# Patient Record
Sex: MALE | Race: WHITE | Employment: OTHER | URBAN - METROPOLITAN AREA
[De-identification: names, ages, dates, MRNs, and addresses within clinical notes are randomized per-mention and may not be internally consistent; named-entity substitution may affect disease eponyms.]

---

## 2017-01-25 RX ORDER — ATENOLOL 100 MG/1
TABLET ORAL
Qty: 90 TABLET | Refills: 1 | Status: SHIPPED | OUTPATIENT
Start: 2017-01-25 | End: 2017-07-24 | Stop reason: SDUPTHER

## 2017-02-03 RX ORDER — DIPHENOXYLATE HYDROCHLORIDE AND ATROPINE SULFATE 2.5; .025 MG/1; MG/1
TABLET ORAL
Qty: 120 TABLET | Refills: 1 | Status: SHIPPED | OUTPATIENT
Start: 2017-02-03 | End: 2017-05-23 | Stop reason: SDUPTHER

## 2017-02-20 ENCOUNTER — OFFICE VISIT (OUTPATIENT)
Dept: INTERNAL MEDICINE CLINIC | Age: 72
End: 2017-02-20

## 2017-02-20 VITALS
WEIGHT: 161.2 LBS | SYSTOLIC BLOOD PRESSURE: 136 MMHG | BODY MASS INDEX: 25.91 KG/M2 | OXYGEN SATURATION: 98 % | HEIGHT: 66 IN | HEART RATE: 78 BPM | RESPIRATION RATE: 18 BRPM | DIASTOLIC BLOOD PRESSURE: 64 MMHG

## 2017-02-20 DIAGNOSIS — E78.00 PURE HYPERCHOLESTEROLEMIA: ICD-10-CM

## 2017-02-20 DIAGNOSIS — Z13.1 SCREENING FOR DIABETES MELLITUS: ICD-10-CM

## 2017-02-20 DIAGNOSIS — I10 ESSENTIAL HYPERTENSION: ICD-10-CM

## 2017-02-20 DIAGNOSIS — Z00.00 ENCOUNTER FOR MEDICARE ANNUAL WELLNESS EXAM: Primary | ICD-10-CM

## 2017-02-20 DIAGNOSIS — Z00.00 ROUTINE GENERAL MEDICAL EXAMINATION AT A HEALTH CARE FACILITY: ICD-10-CM

## 2017-02-20 DIAGNOSIS — Z12.5 SCREENING PSA (PROSTATE SPECIFIC ANTIGEN): ICD-10-CM

## 2017-02-20 PROCEDURE — G0438 PPPS, INITIAL VISIT: HCPCS | Performed by: FAMILY MEDICINE

## 2017-02-20 RX ORDER — QUETIAPINE FUMARATE 25 MG/1
25 TABLET, FILM COATED ORAL NIGHTLY
Qty: 90 TABLET | Refills: 1 | Status: SHIPPED | OUTPATIENT
Start: 2017-02-20 | End: 2017-04-24 | Stop reason: SDUPTHER

## 2017-02-20 RX ORDER — ALPRAZOLAM 0.25 MG/1
0.25 TABLET ORAL NIGHTLY PRN
Qty: 60 TABLET | Refills: 0 | Status: SHIPPED | OUTPATIENT
Start: 2017-02-20 | End: 2017-04-24 | Stop reason: ALTCHOICE

## 2017-02-20 ASSESSMENT — LIFESTYLE VARIABLES: HOW OFTEN DO YOU HAVE A DRINK CONTAINING ALCOHOL: 0

## 2017-02-20 ASSESSMENT — ANXIETY QUESTIONNAIRES: GAD7 TOTAL SCORE: 0

## 2017-02-20 ASSESSMENT — PATIENT HEALTH QUESTIONNAIRE - PHQ9: SUM OF ALL RESPONSES TO PHQ QUESTIONS 1-9: 0

## 2017-03-17 DIAGNOSIS — Z12.5 SCREENING PSA (PROSTATE SPECIFIC ANTIGEN): ICD-10-CM

## 2017-03-17 DIAGNOSIS — I10 ESSENTIAL HYPERTENSION: ICD-10-CM

## 2017-03-17 DIAGNOSIS — Z13.1 SCREENING FOR DIABETES MELLITUS: ICD-10-CM

## 2017-03-17 DIAGNOSIS — E78.00 PURE HYPERCHOLESTEROLEMIA: ICD-10-CM

## 2017-03-17 LAB
A/G RATIO: 2.3 (ref 1.1–2.2)
ALBUMIN SERPL-MCNC: 4.6 G/DL (ref 3.4–5)
ALP BLD-CCNC: 77 U/L (ref 40–129)
ALT SERPL-CCNC: 26 U/L (ref 10–40)
ANION GAP SERPL CALCULATED.3IONS-SCNC: 16 MMOL/L (ref 3–16)
AST SERPL-CCNC: 23 U/L (ref 15–37)
BASOPHILS ABSOLUTE: 0 K/UL (ref 0–0.2)
BASOPHILS RELATIVE PERCENT: 0.4 %
BILIRUB SERPL-MCNC: 0.5 MG/DL (ref 0–1)
BUN BLDV-MCNC: 22 MG/DL (ref 7–20)
CALCIUM SERPL-MCNC: 9.9 MG/DL (ref 8.3–10.6)
CHLORIDE BLD-SCNC: 98 MMOL/L (ref 99–110)
CHOLESTEROL, TOTAL: 177 MG/DL (ref 0–199)
CO2: 28 MMOL/L (ref 21–32)
CREAT SERPL-MCNC: 0.9 MG/DL (ref 0.8–1.3)
EOSINOPHILS ABSOLUTE: 0.3 K/UL (ref 0–0.6)
EOSINOPHILS RELATIVE PERCENT: 3 %
GFR AFRICAN AMERICAN: >60
GFR NON-AFRICAN AMERICAN: >60
GLOBULIN: 2 G/DL
GLUCOSE BLD-MCNC: 104 MG/DL (ref 70–99)
HCT VFR BLD CALC: 46.3 % (ref 40.5–52.5)
HDLC SERPL-MCNC: 56 MG/DL (ref 40–60)
HEMOGLOBIN: 15.4 G/DL (ref 13.5–17.5)
LDL CHOLESTEROL CALCULATED: 88 MG/DL
LYMPHOCYTES ABSOLUTE: 1.8 K/UL (ref 1–5.1)
LYMPHOCYTES RELATIVE PERCENT: 20.2 %
MCH RBC QN AUTO: 28.7 PG (ref 26–34)
MCHC RBC AUTO-ENTMCNC: 33.3 G/DL (ref 31–36)
MCV RBC AUTO: 86.1 FL (ref 80–100)
MONOCYTES ABSOLUTE: 0.8 K/UL (ref 0–1.3)
MONOCYTES RELATIVE PERCENT: 9.7 %
NEUTROPHILS ABSOLUTE: 5.8 K/UL (ref 1.7–7.7)
NEUTROPHILS RELATIVE PERCENT: 66.7 %
PDW BLD-RTO: 14 % (ref 12.4–15.4)
PLATELET # BLD: 236 K/UL (ref 135–450)
PMV BLD AUTO: 8.4 FL (ref 5–10.5)
POTASSIUM SERPL-SCNC: 3.9 MMOL/L (ref 3.5–5.1)
PROSTATE SPECIFIC ANTIGEN: 1.47 NG/ML (ref 0–4)
RBC # BLD: 5.37 M/UL (ref 4.2–5.9)
SODIUM BLD-SCNC: 142 MMOL/L (ref 136–145)
T4 FREE: 0.9 NG/DL (ref 0.9–1.8)
TOTAL PROTEIN: 6.6 G/DL (ref 6.4–8.2)
TRIGL SERPL-MCNC: 163 MG/DL (ref 0–150)
TSH SERPL DL<=0.05 MIU/L-ACNC: 3.21 UIU/ML (ref 0.27–4.2)
VLDLC SERPL CALC-MCNC: 33 MG/DL
WBC # BLD: 8.7 K/UL (ref 4–11)

## 2017-03-27 ENCOUNTER — OFFICE VISIT (OUTPATIENT)
Dept: INTERNAL MEDICINE CLINIC | Age: 72
End: 2017-03-27

## 2017-03-27 VITALS
RESPIRATION RATE: 16 BRPM | SYSTOLIC BLOOD PRESSURE: 130 MMHG | HEART RATE: 80 BPM | DIASTOLIC BLOOD PRESSURE: 66 MMHG | OXYGEN SATURATION: 95 %

## 2017-03-27 DIAGNOSIS — F51.01 PRIMARY INSOMNIA: ICD-10-CM

## 2017-03-27 DIAGNOSIS — K58.0 IRRITABLE BOWEL SYNDROME WITH DIARRHEA: ICD-10-CM

## 2017-03-27 DIAGNOSIS — F34.1 DYSTHYMIA: ICD-10-CM

## 2017-03-27 PROCEDURE — G8420 CALC BMI NORM PARAMETERS: HCPCS | Performed by: FAMILY MEDICINE

## 2017-03-27 PROCEDURE — 99214 OFFICE O/P EST MOD 30 MIN: CPT | Performed by: FAMILY MEDICINE

## 2017-03-27 PROCEDURE — 4040F PNEUMOC VAC/ADMIN/RCVD: CPT | Performed by: FAMILY MEDICINE

## 2017-03-27 PROCEDURE — G8484 FLU IMMUNIZE NO ADMIN: HCPCS | Performed by: FAMILY MEDICINE

## 2017-03-27 PROCEDURE — 3017F COLORECTAL CA SCREEN DOC REV: CPT | Performed by: FAMILY MEDICINE

## 2017-03-27 PROCEDURE — 1123F ACP DISCUSS/DSCN MKR DOCD: CPT | Performed by: FAMILY MEDICINE

## 2017-03-27 PROCEDURE — 1036F TOBACCO NON-USER: CPT | Performed by: FAMILY MEDICINE

## 2017-03-27 PROCEDURE — G8427 DOCREV CUR MEDS BY ELIG CLIN: HCPCS | Performed by: FAMILY MEDICINE

## 2017-03-27 ASSESSMENT — ENCOUNTER SYMPTOMS
DIARRHEA: 0
BLOOD IN STOOL: 0
VOICE CHANGE: 0
ABDOMINAL PAIN: 0
TROUBLE SWALLOWING: 0
CONSTIPATION: 0
SHORTNESS OF BREATH: 0

## 2017-04-05 ENCOUNTER — OFFICE VISIT (OUTPATIENT)
Dept: SLEEP MEDICINE | Age: 72
End: 2017-04-05

## 2017-04-05 VITALS
SYSTOLIC BLOOD PRESSURE: 136 MMHG | HEIGHT: 66 IN | RESPIRATION RATE: 16 BRPM | DIASTOLIC BLOOD PRESSURE: 62 MMHG | WEIGHT: 165 LBS | HEART RATE: 90 BPM | OXYGEN SATURATION: 95 % | BODY MASS INDEX: 26.52 KG/M2

## 2017-04-05 DIAGNOSIS — F51.04 INSOMNIA, PSYCHOPHYSIOLOGICAL: Primary | ICD-10-CM

## 2017-04-05 PROCEDURE — 4040F PNEUMOC VAC/ADMIN/RCVD: CPT | Performed by: PSYCHIATRY & NEUROLOGY

## 2017-04-05 PROCEDURE — 99204 OFFICE O/P NEW MOD 45 MIN: CPT | Performed by: PSYCHIATRY & NEUROLOGY

## 2017-04-05 PROCEDURE — G8427 DOCREV CUR MEDS BY ELIG CLIN: HCPCS | Performed by: PSYCHIATRY & NEUROLOGY

## 2017-04-05 PROCEDURE — 3017F COLORECTAL CA SCREEN DOC REV: CPT | Performed by: PSYCHIATRY & NEUROLOGY

## 2017-04-05 PROCEDURE — 1036F TOBACCO NON-USER: CPT | Performed by: PSYCHIATRY & NEUROLOGY

## 2017-04-05 PROCEDURE — 1123F ACP DISCUSS/DSCN MKR DOCD: CPT | Performed by: PSYCHIATRY & NEUROLOGY

## 2017-04-05 PROCEDURE — G8420 CALC BMI NORM PARAMETERS: HCPCS | Performed by: PSYCHIATRY & NEUROLOGY

## 2017-04-05 ASSESSMENT — ENCOUNTER SYMPTOMS: COUGH: 1

## 2017-04-05 ASSESSMENT — SLEEP AND FATIGUE QUESTIONNAIRES
HOW LIKELY ARE YOU TO NOD OFF OR FALL ASLEEP WHILE SITTING AND TALKING TO SOMEONE: 0
HOW LIKELY ARE YOU TO NOD OFF OR FALL ASLEEP IN A CAR, WHILE STOPPED FOR A FEW MINUTES IN TRAFFIC: 0
HOW LIKELY ARE YOU TO NOD OFF OR FALL ASLEEP WHILE SITTING INACTIVE IN A PUBLIC PLACE: 0
HOW LIKELY ARE YOU TO NOD OFF OR FALL ASLEEP WHILE SITTING AND READING: 0
ESS TOTAL SCORE: 2
HOW LIKELY ARE YOU TO NOD OFF OR FALL ASLEEP WHILE LYING DOWN TO REST IN THE AFTERNOON WHEN CIRCUMSTANCES PERMIT: 1
HOW LIKELY ARE YOU TO NOD OFF OR FALL ASLEEP WHILE SITTING QUIETLY AFTER LUNCH WITHOUT ALCOHOL: 0
HOW LIKELY ARE YOU TO NOD OFF OR FALL ASLEEP WHILE WATCHING TV: 0
HOW LIKELY ARE YOU TO NOD OFF OR FALL ASLEEP WHEN YOU ARE A PASSENGER IN A CAR FOR AN HOUR WITHOUT A BREAK: 1
NECK CIRCUMFERENCE (INCHES): 16

## 2017-04-13 RX ORDER — PRAVASTATIN SODIUM 40 MG
TABLET ORAL
Qty: 90 TABLET | Refills: 1 | Status: SHIPPED | OUTPATIENT
Start: 2017-04-13 | End: 2017-11-01 | Stop reason: SDUPTHER

## 2017-04-24 ENCOUNTER — OFFICE VISIT (OUTPATIENT)
Dept: INTERNAL MEDICINE CLINIC | Age: 72
End: 2017-04-24

## 2017-04-24 VITALS
DIASTOLIC BLOOD PRESSURE: 72 MMHG | RESPIRATION RATE: 18 BRPM | HEART RATE: 80 BPM | SYSTOLIC BLOOD PRESSURE: 120 MMHG | BODY MASS INDEX: 27.04 KG/M2 | WEIGHT: 165 LBS | OXYGEN SATURATION: 96 %

## 2017-04-24 DIAGNOSIS — R06.02 SHORTNESS OF BREATH: ICD-10-CM

## 2017-04-24 DIAGNOSIS — J44.9 COPD, MILD (HCC): ICD-10-CM

## 2017-04-24 DIAGNOSIS — F51.01 PRIMARY INSOMNIA: ICD-10-CM

## 2017-04-24 DIAGNOSIS — F41.8 DEPRESSION WITH ANXIETY: ICD-10-CM

## 2017-04-24 DIAGNOSIS — I10 ESSENTIAL HYPERTENSION: ICD-10-CM

## 2017-04-24 DIAGNOSIS — K58.0 IRRITABLE BOWEL SYNDROME WITH DIARRHEA: ICD-10-CM

## 2017-04-24 PROCEDURE — 99214 OFFICE O/P EST MOD 30 MIN: CPT | Performed by: FAMILY MEDICINE

## 2017-04-24 PROCEDURE — 4040F PNEUMOC VAC/ADMIN/RCVD: CPT | Performed by: FAMILY MEDICINE

## 2017-04-24 PROCEDURE — G8420 CALC BMI NORM PARAMETERS: HCPCS | Performed by: FAMILY MEDICINE

## 2017-04-24 PROCEDURE — 1036F TOBACCO NON-USER: CPT | Performed by: FAMILY MEDICINE

## 2017-04-24 PROCEDURE — G8926 SPIRO NO PERF OR DOC: HCPCS | Performed by: FAMILY MEDICINE

## 2017-04-24 PROCEDURE — 3023F SPIROM DOC REV: CPT | Performed by: FAMILY MEDICINE

## 2017-04-24 PROCEDURE — 1123F ACP DISCUSS/DSCN MKR DOCD: CPT | Performed by: FAMILY MEDICINE

## 2017-04-24 PROCEDURE — 3017F COLORECTAL CA SCREEN DOC REV: CPT | Performed by: FAMILY MEDICINE

## 2017-04-24 PROCEDURE — G8427 DOCREV CUR MEDS BY ELIG CLIN: HCPCS | Performed by: FAMILY MEDICINE

## 2017-04-24 RX ORDER — QUETIAPINE FUMARATE 50 MG/1
50 TABLET, FILM COATED ORAL NIGHTLY
Qty: 30 TABLET | Refills: 3 | Status: SHIPPED | OUTPATIENT
Start: 2017-04-24 | End: 2017-07-17

## 2017-04-24 ASSESSMENT — ENCOUNTER SYMPTOMS
BLOOD IN STOOL: 0
TROUBLE SWALLOWING: 0
CONSTIPATION: 0
ABDOMINAL PAIN: 0
SHORTNESS OF BREATH: 0
DIARRHEA: 0
VOICE CHANGE: 0

## 2017-04-28 RX ORDER — ALBUTEROL SULFATE 90 UG/1
2 AEROSOL, METERED RESPIRATORY (INHALATION) EVERY 6 HOURS PRN
Qty: 1 INHALER | Refills: 2 | OUTPATIENT
Start: 2017-04-28 | End: 2019-03-23 | Stop reason: SDUPTHER

## 2017-05-01 RX ORDER — BENAZEPRIL HYDROCHLORIDE 20 MG/1
TABLET ORAL
Qty: 90 TABLET | Refills: 1 | Status: SHIPPED | OUTPATIENT
Start: 2017-05-01 | End: 2017-10-22 | Stop reason: SDUPTHER

## 2017-05-03 ENCOUNTER — OFFICE VISIT (OUTPATIENT)
Dept: SLEEP MEDICINE | Age: 72
End: 2017-05-03

## 2017-05-03 VITALS
HEART RATE: 78 BPM | RESPIRATION RATE: 12 BRPM | OXYGEN SATURATION: 98 % | SYSTOLIC BLOOD PRESSURE: 128 MMHG | DIASTOLIC BLOOD PRESSURE: 62 MMHG | BODY MASS INDEX: 27.16 KG/M2 | WEIGHT: 169 LBS | HEIGHT: 66 IN

## 2017-05-03 DIAGNOSIS — F51.04 PSYCHOPHYSIOLOGICAL INSOMNIA: Primary | ICD-10-CM

## 2017-05-03 DIAGNOSIS — G47.00 INSOMNIA, PERSISTENT: ICD-10-CM

## 2017-05-03 PROCEDURE — 1036F TOBACCO NON-USER: CPT | Performed by: PSYCHIATRY & NEUROLOGY

## 2017-05-03 PROCEDURE — 4040F PNEUMOC VAC/ADMIN/RCVD: CPT | Performed by: PSYCHIATRY & NEUROLOGY

## 2017-05-03 PROCEDURE — G8420 CALC BMI NORM PARAMETERS: HCPCS | Performed by: PSYCHIATRY & NEUROLOGY

## 2017-05-03 PROCEDURE — 3017F COLORECTAL CA SCREEN DOC REV: CPT | Performed by: PSYCHIATRY & NEUROLOGY

## 2017-05-03 PROCEDURE — G8427 DOCREV CUR MEDS BY ELIG CLIN: HCPCS | Performed by: PSYCHIATRY & NEUROLOGY

## 2017-05-03 PROCEDURE — 1123F ACP DISCUSS/DSCN MKR DOCD: CPT | Performed by: PSYCHIATRY & NEUROLOGY

## 2017-05-03 PROCEDURE — 99213 OFFICE O/P EST LOW 20 MIN: CPT | Performed by: PSYCHIATRY & NEUROLOGY

## 2017-05-03 ASSESSMENT — ENCOUNTER SYMPTOMS
EYES NEGATIVE: 1
COUGH: 1
GASTROINTESTINAL NEGATIVE: 1
COLOR CHANGE: 0

## 2017-05-15 RX ORDER — AMLODIPINE BESYLATE 10 MG/1
TABLET ORAL
Qty: 90 TABLET | Refills: 1 | Status: SHIPPED | OUTPATIENT
Start: 2017-05-15 | End: 2017-11-09 | Stop reason: SDUPTHER

## 2017-05-19 RX ORDER — QUETIAPINE FUMARATE 25 MG/1
25 TABLET, FILM COATED ORAL ONCE
Qty: 30 TABLET | Refills: 1 | Status: SHIPPED | OUTPATIENT
Start: 2017-05-19 | End: 2017-10-16 | Stop reason: ALTCHOICE

## 2017-05-23 RX ORDER — DIPHENOXYLATE HYDROCHLORIDE AND ATROPINE SULFATE 2.5; .025 MG/1; MG/1
TABLET ORAL
Qty: 120 TABLET | Refills: 0 | Status: SHIPPED | OUTPATIENT
Start: 2017-05-23 | End: 2017-06-24 | Stop reason: SDUPTHER

## 2017-05-25 ENCOUNTER — HOSPITAL ENCOUNTER (OUTPATIENT)
Dept: OTHER | Age: 72
Discharge: HOME | End: 2017-05-27
Admitting: PSYCHIATRY & NEUROLOGY

## 2017-05-25 DIAGNOSIS — F51.04 PSYCHOPHYSIOLOGICAL INSOMNIA: ICD-10-CM

## 2017-05-25 DIAGNOSIS — G47.00 INSOMNIA, PERSISTENT: ICD-10-CM

## 2017-06-01 ENCOUNTER — TELEPHONE (OUTPATIENT)
Dept: SLEEP MEDICINE | Age: 72
End: 2017-06-01

## 2017-06-06 ENCOUNTER — OFFICE VISIT (OUTPATIENT)
Dept: SLEEP MEDICINE | Age: 72
End: 2017-06-06

## 2017-06-06 VITALS
DIASTOLIC BLOOD PRESSURE: 68 MMHG | HEART RATE: 80 BPM | SYSTOLIC BLOOD PRESSURE: 130 MMHG | WEIGHT: 166 LBS | OXYGEN SATURATION: 98 % | BODY MASS INDEX: 26.68 KG/M2 | RESPIRATION RATE: 16 BRPM | HEIGHT: 66 IN

## 2017-06-06 DIAGNOSIS — F51.04 CHRONIC INSOMNIA: Primary | ICD-10-CM

## 2017-06-06 PROCEDURE — 1123F ACP DISCUSS/DSCN MKR DOCD: CPT | Performed by: PSYCHIATRY & NEUROLOGY

## 2017-06-06 PROCEDURE — 4040F PNEUMOC VAC/ADMIN/RCVD: CPT | Performed by: PSYCHIATRY & NEUROLOGY

## 2017-06-06 PROCEDURE — 99213 OFFICE O/P EST LOW 20 MIN: CPT | Performed by: PSYCHIATRY & NEUROLOGY

## 2017-06-06 PROCEDURE — G8427 DOCREV CUR MEDS BY ELIG CLIN: HCPCS | Performed by: PSYCHIATRY & NEUROLOGY

## 2017-06-06 PROCEDURE — 3017F COLORECTAL CA SCREEN DOC REV: CPT | Performed by: PSYCHIATRY & NEUROLOGY

## 2017-06-06 PROCEDURE — G8420 CALC BMI NORM PARAMETERS: HCPCS | Performed by: PSYCHIATRY & NEUROLOGY

## 2017-06-06 PROCEDURE — 1036F TOBACCO NON-USER: CPT | Performed by: PSYCHIATRY & NEUROLOGY

## 2017-06-06 RX ORDER — GABAPENTIN 300 MG/1
CAPSULE ORAL
Qty: 60 CAPSULE | Refills: 3 | Status: SHIPPED | OUTPATIENT
Start: 2017-06-06 | End: 2017-06-19

## 2017-06-06 ASSESSMENT — ENCOUNTER SYMPTOMS
ALLERGIC/IMMUNOLOGIC NEGATIVE: 1
GASTROINTESTINAL NEGATIVE: 1
RESPIRATORY NEGATIVE: 1
EYES NEGATIVE: 1

## 2017-06-16 ENCOUNTER — TELEPHONE (OUTPATIENT)
Dept: PULMONOLOGY | Age: 72
End: 2017-06-16

## 2017-06-19 RX ORDER — GABAPENTIN 600 MG/1
TABLET ORAL
Qty: 90 TABLET | Refills: 3 | Status: SHIPPED | OUTPATIENT
Start: 2017-06-19 | End: 2017-07-17 | Stop reason: SDUPTHER

## 2017-06-21 RX ORDER — VALSARTAN 320 MG/1
TABLET ORAL
Qty: 90 TABLET | Refills: 1 | Status: SHIPPED | OUTPATIENT
Start: 2017-06-21 | End: 2017-12-19 | Stop reason: SDUPTHER

## 2017-06-21 RX ORDER — HYDROCHLOROTHIAZIDE 25 MG/1
TABLET ORAL
Qty: 90 TABLET | Refills: 1 | Status: SHIPPED | OUTPATIENT
Start: 2017-06-21 | End: 2018-01-09 | Stop reason: SDUPTHER

## 2017-06-26 RX ORDER — DIPHENOXYLATE HYDROCHLORIDE AND ATROPINE SULFATE 2.5; .025 MG/1; MG/1
TABLET ORAL
Qty: 120 TABLET | Refills: 0 | Status: SHIPPED | OUTPATIENT
Start: 2017-06-26 | End: 2017-08-06 | Stop reason: SDUPTHER

## 2017-07-17 ENCOUNTER — OFFICE VISIT (OUTPATIENT)
Dept: INTERNAL MEDICINE CLINIC | Age: 72
End: 2017-07-17

## 2017-07-17 VITALS
SYSTOLIC BLOOD PRESSURE: 124 MMHG | RESPIRATION RATE: 18 BRPM | OXYGEN SATURATION: 97 % | HEART RATE: 74 BPM | DIASTOLIC BLOOD PRESSURE: 76 MMHG | WEIGHT: 163.2 LBS | BODY MASS INDEX: 26.74 KG/M2

## 2017-07-17 DIAGNOSIS — F34.1 DYSTHYMIA: ICD-10-CM

## 2017-07-17 DIAGNOSIS — I10 ESSENTIAL HYPERTENSION: ICD-10-CM

## 2017-07-17 DIAGNOSIS — J44.9 COPD, MILD (HCC): ICD-10-CM

## 2017-07-17 DIAGNOSIS — F51.01 PRIMARY INSOMNIA: Primary | ICD-10-CM

## 2017-07-17 DIAGNOSIS — K58.0 IRRITABLE BOWEL SYNDROME WITH DIARRHEA: ICD-10-CM

## 2017-07-17 PROCEDURE — 1036F TOBACCO NON-USER: CPT | Performed by: FAMILY MEDICINE

## 2017-07-17 PROCEDURE — G8926 SPIRO NO PERF OR DOC: HCPCS | Performed by: FAMILY MEDICINE

## 2017-07-17 PROCEDURE — 99214 OFFICE O/P EST MOD 30 MIN: CPT | Performed by: FAMILY MEDICINE

## 2017-07-17 PROCEDURE — 4040F PNEUMOC VAC/ADMIN/RCVD: CPT | Performed by: FAMILY MEDICINE

## 2017-07-17 PROCEDURE — 3017F COLORECTAL CA SCREEN DOC REV: CPT | Performed by: FAMILY MEDICINE

## 2017-07-17 PROCEDURE — G8427 DOCREV CUR MEDS BY ELIG CLIN: HCPCS | Performed by: FAMILY MEDICINE

## 2017-07-17 PROCEDURE — 3023F SPIROM DOC REV: CPT | Performed by: FAMILY MEDICINE

## 2017-07-17 PROCEDURE — G8419 CALC BMI OUT NRM PARAM NOF/U: HCPCS | Performed by: FAMILY MEDICINE

## 2017-07-17 PROCEDURE — 1123F ACP DISCUSS/DSCN MKR DOCD: CPT | Performed by: FAMILY MEDICINE

## 2017-07-17 RX ORDER — GABAPENTIN 600 MG/1
TABLET ORAL
Qty: 90 TABLET | Refills: 3 | COMMUNITY
Start: 2017-07-17 | End: 2017-11-01

## 2017-07-17 ASSESSMENT — ENCOUNTER SYMPTOMS
ABDOMINAL PAIN: 0
DIARRHEA: 0
TROUBLE SWALLOWING: 0
BLOOD IN STOOL: 0
SHORTNESS OF BREATH: 0
CONSTIPATION: 0
VOICE CHANGE: 0

## 2017-07-24 RX ORDER — ATENOLOL 100 MG/1
TABLET ORAL
Qty: 90 TABLET | Refills: 1 | Status: SHIPPED | OUTPATIENT
Start: 2017-07-24 | End: 2018-01-24 | Stop reason: SDUPTHER

## 2017-08-07 RX ORDER — DIPHENOXYLATE HYDROCHLORIDE AND ATROPINE SULFATE 2.5; .025 MG/1; MG/1
TABLET ORAL
Qty: 120 TABLET | Refills: 0 | Status: SHIPPED | OUTPATIENT
Start: 2017-08-07 | End: 2017-10-10 | Stop reason: SDUPTHER

## 2017-08-11 RX ORDER — VENLAFAXINE HYDROCHLORIDE 150 MG/1
CAPSULE, EXTENDED RELEASE ORAL
Qty: 90 CAPSULE | Refills: 1 | Status: SHIPPED | OUTPATIENT
Start: 2017-08-11 | End: 2018-02-08 | Stop reason: SDUPTHER

## 2017-09-12 ENCOUNTER — OFFICE VISIT (OUTPATIENT)
Dept: SLEEP MEDICINE | Age: 72
End: 2017-09-12

## 2017-09-12 VITALS
BODY MASS INDEX: 26.36 KG/M2 | OXYGEN SATURATION: 97 % | WEIGHT: 164 LBS | RESPIRATION RATE: 20 BRPM | HEIGHT: 66 IN | DIASTOLIC BLOOD PRESSURE: 70 MMHG | SYSTOLIC BLOOD PRESSURE: 140 MMHG | HEART RATE: 71 BPM

## 2017-09-12 DIAGNOSIS — F51.04 CHRONIC INSOMNIA: Primary | ICD-10-CM

## 2017-09-12 PROCEDURE — 1036F TOBACCO NON-USER: CPT | Performed by: PSYCHIATRY & NEUROLOGY

## 2017-09-12 PROCEDURE — 3017F COLORECTAL CA SCREEN DOC REV: CPT | Performed by: PSYCHIATRY & NEUROLOGY

## 2017-09-12 PROCEDURE — G8427 DOCREV CUR MEDS BY ELIG CLIN: HCPCS | Performed by: PSYCHIATRY & NEUROLOGY

## 2017-09-12 PROCEDURE — 1123F ACP DISCUSS/DSCN MKR DOCD: CPT | Performed by: PSYCHIATRY & NEUROLOGY

## 2017-09-12 PROCEDURE — G8417 CALC BMI ABV UP PARAM F/U: HCPCS | Performed by: PSYCHIATRY & NEUROLOGY

## 2017-09-12 PROCEDURE — 99213 OFFICE O/P EST LOW 20 MIN: CPT | Performed by: PSYCHIATRY & NEUROLOGY

## 2017-09-12 PROCEDURE — 4040F PNEUMOC VAC/ADMIN/RCVD: CPT | Performed by: PSYCHIATRY & NEUROLOGY

## 2017-09-12 ASSESSMENT — ENCOUNTER SYMPTOMS
GASTROINTESTINAL NEGATIVE: 1
ALLERGIC/IMMUNOLOGIC NEGATIVE: 1
EYES NEGATIVE: 1
SHORTNESS OF BREATH: 0
APNEA: 0

## 2017-09-28 ENCOUNTER — TELEPHONE (OUTPATIENT)
Dept: PULMONOLOGY | Age: 72
End: 2017-09-28

## 2017-09-28 NOTE — TELEPHONE ENCOUNTER
Damaris Amos called stating that Dr Marzena Martínez prescribed Prosom 2mg tablets and he can take 1- 1 1/2 tabs nightly. He has been taking 1 1/2 tabs every night but this is not allowing him to stay asleep. He is taking and additional 1/2 tablet. He wants to know if Dr Marzena Martínez can increase his Rx to 2 mg.  Nightly

## 2017-10-09 ENCOUNTER — TELEPHONE (OUTPATIENT)
Dept: INTERNAL MEDICINE CLINIC | Age: 72
End: 2017-10-09

## 2017-10-09 RX ORDER — AZITHROMYCIN 250 MG/1
TABLET, FILM COATED ORAL
Qty: 1 PACKET | Refills: 0 | Status: SHIPPED | OUTPATIENT
Start: 2017-10-09 | End: 2017-10-16 | Stop reason: ALTCHOICE

## 2017-10-09 NOTE — TELEPHONE ENCOUNTER
Patient requesting z-pack to be sent to [facility] for his URI. Pt c/o of sx's for about a week now. Nasal congestion,sneezing etc. Please advise. Rivera Ward can be reached at [phone] .

## 2017-10-11 RX ORDER — DIPHENOXYLATE HYDROCHLORIDE AND ATROPINE SULFATE 2.5; .025 MG/1; MG/1
TABLET ORAL
Qty: 120 TABLET | Refills: 1 | Status: SHIPPED | OUTPATIENT
Start: 2017-10-11 | End: 2018-04-21 | Stop reason: SDUPTHER

## 2017-10-16 ENCOUNTER — OFFICE VISIT (OUTPATIENT)
Dept: INTERNAL MEDICINE CLINIC | Age: 72
End: 2017-10-16

## 2017-10-16 VITALS
HEART RATE: 76 BPM | DIASTOLIC BLOOD PRESSURE: 71 MMHG | OXYGEN SATURATION: 97 % | RESPIRATION RATE: 18 BRPM | SYSTOLIC BLOOD PRESSURE: 112 MMHG

## 2017-10-16 DIAGNOSIS — F51.01 PRIMARY INSOMNIA: ICD-10-CM

## 2017-10-16 DIAGNOSIS — R26.81 UNSTEADY: ICD-10-CM

## 2017-10-16 DIAGNOSIS — F41.8 DEPRESSION WITH ANXIETY: ICD-10-CM

## 2017-10-16 DIAGNOSIS — J20.9 ACUTE BRONCHITIS DUE TO INFECTION: ICD-10-CM

## 2017-10-16 DIAGNOSIS — J44.1 CHRONIC OBSTRUCTIVE PULMONARY DISEASE WITH ACUTE EXACERBATION (HCC): ICD-10-CM

## 2017-10-16 PROCEDURE — 1036F TOBACCO NON-USER: CPT | Performed by: FAMILY MEDICINE

## 2017-10-16 PROCEDURE — 4040F PNEUMOC VAC/ADMIN/RCVD: CPT | Performed by: FAMILY MEDICINE

## 2017-10-16 PROCEDURE — 99214 OFFICE O/P EST MOD 30 MIN: CPT | Performed by: FAMILY MEDICINE

## 2017-10-16 PROCEDURE — 1123F ACP DISCUSS/DSCN MKR DOCD: CPT | Performed by: FAMILY MEDICINE

## 2017-10-16 PROCEDURE — 3017F COLORECTAL CA SCREEN DOC REV: CPT | Performed by: FAMILY MEDICINE

## 2017-10-16 PROCEDURE — G8427 DOCREV CUR MEDS BY ELIG CLIN: HCPCS | Performed by: FAMILY MEDICINE

## 2017-10-16 PROCEDURE — G8484 FLU IMMUNIZE NO ADMIN: HCPCS | Performed by: FAMILY MEDICINE

## 2017-10-16 PROCEDURE — 3023F SPIROM DOC REV: CPT | Performed by: FAMILY MEDICINE

## 2017-10-16 PROCEDURE — G8417 CALC BMI ABV UP PARAM F/U: HCPCS | Performed by: FAMILY MEDICINE

## 2017-10-16 PROCEDURE — G8926 SPIRO NO PERF OR DOC: HCPCS | Performed by: FAMILY MEDICINE

## 2017-10-16 RX ORDER — PREDNISONE 20 MG/1
20 TABLET ORAL DAILY
Qty: 10 TABLET | Refills: 0 | Status: SHIPPED | OUTPATIENT
Start: 2017-10-16 | End: 2017-10-26

## 2017-10-16 ASSESSMENT — ENCOUNTER SYMPTOMS
DIARRHEA: 0
CONSTIPATION: 0
SHORTNESS OF BREATH: 0
VOICE CHANGE: 0
ABDOMINAL PAIN: 0
BLOOD IN STOOL: 0
TROUBLE SWALLOWING: 0

## 2017-10-23 RX ORDER — BENAZEPRIL HYDROCHLORIDE 20 MG/1
TABLET ORAL
Qty: 90 TABLET | Refills: 1 | Status: SHIPPED | OUTPATIENT
Start: 2017-10-23 | End: 2017-11-01

## 2017-10-25 ENCOUNTER — HOSPITAL ENCOUNTER (OUTPATIENT)
Dept: OTHER | Age: 72
Discharge: HOME | End: 2017-10-25
Attending: FAMILY MEDICINE | Admitting: FAMILY MEDICINE

## 2017-10-25 DIAGNOSIS — J44.1 CHRONIC OBSTRUCTIVE PULMONARY DISEASE WITH ACUTE EXACERBATION (HCC): ICD-10-CM

## 2017-10-25 DIAGNOSIS — J20.9 ACUTE BRONCHITIS DUE TO INFECTION: ICD-10-CM

## 2017-11-01 ENCOUNTER — OFFICE VISIT (OUTPATIENT)
Dept: INTERNAL MEDICINE CLINIC | Age: 72
End: 2017-11-01

## 2017-11-01 VITALS — SYSTOLIC BLOOD PRESSURE: 102 MMHG | DIASTOLIC BLOOD PRESSURE: 67 MMHG | HEART RATE: 90 BPM | OXYGEN SATURATION: 97 %

## 2017-11-01 DIAGNOSIS — F51.01 PRIMARY INSOMNIA: ICD-10-CM

## 2017-11-01 DIAGNOSIS — F34.1 DYSTHYMIA: ICD-10-CM

## 2017-11-01 DIAGNOSIS — K58.0 IRRITABLE BOWEL SYNDROME WITH DIARRHEA: ICD-10-CM

## 2017-11-01 DIAGNOSIS — J44.9 COPD, MILD (HCC): ICD-10-CM

## 2017-11-01 DIAGNOSIS — I10 ESSENTIAL HYPERTENSION: ICD-10-CM

## 2017-11-01 PROCEDURE — G8926 SPIRO NO PERF OR DOC: HCPCS | Performed by: FAMILY MEDICINE

## 2017-11-01 PROCEDURE — 4040F PNEUMOC VAC/ADMIN/RCVD: CPT | Performed by: FAMILY MEDICINE

## 2017-11-01 PROCEDURE — 99214 OFFICE O/P EST MOD 30 MIN: CPT | Performed by: FAMILY MEDICINE

## 2017-11-01 PROCEDURE — 3023F SPIROM DOC REV: CPT | Performed by: FAMILY MEDICINE

## 2017-11-01 PROCEDURE — G8427 DOCREV CUR MEDS BY ELIG CLIN: HCPCS | Performed by: FAMILY MEDICINE

## 2017-11-01 PROCEDURE — 1123F ACP DISCUSS/DSCN MKR DOCD: CPT | Performed by: FAMILY MEDICINE

## 2017-11-01 PROCEDURE — 3017F COLORECTAL CA SCREEN DOC REV: CPT | Performed by: FAMILY MEDICINE

## 2017-11-01 PROCEDURE — G8484 FLU IMMUNIZE NO ADMIN: HCPCS | Performed by: FAMILY MEDICINE

## 2017-11-01 PROCEDURE — 1036F TOBACCO NON-USER: CPT | Performed by: FAMILY MEDICINE

## 2017-11-01 PROCEDURE — G8417 CALC BMI ABV UP PARAM F/U: HCPCS | Performed by: FAMILY MEDICINE

## 2017-11-01 RX ORDER — PRAVASTATIN SODIUM 40 MG
TABLET ORAL
Qty: 90 TABLET | Refills: 1 | Status: SHIPPED | OUTPATIENT
Start: 2017-11-01 | End: 2018-05-05 | Stop reason: SDUPTHER

## 2017-11-01 ASSESSMENT — ENCOUNTER SYMPTOMS
SHORTNESS OF BREATH: 0
TROUBLE SWALLOWING: 0
DIARRHEA: 0
VOICE CHANGE: 0
BLOOD IN STOOL: 0
ABDOMINAL PAIN: 0
CONSTIPATION: 0

## 2017-11-01 NOTE — PROGRESS NOTES
affect and unsteadiness    2. COPD, mild (HCC) -Continue Ventolin inhaler as needed    3. Essential hypertension - controlled continue atenolol 100 mg daily, hydrochlorothiazide 25 mg daily and add Diovan 325 mg daily plus the Norvasc 10 mg daily. Hold benazepril x 2 weeks due to lingering cough -which might be ACE cough   4. Dysthymia -Stable on Effexor 150 mg daily    5. Irritable bowel syndrome with diarrhea - continue high-fiber diet and up when necessary Lomotil                 Plan:      As above.  RTC in 2-3 mos and PRN

## 2017-11-10 RX ORDER — AMLODIPINE BESYLATE 10 MG/1
TABLET ORAL
Qty: 90 TABLET | Refills: 1 | Status: SHIPPED | OUTPATIENT
Start: 2017-11-10 | End: 2018-02-12 | Stop reason: SDUPTHER

## 2017-12-11 RX ORDER — FLUTICASONE FUROATE AND VILANTEROL 200; 25 UG/1; UG/1
1 POWDER RESPIRATORY (INHALATION) DAILY
Qty: 1 EACH | Refills: 2 | Status: SHIPPED | OUTPATIENT
Start: 2017-12-11 | End: 2018-01-16 | Stop reason: SDUPTHER

## 2017-12-19 RX ORDER — VALSARTAN 320 MG/1
TABLET ORAL
Qty: 90 TABLET | Refills: 1 | Status: SHIPPED | OUTPATIENT
Start: 2017-12-19 | End: 2018-06-20 | Stop reason: SDUPTHER

## 2018-01-09 RX ORDER — HYDROCHLOROTHIAZIDE 25 MG/1
TABLET ORAL
Qty: 90 TABLET | Refills: 1 | Status: SHIPPED | OUTPATIENT
Start: 2018-01-09 | End: 2018-07-10 | Stop reason: SDUPTHER

## 2018-01-16 ENCOUNTER — OFFICE VISIT (OUTPATIENT)
Dept: INTERNAL MEDICINE CLINIC | Age: 73
End: 2018-01-16

## 2018-01-16 VITALS
RESPIRATION RATE: 18 BRPM | WEIGHT: 172.6 LBS | SYSTOLIC BLOOD PRESSURE: 138 MMHG | DIASTOLIC BLOOD PRESSURE: 62 MMHG | HEART RATE: 78 BPM | OXYGEN SATURATION: 97 % | BODY MASS INDEX: 28.29 KG/M2

## 2018-01-16 DIAGNOSIS — J44.9 COPD, MILD (HCC): Primary | ICD-10-CM

## 2018-01-16 DIAGNOSIS — F51.01 PRIMARY INSOMNIA: ICD-10-CM

## 2018-01-16 DIAGNOSIS — F34.1 DYSTHYMIA: ICD-10-CM

## 2018-01-16 DIAGNOSIS — K58.0 IRRITABLE BOWEL SYNDROME WITH DIARRHEA: ICD-10-CM

## 2018-01-16 DIAGNOSIS — I10 ESSENTIAL HYPERTENSION: ICD-10-CM

## 2018-01-16 PROCEDURE — 1036F TOBACCO NON-USER: CPT | Performed by: FAMILY MEDICINE

## 2018-01-16 PROCEDURE — G8427 DOCREV CUR MEDS BY ELIG CLIN: HCPCS | Performed by: FAMILY MEDICINE

## 2018-01-16 PROCEDURE — 3023F SPIROM DOC REV: CPT | Performed by: FAMILY MEDICINE

## 2018-01-16 PROCEDURE — 99214 OFFICE O/P EST MOD 30 MIN: CPT | Performed by: FAMILY MEDICINE

## 2018-01-16 PROCEDURE — G8417 CALC BMI ABV UP PARAM F/U: HCPCS | Performed by: FAMILY MEDICINE

## 2018-01-16 PROCEDURE — 4040F PNEUMOC VAC/ADMIN/RCVD: CPT | Performed by: FAMILY MEDICINE

## 2018-01-16 PROCEDURE — G8484 FLU IMMUNIZE NO ADMIN: HCPCS | Performed by: FAMILY MEDICINE

## 2018-01-16 PROCEDURE — 3017F COLORECTAL CA SCREEN DOC REV: CPT | Performed by: FAMILY MEDICINE

## 2018-01-16 PROCEDURE — 1123F ACP DISCUSS/DSCN MKR DOCD: CPT | Performed by: FAMILY MEDICINE

## 2018-01-16 PROCEDURE — G8926 SPIRO NO PERF OR DOC: HCPCS | Performed by: FAMILY MEDICINE

## 2018-01-16 RX ORDER — QUETIAPINE FUMARATE 25 MG/1
25 TABLET, FILM COATED ORAL ONCE
Qty: 30 TABLET | Refills: 1 | Status: SHIPPED | OUTPATIENT
Start: 2018-01-16 | End: 2018-02-09 | Stop reason: ALTCHOICE

## 2018-01-16 RX ORDER — FLUTICASONE FUROATE AND VILANTEROL 200; 25 UG/1; UG/1
1 POWDER RESPIRATORY (INHALATION) DAILY
Qty: 1 EACH | Refills: 2 | Status: SHIPPED | OUTPATIENT
Start: 2018-01-16 | End: 2018-05-26 | Stop reason: SDUPTHER

## 2018-01-16 ASSESSMENT — ENCOUNTER SYMPTOMS
TROUBLE SWALLOWING: 0
VOICE CHANGE: 0
DIARRHEA: 0
BLOOD IN STOOL: 0
CONSTIPATION: 0
SHORTNESS OF BREATH: 0
ABDOMINAL PAIN: 0

## 2018-01-16 NOTE — PROGRESS NOTES
Subjective:      Patient ID: Cassie Hollis is a 74 y.o. male. Patient presented to the office for follow-up of COPD, hypertension, dysthymia, insomnia and IBS. He is doing okay although there is some little adjustment with regards to a sleeping medicine. He still take Seroquel 12.5 to help maintain his sleep. He was prescribed gabapentin by the sleep medicine specialist  HPI Review of Systems   Constitutional: Negative for activity change. HENT: Negative for trouble swallowing and voice change. Eyes: Negative for visual disturbance. Respiratory: Negative for shortness of breath. Cardiovascular: Negative for chest pain and leg swelling. Gastrointestinal: Negative for abdominal pain, blood in stool, constipation and diarrhea. Genitourinary: Negative for difficulty urinating, dysuria, frequency, hematuria and scrotal swelling. Musculoskeletal: Negative for arthralgias and myalgias. Skin: Negative for rash. Neurological: Negative for dizziness. Psychiatric/Behavioral: Negative for behavioral problems. Objective:   Physical Exam   Constitutional: He is oriented to person, place, and time. He appears well-developed and well-nourished. No distress. HENT:   Head: Normocephalic. Eyes: Conjunctivae are normal.   Neck: Neck supple. No thyromegaly present. Cardiovascular: Normal rate, regular rhythm and normal heart sounds. No murmur heard. Pulmonary/Chest: Breath sounds normal. No respiratory distress. He has no wheezes. He has no rales. Abdominal: Soft. He exhibits no distension. Musculoskeletal: Normal range of motion. He exhibits no edema. Neurological: He is alert and oriented to person, place, and time. Skin: Skin is warm. No rash noted. Psychiatric: He has a normal mood and affect. His behavior is normal.       Assessment:      1. COPD, mild ([address] - stable on Breo and Ventolin HFA   2.  Essential hypertension -controlled Atenolol 100 mg daily, amlodipine 10 mg daily, and Diovan 325 mg daily, plus HCTZ 25 mg daily    3. Dysthymia - he takes Effexor 150 mg daily advised to increase the dose of Seroquel to 25 mg daily which might help sleep and he may discontinue  gabapentin which is also use to maintain his sleep    4. Primary insomnia - She also take ProSom 2 mg 1-2 tablets at night. 5. Irritable bowel syndrome with diarrhea - Continue high fiber diet plus Lomotil as needed             Plan:      As above.  RTC in 3 mos

## 2018-01-24 RX ORDER — ATENOLOL 100 MG/1
TABLET ORAL
Qty: 90 TABLET | Refills: 1 | Status: SHIPPED | OUTPATIENT
Start: 2018-01-24 | End: 2018-07-25 | Stop reason: SDUPTHER

## 2018-01-30 RX ORDER — QUETIAPINE FUMARATE 25 MG/1
TABLET, FILM COATED ORAL
Qty: 30 TABLET | Refills: 3 | Status: SHIPPED | OUTPATIENT
Start: 2018-01-30 | End: 2018-05-30 | Stop reason: SDUPTHER

## 2018-02-09 RX ORDER — VENLAFAXINE HYDROCHLORIDE 150 MG/1
CAPSULE, EXTENDED RELEASE ORAL
Qty: 90 CAPSULE | Refills: 1 | Status: SHIPPED | OUTPATIENT
Start: 2018-02-09 | End: 2018-08-13 | Stop reason: SDUPTHER

## 2018-02-12 RX ORDER — AMLODIPINE BESYLATE 10 MG/1
TABLET ORAL
Qty: 90 TABLET | Refills: 1 | Status: SHIPPED | OUTPATIENT
Start: 2018-02-12 | End: 2018-05-29 | Stop reason: SDUPTHER

## 2018-03-29 ENCOUNTER — OFFICE VISIT (OUTPATIENT)
Dept: SLEEP MEDICINE | Age: 73
End: 2018-03-29

## 2018-03-29 VITALS
HEIGHT: 70 IN | OXYGEN SATURATION: 97 % | TEMPERATURE: 98.6 F | SYSTOLIC BLOOD PRESSURE: 124 MMHG | BODY MASS INDEX: 24.91 KG/M2 | WEIGHT: 174 LBS | RESPIRATION RATE: 16 BRPM | DIASTOLIC BLOOD PRESSURE: 68 MMHG | HEART RATE: 76 BPM

## 2018-03-29 DIAGNOSIS — F51.04 CHRONIC INSOMNIA: Primary | ICD-10-CM

## 2018-03-29 PROCEDURE — G8427 DOCREV CUR MEDS BY ELIG CLIN: HCPCS | Performed by: PSYCHIATRY & NEUROLOGY

## 2018-03-29 PROCEDURE — 1123F ACP DISCUSS/DSCN MKR DOCD: CPT | Performed by: PSYCHIATRY & NEUROLOGY

## 2018-03-29 PROCEDURE — 1036F TOBACCO NON-USER: CPT | Performed by: PSYCHIATRY & NEUROLOGY

## 2018-03-29 PROCEDURE — 3017F COLORECTAL CA SCREEN DOC REV: CPT | Performed by: PSYCHIATRY & NEUROLOGY

## 2018-03-29 PROCEDURE — 4040F PNEUMOC VAC/ADMIN/RCVD: CPT | Performed by: PSYCHIATRY & NEUROLOGY

## 2018-03-29 PROCEDURE — G8482 FLU IMMUNIZE ORDER/ADMIN: HCPCS | Performed by: PSYCHIATRY & NEUROLOGY

## 2018-03-29 PROCEDURE — G8420 CALC BMI NORM PARAMETERS: HCPCS | Performed by: PSYCHIATRY & NEUROLOGY

## 2018-03-29 PROCEDURE — 99213 OFFICE O/P EST LOW 20 MIN: CPT | Performed by: PSYCHIATRY & NEUROLOGY

## 2018-03-29 NOTE — PATIENT INSTRUCTIONS
Patient Education        Insomnia: Care Instructions  Your Care Instructions    Insomnia is the inability to sleep well. It is a common problem for most people at some time. Insomnia may make it hard for you to get to sleep, stay asleep, or sleep as long as you need to. This can make you tired and grouchy during the day. It can also make you forgetful, less effective at work, and unhappy. Insomnia can be caused by conditions such as depression or anxiety. Pain can also affect your ability to sleep. When these problems are solved, the insomnia usually clears up. But sometimes bad sleep habits can cause insomnia. If insomnia is affecting your work or your enjoyment of life, you can take steps to improve your sleep. Follow-up care is a key part of your treatment and safety. Be sure to make and go to all appointments, and call your doctor if you are having problems. It's also a good idea to know your test results and keep a list of the medicines you take. How can you care for yourself at home? What to avoid  · Do not have drinks with caffeine, such as coffee or black tea, for 8 hours before bed. · Do not smoke or use other types of tobacco near bedtime. Nicotine is a stimulant and can keep you awake. · Avoid drinking alcohol late in the evening, because it can cause you to wake in the middle of the night. · Do not eat a big meal close to bedtime. If you are hungry, eat a light snack. · Do not drink a lot of water close to bedtime, because the need to urinate may wake you up during the night. · Do not read or watch TV in bed. Use the bed only for sleeping and sexual activity. What to try  · Go to bed at the same time every night, and wake up at the same time every morning. Do not take naps during the day. · Keep your bedroom quiet, dark, and cool. · Sleep on a comfortable pillow and mattress. · If watching the clock makes you anxious, turn it facing away from you so you cannot see the time.   · If you

## 2018-04-05 ENCOUNTER — OFFICE VISIT (OUTPATIENT)
Dept: INTERNAL MEDICINE CLINIC | Age: 73
End: 2018-04-05

## 2018-04-05 VITALS
TEMPERATURE: 98.8 F | BODY MASS INDEX: 25.02 KG/M2 | SYSTOLIC BLOOD PRESSURE: 130 MMHG | WEIGHT: 174.4 LBS | DIASTOLIC BLOOD PRESSURE: 70 MMHG | HEART RATE: 89 BPM | OXYGEN SATURATION: 98 %

## 2018-04-05 DIAGNOSIS — F51.01 PRIMARY INSOMNIA: ICD-10-CM

## 2018-04-05 DIAGNOSIS — Z23 NEED FOR TDAP VACCINATION: ICD-10-CM

## 2018-04-05 DIAGNOSIS — J20.9 ACUTE BRONCHITIS DUE TO INFECTION: ICD-10-CM

## 2018-04-05 DIAGNOSIS — Z13.6 SCREENING FOR AAA (ABDOMINAL AORTIC ANEURYSM): ICD-10-CM

## 2018-04-05 DIAGNOSIS — F34.1 DYSTHYMIA: ICD-10-CM

## 2018-04-05 DIAGNOSIS — J44.9 COPD, MILD (HCC): ICD-10-CM

## 2018-04-05 PROCEDURE — G8926 SPIRO NO PERF OR DOC: HCPCS | Performed by: FAMILY MEDICINE

## 2018-04-05 PROCEDURE — 4040F PNEUMOC VAC/ADMIN/RCVD: CPT | Performed by: FAMILY MEDICINE

## 2018-04-05 PROCEDURE — G8417 CALC BMI ABV UP PARAM F/U: HCPCS | Performed by: FAMILY MEDICINE

## 2018-04-05 PROCEDURE — 99214 OFFICE O/P EST MOD 30 MIN: CPT | Performed by: FAMILY MEDICINE

## 2018-04-05 PROCEDURE — 1123F ACP DISCUSS/DSCN MKR DOCD: CPT | Performed by: FAMILY MEDICINE

## 2018-04-05 PROCEDURE — G8427 DOCREV CUR MEDS BY ELIG CLIN: HCPCS | Performed by: FAMILY MEDICINE

## 2018-04-05 PROCEDURE — 1036F TOBACCO NON-USER: CPT | Performed by: FAMILY MEDICINE

## 2018-04-05 PROCEDURE — 3017F COLORECTAL CA SCREEN DOC REV: CPT | Performed by: FAMILY MEDICINE

## 2018-04-05 PROCEDURE — 3023F SPIROM DOC REV: CPT | Performed by: FAMILY MEDICINE

## 2018-04-05 RX ORDER — AZITHROMYCIN 250 MG/1
TABLET, FILM COATED ORAL
Qty: 1 PACKET | Refills: 0 | Status: SHIPPED | OUTPATIENT
Start: 2018-04-05 | End: 2018-04-15

## 2018-04-05 RX ORDER — GUAIFENESIN AND CODEINE PHOSPHATE 100; 10 MG/5ML; MG/5ML
5 SOLUTION ORAL 4 TIMES DAILY PRN
Qty: 120 ML | Refills: 1 | Status: SHIPPED | OUTPATIENT
Start: 2018-04-05 | End: 2018-04-12

## 2018-04-05 ASSESSMENT — ENCOUNTER SYMPTOMS
DIARRHEA: 0
TROUBLE SWALLOWING: 0
SHORTNESS OF BREATH: 0
ABDOMINAL PAIN: 0
CONSTIPATION: 0
BLOOD IN STOOL: 0
VOICE CHANGE: 0

## 2018-04-16 ENCOUNTER — HOSPITAL ENCOUNTER (OUTPATIENT)
Dept: ULTRASOUND IMAGING | Age: 73
Discharge: HOME | End: 2018-04-16
Attending: FAMILY MEDICINE | Admitting: FAMILY MEDICINE

## 2018-04-16 DIAGNOSIS — Z13.6 ENCOUNTER FOR SCREENING FOR CARDIOVASCULAR DISORDERS: ICD-10-CM

## 2018-04-16 DIAGNOSIS — Z13.6 SCREENING FOR AAA (ABDOMINAL AORTIC ANEURYSM): ICD-10-CM

## 2018-04-19 DIAGNOSIS — F51.04 CHRONIC INSOMNIA: ICD-10-CM

## 2018-04-19 DIAGNOSIS — Z12.5 SCREENING PSA (PROSTATE SPECIFIC ANTIGEN): ICD-10-CM

## 2018-04-19 DIAGNOSIS — I10 ESSENTIAL HYPERTENSION: Primary | ICD-10-CM

## 2018-04-19 DIAGNOSIS — E78.00 PURE HYPERCHOLESTEROLEMIA: ICD-10-CM

## 2018-04-21 DIAGNOSIS — K52.9 CHRONIC DIARRHEA: Primary | ICD-10-CM

## 2018-04-23 RX ORDER — DIPHENOXYLATE HYDROCHLORIDE AND ATROPINE SULFATE 2.5; .025 MG/1; MG/1
TABLET ORAL
Qty: 120 TABLET | Refills: 0 | Status: SHIPPED | OUTPATIENT
Start: 2018-04-23 | End: 2018-08-14 | Stop reason: SDUPTHER

## 2018-04-23 RX ORDER — BENAZEPRIL HYDROCHLORIDE 20 MG/1
TABLET ORAL
Qty: 90 TABLET | Refills: 1 | Status: SHIPPED | OUTPATIENT
Start: 2018-04-23 | End: 2018-10-28 | Stop reason: SDUPTHER

## 2018-04-25 DIAGNOSIS — I10 ESSENTIAL HYPERTENSION: ICD-10-CM

## 2018-04-25 DIAGNOSIS — E78.00 PURE HYPERCHOLESTEROLEMIA: ICD-10-CM

## 2018-04-25 DIAGNOSIS — Z12.5 SCREENING PSA (PROSTATE SPECIFIC ANTIGEN): ICD-10-CM

## 2018-04-25 LAB
A/G RATIO: 1.8 (ref 1.1–2.2)
ALBUMIN SERPL-MCNC: 4.2 G/DL (ref 3.4–5)
ALP BLD-CCNC: 88 U/L (ref 40–129)
ALT SERPL-CCNC: 22 U/L (ref 10–40)
ANION GAP SERPL CALCULATED.3IONS-SCNC: 13 MMOL/L (ref 3–16)
AST SERPL-CCNC: 22 U/L (ref 15–37)
BILIRUB SERPL-MCNC: 0.4 MG/DL (ref 0–1)
BUN BLDV-MCNC: 23 MG/DL (ref 7–20)
CALCIUM SERPL-MCNC: 9.3 MG/DL (ref 8.3–10.6)
CHLORIDE BLD-SCNC: 100 MMOL/L (ref 99–110)
CHOLESTEROL, TOTAL: 170 MG/DL (ref 0–199)
CO2: 29 MMOL/L (ref 21–32)
CREAT SERPL-MCNC: 0.9 MG/DL (ref 0.8–1.3)
GFR AFRICAN AMERICAN: >60
GFR NON-AFRICAN AMERICAN: >60
GLOBULIN: 2.3 G/DL
GLUCOSE BLD-MCNC: 109 MG/DL (ref 70–99)
HCT VFR BLD CALC: 45.2 % (ref 40.5–52.5)
HDLC SERPL-MCNC: 44 MG/DL (ref 40–60)
HEMOGLOBIN: 15.5 G/DL (ref 13.5–17.5)
LDL CHOLESTEROL CALCULATED: 106 MG/DL
MCH RBC QN AUTO: 29.2 PG (ref 26–34)
MCHC RBC AUTO-ENTMCNC: 34.3 G/DL (ref 31–36)
MCV RBC AUTO: 85.2 FL (ref 80–100)
PDW BLD-RTO: 14 % (ref 12.4–15.4)
PLATELET # BLD: 299 K/UL (ref 135–450)
PMV BLD AUTO: 7.8 FL (ref 5–10.5)
POTASSIUM SERPL-SCNC: 4.2 MMOL/L (ref 3.5–5.1)
RBC # BLD: 5.3 M/UL (ref 4.2–5.9)
SODIUM BLD-SCNC: 142 MMOL/L (ref 136–145)
T4 FREE: 1 NG/DL (ref 0.9–1.8)
TOTAL PROTEIN: 6.5 G/DL (ref 6.4–8.2)
TRIGL SERPL-MCNC: 100 MG/DL (ref 0–150)
TSH SERPL DL<=0.05 MIU/L-ACNC: 3.21 UIU/ML (ref 0.27–4.2)
VLDLC SERPL CALC-MCNC: 20 MG/DL
WBC # BLD: 9.5 K/UL (ref 4–11)

## 2018-04-30 ENCOUNTER — TELEPHONE (OUTPATIENT)
Dept: INTERNAL MEDICINE CLINIC | Age: 73
End: 2018-04-30

## 2018-04-30 ENCOUNTER — TELEPHONE (OUTPATIENT)
Dept: SLEEP MEDICINE | Age: 73
End: 2018-04-30

## 2018-04-30 DIAGNOSIS — F51.04 CHRONIC INSOMNIA: Primary | ICD-10-CM

## 2018-04-30 RX ORDER — TEMAZEPAM 15 MG/1
15 CAPSULE ORAL NIGHTLY PRN
Qty: 30 CAPSULE | Refills: 5 | Status: SHIPPED | OUTPATIENT
Start: 2018-04-30 | End: 2018-05-30

## 2018-05-05 RX ORDER — PRAVASTATIN SODIUM 40 MG
TABLET ORAL
Qty: 90 TABLET | Refills: 1 | Status: SHIPPED | OUTPATIENT
Start: 2018-05-05 | End: 2018-11-02 | Stop reason: SDUPTHER

## 2018-05-05 RX ORDER — AMLODIPINE BESYLATE 10 MG/1
TABLET ORAL
Qty: 90 TABLET | Refills: 1 | Status: SHIPPED | OUTPATIENT
Start: 2018-05-05 | End: 2018-11-02 | Stop reason: SDUPTHER

## 2018-05-24 DIAGNOSIS — F51.04 CHRONIC INSOMNIA: ICD-10-CM

## 2018-05-30 ENCOUNTER — OFFICE VISIT (OUTPATIENT)
Dept: INTERNAL MEDICINE CLINIC | Age: 73
End: 2018-05-30

## 2018-05-30 VITALS
RESPIRATION RATE: 18 BRPM | HEART RATE: 80 BPM | BODY MASS INDEX: 24.25 KG/M2 | WEIGHT: 169 LBS | DIASTOLIC BLOOD PRESSURE: 66 MMHG | SYSTOLIC BLOOD PRESSURE: 134 MMHG | OXYGEN SATURATION: 96 %

## 2018-05-30 DIAGNOSIS — F51.01 PRIMARY INSOMNIA: Primary | ICD-10-CM

## 2018-05-30 DIAGNOSIS — I10 ESSENTIAL HYPERTENSION: ICD-10-CM

## 2018-05-30 DIAGNOSIS — F41.8 DEPRESSION WITH ANXIETY: ICD-10-CM

## 2018-05-30 DIAGNOSIS — K58.0 IRRITABLE BOWEL SYNDROME WITH DIARRHEA: ICD-10-CM

## 2018-05-30 PROCEDURE — 1123F ACP DISCUSS/DSCN MKR DOCD: CPT | Performed by: FAMILY MEDICINE

## 2018-05-30 PROCEDURE — G8420 CALC BMI NORM PARAMETERS: HCPCS | Performed by: FAMILY MEDICINE

## 2018-05-30 PROCEDURE — 1036F TOBACCO NON-USER: CPT | Performed by: FAMILY MEDICINE

## 2018-05-30 PROCEDURE — G8427 DOCREV CUR MEDS BY ELIG CLIN: HCPCS | Performed by: FAMILY MEDICINE

## 2018-05-30 PROCEDURE — 4040F PNEUMOC VAC/ADMIN/RCVD: CPT | Performed by: FAMILY MEDICINE

## 2018-05-30 PROCEDURE — 99214 OFFICE O/P EST MOD 30 MIN: CPT | Performed by: FAMILY MEDICINE

## 2018-05-30 PROCEDURE — 3017F COLORECTAL CA SCREEN DOC REV: CPT | Performed by: FAMILY MEDICINE

## 2018-05-30 RX ORDER — QUETIAPINE FUMARATE 50 MG/1
50 TABLET, FILM COATED ORAL NIGHTLY
Qty: 30 TABLET | Refills: 5 | Status: SHIPPED | OUTPATIENT
Start: 2018-05-30 | End: 2018-10-15 | Stop reason: SDUPTHER

## 2018-05-30 ASSESSMENT — ENCOUNTER SYMPTOMS
TROUBLE SWALLOWING: 0
SHORTNESS OF BREATH: 0
DIARRHEA: 0
CONSTIPATION: 0
VOICE CHANGE: 0
BLOOD IN STOOL: 0
ABDOMINAL PAIN: 0

## 2018-06-20 RX ORDER — VALSARTAN 320 MG/1
TABLET ORAL
Qty: 90 TABLET | Refills: 1 | Status: SHIPPED | OUTPATIENT
Start: 2018-06-20 | End: 2018-07-31 | Stop reason: ALTCHOICE

## 2018-07-02 RX ORDER — GABAPENTIN 600 MG/1
TABLET ORAL
Qty: 60 TABLET | Refills: 1 | Status: SHIPPED | OUTPATIENT
Start: 2018-07-02 | End: 2018-10-02

## 2018-07-10 RX ORDER — HYDROCHLOROTHIAZIDE 25 MG/1
25 TABLET ORAL DAILY
Qty: 90 TABLET | Refills: 1 | Status: SHIPPED | OUTPATIENT
Start: 2018-07-10 | End: 2019-01-15 | Stop reason: SDUPTHER

## 2018-07-25 RX ORDER — ATENOLOL 100 MG/1
100 TABLET ORAL DAILY
Qty: 90 TABLET | Refills: 1 | Status: SHIPPED | OUTPATIENT
Start: 2018-07-25 | End: 2019-01-15 | Stop reason: SDUPTHER

## 2018-07-31 ENCOUNTER — TELEPHONE (OUTPATIENT)
Dept: INTERNAL MEDICINE CLINIC | Age: 73
End: 2018-07-31

## 2018-07-31 RX ORDER — LOSARTAN POTASSIUM 100 MG/1
100 TABLET ORAL DAILY
Qty: 90 TABLET | Refills: 1 | Status: SHIPPED | OUTPATIENT
Start: 2018-07-31 | End: 2019-01-31 | Stop reason: SDUPTHER

## 2018-08-08 ENCOUNTER — OFFICE VISIT (OUTPATIENT)
Dept: INTERNAL MEDICINE CLINIC | Age: 73
End: 2018-08-08

## 2018-08-08 VITALS
OXYGEN SATURATION: 97 % | HEART RATE: 71 BPM | BODY MASS INDEX: 24.02 KG/M2 | SYSTOLIC BLOOD PRESSURE: 136 MMHG | WEIGHT: 167.4 LBS | RESPIRATION RATE: 18 BRPM | DIASTOLIC BLOOD PRESSURE: 66 MMHG

## 2018-08-08 DIAGNOSIS — Z00.00 MEDICARE ANNUAL WELLNESS VISIT, SUBSEQUENT: Primary | ICD-10-CM

## 2018-08-08 DIAGNOSIS — F51.01 PRIMARY INSOMNIA: ICD-10-CM

## 2018-08-08 DIAGNOSIS — Z00.00 ROUTINE GENERAL MEDICAL EXAMINATION AT A HEALTH CARE FACILITY: ICD-10-CM

## 2018-08-08 PROCEDURE — G0439 PPPS, SUBSEQ VISIT: HCPCS | Performed by: FAMILY MEDICINE

## 2018-08-08 PROCEDURE — 4040F PNEUMOC VAC/ADMIN/RCVD: CPT | Performed by: FAMILY MEDICINE

## 2018-08-08 ASSESSMENT — LIFESTYLE VARIABLES: HOW OFTEN DO YOU HAVE A DRINK CONTAINING ALCOHOL: 0

## 2018-08-08 ASSESSMENT — PATIENT HEALTH QUESTIONNAIRE - PHQ9
SUM OF ALL RESPONSES TO PHQ QUESTIONS 1-9: 1
SUM OF ALL RESPONSES TO PHQ QUESTIONS 1-9: 1

## 2018-08-08 ASSESSMENT — ANXIETY QUESTIONNAIRES: GAD7 TOTAL SCORE: 0

## 2018-08-08 NOTE — PROGRESS NOTES
Ferman Essex, MD   aspirin 81 MG tablet Take 81 mg by mouth daily. Historical Provider, MD     Past Medical History:   Diagnosis Date    Anxiety     Depression     Hyperlipidemia     Hypertension     IBS (irritable bowel syndrome) 10/16/2013    Insomnia      Past Surgical History:   Procedure Laterality Date    APPENDECTOMY      TONSILLECTOMY AND ADENOIDECTOMY       Family History   Problem Relation Age of Onset    Stroke Maternal Grandfather     Cancer Other         leukemia    Hypertension Sister     Hypertension Brother     Hypertension Brother        CareTeam (Including outside providers/suppliers regularly involved in providing care):   Patient Care Team:  Ferman Essex, MD as PCP - Elizabeth Salmon MD as PCP - S Attributed Provider    Wt Readings from Last 3 Encounters:   08/08/18 167 lb 6.4 oz (75.9 kg)   05/30/18 169 lb (76.7 kg)   04/05/18 174 lb 6.4 oz (79.1 kg)     Vitals:    08/08/18 1329   BP: 136/66   Pulse: 71   Resp: 18   SpO2: 97%   Weight: 167 lb 6.4 oz (75.9 kg)     Body mass index is 24.02 kg/m².   General Appearance: alert and oriented to person, place and time, well developed and well- nourished, in no acute distress  Skin: warm and dry, no rash or erythema  Head: normocephalic and atraumatic  Eyes: pupils equal, round, and reactive to light, extraocular eye movements intact, conjunctivae normal  ENT: tympanic membrane, external ear and ear canal normal bilaterally, nose without deformity, nasal mucosa and turbinates normal without polyps  Neck: supple and non-tender without mass, no thyromegaly or thyroid nodules, no cervical lymphadenopathy  Pulmonary/Chest: clear to auscultation bilaterally- no wheezes, rales or rhonchi, normal air movement, no respiratory distress  Cardiovascular: normal rate, regular rhythm, normal S1 and S2, no murmurs, rubs, clicks, or gallops, distal pulses intact, no carotid bruits  Abdomen: soft, non-tender, non-distended, normal bowel sounds, no masses or organomegaly  Extremities: no cyanosis, clubbing or edema  Musculoskeletal: normal range of motion, no joint swelling, deformity or tenderness  Neurologic: reflexes normal and symmetric, no cranial nerve deficit, gait, coordination and speech normal    Patient's complete Health Risk Assessment and screening values have been reviewed and are found in Flowsheets. The following problems were reviewed today and where indicated follow up appointments were made and/or referrals ordered. Positive Risk Factor Screenings with Interventions:     General Health:  General  In general, how would you say your health is?: Very Good  In the past 7 days, have you experienced any of the following?: (!) New or Increased Fatigue  Do you get the social and emotional support that you need?: Yes  Do you have a Living Will?: Yes  General Health Risk Interventions:  · None indicated    Personalized Preventive Plan   Current Health Maintenance Status  Immunization History   Administered Date(s) Administered    Influenza Virus Vaccine 10/16/2013, 10/20/2014, 09/23/2015    Influenza, High Dose 10/01/2016, 09/26/2017    Pneumococcal 13-valent Conjugate ([id number]) 10/01/2016    Pneumococcal Polysaccharide ([id number]) 07/12/2013    Td 10/16/2013    Tdap (Boostrix, Adacel) 06/13/2018    Zoster Live (Zostavax) 09/04/2013    Zoster Subunit (Shingrix) 04/05/2018        Health Maintenance   Topic Date Due    Flu vaccine (1) 09/01/2018    Shingles Vaccine (2 of 2 - 2 Dose Series) 10/05/2018    Potassium monitoring  04/25/2019    Creatinine monitoring  04/25/2019    Colon cancer screen colonoscopy  06/05/2019    Lipid screen  04/25/2023    DTaP/Tdap/Td vaccine (2 - Td) 06/13/2028    Pneumococcal low/med risk  Completed    AAA screen  Completed     Recommendations for Preventive Services Due: see orders and patient instructions/AVS.  .   Recommended screening schedule for the next 5-10 years is provided to the patient in written form: see Patient Instructions/AVS.

## 2018-08-08 NOTE — PATIENT INSTRUCTIONS
Personalized Preventive Plan for Marisol Lynn - 8/8/2018  Medicare offers a range of preventive health benefits. Some of the tests and screenings are paid in full while other may be subject to a deductible, co-insurance, and/or copay. Some of these benefits include a comprehensive review of your medical history including lifestyle, illnesses that may run in your family, and various assessments and screenings as appropriate. After reviewing your medical record and screening and assessments performed today your provider may have ordered immunizations, labs, imaging, and/or referrals for you. A list of these orders (if applicable) as well as your Preventive Care list are included within your After Visit Summary for your review. Other Preventive Recommendations:    · A preventive eye exam performed by an eye specialist is recommended every 1-2 years to screen for glaucoma; cataracts, macular degeneration, and other eye disorders. · A preventive dental visit is recommended every 6 months. · Try to get at least 150 minutes of exercise per week or 10,000 steps per day on a pedometer . · Order or download the FREE \"Exercise & Physical Activity: Your Everyday Guide\" from The [employer]. Call 1-348.290.4915 or search [employer] online. · You need 9089-2124 mg of calcium and 5744-6350 IU of vitamin D per day. It is possible to meet your calcium requirement with diet alone, but a vitamin D supplement is usually necessary to meet this goal.  · When exposed to the sun, use a sunscreen that protects against both UVA and UVB radiation with an SPF of 30 or greater. Reapply every 2 to 3 hours or after sweating, drying off with a towel, or swimming. · Always wear a seat belt when traveling in a car. Always wear a helmet when riding a bicycle or motorcycle.

## 2018-08-13 RX ORDER — VENLAFAXINE HYDROCHLORIDE 150 MG/1
150 CAPSULE, EXTENDED RELEASE ORAL DAILY
Qty: 90 CAPSULE | Refills: 1 | Status: SHIPPED | OUTPATIENT
Start: 2018-08-13 | End: 2019-02-11 | Stop reason: SDUPTHER

## 2018-08-14 DIAGNOSIS — K52.9 CHRONIC DIARRHEA: ICD-10-CM

## 2018-08-14 RX ORDER — DIPHENOXYLATE HYDROCHLORIDE AND ATROPINE SULFATE 2.5; .025 MG/1; MG/1
TABLET ORAL
Qty: 120 TABLET | Refills: 1 | Status: SHIPPED | OUTPATIENT
Start: 2018-08-14 | End: 2018-12-13 | Stop reason: SDUPTHER

## 2018-08-15 DIAGNOSIS — K52.9 CHRONIC DIARRHEA: ICD-10-CM

## 2018-08-15 RX ORDER — DIPHENOXYLATE HYDROCHLORIDE AND ATROPINE SULFATE 2.5; .025 MG/1; MG/1
TABLET ORAL
Qty: 120 TABLET | OUTPATIENT
Start: 2018-08-15 | End: 2018-09-14

## 2018-09-12 DIAGNOSIS — F51.01 PRIMARY INSOMNIA: ICD-10-CM

## 2018-10-02 ENCOUNTER — OFFICE VISIT (OUTPATIENT)
Dept: INTERNAL MEDICINE CLINIC | Age: 73
End: 2018-10-02

## 2018-10-02 VITALS
RESPIRATION RATE: 20 BRPM | OXYGEN SATURATION: 93 % | HEART RATE: 65 BPM | TEMPERATURE: 99.2 F | SYSTOLIC BLOOD PRESSURE: 124 MMHG | DIASTOLIC BLOOD PRESSURE: 56 MMHG

## 2018-10-02 DIAGNOSIS — Z91.81 AT HIGH RISK FOR FALLS: ICD-10-CM

## 2018-10-02 DIAGNOSIS — J20.9 ACUTE BRONCHITIS DUE TO INFECTION: ICD-10-CM

## 2018-10-02 DIAGNOSIS — J44.9 COPD, MILD (HCC): ICD-10-CM

## 2018-10-02 DIAGNOSIS — F51.01 PRIMARY INSOMNIA: ICD-10-CM

## 2018-10-02 DIAGNOSIS — I10 ESSENTIAL HYPERTENSION: ICD-10-CM

## 2018-10-02 DIAGNOSIS — K58.0 IRRITABLE BOWEL SYNDROME WITH DIARRHEA: ICD-10-CM

## 2018-10-02 PROCEDURE — 99214 OFFICE O/P EST MOD 30 MIN: CPT | Performed by: FAMILY MEDICINE

## 2018-10-02 RX ORDER — AZITHROMYCIN 250 MG/1
TABLET, FILM COATED ORAL
Qty: 1 PACKET | Refills: 0 | Status: SHIPPED | OUTPATIENT
Start: 2018-10-02 | End: 2019-02-07 | Stop reason: SDUPTHER

## 2018-10-02 RX ORDER — FLUTICASONE FUROATE AND VILANTEROL 100; 25 UG/1; UG/1
POWDER RESPIRATORY (INHALATION) DAILY
COMMUNITY
End: 2018-12-03 | Stop reason: CLARIF

## 2018-10-02 RX ORDER — BENZONATATE 100 MG/1
100 CAPSULE ORAL 3 TIMES DAILY PRN
Qty: 30 CAPSULE | Refills: 1 | Status: SHIPPED | OUTPATIENT
Start: 2018-10-02 | End: 2018-10-09

## 2018-10-02 ASSESSMENT — ENCOUNTER SYMPTOMS
WHEEZING: 0
BLOOD IN STOOL: 0
COUGH: 1
DIARRHEA: 0
SHORTNESS OF BREATH: 0
VOICE CHANGE: 0
TROUBLE SWALLOWING: 0
CONSTIPATION: 0
ABDOMINAL PAIN: 0

## 2018-10-02 NOTE — PROGRESS NOTES
10/2/2018     [place] Lovelys (:  1945) is a 74 y.o. male, here for evaluation of the following medical concerns:    HPI   Patient presented to the office for follow-up. He complained of cough and cold for the past 10 days. He has nasal congestion , postnasal drip, sore throat and dry cough. He also complained of hoarseness of the voice. He tried over-the-counter cough medicine \"with dextrometorphan \" but he is not getting any better. His temperature at the office is 99.2°F though he denies fever and chills at home. He also denies shortness of breath or wheezing  COPD: Mild. Current treatment includes short-acting beta agonist inhaler, combined beta agonist/steroid inhaler. Residual symptoms: none. He denies any other symptoms. He requires his rescue inhaler 0 time(s) per day. Hypertension:  Home blood pressure monitoring: No.  He is adherent to a low sodium diet. Patient denies chest pain and shortness of breath. Antihypertensive medication side effects: no medication side effects noted. Use of agents associated with hypertension: none. Sodium (mmol/L)   Date Value   2018 142    BUN (mg/dL)   Date Value   2018 23 (H)    Glucose (mg/dL)   Date Value   2018 109 (H)      Potassium (mmol/L)   Date Value   2018 4.2    CREATININE (mg/dL)   Date Value   2018 0.9       He has primary insomnia controlled with current medication. He also have IBS , takes the Lomotil as needed    Review of Systems   Constitutional: Negative for activity change. HENT: Negative for trouble swallowing and voice change. Eyes: Negative for visual disturbance. Respiratory: Positive for cough. Negative for shortness of breath and wheezing. Cardiovascular: Negative for chest pain and leg swelling. Gastrointestinal: Negative for abdominal pain, blood in stool, constipation and diarrhea.    Genitourinary: Negative for difficulty urinating, dysuria, frequency, hematuria and scrotal swelling. Musculoskeletal: Negative for arthralgias and myalgias. Skin: Negative for rash. Neurological: Negative for dizziness. Psychiatric/Behavioral: Negative for behavioral problems. Prior to Visit Medications    Medication Sig Taking? Authorizing Provider   fluticasone-vilanterol (BREO ELLIPTA) 100-25 MCG/INH AEPB inhaler Inhale into the lungs daily Yes Historical Provider, MD   azithromycin (ZITHROMAX Z-PAMELLA) 250 MG tablet Take 2 tablets (500 mg) on Day 1, and then take 1 tablet (250 mg) on days 2 through 5. Yes Carol Machado MD   benzonatate (TESSALON PERLES) 100 MG capsule Take 1 capsule by mouth 3 times daily as needed for Cough Yes Carol Machado MD   estazolam (PROSOM) 2 MG tablet Take 1.5 tablets by mouth nightly as needed (sleep) for up to 30 days. Elijah Reynolds MD   albuterol sulfate HFA (VENTOLIN HFA) 108 (90 BASE) MCG/ACT inhaler Inhale 2 puffs into the lungs every 6 hours as needed for Wheezing Yes Carol Machado MD   diphenoxylate-atropine (LOMOTIL) 2.5-0.025 MG per tablet TAKE 1 TABLET BY MOUTH FOUR TIMES A DAY AS NEEDED  Carol Machado MD   venlafaxine (EFFEXOR XR) 150 MG extended release capsule Take 1 capsule by mouth daily  Carol Machado MD   losartan (COZAAR) 100 MG tablet Take 1 tablet by mouth daily  Carol Machado MD   atenolol (TENORMIN) 100 MG tablet Take 1 tablet by mouth daily  Carol Machado MD   hydrochlorothiazide (HYDRODIURIL) 25 MG tablet Take 1 tablet by mouth daily  Carol Machado MD   gabapentin (NEURONTIN) 600 MG tablet 1-2 tabs about 2 hours before the bedtime. Patient taking differently: 75 mg. 1-2 tabs about 2 hours before the bedtime.   Pao Miranda MD   QUEtiapine (SEROQUEL) 50 MG tablet Take 1 tablet by mouth nightly  Carol Machado MD   pravastatin (PRAVACHOL) 40 MG tablet TAKE 1 TABLET BY MOUTH DAILY  Carol Machado MD   amLODIPine (NORVASC) 10 MG tablet TAKE 1 TABLET BY MOUTH DAILY  Fortino Fofana

## 2018-10-14 DIAGNOSIS — F51.01 PRIMARY INSOMNIA: ICD-10-CM

## 2018-10-15 ENCOUNTER — OFFICE VISIT (OUTPATIENT)
Dept: SLEEP MEDICINE | Age: 73
End: 2018-10-15
Payer: MEDICARE

## 2018-10-15 VITALS
TEMPERATURE: 98.4 F | HEIGHT: 71 IN | OXYGEN SATURATION: 97 % | DIASTOLIC BLOOD PRESSURE: 62 MMHG | WEIGHT: 165.2 LBS | RESPIRATION RATE: 20 BRPM | SYSTOLIC BLOOD PRESSURE: 124 MMHG | BODY MASS INDEX: 23.13 KG/M2 | HEART RATE: 86 BPM

## 2018-10-15 DIAGNOSIS — F51.01 PRIMARY INSOMNIA: Primary | ICD-10-CM

## 2018-10-15 DIAGNOSIS — I10 ESSENTIAL HYPERTENSION: ICD-10-CM

## 2018-10-15 PROCEDURE — G8427 DOCREV CUR MEDS BY ELIG CLIN: HCPCS | Performed by: PSYCHIATRY & NEUROLOGY

## 2018-10-15 PROCEDURE — 3017F COLORECTAL CA SCREEN DOC REV: CPT | Performed by: PSYCHIATRY & NEUROLOGY

## 2018-10-15 PROCEDURE — 1101F PT FALLS ASSESS-DOCD LE1/YR: CPT | Performed by: PSYCHIATRY & NEUROLOGY

## 2018-10-15 PROCEDURE — 1036F TOBACCO NON-USER: CPT | Performed by: PSYCHIATRY & NEUROLOGY

## 2018-10-15 PROCEDURE — G8420 CALC BMI NORM PARAMETERS: HCPCS | Performed by: PSYCHIATRY & NEUROLOGY

## 2018-10-15 PROCEDURE — 1123F ACP DISCUSS/DSCN MKR DOCD: CPT | Performed by: PSYCHIATRY & NEUROLOGY

## 2018-10-15 PROCEDURE — 99213 OFFICE O/P EST LOW 20 MIN: CPT | Performed by: PSYCHIATRY & NEUROLOGY

## 2018-10-15 PROCEDURE — G8484 FLU IMMUNIZE NO ADMIN: HCPCS | Performed by: PSYCHIATRY & NEUROLOGY

## 2018-10-15 PROCEDURE — 4040F PNEUMOC VAC/ADMIN/RCVD: CPT | Performed by: PSYCHIATRY & NEUROLOGY

## 2018-10-15 RX ORDER — QUETIAPINE FUMARATE 50 MG/1
25 TABLET, FILM COATED ORAL NIGHTLY
Qty: 30 TABLET | Refills: 5 | Status: ON HOLD | OUTPATIENT
Start: 2018-10-15 | End: 2019-02-09

## 2018-10-15 RX ORDER — TEMAZEPAM 30 MG/1
30 CAPSULE ORAL NIGHTLY PRN
Qty: 30 CAPSULE | Refills: 5 | Status: SHIPPED | OUTPATIENT
Start: 2018-10-15 | End: 2018-12-10 | Stop reason: SDUPTHER

## 2018-10-15 ASSESSMENT — ENCOUNTER SYMPTOMS
EYES NEGATIVE: 1
CHOKING: 0

## 2018-10-15 NOTE — PATIENT INSTRUCTIONS

## 2018-10-29 RX ORDER — BENAZEPRIL HYDROCHLORIDE 20 MG/1
20 TABLET ORAL DAILY
Qty: 90 TABLET | Refills: 1 | Status: ON HOLD | OUTPATIENT
Start: 2018-10-29 | End: 2019-02-10 | Stop reason: HOSPADM

## 2018-11-02 RX ORDER — PRAVASTATIN SODIUM 40 MG
40 TABLET ORAL DAILY
Qty: 90 TABLET | Refills: 1 | Status: SHIPPED | OUTPATIENT
Start: 2018-11-02 | End: 2019-05-03 | Stop reason: SDUPTHER

## 2018-11-02 RX ORDER — AMLODIPINE BESYLATE 10 MG/1
10 TABLET ORAL DAILY
Qty: 90 TABLET | Refills: 1 | Status: ON HOLD | OUTPATIENT
Start: 2018-11-02 | End: 2019-02-10

## 2018-11-21 DIAGNOSIS — F41.8 DEPRESSION WITH ANXIETY: Primary | ICD-10-CM

## 2018-11-30 ENCOUNTER — OFFICE VISIT (OUTPATIENT)
Dept: INTERNAL MEDICINE CLINIC | Age: 73
End: 2018-11-30
Payer: MEDICARE

## 2018-11-30 ENCOUNTER — HOSPITAL ENCOUNTER (OUTPATIENT)
Dept: GENERAL RADIOLOGY | Age: 73
Discharge: HOME | End: 2018-11-30
Payer: MEDICARE

## 2018-11-30 ENCOUNTER — HOSPITAL ENCOUNTER (OUTPATIENT)
Age: 73
Discharge: HOME | End: 2018-11-30
Payer: MEDICARE

## 2018-11-30 VITALS — RESPIRATION RATE: 18 BRPM | DIASTOLIC BLOOD PRESSURE: 54 MMHG | HEART RATE: 72 BPM | SYSTOLIC BLOOD PRESSURE: 126 MMHG

## 2018-11-30 DIAGNOSIS — S69.91XA INJURY OF RIGHT MIDDLE FINGER, INITIAL ENCOUNTER: ICD-10-CM

## 2018-11-30 DIAGNOSIS — I10 ESSENTIAL HYPERTENSION: ICD-10-CM

## 2018-11-30 DIAGNOSIS — S69.91XA INJURY OF RIGHT MIDDLE FINGER, INITIAL ENCOUNTER: Primary | ICD-10-CM

## 2018-11-30 PROCEDURE — 4040F PNEUMOC VAC/ADMIN/RCVD: CPT | Performed by: FAMILY MEDICINE

## 2018-11-30 PROCEDURE — 99213 OFFICE O/P EST LOW 20 MIN: CPT | Performed by: FAMILY MEDICINE

## 2018-11-30 PROCEDURE — 1123F ACP DISCUSS/DSCN MKR DOCD: CPT | Performed by: FAMILY MEDICINE

## 2018-11-30 PROCEDURE — G8482 FLU IMMUNIZE ORDER/ADMIN: HCPCS | Performed by: FAMILY MEDICINE

## 2018-11-30 PROCEDURE — G8420 CALC BMI NORM PARAMETERS: HCPCS | Performed by: FAMILY MEDICINE

## 2018-11-30 PROCEDURE — 3017F COLORECTAL CA SCREEN DOC REV: CPT | Performed by: FAMILY MEDICINE

## 2018-11-30 PROCEDURE — G8427 DOCREV CUR MEDS BY ELIG CLIN: HCPCS | Performed by: FAMILY MEDICINE

## 2018-11-30 PROCEDURE — 1101F PT FALLS ASSESS-DOCD LE1/YR: CPT | Performed by: FAMILY MEDICINE

## 2018-11-30 PROCEDURE — 1036F TOBACCO NON-USER: CPT | Performed by: FAMILY MEDICINE

## 2018-11-30 PROCEDURE — 73140 X-RAY EXAM OF FINGER(S): CPT

## 2018-11-30 ASSESSMENT — ENCOUNTER SYMPTOMS
ABDOMINAL PAIN: 0
VOICE CHANGE: 0
DIARRHEA: 0
TROUBLE SWALLOWING: 0
CONSTIPATION: 0
SHORTNESS OF BREATH: 0
BLOOD IN STOOL: 0

## 2018-12-01 ENCOUNTER — TELEPHONE (OUTPATIENT)
Dept: INTERNAL MEDICINE CLINIC | Age: 73
End: 2018-12-01

## 2018-12-10 ENCOUNTER — TELEPHONE (OUTPATIENT)
Dept: SLEEP MEDICINE | Age: 73
End: 2018-12-10

## 2018-12-10 DIAGNOSIS — F51.04 CHRONIC INSOMNIA: Primary | ICD-10-CM

## 2018-12-10 DIAGNOSIS — F51.01 PRIMARY INSOMNIA: ICD-10-CM

## 2018-12-10 RX ORDER — TEMAZEPAM 30 MG/1
30 CAPSULE ORAL NIGHTLY PRN
Qty: 30 CAPSULE | Refills: 5 | Status: SHIPPED | OUTPATIENT
Start: 2018-12-10 | End: 2018-12-10

## 2018-12-10 RX ORDER — TEMAZEPAM 15 MG/1
CAPSULE ORAL
Qty: 60 CAPSULE | Refills: 5 | Status: SHIPPED | OUTPATIENT
Start: 2018-12-10 | End: 2019-04-15 | Stop reason: SDUPTHER

## 2018-12-13 DIAGNOSIS — K52.9 CHRONIC DIARRHEA: ICD-10-CM

## 2018-12-14 RX ORDER — DIPHENOXYLATE HYDROCHLORIDE AND ATROPINE SULFATE 2.5; .025 MG/1; MG/1
1 TABLET ORAL 4 TIMES DAILY PRN
Qty: 120 TABLET | Refills: 0 | Status: SHIPPED | OUTPATIENT
Start: 2018-12-14 | End: 2019-05-26 | Stop reason: SDUPTHER

## 2018-12-26 DIAGNOSIS — F41.8 DEPRESSION WITH ANXIETY: ICD-10-CM

## 2019-01-15 RX ORDER — HYDROCHLOROTHIAZIDE 25 MG/1
25 TABLET ORAL DAILY
Qty: 90 TABLET | Refills: 1 | Status: ON HOLD | OUTPATIENT
Start: 2019-01-15 | End: 2019-02-10 | Stop reason: HOSPADM

## 2019-01-15 RX ORDER — ATENOLOL 100 MG/1
100 TABLET ORAL DAILY
Qty: 90 TABLET | Refills: 1 | Status: SHIPPED | OUTPATIENT
Start: 2019-01-15 | End: 2019-07-21 | Stop reason: SDUPTHER

## 2019-01-24 DIAGNOSIS — F41.8 DEPRESSION WITH ANXIETY: ICD-10-CM

## 2019-02-01 RX ORDER — LOSARTAN POTASSIUM 100 MG/1
100 TABLET ORAL DAILY
Qty: 90 TABLET | Refills: 1 | Status: ON HOLD | OUTPATIENT
Start: 2019-02-01 | End: 2019-02-10

## 2019-02-01 RX ORDER — QUETIAPINE FUMARATE 50 MG/1
50 TABLET, FILM COATED ORAL NIGHTLY
Qty: 90 TABLET | Refills: 1 | Status: SHIPPED | OUTPATIENT
Start: 2019-02-01 | End: 2019-10-27 | Stop reason: SDUPTHER

## 2019-02-07 RX ORDER — AZITHROMYCIN 250 MG/1
TABLET, FILM COATED ORAL
Qty: 1 PACKET | Refills: 1 | Status: ON HOLD | OUTPATIENT
Start: 2019-02-07 | End: 2019-02-10 | Stop reason: HOSPADM

## 2019-02-08 ENCOUNTER — HOSPITAL ENCOUNTER (INPATIENT)
Age: 74
LOS: 2 days | Discharge: HOME | DRG: 684 | End: 2019-02-10
Attending: EMERGENCY MEDICINE | Admitting: INTERNAL MEDICINE
Payer: MEDICARE

## 2019-02-08 ENCOUNTER — APPOINTMENT (OUTPATIENT)
Dept: GENERAL RADIOLOGY | Age: 74
DRG: 684 | End: 2019-02-08
Payer: MEDICARE

## 2019-02-08 ENCOUNTER — APPOINTMENT (OUTPATIENT)
Dept: CT IMAGING | Age: 74
DRG: 684 | End: 2019-02-08
Payer: MEDICARE

## 2019-02-08 DIAGNOSIS — N17.9 AKI (ACUTE KIDNEY INJURY) (HCC): ICD-10-CM

## 2019-02-08 DIAGNOSIS — N17.9 ACUTE RENAL INJURY (HCC): Primary | ICD-10-CM

## 2019-02-08 DIAGNOSIS — R55 SYNCOPE, UNSPECIFIED SYNCOPE TYPE: ICD-10-CM

## 2019-02-08 LAB
A/G RATIO: 1.2 (ref 1.1–2.2)
ALBUMIN SERPL-MCNC: 3.4 G/DL (ref 3.4–5)
ALP BLD-CCNC: 64 U/L (ref 40–129)
ALT SERPL-CCNC: 29 U/L (ref 10–40)
ANION GAP SERPL CALCULATED.3IONS-SCNC: 20 MMOL/L (ref 3–16)
AST SERPL-CCNC: 61 U/L (ref 15–37)
BACTERIA: ABNORMAL /HPF
BASOPHILS ABSOLUTE: 0 K/UL (ref 0–0.2)
BASOPHILS RELATIVE PERCENT: 0.5 %
BILIRUB SERPL-MCNC: <0.2 MG/DL (ref 0–1)
BILIRUBIN URINE: ABNORMAL
BLOOD, URINE: ABNORMAL
BUN BLDV-MCNC: 65 MG/DL (ref 7–20)
CALCIUM SERPL-MCNC: 8.2 MG/DL (ref 8.3–10.6)
CASTS 2: ABNORMAL /LPF
CASTS: ABNORMAL /LPF
CHLORIDE BLD-SCNC: 97 MMOL/L (ref 99–110)
CLARITY: CLEAR
CO2: 22 MMOL/L (ref 21–32)
COLOR: YELLOW
CREAT SERPL-MCNC: 3.1 MG/DL (ref 0.8–1.3)
CRYSTALS, UA: ABNORMAL /HPF
EOSINOPHILS ABSOLUTE: 0 K/UL (ref 0–0.6)
EOSINOPHILS RELATIVE PERCENT: 0.2 %
EPITHELIAL CELLS, UA: ABNORMAL /HPF
GFR AFRICAN AMERICAN: 24
GFR NON-AFRICAN AMERICAN: 20
GLOBULIN: 2.8 G/DL
GLUCOSE BLD-MCNC: 131 MG/DL (ref 70–99)
GLUCOSE URINE: NEGATIVE MG/DL
HCT VFR BLD CALC: 42.5 % (ref 40.5–52.5)
HEMOGLOBIN: 13.8 G/DL (ref 13.5–17.5)
KETONES, URINE: ABNORMAL MG/DL
LEUKOCYTE ESTERASE, URINE: NEGATIVE
LYMPHOCYTES ABSOLUTE: 0.6 K/UL (ref 1–5.1)
LYMPHOCYTES RELATIVE PERCENT: 8.3 %
MCH RBC QN AUTO: 27.8 PG (ref 26–34)
MCHC RBC AUTO-ENTMCNC: 32.4 G/DL (ref 31–36)
MCV RBC AUTO: 85.7 FL (ref 80–100)
MICROSCOPIC EXAMINATION: YES
MONOCYTES ABSOLUTE: 0.3 K/UL (ref 0–1.3)
MONOCYTES RELATIVE PERCENT: 4.5 %
NEUTROPHILS ABSOLUTE: 6 K/UL (ref 1.7–7.7)
NEUTROPHILS RELATIVE PERCENT: 86.5 %
NITRITE, URINE: NEGATIVE
PDW BLD-RTO: 14.1 % (ref 12.4–15.4)
PH UA: 5
PLATELET # BLD: 179 K/UL (ref 135–450)
PMV BLD AUTO: 8.9 FL (ref 5–10.5)
POTASSIUM REFLEX MAGNESIUM: 3.7 MMOL/L (ref 3.5–5.1)
PROTEIN UA: 100 MG/DL
RAPID INFLUENZA  B AGN: NEGATIVE
RAPID INFLUENZA A AGN: NEGATIVE
RBC # BLD: 4.96 M/UL (ref 4.2–5.9)
RBC UA: ABNORMAL /HPF (ref 0–2)
SODIUM BLD-SCNC: 139 MMOL/L (ref 136–145)
SPECIFIC GRAVITY UA: 1.02
TOTAL PROTEIN: 6.2 G/DL (ref 6.4–8.2)
URINE REFLEX TO CULTURE: ABNORMAL
URINE TYPE: ABNORMAL
UROBILINOGEN, URINE: 0.2 E.U./DL
WBC # BLD: 6.9 K/UL (ref 4–11)
WBC UA: ABNORMAL /HPF (ref 0–5)

## 2019-02-08 PROCEDURE — 36415 COLL VENOUS BLD VENIPUNCTURE: CPT

## 2019-02-08 PROCEDURE — 2580000003 HC RX 258: Performed by: EMERGENCY MEDICINE

## 2019-02-08 PROCEDURE — 1200000000 HC SEMI PRIVATE

## 2019-02-08 PROCEDURE — 96375 TX/PRO/DX INJ NEW DRUG ADDON: CPT

## 2019-02-08 PROCEDURE — 99285 EMERGENCY DEPT VISIT HI MDM: CPT

## 2019-02-08 PROCEDURE — 2500000003 HC RX 250 WO HCPCS: Performed by: EMERGENCY MEDICINE

## 2019-02-08 PROCEDURE — 93005 ELECTROCARDIOGRAM TRACING: CPT | Performed by: EMERGENCY MEDICINE

## 2019-02-08 PROCEDURE — 85025 COMPLETE CBC W/AUTO DIFF WBC: CPT

## 2019-02-08 PROCEDURE — 71046 X-RAY EXAM CHEST 2 VIEWS: CPT

## 2019-02-08 PROCEDURE — 80053 COMPREHEN METABOLIC PANEL: CPT

## 2019-02-08 PROCEDURE — 96361 HYDRATE IV INFUSION ADD-ON: CPT

## 2019-02-08 PROCEDURE — 81001 URINALYSIS AUTO W/SCOPE: CPT

## 2019-02-08 PROCEDURE — 70450 CT HEAD/BRAIN W/O DYE: CPT

## 2019-02-08 PROCEDURE — 96374 THER/PROPH/DIAG INJ IV PUSH: CPT

## 2019-02-08 PROCEDURE — 6360000002 HC RX W HCPCS: Performed by: EMERGENCY MEDICINE

## 2019-02-08 PROCEDURE — 87804 INFLUENZA ASSAY W/OPTIC: CPT

## 2019-02-08 RX ORDER — SODIUM CHLORIDE 9 MG/ML
INJECTION, SOLUTION INTRAVENOUS CONTINUOUS
Status: DISCONTINUED | OUTPATIENT
Start: 2019-02-08 | End: 2019-02-09

## 2019-02-08 RX ORDER — ONDANSETRON 2 MG/ML
4 INJECTION INTRAMUSCULAR; INTRAVENOUS ONCE
Status: COMPLETED | OUTPATIENT
Start: 2019-02-08 | End: 2019-02-08

## 2019-02-08 RX ADMIN — SODIUM CHLORIDE: 9 INJECTION, SOLUTION INTRAVENOUS at 20:42

## 2019-02-08 RX ADMIN — SODIUM CHLORIDE: 9 INJECTION, SOLUTION INTRAVENOUS at 22:28

## 2019-02-08 RX ADMIN — FAMOTIDINE 20 MG: 10 INJECTION, SOLUTION INTRAVENOUS at 20:45

## 2019-02-08 RX ADMIN — ONDANSETRON 4 MG: 2 INJECTION INTRAMUSCULAR; INTRAVENOUS at 20:42

## 2019-02-08 ASSESSMENT — PAIN SCALES - GENERAL
PAINLEVEL_OUTOF10: 0

## 2019-02-08 ASSESSMENT — ENCOUNTER SYMPTOMS
SHORTNESS OF BREATH: 0
EYE DISCHARGE: 0

## 2019-02-09 ENCOUNTER — APPOINTMENT (OUTPATIENT)
Dept: ULTRASOUND IMAGING | Age: 74
DRG: 684 | End: 2019-02-09
Payer: MEDICARE

## 2019-02-09 LAB
ANION GAP SERPL CALCULATED.3IONS-SCNC: 13 MMOL/L (ref 3–16)
BUN BLDV-MCNC: 64 MG/DL (ref 7–20)
CALCIUM SERPL-MCNC: 7.3 MG/DL (ref 8.3–10.6)
CHLORIDE BLD-SCNC: 100 MMOL/L (ref 99–110)
CO2: 24 MMOL/L (ref 21–32)
CREAT SERPL-MCNC: 2.8 MG/DL (ref 0.8–1.3)
GFR AFRICAN AMERICAN: 27
GFR NON-AFRICAN AMERICAN: 22
GLUCOSE BLD-MCNC: 86 MG/DL (ref 70–99)
GLUCOSE BLD-MCNC: 96 MG/DL (ref 70–99)
GLUCOSE BLD-MCNC: 96 MG/DL (ref 70–99)
MAGNESIUM: 3.3 MG/DL (ref 1.8–2.4)
PERFORMED ON: NORMAL
PERFORMED ON: NORMAL
POTASSIUM SERPL-SCNC: 3.2 MMOL/L (ref 3.5–5.1)
REPORT: NORMAL
RESPIRATORY PANEL PCR: NORMAL
SODIUM BLD-SCNC: 137 MMOL/L (ref 136–145)
URIC ACID, SERUM: 7.3 MG/DL (ref 3.5–7.2)

## 2019-02-09 PROCEDURE — 6370000000 HC RX 637 (ALT 250 FOR IP): Performed by: NURSE PRACTITIONER

## 2019-02-09 PROCEDURE — 2580000003 HC RX 258: Performed by: INTERNAL MEDICINE

## 2019-02-09 PROCEDURE — 76770 US EXAM ABDO BACK WALL COMP: CPT

## 2019-02-09 PROCEDURE — 6360000002 HC RX W HCPCS: Performed by: INTERNAL MEDICINE

## 2019-02-09 PROCEDURE — 83735 ASSAY OF MAGNESIUM: CPT

## 2019-02-09 PROCEDURE — 1200000000 HC SEMI PRIVATE

## 2019-02-09 PROCEDURE — 80048 BASIC METABOLIC PNL TOTAL CA: CPT

## 2019-02-09 PROCEDURE — 87581 M.PNEUMON DNA AMP PROBE: CPT

## 2019-02-09 PROCEDURE — 87798 DETECT AGENT NOS DNA AMP: CPT

## 2019-02-09 PROCEDURE — 2580000003 HC RX 258: Performed by: NURSE PRACTITIONER

## 2019-02-09 PROCEDURE — 36415 COLL VENOUS BLD VENIPUNCTURE: CPT

## 2019-02-09 PROCEDURE — 84550 ASSAY OF BLOOD/URIC ACID: CPT

## 2019-02-09 PROCEDURE — 6370000000 HC RX 637 (ALT 250 FOR IP): Performed by: INTERNAL MEDICINE

## 2019-02-09 PROCEDURE — 87633 RESP VIRUS 12-25 TARGETS: CPT

## 2019-02-09 PROCEDURE — 87486 CHLMYD PNEUM DNA AMP PROBE: CPT

## 2019-02-09 PROCEDURE — 94760 N-INVAS EAR/PLS OXIMETRY 1: CPT

## 2019-02-09 RX ORDER — FAMOTIDINE 20 MG/1
20 TABLET, FILM COATED ORAL DAILY
Status: DISCONTINUED | OUTPATIENT
Start: 2019-02-09 | End: 2019-02-10 | Stop reason: HOSPADM

## 2019-02-09 RX ORDER — ALBUTEROL SULFATE 90 UG/1
2 AEROSOL, METERED RESPIRATORY (INHALATION) EVERY 6 HOURS PRN
Status: DISCONTINUED | OUTPATIENT
Start: 2019-02-09 | End: 2019-02-10 | Stop reason: HOSPADM

## 2019-02-09 RX ORDER — SODIUM CHLORIDE 0.9 % (FLUSH) 0.9 %
10 SYRINGE (ML) INJECTION EVERY 12 HOURS SCHEDULED
Status: DISCONTINUED | OUTPATIENT
Start: 2019-02-09 | End: 2019-02-10 | Stop reason: HOSPADM

## 2019-02-09 RX ORDER — 0.9 % SODIUM CHLORIDE 0.9 %
500 INTRAVENOUS SOLUTION INTRAVENOUS ONCE
Status: COMPLETED | OUTPATIENT
Start: 2019-02-09 | End: 2019-02-09

## 2019-02-09 RX ORDER — QUETIAPINE FUMARATE 25 MG/1
25 TABLET, FILM COATED ORAL NIGHTLY
Status: DISCONTINUED | OUTPATIENT
Start: 2019-02-09 | End: 2019-02-10 | Stop reason: HOSPADM

## 2019-02-09 RX ORDER — ONDANSETRON 2 MG/ML
4 INJECTION INTRAMUSCULAR; INTRAVENOUS EVERY 6 HOURS PRN
Status: DISCONTINUED | OUTPATIENT
Start: 2019-02-09 | End: 2019-02-10 | Stop reason: HOSPADM

## 2019-02-09 RX ORDER — ACETAMINOPHEN 325 MG/1
650 TABLET ORAL EVERY 4 HOURS PRN
Status: DISCONTINUED | OUTPATIENT
Start: 2019-02-09 | End: 2019-02-10 | Stop reason: HOSPADM

## 2019-02-09 RX ORDER — SODIUM CHLORIDE 9 MG/ML
INJECTION, SOLUTION INTRAVENOUS CONTINUOUS
Status: DISCONTINUED | OUTPATIENT
Start: 2019-02-09 | End: 2019-02-10

## 2019-02-09 RX ORDER — POTASSIUM CHLORIDE 20 MEQ/1
40 TABLET, EXTENDED RELEASE ORAL ONCE
Status: COMPLETED | OUTPATIENT
Start: 2019-02-09 | End: 2019-02-09

## 2019-02-09 RX ORDER — SODIUM CHLORIDE 0.9 % (FLUSH) 0.9 %
10 SYRINGE (ML) INJECTION PRN
Status: DISCONTINUED | OUTPATIENT
Start: 2019-02-09 | End: 2019-02-10 | Stop reason: HOSPADM

## 2019-02-09 RX ORDER — ASPIRIN 81 MG/1
81 TABLET, CHEWABLE ORAL DAILY
Status: DISCONTINUED | OUTPATIENT
Start: 2019-02-09 | End: 2019-02-10 | Stop reason: HOSPADM

## 2019-02-09 RX ORDER — GUAIFENESIN 600 MG/1
600 TABLET, EXTENDED RELEASE ORAL 2 TIMES DAILY
Status: DISCONTINUED | OUTPATIENT
Start: 2019-02-09 | End: 2019-02-10 | Stop reason: HOSPADM

## 2019-02-09 RX ORDER — LORAZEPAM 0.5 MG/1
0.5 TABLET ORAL NIGHTLY PRN
Status: DISCONTINUED | OUTPATIENT
Start: 2019-02-09 | End: 2019-02-09

## 2019-02-09 RX ADMIN — SODIUM CHLORIDE 500 ML: 9 INJECTION, SOLUTION INTRAVENOUS at 05:03

## 2019-02-09 RX ADMIN — GUAIFENESIN 600 MG: 600 TABLET, EXTENDED RELEASE ORAL at 14:00

## 2019-02-09 RX ADMIN — ENOXAPARIN SODIUM 30 MG: 30 INJECTION SUBCUTANEOUS at 09:29

## 2019-02-09 RX ADMIN — FAMOTIDINE 20 MG: 20 TABLET, FILM COATED ORAL at 17:00

## 2019-02-09 RX ADMIN — POTASSIUM CHLORIDE 40 MEQ: 20 TABLET, EXTENDED RELEASE ORAL at 09:29

## 2019-02-09 RX ADMIN — ASPIRIN 81 MG 81 MG: 81 TABLET ORAL at 09:29

## 2019-02-09 RX ADMIN — GUAIFENESIN 600 MG: 600 TABLET, EXTENDED RELEASE ORAL at 20:49

## 2019-02-09 RX ADMIN — SODIUM CHLORIDE: 9 INJECTION, SOLUTION INTRAVENOUS at 05:03

## 2019-02-09 RX ADMIN — QUETIAPINE FUMARATE 25 MG: 25 TABLET ORAL at 20:49

## 2019-02-09 RX ADMIN — SODIUM CHLORIDE: 9 INJECTION, SOLUTION INTRAVENOUS at 20:47

## 2019-02-09 ASSESSMENT — ENCOUNTER SYMPTOMS
VOICE CHANGE: 0
EYE PAIN: 0
EYE REDNESS: 0
BACK PAIN: 0
PHOTOPHOBIA: 0
DIARRHEA: 1
SORE THROAT: 0
CHEST TIGHTNESS: 0
TROUBLE SWALLOWING: 0
NAUSEA: 1
ABDOMINAL PAIN: 0
VOMITING: 1

## 2019-02-09 ASSESSMENT — PAIN SCALES - GENERAL: PAINLEVEL_OUTOF10: 0

## 2019-02-10 VITALS
OXYGEN SATURATION: 95 % | BODY MASS INDEX: 23.89 KG/M2 | DIASTOLIC BLOOD PRESSURE: 68 MMHG | HEIGHT: 70 IN | TEMPERATURE: 98 F | HEART RATE: 85 BPM | RESPIRATION RATE: 14 BRPM | WEIGHT: 166.89 LBS | SYSTOLIC BLOOD PRESSURE: 125 MMHG

## 2019-02-10 LAB
ANION GAP SERPL CALCULATED.3IONS-SCNC: 11 MMOL/L (ref 3–16)
BUN BLDV-MCNC: 35 MG/DL (ref 7–20)
CALCIUM SERPL-MCNC: 7.7 MG/DL (ref 8.3–10.6)
CHLORIDE BLD-SCNC: 108 MMOL/L (ref 99–110)
CO2: 22 MMOL/L (ref 21–32)
CREAT SERPL-MCNC: 1.2 MG/DL (ref 0.8–1.3)
EKG ATRIAL RATE: 87 BPM
EKG DIAGNOSIS: NORMAL
EKG P AXIS: 50 DEGREES
EKG P-R INTERVAL: 220 MS
EKG Q-T INTERVAL: 378 MS
EKG QRS DURATION: 98 MS
EKG QTC CALCULATION (BAZETT): 454 MS
EKG R AXIS: 2 DEGREES
EKG T AXIS: 27 DEGREES
EKG VENTRICULAR RATE: 87 BPM
GFR AFRICAN AMERICAN: >60
GFR NON-AFRICAN AMERICAN: 59
GLUCOSE BLD-MCNC: 107 MG/DL (ref 70–99)
HCT VFR BLD CALC: 33.3 % (ref 40.5–52.5)
HEMOGLOBIN: 11.5 G/DL (ref 13.5–17.5)
MAGNESIUM: 2.7 MG/DL (ref 1.8–2.4)
MCH RBC QN AUTO: 29.3 PG (ref 26–34)
MCHC RBC AUTO-ENTMCNC: 34.5 G/DL (ref 31–36)
MCV RBC AUTO: 84.8 FL (ref 80–100)
PDW BLD-RTO: 14.3 % (ref 12.4–15.4)
PLATELET # BLD: 170 K/UL (ref 135–450)
PMV BLD AUTO: 8 FL (ref 5–10.5)
POTASSIUM REFLEX MAGNESIUM: 3.1 MMOL/L (ref 3.5–5.1)
RBC # BLD: 3.93 M/UL (ref 4.2–5.9)
SODIUM BLD-SCNC: 141 MMOL/L (ref 136–145)
TROPONIN: <0.01 NG/ML
WBC # BLD: 6.6 K/UL (ref 4–11)

## 2019-02-10 PROCEDURE — 85027 COMPLETE CBC AUTOMATED: CPT

## 2019-02-10 PROCEDURE — 6360000002 HC RX W HCPCS: Performed by: INTERNAL MEDICINE

## 2019-02-10 PROCEDURE — 94760 N-INVAS EAR/PLS OXIMETRY 1: CPT

## 2019-02-10 PROCEDURE — 84484 ASSAY OF TROPONIN QUANT: CPT

## 2019-02-10 PROCEDURE — 6370000000 HC RX 637 (ALT 250 FOR IP): Performed by: NURSE PRACTITIONER

## 2019-02-10 PROCEDURE — 36415 COLL VENOUS BLD VENIPUNCTURE: CPT

## 2019-02-10 PROCEDURE — 80048 BASIC METABOLIC PNL TOTAL CA: CPT

## 2019-02-10 PROCEDURE — 2580000003 HC RX 258: Performed by: NURSE PRACTITIONER

## 2019-02-10 PROCEDURE — 83735 ASSAY OF MAGNESIUM: CPT

## 2019-02-10 PROCEDURE — 93010 ELECTROCARDIOGRAM REPORT: CPT | Performed by: INTERNAL MEDICINE

## 2019-02-10 PROCEDURE — 6370000000 HC RX 637 (ALT 250 FOR IP): Performed by: INTERNAL MEDICINE

## 2019-02-10 RX ORDER — POTASSIUM CHLORIDE 20 MEQ/1
40 TABLET, EXTENDED RELEASE ORAL
Status: DISCONTINUED | OUTPATIENT
Start: 2019-02-10 | End: 2019-02-10 | Stop reason: HOSPADM

## 2019-02-10 RX ORDER — LOSARTAN POTASSIUM 100 MG/1
50 TABLET ORAL DAILY
Qty: 90 TABLET | Refills: 1 | Status: SHIPPED | OUTPATIENT
Start: 2019-02-10 | End: 2019-03-27 | Stop reason: SDUPTHER

## 2019-02-10 RX ORDER — POTASSIUM CHLORIDE 20 MEQ/1
40 TABLET, EXTENDED RELEASE ORAL
Status: ACTIVE | OUTPATIENT
Start: 2019-02-10 | End: 2019-02-10

## 2019-02-10 RX ORDER — AMLODIPINE BESYLATE 10 MG/1
5 TABLET ORAL DAILY
Qty: 90 TABLET | Refills: 1 | Status: SHIPPED | OUTPATIENT
Start: 2019-02-10 | End: 2019-06-27 | Stop reason: SDUPTHER

## 2019-02-10 RX ADMIN — POTASSIUM CHLORIDE 40 MEQ: 20 TABLET, EXTENDED RELEASE ORAL at 13:30

## 2019-02-10 RX ADMIN — GUAIFENESIN 600 MG: 600 TABLET, EXTENDED RELEASE ORAL at 09:09

## 2019-02-10 RX ADMIN — ENOXAPARIN SODIUM 30 MG: 30 INJECTION SUBCUTANEOUS at 09:09

## 2019-02-10 RX ADMIN — ASPIRIN 81 MG 81 MG: 81 TABLET ORAL at 09:09

## 2019-02-10 RX ADMIN — FAMOTIDINE 20 MG: 20 TABLET, FILM COATED ORAL at 09:09

## 2019-02-10 RX ADMIN — SODIUM CHLORIDE: 9 INJECTION, SOLUTION INTRAVENOUS at 04:06

## 2019-02-11 ENCOUNTER — CARE COORDINATION (OUTPATIENT)
Dept: CASE MANAGEMENT | Age: 74
End: 2019-02-11

## 2019-02-11 ENCOUNTER — TELEPHONE (OUTPATIENT)
Dept: INTERNAL MEDICINE CLINIC | Age: 74
End: 2019-02-11

## 2019-02-11 DIAGNOSIS — N17.9 AKI (ACUTE KIDNEY INJURY) (HCC): ICD-10-CM

## 2019-02-11 RX ORDER — VENLAFAXINE HYDROCHLORIDE 150 MG/1
150 CAPSULE, EXTENDED RELEASE ORAL DAILY
Qty: 90 CAPSULE | Refills: 1 | Status: SHIPPED | OUTPATIENT
Start: 2019-02-11 | End: 2019-07-21 | Stop reason: SDUPTHER

## 2019-02-12 LAB
ANION GAP SERPL CALCULATED.3IONS-SCNC: 16 MMOL/L (ref 3–16)
BUN BLDV-MCNC: 21 MG/DL (ref 7–20)
CALCIUM SERPL-MCNC: 8.9 MG/DL (ref 8.3–10.6)
CHLORIDE BLD-SCNC: 106 MMOL/L (ref 99–110)
CO2: 24 MMOL/L (ref 21–32)
CREAT SERPL-MCNC: 0.7 MG/DL (ref 0.8–1.3)
GFR AFRICAN AMERICAN: >60
GFR NON-AFRICAN AMERICAN: >60
GLUCOSE BLD-MCNC: 119 MG/DL (ref 70–99)
POTASSIUM SERPL-SCNC: 3.8 MMOL/L (ref 3.5–5.1)
SODIUM BLD-SCNC: 146 MMOL/L (ref 136–145)

## 2019-02-14 ENCOUNTER — OFFICE VISIT (OUTPATIENT)
Dept: INTERNAL MEDICINE CLINIC | Age: 74
End: 2019-02-14
Payer: MEDICARE

## 2019-02-14 ENCOUNTER — CARE COORDINATION (OUTPATIENT)
Dept: CASE MANAGEMENT | Age: 74
End: 2019-02-14

## 2019-02-14 VITALS
BODY MASS INDEX: 23.53 KG/M2 | SYSTOLIC BLOOD PRESSURE: 144 MMHG | WEIGHT: 164 LBS | HEART RATE: 87 BPM | DIASTOLIC BLOOD PRESSURE: 87 MMHG | RESPIRATION RATE: 18 BRPM

## 2019-02-14 DIAGNOSIS — K58.0 IRRITABLE BOWEL SYNDROME WITH DIARRHEA: ICD-10-CM

## 2019-02-14 DIAGNOSIS — F41.8 DEPRESSION WITH ANXIETY: ICD-10-CM

## 2019-02-14 DIAGNOSIS — I95.1 SYNCOPE DUE TO ORTHOSTATIC HYPOTENSION: ICD-10-CM

## 2019-02-14 DIAGNOSIS — N17.9 AKI (ACUTE KIDNEY INJURY) (HCC): ICD-10-CM

## 2019-02-14 DIAGNOSIS — I10 ESSENTIAL HYPERTENSION: ICD-10-CM

## 2019-02-14 DIAGNOSIS — K59.1 FUNCTIONAL DIARRHEA: ICD-10-CM

## 2019-02-14 PROCEDURE — 99496 TRANSJ CARE MGMT HIGH F2F 7D: CPT | Performed by: FAMILY MEDICINE

## 2019-02-14 PROCEDURE — 1111F DSCHRG MED/CURRENT MED MERGE: CPT | Performed by: FAMILY MEDICINE

## 2019-02-14 ASSESSMENT — ENCOUNTER SYMPTOMS
VOICE CHANGE: 0
SHORTNESS OF BREATH: 0
CONSTIPATION: 0
TROUBLE SWALLOWING: 0
ABDOMINAL PAIN: 0
DIARRHEA: 0
BLOOD IN STOOL: 0

## 2019-02-19 ENCOUNTER — CARE COORDINATION (OUTPATIENT)
Dept: CASE MANAGEMENT | Age: 74
End: 2019-02-19

## 2019-02-22 DIAGNOSIS — F41.8 DEPRESSION WITH ANXIETY: ICD-10-CM

## 2019-02-25 ENCOUNTER — HOSPITAL ENCOUNTER (OUTPATIENT)
Dept: NON INVASIVE DIAGNOSTICS | Age: 74
Discharge: HOME | End: 2019-02-25
Payer: MEDICARE

## 2019-02-25 DIAGNOSIS — R55 SYNCOPE, UNSPECIFIED SYNCOPE TYPE: ICD-10-CM

## 2019-02-25 LAB
LV EF: 58 %
LVEF MODALITY: NORMAL

## 2019-02-25 PROCEDURE — 93306 TTE W/DOPPLER COMPLETE: CPT

## 2019-03-08 ENCOUNTER — TELEPHONE (OUTPATIENT)
Dept: INTERNAL MEDICINE CLINIC | Age: 74
End: 2019-03-08

## 2019-03-08 ENCOUNTER — OFFICE VISIT (OUTPATIENT)
Dept: INTERNAL MEDICINE CLINIC | Age: 74
End: 2019-03-08
Payer: MEDICARE

## 2019-03-08 VITALS
DIASTOLIC BLOOD PRESSURE: 80 MMHG | OXYGEN SATURATION: 95 % | RESPIRATION RATE: 18 BRPM | HEART RATE: 81 BPM | SYSTOLIC BLOOD PRESSURE: 138 MMHG

## 2019-03-08 DIAGNOSIS — I10 ESSENTIAL HYPERTENSION: ICD-10-CM

## 2019-03-08 DIAGNOSIS — F34.1 DYSTHYMIA: ICD-10-CM

## 2019-03-08 DIAGNOSIS — Z12.5 SCREENING PSA (PROSTATE SPECIFIC ANTIGEN): ICD-10-CM

## 2019-03-08 DIAGNOSIS — E55.9 VITAMIN D DEFICIENCY: ICD-10-CM

## 2019-03-08 DIAGNOSIS — J44.9 COPD, MILD (HCC): Primary | ICD-10-CM

## 2019-03-08 DIAGNOSIS — J44.9 COPD, MILD (HCC): ICD-10-CM

## 2019-03-08 DIAGNOSIS — K58.0 IRRITABLE BOWEL SYNDROME WITH DIARRHEA: ICD-10-CM

## 2019-03-08 DIAGNOSIS — N17.9 AKI (ACUTE KIDNEY INJURY) (HCC): Primary | ICD-10-CM

## 2019-03-08 DIAGNOSIS — F51.01 PRIMARY INSOMNIA: ICD-10-CM

## 2019-03-08 DIAGNOSIS — E78.00 PURE HYPERCHOLESTEROLEMIA: ICD-10-CM

## 2019-03-08 DIAGNOSIS — N17.9 AKI (ACUTE KIDNEY INJURY) (HCC): ICD-10-CM

## 2019-03-08 PROCEDURE — 1101F PT FALLS ASSESS-DOCD LE1/YR: CPT | Performed by: FAMILY MEDICINE

## 2019-03-08 PROCEDURE — 1123F ACP DISCUSS/DSCN MKR DOCD: CPT | Performed by: FAMILY MEDICINE

## 2019-03-08 PROCEDURE — G8926 SPIRO NO PERF OR DOC: HCPCS | Performed by: FAMILY MEDICINE

## 2019-03-08 PROCEDURE — 1036F TOBACCO NON-USER: CPT | Performed by: FAMILY MEDICINE

## 2019-03-08 PROCEDURE — G8420 CALC BMI NORM PARAMETERS: HCPCS | Performed by: FAMILY MEDICINE

## 2019-03-08 PROCEDURE — G8482 FLU IMMUNIZE ORDER/ADMIN: HCPCS | Performed by: FAMILY MEDICINE

## 2019-03-08 PROCEDURE — 3017F COLORECTAL CA SCREEN DOC REV: CPT | Performed by: FAMILY MEDICINE

## 2019-03-08 PROCEDURE — 4040F PNEUMOC VAC/ADMIN/RCVD: CPT | Performed by: FAMILY MEDICINE

## 2019-03-08 PROCEDURE — 99214 OFFICE O/P EST MOD 30 MIN: CPT | Performed by: FAMILY MEDICINE

## 2019-03-08 PROCEDURE — 1111F DSCHRG MED/CURRENT MED MERGE: CPT | Performed by: FAMILY MEDICINE

## 2019-03-08 PROCEDURE — G8427 DOCREV CUR MEDS BY ELIG CLIN: HCPCS | Performed by: FAMILY MEDICINE

## 2019-03-08 PROCEDURE — 3023F SPIROM DOC REV: CPT | Performed by: FAMILY MEDICINE

## 2019-03-08 ASSESSMENT — ENCOUNTER SYMPTOMS
TROUBLE SWALLOWING: 0
SHORTNESS OF BREATH: 0
BLOOD IN STOOL: 0
VOICE CHANGE: 0
DIARRHEA: 0
CONSTIPATION: 0
ABDOMINAL PAIN: 0

## 2019-03-11 ENCOUNTER — CARE COORDINATION (OUTPATIENT)
Dept: CASE MANAGEMENT | Age: 74
End: 2019-03-11

## 2019-03-11 DIAGNOSIS — J44.9 COPD, MILD (HCC): ICD-10-CM

## 2019-03-11 DIAGNOSIS — E78.00 PURE HYPERCHOLESTEROLEMIA: ICD-10-CM

## 2019-03-11 DIAGNOSIS — Z12.5 SCREENING PSA (PROSTATE SPECIFIC ANTIGEN): ICD-10-CM

## 2019-03-11 DIAGNOSIS — E55.9 VITAMIN D DEFICIENCY: ICD-10-CM

## 2019-03-11 DIAGNOSIS — I10 ESSENTIAL HYPERTENSION: ICD-10-CM

## 2019-03-11 DIAGNOSIS — N17.9 AKI (ACUTE KIDNEY INJURY) (HCC): ICD-10-CM

## 2019-03-11 LAB
A/G RATIO: 1.7 (ref 1.1–2.2)
ALBUMIN SERPL-MCNC: 4 G/DL (ref 3.4–5)
ALP BLD-CCNC: 100 U/L (ref 40–129)
ALT SERPL-CCNC: 21 U/L (ref 10–40)
ANION GAP SERPL CALCULATED.3IONS-SCNC: 11 MMOL/L (ref 3–16)
AST SERPL-CCNC: 25 U/L (ref 15–37)
BASOPHILS ABSOLUTE: 0.1 K/UL (ref 0–0.2)
BASOPHILS RELATIVE PERCENT: 0.8 %
BILIRUB SERPL-MCNC: 0.4 MG/DL (ref 0–1)
BUN BLDV-MCNC: 18 MG/DL (ref 7–20)
CALCIUM SERPL-MCNC: 9.1 MG/DL (ref 8.3–10.6)
CHLORIDE BLD-SCNC: 103 MMOL/L (ref 99–110)
CHOLESTEROL, FASTING: 139 MG/DL (ref 0–199)
CO2: 27 MMOL/L (ref 21–32)
CREAT SERPL-MCNC: 0.8 MG/DL (ref 0.8–1.3)
EOSINOPHILS ABSOLUTE: 0.4 K/UL (ref 0–0.6)
EOSINOPHILS RELATIVE PERCENT: 5.6 %
GFR AFRICAN AMERICAN: >60
GFR NON-AFRICAN AMERICAN: >60
GLOBULIN: 2.4 G/DL
GLUCOSE FASTING: 98 MG/DL (ref 70–99)
HCT VFR BLD CALC: 41.6 % (ref 40.5–52.5)
HDLC SERPL-MCNC: 45 MG/DL (ref 40–60)
HEMOGLOBIN: 14 G/DL (ref 13.5–17.5)
LDL CHOLESTEROL CALCULATED: 77 MG/DL
LYMPHOCYTES ABSOLUTE: 1.1 K/UL (ref 1–5.1)
LYMPHOCYTES RELATIVE PERCENT: 15.1 %
MCH RBC QN AUTO: 29 PG (ref 26–34)
MCHC RBC AUTO-ENTMCNC: 33.6 G/DL (ref 31–36)
MCV RBC AUTO: 86.2 FL (ref 80–100)
MONOCYTES ABSOLUTE: 0.7 K/UL (ref 0–1.3)
MONOCYTES RELATIVE PERCENT: 9.3 %
NEUTROPHILS ABSOLUTE: 4.9 K/UL (ref 1.7–7.7)
NEUTROPHILS RELATIVE PERCENT: 69.2 %
PDW BLD-RTO: 15 % (ref 12.4–15.4)
PLATELET # BLD: 228 K/UL (ref 135–450)
PMV BLD AUTO: 8.3 FL (ref 5–10.5)
POTASSIUM SERPL-SCNC: 4 MMOL/L (ref 3.5–5.1)
PROSTATE SPECIFIC ANTIGEN: 1.57 NG/ML (ref 0–4)
RBC # BLD: 4.82 M/UL (ref 4.2–5.9)
SODIUM BLD-SCNC: 141 MMOL/L (ref 136–145)
T4 FREE: 0.8 NG/DL (ref 0.9–1.8)
TOTAL PROTEIN: 6.4 G/DL (ref 6.4–8.2)
TRIGLYCERIDE, FASTING: 83 MG/DL (ref 0–150)
TSH SERPL DL<=0.05 MIU/L-ACNC: 2.65 UIU/ML (ref 0.27–4.2)
VITAMIN D 25-HYDROXY: 39.7 NG/ML
VLDLC SERPL CALC-MCNC: 17 MG/DL
WBC # BLD: 7.1 K/UL (ref 4–11)

## 2019-03-12 RX ORDER — FLUTICASONE FUROATE AND VILANTEROL 100; 25 UG/1; UG/1
POWDER RESPIRATORY (INHALATION) DAILY
Status: ON HOLD | COMMUNITY
End: 2020-12-17

## 2019-03-18 ENCOUNTER — ANESTHESIA EVENT (OUTPATIENT)
Dept: ENDOSCOPY | Age: 74
End: 2019-03-18
Payer: MEDICARE

## 2019-03-19 ENCOUNTER — HOSPITAL ENCOUNTER (OUTPATIENT)
Age: 74
Setting detail: OUTPATIENT SURGERY
Discharge: HOME | End: 2019-03-19
Attending: INTERNAL MEDICINE | Admitting: INTERNAL MEDICINE
Payer: MEDICARE

## 2019-03-19 ENCOUNTER — ANESTHESIA (OUTPATIENT)
Dept: ENDOSCOPY | Age: 74
End: 2019-03-19
Payer: MEDICARE

## 2019-03-19 VITALS
TEMPERATURE: 97.4 F | RESPIRATION RATE: 16 BRPM | HEART RATE: 85 BPM | BODY MASS INDEX: 22.31 KG/M2 | HEIGHT: 71 IN | DIASTOLIC BLOOD PRESSURE: 86 MMHG | WEIGHT: 159.38 LBS | SYSTOLIC BLOOD PRESSURE: 150 MMHG | OXYGEN SATURATION: 97 %

## 2019-03-19 VITALS — OXYGEN SATURATION: 100 % | DIASTOLIC BLOOD PRESSURE: 93 MMHG | SYSTOLIC BLOOD PRESSURE: 122 MMHG

## 2019-03-19 DIAGNOSIS — K52.9 CHRONIC DIARRHEA OF UNKNOWN ORIGIN: ICD-10-CM

## 2019-03-19 PROCEDURE — 3700000000 HC ANESTHESIA ATTENDED CARE: Performed by: INTERNAL MEDICINE

## 2019-03-19 PROCEDURE — 3700000001 HC ADD 15 MINUTES (ANESTHESIA): Performed by: INTERNAL MEDICINE

## 2019-03-19 PROCEDURE — 2580000003 HC RX 258: Performed by: ANESTHESIOLOGY

## 2019-03-19 PROCEDURE — 2709999900 HC NON-CHARGEABLE SUPPLY: Performed by: INTERNAL MEDICINE

## 2019-03-19 PROCEDURE — 6360000002 HC RX W HCPCS: Performed by: NURSE ANESTHETIST, CERTIFIED REGISTERED

## 2019-03-19 PROCEDURE — 7100000010 HC PHASE II RECOVERY - FIRST 15 MIN: Performed by: INTERNAL MEDICINE

## 2019-03-19 PROCEDURE — 88305 TISSUE EXAM BY PATHOLOGIST: CPT

## 2019-03-19 PROCEDURE — 3609010600 HC COLONOSCOPY POLYPECTOMY SNARE/COLD BIOPSY: Performed by: INTERNAL MEDICINE

## 2019-03-19 PROCEDURE — 7100000011 HC PHASE II RECOVERY - ADDTL 15 MIN: Performed by: INTERNAL MEDICINE

## 2019-03-19 PROCEDURE — 3609010300 HC COLONOSCOPY W/BIOPSY SINGLE/MULTIPLE: Performed by: INTERNAL MEDICINE

## 2019-03-19 PROCEDURE — C1773 RET DEV, INSERTABLE: HCPCS | Performed by: INTERNAL MEDICINE

## 2019-03-19 PROCEDURE — 2500000003 HC RX 250 WO HCPCS: Performed by: NURSE ANESTHETIST, CERTIFIED REGISTERED

## 2019-03-19 RX ORDER — ONDANSETRON 2 MG/ML
4 INJECTION INTRAMUSCULAR; INTRAVENOUS
Status: DISCONTINUED | OUTPATIENT
Start: 2019-03-19 | End: 2019-03-19 | Stop reason: HOSPADM

## 2019-03-19 RX ORDER — SODIUM CHLORIDE 9 MG/ML
INJECTION, SOLUTION INTRAVENOUS CONTINUOUS
Status: DISCONTINUED | OUTPATIENT
Start: 2019-03-19 | End: 2019-03-19 | Stop reason: HOSPADM

## 2019-03-19 RX ORDER — PROPOFOL 10 MG/ML
INJECTION, EMULSION INTRAVENOUS PRN
Status: DISCONTINUED | OUTPATIENT
Start: 2019-03-19 | End: 2019-03-19 | Stop reason: SDUPTHER

## 2019-03-19 RX ORDER — LIDOCAINE HYDROCHLORIDE 20 MG/ML
INJECTION, SOLUTION EPIDURAL; INFILTRATION; INTRACAUDAL; PERINEURAL PRN
Status: DISCONTINUED | OUTPATIENT
Start: 2019-03-19 | End: 2019-03-19 | Stop reason: SDUPTHER

## 2019-03-19 RX ORDER — SODIUM CHLORIDE 0.9 % (FLUSH) 0.9 %
10 SYRINGE (ML) INJECTION PRN
Status: DISCONTINUED | OUTPATIENT
Start: 2019-03-19 | End: 2019-03-19 | Stop reason: HOSPADM

## 2019-03-19 RX ORDER — SODIUM CHLORIDE, SODIUM LACTATE, POTASSIUM CHLORIDE, CALCIUM CHLORIDE 600; 310; 30; 20 MG/100ML; MG/100ML; MG/100ML; MG/100ML
INJECTION, SOLUTION INTRAVENOUS CONTINUOUS
Status: DISCONTINUED | OUTPATIENT
Start: 2019-03-19 | End: 2019-03-19 | Stop reason: ALTCHOICE

## 2019-03-19 RX ORDER — SODIUM CHLORIDE 0.9 % (FLUSH) 0.9 %
10 SYRINGE (ML) INJECTION EVERY 12 HOURS SCHEDULED
Status: DISCONTINUED | OUTPATIENT
Start: 2019-03-19 | End: 2019-03-19 | Stop reason: HOSPADM

## 2019-03-19 RX ORDER — LIDOCAINE HYDROCHLORIDE 10 MG/ML
1 INJECTION, SOLUTION EPIDURAL; INFILTRATION; INTRACAUDAL; PERINEURAL
Status: DISCONTINUED | OUTPATIENT
Start: 2019-03-19 | End: 2019-03-19 | Stop reason: HOSPADM

## 2019-03-19 RX ADMIN — LIDOCAINE HYDROCHLORIDE 40 MG: 20 INJECTION, SOLUTION EPIDURAL; INFILTRATION; INTRACAUDAL; PERINEURAL at 10:55

## 2019-03-19 RX ADMIN — PROPOFOL 80 MG: 10 INJECTION, EMULSION INTRAVENOUS at 10:49

## 2019-03-19 RX ADMIN — PROPOFOL 40 MG: 10 INJECTION, EMULSION INTRAVENOUS at 11:00

## 2019-03-19 RX ADMIN — PROPOFOL 80 MG: 10 INJECTION, EMULSION INTRAVENOUS at 10:55

## 2019-03-19 RX ADMIN — LIDOCAINE HYDROCHLORIDE 20 MG: 20 INJECTION, SOLUTION EPIDURAL; INFILTRATION; INTRACAUDAL; PERINEURAL at 11:00

## 2019-03-19 RX ADMIN — LIDOCAINE HYDROCHLORIDE 40 MG: 20 INJECTION, SOLUTION EPIDURAL; INFILTRATION; INTRACAUDAL; PERINEURAL at 10:49

## 2019-03-19 RX ADMIN — SODIUM CHLORIDE: 0.9 INJECTION, SOLUTION INTRAVENOUS at 10:33

## 2019-03-19 ASSESSMENT — PAIN SCALES - GENERAL
PAINLEVEL_OUTOF10: 0
PAINLEVEL_OUTOF10: 0

## 2019-03-19 ASSESSMENT — ENCOUNTER SYMPTOMS: SHORTNESS OF BREATH: 0

## 2019-03-19 ASSESSMENT — PAIN SCALES - WONG BAKER: WONGBAKER_NUMERICALRESPONSE: 0

## 2019-03-19 ASSESSMENT — PAIN - FUNCTIONAL ASSESSMENT: PAIN_FUNCTIONAL_ASSESSMENT: 0-10

## 2019-03-19 ASSESSMENT — LIFESTYLE VARIABLES: SMOKING_STATUS: 0

## 2019-03-25 RX ORDER — ALBUTEROL SULFATE 90 UG/1
2 AEROSOL, METERED RESPIRATORY (INHALATION) EVERY 6 HOURS PRN
Qty: 18 G | Refills: 2 | Status: SHIPPED | OUTPATIENT
Start: 2019-03-25 | End: 2020-09-01

## 2019-03-27 ENCOUNTER — OFFICE VISIT (OUTPATIENT)
Dept: INTERNAL MEDICINE CLINIC | Age: 74
End: 2019-03-27
Payer: MEDICARE

## 2019-03-27 VITALS
OXYGEN SATURATION: 98 % | DIASTOLIC BLOOD PRESSURE: 80 MMHG | SYSTOLIC BLOOD PRESSURE: 162 MMHG | HEART RATE: 74 BPM | WEIGHT: 163.8 LBS | BODY MASS INDEX: 23.17 KG/M2 | RESPIRATION RATE: 18 BRPM

## 2019-03-27 DIAGNOSIS — J44.9 COPD, MILD (HCC): ICD-10-CM

## 2019-03-27 DIAGNOSIS — F51.01 PRIMARY INSOMNIA: ICD-10-CM

## 2019-03-27 DIAGNOSIS — R35.0 URINARY FREQUENCY: Primary | ICD-10-CM

## 2019-03-27 DIAGNOSIS — K58.0 IRRITABLE BOWEL SYNDROME WITH DIARRHEA: ICD-10-CM

## 2019-03-27 DIAGNOSIS — D12.8 ADENOMATOUS POLYP OF RECTUM: ICD-10-CM

## 2019-03-27 DIAGNOSIS — I10 ESSENTIAL HYPERTENSION: ICD-10-CM

## 2019-03-27 DIAGNOSIS — F34.1 DYSTHYMIA: ICD-10-CM

## 2019-03-27 LAB
BILIRUBIN, POC: NEGATIVE
BLOOD URINE, POC: NEGATIVE
CLARITY, POC: NORMAL
COLOR, POC: NORMAL
GLUCOSE URINE, POC: NEGATIVE
KETONES, POC: NEGATIVE
LEUKOCYTE EST, POC: NEGATIVE
NITRITE, POC: NEGATIVE
PH, POC: 7.5
PROTEIN, POC: NORMAL
SPECIFIC GRAVITY, POC: 1
UROBILINOGEN, POC: 0.2

## 2019-03-27 PROCEDURE — 1036F TOBACCO NON-USER: CPT | Performed by: FAMILY MEDICINE

## 2019-03-27 PROCEDURE — G8427 DOCREV CUR MEDS BY ELIG CLIN: HCPCS | Performed by: FAMILY MEDICINE

## 2019-03-27 PROCEDURE — 3017F COLORECTAL CA SCREEN DOC REV: CPT | Performed by: FAMILY MEDICINE

## 2019-03-27 PROCEDURE — 4040F PNEUMOC VAC/ADMIN/RCVD: CPT | Performed by: FAMILY MEDICINE

## 2019-03-27 PROCEDURE — 1123F ACP DISCUSS/DSCN MKR DOCD: CPT | Performed by: FAMILY MEDICINE

## 2019-03-27 PROCEDURE — 99214 OFFICE O/P EST MOD 30 MIN: CPT | Performed by: FAMILY MEDICINE

## 2019-03-27 PROCEDURE — 3023F SPIROM DOC REV: CPT | Performed by: FAMILY MEDICINE

## 2019-03-27 PROCEDURE — 81002 URINALYSIS NONAUTO W/O SCOPE: CPT | Performed by: FAMILY MEDICINE

## 2019-03-27 PROCEDURE — G8926 SPIRO NO PERF OR DOC: HCPCS | Performed by: FAMILY MEDICINE

## 2019-03-27 PROCEDURE — G8420 CALC BMI NORM PARAMETERS: HCPCS | Performed by: FAMILY MEDICINE

## 2019-03-27 PROCEDURE — G8482 FLU IMMUNIZE ORDER/ADMIN: HCPCS | Performed by: FAMILY MEDICINE

## 2019-03-27 RX ORDER — LOSARTAN POTASSIUM 100 MG/1
100 TABLET ORAL DAILY
Qty: 90 TABLET | Refills: 1 | Status: SHIPPED | OUTPATIENT
Start: 2019-03-27 | End: 2020-02-18

## 2019-03-27 ASSESSMENT — ENCOUNTER SYMPTOMS
CONSTIPATION: 0
TROUBLE SWALLOWING: 0
BLOOD IN STOOL: 0
SHORTNESS OF BREATH: 0
ABDOMINAL PAIN: 0
DIARRHEA: 1
VOICE CHANGE: 0

## 2019-03-29 DIAGNOSIS — F41.8 DEPRESSION WITH ANXIETY: ICD-10-CM

## 2019-04-15 ENCOUNTER — OFFICE VISIT (OUTPATIENT)
Dept: SLEEP MEDICINE | Age: 74
End: 2019-04-15
Payer: MEDICARE

## 2019-04-15 ENCOUNTER — OFFICE VISIT (OUTPATIENT)
Dept: INTERNAL MEDICINE CLINIC | Age: 74
End: 2019-04-15
Payer: MEDICARE

## 2019-04-15 VITALS
BODY MASS INDEX: 22.96 KG/M2 | HEART RATE: 71 BPM | HEIGHT: 71 IN | TEMPERATURE: 97.9 F | OXYGEN SATURATION: 96 % | DIASTOLIC BLOOD PRESSURE: 88 MMHG | SYSTOLIC BLOOD PRESSURE: 132 MMHG | RESPIRATION RATE: 14 BRPM | WEIGHT: 164 LBS

## 2019-04-15 VITALS
OXYGEN SATURATION: 98 % | DIASTOLIC BLOOD PRESSURE: 74 MMHG | RESPIRATION RATE: 18 BRPM | HEART RATE: 74 BPM | SYSTOLIC BLOOD PRESSURE: 142 MMHG

## 2019-04-15 DIAGNOSIS — K58.0 IRRITABLE BOWEL SYNDROME WITH DIARRHEA: ICD-10-CM

## 2019-04-15 DIAGNOSIS — F51.01 PRIMARY INSOMNIA: ICD-10-CM

## 2019-04-15 DIAGNOSIS — I10 ESSENTIAL HYPERTENSION: Primary | ICD-10-CM

## 2019-04-15 DIAGNOSIS — E78.00 PURE HYPERCHOLESTEROLEMIA: ICD-10-CM

## 2019-04-15 DIAGNOSIS — F51.04 CHRONIC INSOMNIA: Primary | ICD-10-CM

## 2019-04-15 PROBLEM — N17.9 AKI (ACUTE KIDNEY INJURY) (HCC): Status: RESOLVED | Noted: 2019-02-08 | Resolved: 2019-04-15

## 2019-04-15 PROCEDURE — 1123F ACP DISCUSS/DSCN MKR DOCD: CPT | Performed by: FAMILY MEDICINE

## 2019-04-15 PROCEDURE — 3017F COLORECTAL CA SCREEN DOC REV: CPT | Performed by: PSYCHIATRY & NEUROLOGY

## 2019-04-15 PROCEDURE — G8420 CALC BMI NORM PARAMETERS: HCPCS | Performed by: PSYCHIATRY & NEUROLOGY

## 2019-04-15 PROCEDURE — G8427 DOCREV CUR MEDS BY ELIG CLIN: HCPCS | Performed by: FAMILY MEDICINE

## 2019-04-15 PROCEDURE — G8420 CALC BMI NORM PARAMETERS: HCPCS | Performed by: FAMILY MEDICINE

## 2019-04-15 PROCEDURE — 1123F ACP DISCUSS/DSCN MKR DOCD: CPT | Performed by: PSYCHIATRY & NEUROLOGY

## 2019-04-15 PROCEDURE — 4040F PNEUMOC VAC/ADMIN/RCVD: CPT | Performed by: PSYCHIATRY & NEUROLOGY

## 2019-04-15 PROCEDURE — 3017F COLORECTAL CA SCREEN DOC REV: CPT | Performed by: FAMILY MEDICINE

## 2019-04-15 PROCEDURE — 1036F TOBACCO NON-USER: CPT | Performed by: FAMILY MEDICINE

## 2019-04-15 PROCEDURE — 1036F TOBACCO NON-USER: CPT | Performed by: PSYCHIATRY & NEUROLOGY

## 2019-04-15 PROCEDURE — G8427 DOCREV CUR MEDS BY ELIG CLIN: HCPCS | Performed by: PSYCHIATRY & NEUROLOGY

## 2019-04-15 PROCEDURE — 99214 OFFICE O/P EST MOD 30 MIN: CPT | Performed by: FAMILY MEDICINE

## 2019-04-15 PROCEDURE — 99213 OFFICE O/P EST LOW 20 MIN: CPT | Performed by: PSYCHIATRY & NEUROLOGY

## 2019-04-15 PROCEDURE — 4040F PNEUMOC VAC/ADMIN/RCVD: CPT | Performed by: FAMILY MEDICINE

## 2019-04-15 RX ORDER — TEMAZEPAM 15 MG/1
CAPSULE ORAL
Qty: 60 CAPSULE | Refills: 5 | Status: SHIPPED | OUTPATIENT
Start: 2019-04-15 | End: 2019-06-27

## 2019-04-15 RX ORDER — HYDROCHLOROTHIAZIDE 25 MG/1
25 TABLET ORAL EVERY MORNING
Qty: 30 TABLET | Refills: 3 | Status: SHIPPED | OUTPATIENT
Start: 2019-04-15 | End: 2019-11-04

## 2019-04-15 ASSESSMENT — ENCOUNTER SYMPTOMS
DIARRHEA: 0
COUGH: 0
SHORTNESS OF BREATH: 0
BLOOD IN STOOL: 0
CONSTIPATION: 0
ABDOMINAL PAIN: 0
TROUBLE SWALLOWING: 0
VOICE CHANGE: 0
APNEA: 0

## 2019-04-15 NOTE — PATIENT INSTRUCTIONS

## 2019-04-15 NOTE — PROGRESS NOTES
MD AMAURI Reeves Board Certified in Sleep Medicine  Certified in [address] [place] [address] Certified in Neurology [phone] [place] [address] ELIE Whatley [phone]   [address] [place] [address], [address]                      [address]  [address] [place] [id number] [phone]    Subjective:     Patient ID: Patria Hendricks is a 74 y.o. male. Chief Complaint   Patient presents with    Follow-up     medication       HPI:        Patria Hendricks is a 74 y.o. male was seen today as a follow for chronic insomnia. Still on Temazepam 30 mg, no side effects from the drug, he can fall in sleep within 15-30 minutes and stays in sleep. No snoring/EDS/snoriting. No RLS symptoms.      Previous Report(s)Reviewed: historical medical records     Social History     Socioeconomic History    Marital status:      Spouse name: Not on file    Number of children: Not on file    Years of education: Not on file    Highest education level: Not on file   Occupational History    Not on file   Social Needs    Financial resource strain: Not on file    Food insecurity:     Worry: Not on file     Inability: Not on file    Transportation needs:     Medical: Not on file     Non-medical: Not on file   Tobacco Use    Smoking status: Former Smoker     Packs/day: 1.00     Years: 20.00     Pack years: 20.00     Types: Cigarettes     Last attempt to quit: 1988     Years since quittin.6    Smokeless tobacco: Never Used   Substance and Sexual Activity    Alcohol use: No    Drug use: No    Sexual activity: Not on file   Lifestyle    Physical activity:     Days per week: Not on file     Minutes per session: Not on file    Stress: Not on file   Relationships    Social connections:     Talks on phone: Not on file     Gets together: Not on file     Attends Spiritism service: Not on file     Active member of club or organization: Not on file     Attends meetings of clubs or organizations: Not on file     Relationship status: Not on file    Intimate partner violence:     Fear of current or ex partner: Not on file     Emotionally abused: Not on file     Physically abused: Not on file     Forced sexual activity: Not on file   Other Topics Concern    Not on file   Social History Narrative    Caffeine:        SODA - 0          TEA - 0        COFFEE - 3 cups a day       Prior to Admission medications    Medication Sig Start Date End Date Taking? Authorizing Provider   temazepam (RESTORIL) 15 MG capsule 2 caps a night as needed 4/15/19 4/15/23 Yes Bettye Henriquez MD   losartan (COZAAR) 100 MG tablet Take 1 tablet by mouth daily 3/27/19  Yes Kofi Pool MD   albuterol sulfate HFA (VENTOLIN HFA) 108 (90 Base) MCG/ACT inhaler Inhale 2 puffs into the lungs every 6 hours as needed for Wheezing or Shortness of Breath 3/25/19  Yes Kofi Pool MD   fluticasone-vilanterol (BREO ELLIPTA) 100-25 MCG/INH AEPB inhaler Inhale into the lungs daily   Yes Historical Provider, MD   venlafaxine (EFFEXOR XR) 150 MG extended release capsule Take 1 capsule by mouth daily 2/11/19  Yes Kofi Pool MD   amLODIPine (NORVASC) 10 MG tablet Take 0.5 tablets by mouth daily 2/10/19  Yes Rissa Rodrigues, KLAUS - CNP   QUEtiapine (SEROQUEL) 50 MG tablet Take 1 tablet by mouth nightly 2/1/19  Yes Kofi Pool MD   atenolol (TENORMIN) 100 MG tablet Take 1 tablet by mouth daily 1/15/19  Yes Kofi Pool MD   pravastatin (PRAVACHOL) 40 MG tablet Take 1 tablet by mouth daily 11/2/18  Yes Kofi Pool MD   aspirin 81 MG tablet Take 81 mg by mouth daily. Yes Historical Provider, MD   diphenoxylate-atropine (LOMOTIL) 2.5-0.025 MG per tablet Take 1 tablet by mouth 4 times daily as needed for Diarrhea for up to 30 days. . 12/14/18 3/12/19  Kofi Pool MD Themandibular molar Class :   [x]1 []2 []3      Mallampati I Airway Classification:   []1 []2 [x]3 []4      Physical Exam   Constitutional: No distress. HENT:   Nose: Nose normal.   Eyes: EOM are normal.   Neck: Neck supple. Cardiovascular: Normal rate, regular rhythm and normal heart sounds. Pulmonary/Chest: Effort normal and breath sounds normal. No respiratory distress. Musculoskeletal: Normal range of motion. He exhibits no edema. Neurological: He is alert. Skin: Skin is warm. Psychiatric: He has a normal mood and affect. Nursing note and vitals reviewed. Assessment:        Diagnosis Orders   1. Chronic insomnia  temazepam (RESTORIL) 15 MG capsule     Plan:   WIll continue the Temazepam.    No orders of the defined types were placed in this encounter. Return in about 6 months (around 10/15/2019) for insomnia.     Remington Haney MD  Medical Director - [facility]

## 2019-04-15 NOTE — PROGRESS NOTES
4/15/2019     Hailey Cuevas (:  1945) is a 74 y.o. male, here for evaluation of the following medical concerns:    HPI   Patient presented to the office for follow-up. He stated that since the hydrochlorothiazide was discontinued few months ago ( after episodes of hypotension, dehydration and acute kidney injury) his blood pressures started to creep up. 140-160's. And once in a while she also tension-type headache. But not sure if headache is causing high blood pressure or high blood pressure causing headache  Treatment Adherence:   Medication compliance:  compliant most of the time  Diet compliance:  compliant most of the time  Weight trend: stable  Current exercise: no regular exercise  Barriers: none    Hypertension:  Home blood pressure monitoring: No. Patient denies chest pain and shortness of breath. Antihypertensive medication side effects: no medication side effects noted. Use of agents associated with hypertension: none. Sodium (mmol/L)   Date Value   2019 141    BUN (mg/dL)   Date Value   2019 18    Glucose (mg/dL)   Date Value   2019 119 (H)      Potassium (mmol/L)   Date Value   2019 4.0     Potassium reflex Magnesium (mmol/L)   Date Value   02/10/2019 3.1 (L)    CREATININE (mg/dL)   Date Value   2019 0.8         Hyperlipidemia:  No new myalgias or GI upset on pravastatin (Pravachol). Lab Results   Component Value Date    CHOL 170 2018    TRIG 100 2018    HDL 45 2019    LDLCALC 77 2019     Lab Results   Component Value Date    ALT 21 2019    AST 25 2019      He has IBS with diarrhea and was advised by gastroenterologist to start high-fiber diet including Metamucil. Patient is stated that the diarrhea is almost gone and rarely use Lomotil    Review of Systems   Constitutional: Negative for activity change. HENT: Negative for trouble swallowing and voice change.     Eyes: Negative for visual disturbance. Respiratory: Negative for shortness of breath. Cardiovascular: Negative for chest pain and leg swelling. Gastrointestinal: Negative for abdominal pain, blood in stool, constipation and diarrhea. Genitourinary: Negative for difficulty urinating, dysuria, frequency, hematuria and scrotal swelling. Musculoskeletal: Negative for arthralgias and myalgias. Skin: Negative for rash. Neurological: Negative for dizziness. Psychiatric/Behavioral: Negative for behavioral problems. Prior to Visit Medications    Medication Sig Taking? Authorizing Provider   hydrochlorothiazide (HYDRODIURIL) 25 MG tablet Take 1 tablet by mouth every morning Yes Sharron Garcia MD   losartan (COZAAR) 100 MG tablet Take 1 tablet by mouth daily Yes Sharron Garcia MD   amLODIPine (NORVASC) 10 MG tablet Take 0.5 tablets by mouth daily Yes KLAUS Blue CNP   atenolol (TENORMIN) 100 MG tablet Take 1 tablet by mouth daily Yes Sharron Garcia MD   pravastatin (PRAVACHOL) 40 MG tablet Take 1 tablet by mouth daily Yes Sharron Garcia MD   temazepam (RESTORIL) 15 MG capsule 2 caps a night as needed  Neli Ramirez MD   albuterol sulfate HFA (VENTOLIN HFA) 108 (90 Base) MCG/ACT inhaler Inhale 2 puffs into the lungs every 6 hours as needed for Wheezing or Shortness of Breath  Sharron Garcia MD   fluticasone-vilanterol (BREO ELLIPTA) 100-25 MCG/INH AEPB inhaler Inhale into the lungs daily  Historical Provider, MD   venlafaxine (EFFEXOR XR) 150 MG extended release capsule Take 1 capsule by mouth daily  Sharron Garcia MD   QUEtiapine (SEROQUEL) 50 MG tablet Take 1 tablet by mouth nightly  Sharron Garcia MD   diphenoxylate-atropine (LOMOTIL) 2.5-0.025 MG per tablet Take 1 tablet by mouth 4 times daily as needed for Diarrhea for up to 30 days. Apoorva Friday, MD   aspirin 81 MG tablet Take 81 mg by mouth daily.   Historical Provider, MD        Social History     Tobacco Use    Smoking status: Former Smoker     Packs/day: 1.00     Years: 20.00     Pack years: 20.00     Types: Cigarettes     Last attempt to quit: 1988     Years since quittin.6    Smokeless tobacco: Never Used   Substance Use Topics    Alcohol use: No        Vitals:    04/15/19 1418   BP: (!) 142/74   Pulse: 74   Resp: 18   SpO2: 98%     Estimated body mass index is 23.2 kg/m² as calculated from the following:    Height as of an earlier encounter on 4/15/19: 5' 10.5\" (1.791 m). Weight as of an earlier encounter on 4/15/19: 164 lb (74.4 kg). Physical Exam   Constitutional: He is oriented to person, place, and time. He appears well-developed and well-nourished. No distress. HENT:   Head: Normocephalic. Eyes: Conjunctivae are normal.   Neck: Neck supple. No thyromegaly present. Cardiovascular: Normal rate, regular rhythm and normal heart sounds. No murmur heard. Pulmonary/Chest: Breath sounds normal. No respiratory distress. He has no wheezes. He has no rales. Abdominal: Soft. He exhibits no distension. Musculoskeletal: Normal range of motion. He exhibits no edema. Neurological: He is alert and oriented to person, place, and time. Skin: Skin is warm. No rash noted. Psychiatric: He has a normal mood and affect. His behavior is normal.       ASSESSMENT/PLAN:  1. Essential hypertension  Restart hydrochlorothiazide 25 mg daily # 30 x 3. Continue atenolol 100 mg daily, amlodipine 10 mg daily and losartan 100 mg daily. Will recheck BMP in one  month. Continue blood pressure reading at home and call the office if there is some concern. 2. Irritable bowel syndrome with diarrhea  Controlled. Continue high-fiber diet. Lomotil when necessary only    3. Primary insomnia  Patient takes the Restoril 15 mg at night    4. Pure hypercholesterolemia  Controlled. LDL was 77 last 3/11/2019.  Continue Pravachol 40 mg daily    RTC in 3 mos    An electronic signature was used to authenticate this note.    --PUSHPA HARRY Bebe Quintanilla MD on 4/15/2019 at 3:21 PM

## 2019-05-01 DIAGNOSIS — F41.8 DEPRESSION WITH ANXIETY: ICD-10-CM

## 2019-05-03 RX ORDER — PRAVASTATIN SODIUM 40 MG
40 TABLET ORAL DAILY
Qty: 90 TABLET | Refills: 1 | Status: SHIPPED | OUTPATIENT
Start: 2019-05-03 | End: 2019-10-27 | Stop reason: SDUPTHER

## 2019-05-17 ENCOUNTER — TELEPHONE (OUTPATIENT)
Dept: INTERNAL MEDICINE CLINIC | Age: 74
End: 2019-05-17

## 2019-05-17 DIAGNOSIS — I10 ESSENTIAL HYPERTENSION: Primary | ICD-10-CM

## 2019-05-22 NOTE — TELEPHONE ENCOUNTER
Per Dr Dakotah Esteves needs repeat BMP since taking HCTZ. Order entered and the patient has been notified of this information and all questions answered.

## 2019-05-26 DIAGNOSIS — K52.9 CHRONIC DIARRHEA: ICD-10-CM

## 2019-05-28 RX ORDER — DIPHENOXYLATE HYDROCHLORIDE AND ATROPINE SULFATE 2.5; .025 MG/1; MG/1
1 TABLET ORAL 4 TIMES DAILY PRN
Qty: 120 TABLET | Refills: 1 | Status: SHIPPED | OUTPATIENT
Start: 2019-05-28 | End: 2020-04-20

## 2019-05-31 DIAGNOSIS — F41.8 DEPRESSION WITH ANXIETY: ICD-10-CM

## 2019-06-03 DIAGNOSIS — I10 ESSENTIAL HYPERTENSION: ICD-10-CM

## 2019-06-03 LAB
ANION GAP SERPL CALCULATED.3IONS-SCNC: 16 MMOL/L (ref 3–16)
BUN BLDV-MCNC: 19 MG/DL (ref 7–20)
CALCIUM SERPL-MCNC: 9.2 MG/DL (ref 8.3–10.6)
CHLORIDE BLD-SCNC: 99 MMOL/L (ref 99–110)
CO2: 24 MMOL/L (ref 21–32)
CREAT SERPL-MCNC: 1 MG/DL (ref 0.8–1.3)
GFR AFRICAN AMERICAN: >60
GFR NON-AFRICAN AMERICAN: >60
GLUCOSE BLD-MCNC: 107 MG/DL (ref 70–99)
POTASSIUM SERPL-SCNC: 3.9 MMOL/L (ref 3.5–5.1)
SODIUM BLD-SCNC: 139 MMOL/L (ref 136–145)

## 2019-06-27 ENCOUNTER — OFFICE VISIT (OUTPATIENT)
Dept: INTERNAL MEDICINE CLINIC | Age: 74
End: 2019-06-27
Payer: MEDICARE

## 2019-06-27 VITALS
OXYGEN SATURATION: 96 % | HEART RATE: 75 BPM | SYSTOLIC BLOOD PRESSURE: 132 MMHG | RESPIRATION RATE: 18 BRPM | DIASTOLIC BLOOD PRESSURE: 62 MMHG

## 2019-06-27 DIAGNOSIS — K58.0 IRRITABLE BOWEL SYNDROME WITH DIARRHEA: ICD-10-CM

## 2019-06-27 DIAGNOSIS — I10 ESSENTIAL HYPERTENSION: ICD-10-CM

## 2019-06-27 DIAGNOSIS — F34.1 DYSTHYMIA: ICD-10-CM

## 2019-06-27 DIAGNOSIS — F51.01 PRIMARY INSOMNIA: ICD-10-CM

## 2019-06-27 PROCEDURE — 1036F TOBACCO NON-USER: CPT | Performed by: FAMILY MEDICINE

## 2019-06-27 PROCEDURE — 4040F PNEUMOC VAC/ADMIN/RCVD: CPT | Performed by: FAMILY MEDICINE

## 2019-06-27 PROCEDURE — G8420 CALC BMI NORM PARAMETERS: HCPCS | Performed by: FAMILY MEDICINE

## 2019-06-27 PROCEDURE — G8427 DOCREV CUR MEDS BY ELIG CLIN: HCPCS | Performed by: FAMILY MEDICINE

## 2019-06-27 PROCEDURE — 99214 OFFICE O/P EST MOD 30 MIN: CPT | Performed by: FAMILY MEDICINE

## 2019-06-27 PROCEDURE — 1123F ACP DISCUSS/DSCN MKR DOCD: CPT | Performed by: FAMILY MEDICINE

## 2019-06-27 RX ORDER — AMLODIPINE BESYLATE 5 MG/1
5 TABLET ORAL DAILY
Qty: 90 TABLET | Refills: 1
Start: 2019-06-27 | End: 2020-02-13

## 2019-06-27 ASSESSMENT — ENCOUNTER SYMPTOMS
SHORTNESS OF BREATH: 0
CONSTIPATION: 0
BLOOD IN STOOL: 0
DIARRHEA: 0
VOICE CHANGE: 0
TROUBLE SWALLOWING: 0
ABDOMINAL PAIN: 0

## 2019-06-27 NOTE — PROGRESS NOTES
2019     Tali Mccloud (:  1945) is a 66 y.o. male, here for evaluation of the following medical concerns:    HPI   Patient is here for follow-up. He would like to discuss also the results of the renal profile. About  3 months ago he was admitted at the hospital with dehydration and acute renal profile with hypokalemia. Hydrochlorothiazide was on hold as it may contribute to the dehydration as well as hypokalemia but then it was restarted last visit because of blood pressure slowly creeping up. BMP was done a week ago and it came back with normal potassium as well as renal profile. Patient would also like to discuss medication for insomnia. He reported he still trouble sleeping despite ProSom 3.5 mg at night he is requesting to up the dose to 4 mg daily. He has anxiety depression and takes Effexor and Seroquel. He stated that sometimes he cut the dose of Seroquel from 50 mg to 25 mg because of occasional hangover effect the next day. He has IBS with diarrhea and takes Lomotil as needed  Hypertension:  Home blood pressure monitoring: No.  He is adherent to a low sodium diet. Patient denies chest pain and shortness of breath. Antihypertensive medication side effects: no medication side effects noted. Use of agents associated with hypertension: none. Sodium (mmol/L)   Date Value   2019 139    BUN (mg/dL)   Date Value   2019 19    Glucose (mg/dL)   Date Value   2019 107 (H)      Potassium (mmol/L)   Date Value   2019 3.9     Potassium reflex Magnesium (mmol/L)   Date Value   02/10/2019 3.1 (L)    CREATININE (mg/dL)   Date Value   2019 1.0           Review of Systems   Constitutional: Negative for activity change. HENT: Negative for trouble swallowing and voice change. Eyes: Negative for visual disturbance. Respiratory: Negative for shortness of breath. Cardiovascular: Negative for chest pain and leg swelling. Gastrointestinal: Negative for abdominal pain, blood in stool, constipation and diarrhea. Genitourinary: Negative for difficulty urinating, dysuria, frequency, hematuria and scrotal swelling. Musculoskeletal: Negative for arthralgias and myalgias. Skin: Negative for rash. Neurological: Negative for dizziness. Psychiatric/Behavioral: Positive for sleep disturbance. Negative for behavioral problems. The patient is nervous/anxious. Prior to Visit Medications    Medication Sig Taking? Authorizing Provider   amLODIPine (NORVASC) 5 MG tablet Take 1 tablet by mouth daily Yes Xavier Reilly MD   estazolam (PROSOM) 2 MG tablet Take 1-2 tablets at night PRN for sleep Yes Xavier Reilly MD   diphenoxylate-atropine (LOMOTIL) 2.5-0.025 MG per tablet Take 1 tablet by mouth 4 times daily as needed for Diarrhea for up to 60 days. Yes Xavier Reilly MD   pravastatin (PRAVACHOL) 40 MG tablet Take 1 tablet by mouth daily Yes Xavier Reilly MD   hydrochlorothiazide (HYDRODIURIL) 25 MG tablet Take 1 tablet by mouth every morning Yes Xavier Reilly MD   losartan (COZAAR) 100 MG tablet Take 1 tablet by mouth daily Yes Xavier Reilly MD   albuterol sulfate HFA (VENTOLIN HFA) 108 (90 Base) MCG/ACT inhaler Inhale 2 puffs into the lungs every 6 hours as needed for Wheezing or Shortness of Breath Yes Xavier Reilly MD   fluticasone-vilanterol (BREO ELLIPTA) 100-25 MCG/INH AEPB inhaler Inhale into the lungs daily Yes Historical Provider, MD   venlafaxine (EFFEXOR XR) 150 MG extended release capsule Take 1 capsule by mouth daily Yes Xavier Reilly MD   QUEtiapine (SEROQUEL) 50 MG tablet Take 1 tablet by mouth nightly Yes Xavier Reilly MD   atenolol (TENORMIN) 100 MG tablet Take 1 tablet by mouth daily Yes Xavier Reilly MD   aspirin 81 MG tablet Take 81 mg by mouth daily.  Yes Historical Provider, MD        Social History     Tobacco Use    Smoking status: Former Smoker     Packs/day: 1.00     Years: 20.00 Pack years: 20.00     Types: Cigarettes     Last attempt to quit: 1988     Years since quittin.8    Smokeless tobacco: Never Used   Substance Use Topics    Alcohol use: No        Vitals:    19 1215   BP: 132/62   Pulse: 75   Resp: 18   SpO2: 96%     Estimated body mass index is 23.2 kg/m² as calculated from the following:    Height as of 4/15/19: 5' 10.5\" (1.791 m). Weight as of 4/15/19: 164 lb (74.4 kg). Physical Exam   Constitutional: He appears well-developed and well-nourished. HENT:   Head: Normocephalic. Eyes: Pupils are equal, round, and reactive to light. Conjunctivae are normal.   Neck: Normal range of motion. Neck supple. No thyromegaly present. Cardiovascular: Normal rate, regular rhythm and normal heart sounds. No murmur heard. Pulmonary/Chest: Effort normal and breath sounds normal.   Abdominal: Soft. Bowel sounds are normal. He exhibits no mass. Genitourinary: Prostate normal and penis normal.   Musculoskeletal: Normal range of motion. He exhibits no edema. Neurological: He is alert. Skin: Skin is warm. No rash noted. Psychiatric: He has a normal mood and affect. His behavior is normal.       ASSESSMENT/PLAN:  1. Primary insomnia  He requested to increase ProSom from 3.5mg to 4 mg daily. His request was granted as long as he quit temazepam    2. Essential hypertension  Controlled. Continue atenolol 100 mg daily, losartan 100 mg daily, hydrochlorothiazide 25 mg daily and amlodipine 5 mg daily    3. Irritable bowel syndrome with diarrhea  Advised high-fiber diet. May take Metamucil. He also take Lomotil 3-4 times a day as needed. 4. Dysthymia  Continue Effexor 150 mg daily.   Plus Seroquel 50 mg at night patient advised to take Seroquel around 8 PM or 10 to 12 hours before waking time      RTC in 3 mos    An electronic signature was used to authenticate this note.    --Walter Borrego MD on 2019 at 7:07 PM

## 2019-07-21 DIAGNOSIS — F41.9 ANXIETY: ICD-10-CM

## 2019-07-22 RX ORDER — ATENOLOL 100 MG/1
100 TABLET ORAL DAILY
Qty: 90 TABLET | Refills: 1 | Status: SHIPPED | OUTPATIENT
Start: 2019-07-22 | End: 2020-01-20

## 2019-07-22 RX ORDER — VENLAFAXINE HYDROCHLORIDE 150 MG/1
150 CAPSULE, EXTENDED RELEASE ORAL DAILY
Qty: 90 CAPSULE | Refills: 1 | Status: SHIPPED | OUTPATIENT
Start: 2019-07-22 | End: 2019-12-09 | Stop reason: SDUPTHER

## 2019-07-22 NOTE — TELEPHONE ENCOUNTER
Last filled: 6/27/19. [facility] [address]  [facility].

## 2019-08-21 DIAGNOSIS — F41.9 ANXIETY: ICD-10-CM

## 2019-09-18 ENCOUNTER — OFFICE VISIT (OUTPATIENT)
Dept: INTERNAL MEDICINE CLINIC | Age: 74
End: 2019-09-18
Payer: MEDICARE

## 2019-09-18 VITALS
SYSTOLIC BLOOD PRESSURE: 134 MMHG | OXYGEN SATURATION: 97 % | RESPIRATION RATE: 18 BRPM | HEART RATE: 70 BPM | DIASTOLIC BLOOD PRESSURE: 68 MMHG | BODY MASS INDEX: 23.96 KG/M2 | WEIGHT: 169.4 LBS

## 2019-09-18 DIAGNOSIS — Z23 NEED FOR INFLUENZA VACCINATION: ICD-10-CM

## 2019-09-18 DIAGNOSIS — Z00.00 ROUTINE GENERAL MEDICAL EXAMINATION AT A HEALTH CARE FACILITY: ICD-10-CM

## 2019-09-18 DIAGNOSIS — F41.9 ANXIETY: ICD-10-CM

## 2019-09-18 DIAGNOSIS — Z00.00 MEDICARE ANNUAL WELLNESS VISIT, SUBSEQUENT: Primary | ICD-10-CM

## 2019-09-18 PROCEDURE — 4040F PNEUMOC VAC/ADMIN/RCVD: CPT | Performed by: FAMILY MEDICINE

## 2019-09-18 PROCEDURE — 1123F ACP DISCUSS/DSCN MKR DOCD: CPT | Performed by: FAMILY MEDICINE

## 2019-09-18 PROCEDURE — 90686 IIV4 VACC NO PRSV 0.5 ML IM: CPT | Performed by: FAMILY MEDICINE

## 2019-09-18 PROCEDURE — G0439 PPPS, SUBSEQ VISIT: HCPCS | Performed by: FAMILY MEDICINE

## 2019-09-18 PROCEDURE — G0008 ADMIN INFLUENZA VIRUS VAC: HCPCS | Performed by: FAMILY MEDICINE

## 2019-09-18 ASSESSMENT — LIFESTYLE VARIABLES: HOW OFTEN DO YOU HAVE A DRINK CONTAINING ALCOHOL: 0

## 2019-09-18 ASSESSMENT — PATIENT HEALTH QUESTIONNAIRE - PHQ9
SUM OF ALL RESPONSES TO PHQ QUESTIONS 1-9: 0
SUM OF ALL RESPONSES TO PHQ QUESTIONS 1-9: 0

## 2019-09-18 ASSESSMENT — VISUAL ACUITY
OS_CC: 20/25
OD_CC: 20/25

## 2019-09-18 NOTE — PROGRESS NOTES
Medicare Annual Wellness Visit  Name: Viridiana Cruz Date: 2019   MRN: [id number] Sex: Male   Age: 66 y.o. Ethnicity: Non-/Non    : 1945 Race: Ramesh Kyle is here for Medicare AWV    Screenings for behavioral, psychosocial and functional/safety risks, and cognitive dysfunction are all negative except as indicated below. These results, as well as other patient data from the [address] form, are documented in Flowsheets linked to this Encounter. No Known Allergies  Prior to Visit Medications    Medication Sig Taking? Authorizing Provider   estazolam (PROSOM) 2 MG tablet TAKE 1-2 TABLETS BY MOUTH EVERY NIGHT AT BEDTIME AS NEEDED FOR SLEEP Yes Katelynn Boyd MD   atenolol (TENORMIN) 100 MG tablet Take 1 tablet by mouth daily Yes KLAUS Sousa CNP   venlafaxine (EFFEXOR XR) 150 MG extended release capsule Take 1 capsule by mouth daily Yes KLAUS Sousa CNP   amLODIPine (NORVASC) 5 MG tablet Take 1 tablet by mouth daily Yes Katelynn Boyd MD   pravastatin (PRAVACHOL) 40 MG tablet Take 1 tablet by mouth daily Yes Katelynn Boyd MD   hydrochlorothiazide (HYDRODIURIL) 25 MG tablet Take 1 tablet by mouth every morning Yes Katelynn Boyd MD   losartan (COZAAR) 100 MG tablet Take 1 tablet by mouth daily Yes Katelynn Boyd MD   albuterol sulfate HFA (VENTOLIN HFA) 108 (90 Base) MCG/ACT inhaler Inhale 2 puffs into the lungs every 6 hours as needed for Wheezing or Shortness of Breath Yes Katelynn Boyd MD   fluticasone-vilanterol (BREO ELLIPTA) 100-25 MCG/INH AEPB inhaler Inhale into the lungs daily Yes Historical Provider, MD   QUEtiapine (SEROQUEL) 50 MG tablet Take 1 tablet by mouth nightly Yes Katelynn Boyd MD   diphenoxylate-atropine (LOMOTIL) 2.5-0.025 MG per tablet Take 1 tablet by mouth 4 times daily as needed for Diarrhea for up to 60 days. Katelynn Boyd MD   aspirin 81 MG tablet Take 81 mg by mouth daily.

## 2019-10-07 ENCOUNTER — OFFICE VISIT (OUTPATIENT)
Dept: SLEEP MEDICINE | Age: 74
End: 2019-10-07
Payer: MEDICARE

## 2019-10-07 VITALS
HEART RATE: 76 BPM | RESPIRATION RATE: 16 BRPM | DIASTOLIC BLOOD PRESSURE: 84 MMHG | OXYGEN SATURATION: 96 % | WEIGHT: 172 LBS | SYSTOLIC BLOOD PRESSURE: 138 MMHG | HEIGHT: 71 IN | BODY MASS INDEX: 24.08 KG/M2

## 2019-10-07 DIAGNOSIS — F51.04 CHRONIC INSOMNIA: Primary | ICD-10-CM

## 2019-10-07 PROCEDURE — 4040F PNEUMOC VAC/ADMIN/RCVD: CPT | Performed by: PSYCHIATRY & NEUROLOGY

## 2019-10-07 PROCEDURE — G8427 DOCREV CUR MEDS BY ELIG CLIN: HCPCS | Performed by: PSYCHIATRY & NEUROLOGY

## 2019-10-07 PROCEDURE — 1036F TOBACCO NON-USER: CPT | Performed by: PSYCHIATRY & NEUROLOGY

## 2019-10-07 PROCEDURE — 1123F ACP DISCUSS/DSCN MKR DOCD: CPT | Performed by: PSYCHIATRY & NEUROLOGY

## 2019-10-07 PROCEDURE — 99212 OFFICE O/P EST SF 10 MIN: CPT | Performed by: PSYCHIATRY & NEUROLOGY

## 2019-10-07 PROCEDURE — G8482 FLU IMMUNIZE ORDER/ADMIN: HCPCS | Performed by: PSYCHIATRY & NEUROLOGY

## 2019-10-07 PROCEDURE — G8420 CALC BMI NORM PARAMETERS: HCPCS | Performed by: PSYCHIATRY & NEUROLOGY

## 2019-10-07 RX ORDER — TEMAZEPAM 15 MG/1
CAPSULE ORAL
Qty: 60 CAPSULE | Refills: 5 | Status: SHIPPED | OUTPATIENT
Start: 2019-10-07 | End: 2019-12-09

## 2019-10-07 ASSESSMENT — ENCOUNTER SYMPTOMS: APNEA: 0

## 2019-10-08 RX ORDER — HYDROCHLOROTHIAZIDE 25 MG/1
25 TABLET ORAL DAILY
Qty: 90 TABLET | Refills: 1 | Status: SHIPPED | OUTPATIENT
Start: 2019-10-08 | End: 2020-04-10

## 2019-10-15 DIAGNOSIS — F41.9 ANXIETY: ICD-10-CM

## 2019-10-29 RX ORDER — PRAVASTATIN SODIUM 40 MG
40 TABLET ORAL DAILY
Qty: 90 TABLET | Refills: 1 | Status: SHIPPED | OUTPATIENT
Start: 2019-10-29 | End: 2020-04-20

## 2019-10-29 RX ORDER — QUETIAPINE FUMARATE 50 MG/1
50 TABLET, FILM COATED ORAL NIGHTLY
Qty: 90 TABLET | Refills: 1 | Status: SHIPPED | OUTPATIENT
Start: 2019-10-29 | End: 2020-05-11 | Stop reason: SDUPTHER

## 2019-10-30 RX ORDER — AZITHROMYCIN 250 MG/1
TABLET, FILM COATED ORAL
Qty: 1 PACKET | Refills: 1 | Status: SHIPPED | OUTPATIENT
Start: 2019-10-30 | End: 2019-12-09 | Stop reason: ALTCHOICE

## 2019-11-13 DIAGNOSIS — F41.9 ANXIETY: ICD-10-CM

## 2019-12-09 ENCOUNTER — OFFICE VISIT (OUTPATIENT)
Dept: INTERNAL MEDICINE CLINIC | Age: 74
End: 2019-12-09
Payer: MEDICARE

## 2019-12-09 VITALS
HEART RATE: 70 BPM | SYSTOLIC BLOOD PRESSURE: 138 MMHG | DIASTOLIC BLOOD PRESSURE: 76 MMHG | RESPIRATION RATE: 18 BRPM | OXYGEN SATURATION: 96 % | BODY MASS INDEX: 24.47 KG/M2 | WEIGHT: 173 LBS

## 2019-12-09 DIAGNOSIS — I10 ESSENTIAL HYPERTENSION: Primary | ICD-10-CM

## 2019-12-09 DIAGNOSIS — F41.8 DEPRESSION WITH ANXIETY: ICD-10-CM

## 2019-12-09 DIAGNOSIS — M25.562 CHRONIC PAIN OF LEFT KNEE: ICD-10-CM

## 2019-12-09 DIAGNOSIS — F51.01 PRIMARY INSOMNIA: ICD-10-CM

## 2019-12-09 DIAGNOSIS — J44.9 COPD, MILD (HCC): ICD-10-CM

## 2019-12-09 DIAGNOSIS — G89.29 CHRONIC PAIN OF LEFT KNEE: ICD-10-CM

## 2019-12-09 DIAGNOSIS — K58.0 IRRITABLE BOWEL SYNDROME WITH DIARRHEA: ICD-10-CM

## 2019-12-09 PROCEDURE — 99214 OFFICE O/P EST MOD 30 MIN: CPT | Performed by: FAMILY MEDICINE

## 2019-12-09 PROCEDURE — G8420 CALC BMI NORM PARAMETERS: HCPCS | Performed by: FAMILY MEDICINE

## 2019-12-09 PROCEDURE — 1123F ACP DISCUSS/DSCN MKR DOCD: CPT | Performed by: FAMILY MEDICINE

## 2019-12-09 PROCEDURE — G8926 SPIRO NO PERF OR DOC: HCPCS | Performed by: FAMILY MEDICINE

## 2019-12-09 PROCEDURE — 4040F PNEUMOC VAC/ADMIN/RCVD: CPT | Performed by: FAMILY MEDICINE

## 2019-12-09 PROCEDURE — G8427 DOCREV CUR MEDS BY ELIG CLIN: HCPCS | Performed by: FAMILY MEDICINE

## 2019-12-09 PROCEDURE — G8482 FLU IMMUNIZE ORDER/ADMIN: HCPCS | Performed by: FAMILY MEDICINE

## 2019-12-09 PROCEDURE — 1036F TOBACCO NON-USER: CPT | Performed by: FAMILY MEDICINE

## 2019-12-09 PROCEDURE — 3023F SPIROM DOC REV: CPT | Performed by: FAMILY MEDICINE

## 2019-12-09 RX ORDER — PREDNISONE 20 MG/1
20 TABLET ORAL DAILY
Qty: 10 TABLET | Refills: 0 | Status: SHIPPED | OUTPATIENT
Start: 2019-12-09 | End: 2019-12-19

## 2019-12-09 RX ORDER — VENLAFAXINE HYDROCHLORIDE 150 MG/1
150 CAPSULE, EXTENDED RELEASE ORAL DAILY
Qty: 90 CAPSULE | Refills: 1 | Status: SHIPPED | OUTPATIENT
Start: 2019-12-09 | End: 2020-03-05

## 2019-12-09 RX ORDER — IBUPROFEN 600 MG/1
600 TABLET ORAL EVERY 6 HOURS PRN
Qty: 90 TABLET | Refills: 1 | Status: SHIPPED | OUTPATIENT
Start: 2019-12-09 | End: 2020-07-06

## 2019-12-09 SDOH — ECONOMIC STABILITY: INCOME INSECURITY: HOW HARD IS IT FOR YOU TO PAY FOR THE VERY BASICS LIKE FOOD, HOUSING, MEDICAL CARE, AND HEATING?: NOT HARD AT ALL

## 2019-12-09 SDOH — ECONOMIC STABILITY: FOOD INSECURITY: WITHIN THE PAST 12 MONTHS, THE FOOD YOU BOUGHT JUST DIDN'T LAST AND YOU DIDN'T HAVE MONEY TO GET MORE.: NEVER TRUE

## 2019-12-09 SDOH — ECONOMIC STABILITY: TRANSPORTATION INSECURITY
IN THE PAST 12 MONTHS, HAS LACK OF TRANSPORTATION KEPT YOU FROM MEETINGS, WORK, OR FROM GETTING THINGS NEEDED FOR DAILY LIVING?: NO

## 2019-12-09 SDOH — ECONOMIC STABILITY: TRANSPORTATION INSECURITY
IN THE PAST 12 MONTHS, HAS THE LACK OF TRANSPORTATION KEPT YOU FROM MEDICAL APPOINTMENTS OR FROM GETTING MEDICATIONS?: NO

## 2019-12-09 SDOH — ECONOMIC STABILITY: FOOD INSECURITY: WITHIN THE PAST 12 MONTHS, YOU WORRIED THAT YOUR FOOD WOULD RUN OUT BEFORE YOU GOT MONEY TO BUY MORE.: NEVER TRUE

## 2019-12-10 DIAGNOSIS — F41.9 ANXIETY: ICD-10-CM

## 2019-12-11 DIAGNOSIS — F41.9 ANXIETY: ICD-10-CM

## 2020-01-20 RX ORDER — ATENOLOL 100 MG/1
100 TABLET ORAL DAILY
Qty: 90 TABLET | Refills: 1 | Status: SHIPPED | OUTPATIENT
Start: 2020-01-20 | End: 2020-07-21

## 2020-02-05 RX ORDER — AMLODIPINE BESYLATE 10 MG/1
5 TABLET ORAL DAILY
Qty: 45 TABLET | Refills: 1 | Status: SHIPPED | OUTPATIENT
Start: 2020-02-05 | End: 2020-07-06

## 2020-02-14 ENCOUNTER — OFFICE VISIT (OUTPATIENT)
Dept: INTERNAL MEDICINE CLINIC | Age: 75
End: 2020-02-14
Payer: MEDICARE

## 2020-02-14 VITALS
SYSTOLIC BLOOD PRESSURE: 166 MMHG | WEIGHT: 172 LBS | DIASTOLIC BLOOD PRESSURE: 80 MMHG | HEART RATE: 74 BPM | RESPIRATION RATE: 18 BRPM | OXYGEN SATURATION: 97 % | BODY MASS INDEX: 24.33 KG/M2

## 2020-02-14 PROCEDURE — 3023F SPIROM DOC REV: CPT | Performed by: FAMILY MEDICINE

## 2020-02-14 PROCEDURE — 1123F ACP DISCUSS/DSCN MKR DOCD: CPT | Performed by: FAMILY MEDICINE

## 2020-02-14 PROCEDURE — 1036F TOBACCO NON-USER: CPT | Performed by: FAMILY MEDICINE

## 2020-02-14 PROCEDURE — G8420 CALC BMI NORM PARAMETERS: HCPCS | Performed by: FAMILY MEDICINE

## 2020-02-14 PROCEDURE — G8926 SPIRO NO PERF OR DOC: HCPCS | Performed by: FAMILY MEDICINE

## 2020-02-14 PROCEDURE — 99214 OFFICE O/P EST MOD 30 MIN: CPT | Performed by: FAMILY MEDICINE

## 2020-02-14 PROCEDURE — G8427 DOCREV CUR MEDS BY ELIG CLIN: HCPCS | Performed by: FAMILY MEDICINE

## 2020-02-14 PROCEDURE — G8482 FLU IMMUNIZE ORDER/ADMIN: HCPCS | Performed by: FAMILY MEDICINE

## 2020-02-14 PROCEDURE — 4040F PNEUMOC VAC/ADMIN/RCVD: CPT | Performed by: FAMILY MEDICINE

## 2020-02-14 RX ORDER — FUROSEMIDE 40 MG/1
40 TABLET ORAL DAILY
Qty: 30 TABLET | Refills: 5 | Status: ON HOLD | OUTPATIENT
Start: 2020-02-14 | End: 2020-12-17

## 2020-02-14 ASSESSMENT — PATIENT HEALTH QUESTIONNAIRE - PHQ9
SUM OF ALL RESPONSES TO PHQ QUESTIONS 1-9: 0
2. FEELING DOWN, DEPRESSED OR HOPELESS: 0
SUM OF ALL RESPONSES TO PHQ9 QUESTIONS 1 & 2: 0
1. LITTLE INTEREST OR PLEASURE IN DOING THINGS: 0
SUM OF ALL RESPONSES TO PHQ QUESTIONS 1-9: 0

## 2020-02-14 NOTE — PROGRESS NOTES
by Mirian Teresa MD at [address] [place] [address]          Social History     Socioeconomic History    Marital status:      Spouse name: Not on file    Number of children: Not on file    Years of education: Not on file    Highest education level: Not on file   Occupational History    Not on file   Social Needs    Financial resource strain: Not hard at all    Food insecurity:     Worry: Never true     Inability: Never true   [employer] needs:     Medical: No     Non-medical: No   Tobacco Use    Smoking status: Former Smoker     Packs/day: 1.00     Years: 20.00     Pack years: 20.00     Types: Cigarettes     Last attempt to quit: 1988     Years since quittin.5    Smokeless tobacco: Never Used   Substance and Sexual Activity    Alcohol use: No    Drug use: No    Sexual activity: Not on file   Lifestyle    Physical activity:     Days per week: Not on file     Minutes per session: Not on file    Stress: Not on file   Relationships    Social connections:     Talks on phone: Not on file     Gets together: Not on file     Attends [employer] service: Not on file     Active member of club or organization: Not on file     Attends meetings of clubs or organizations: Not on file     Relationship status: Not on file    Intimate partner violence:     Fear of current or ex partner: Not on file     Emotionally abused: Not on file     Physically abused: Not on file     Forced sexual activity: Not on file   Other Topics Concern    Not on file   Social History Narrative    Caffeine:        SODA - 0          TEA - 0        COFFEE - 3 cups a day        Family History   Problem Relation Age of Onset    Stroke Maternal Grandfather     Cancer Other         leukemia    Hypertension Sister     Hypertension Brother     Hypertension Brother           Physical Exam:  Physical Exam  Vitals signs and nursing note reviewed.    Constitutional:       General: He is not in acute distress. Appearance: Normal appearance. HENT:      Head: Normocephalic and atraumatic. Right Ear: External ear normal.      Left Ear: External ear normal.      Mouth/Throat:      Mouth: Mucous membranes are moist.      Pharynx: Oropharynx is clear. No oropharyngeal exudate. Eyes:      General: No scleral icterus. Conjunctiva/sclera: Conjunctivae normal.   Neck:      Musculoskeletal: Normal range of motion and neck supple. Vascular: No carotid bruit. Cardiovascular:      Rate and Rhythm: Normal rate and regular rhythm. Heart sounds: Normal heart sounds. No murmur. Pulmonary:      Effort: Pulmonary effort is normal. No respiratory distress. Breath sounds: Normal breath sounds. No wheezing. Abdominal:      General: There is no distension. Palpations: Abdomen is soft. There is no mass. Tenderness: There is no abdominal tenderness. There is no guarding or rebound. Musculoskeletal:         General: Swelling (swelling of left ankle. ) present. Skin:     General: Skin is warm and dry. Findings: No rash. Neurological:      Mental Status: He is alert and oriented to person, place, and time.    Psychiatric:         Mood and Affect: Mood normal.         Behavior: Behavior normal.            Laboratory Studies:  No results found for: LABA1C     Lab Results   Component Value Date    WBC 7.1 03/11/2019    HGB 14.0 03/11/2019    HCT 41.6 03/11/2019    MCV 86.2 03/11/2019     03/11/2019        Lab Results   Component Value Date     06/03/2019    K 3.9 06/03/2019    CL 99 06/03/2019    CO2 24 06/03/2019    BUN 19 06/03/2019    CREATININE 1.0 06/03/2019    GLUCOSE 107 (H) 06/03/2019    CALCIUM 9.2 06/03/2019    PROT 6.4 03/11/2019    LABALBU 4.0 03/11/2019    BILITOT 0.4 03/11/2019    ALKPHOS 100 03/11/2019    AST 25 03/11/2019    ALT 21 03/11/2019    LABGLOM >60 06/03/2019    GFRAA >60 06/03/2019    AGRATIO 1.7 03/11/2019    GLOB 2.4 03/11/2019       Lab Results Component Value Date    CHOL 170 04/25/2018    TRIG 100 04/25/2018    HDL 45 03/11/2019    LDLCALC 77 03/11/2019    LABVLDL 17 03/11/2019       Lab Results   Component Value Date    TSH 2.65 03/11/2019    T4FREE 0.8 (L) 03/11/2019        Lab Results   Component Value Date    VITD25 39.7 03/11/2019    VITD25 43.4 02/10/2015          Health Maintenance Review: There are no preventive care reminders to display for this patient. Assessment/Plan:  1. Chronic pain of left knee / 2. Edema of left ankle / 3. At high risk for falls  Patient sustained previous sports injury playing baseball in college. Residual pain from this injury has been bothering patient recently. Patient is also experiencing left ankle swelling which I suspect is due to his left knee brace. Advised patient hold off on wearing knees brace for 1 week. Will start patient on Lasix 40mg daily for 1 week. Will order an X-ray of left knee. Will refer patient to orthopedics Dr. Shirin Jarrett. Of note: patient had an echo doppler in 2/2019 which was unremarkable along with echocardiogram which was also unremarkarble. - Priyanka Giraldo MD, Orthopedic Surgery (Hip, Knee, Shoulder), [facility]  - XR KNEE LEFT (3 VIEWS); Future  - furosemide (LASIX) 40 MG tablet; Take 1 tablet by mouth daily  Dispense: 30 tablet; Refill: 5    4. Essential hypertension  Controlled. Continue Atenolol 100mg daily, Hydrodiuril 25mg daily, Losartain 100mg daily and Amlodipine 5mg daily. 5. COPD, mild (HCC)  Stable. Continue Breo and Albuterol inhalers as needed. 6. Irritable bowel syndrome with diarrhea  Doing fairly well. Taking less of the Lomotil since he began taking Metamucil    7. Primary insomnia  Stable. Continue Prosom 2mg daily along with Seroquel.   - estazolam (PROSOM) 2 MG tablet; Take 1-2 tabs by mouth nightly PRN for sleep  Dispense: 60 tablet; Refill: 0    8. Depression with anxiety  Controlled.  Continue Effexor 150mg daily and Seroquel 50mg MD.   Electronically Signed: Leopold Glen, Scribe, 2/14/2020, 3:31 PM.      Neha Campos MD, personally performed the services described in this documentation. All medical record entries made by the scribe were at my direction and in my presence. I have reviewed the chart and discharge instructions (if applicable) and agree that the record reflects my personal performance and is accurate and complete.  Leslie Mai MD, 2/14/2020, 3:43 PM.

## 2020-02-18 RX ORDER — LOSARTAN POTASSIUM 100 MG/1
100 TABLET ORAL DAILY
Qty: 90 TABLET | Refills: 1 | Status: SHIPPED | OUTPATIENT
Start: 2020-02-18 | End: 2020-08-20

## 2020-03-05 RX ORDER — VENLAFAXINE HYDROCHLORIDE 150 MG/1
150 CAPSULE, EXTENDED RELEASE ORAL DAILY
Qty: 90 CAPSULE | Refills: 1 | Status: SHIPPED | OUTPATIENT
Start: 2020-03-05 | End: 2020-09-04

## 2020-03-13 DIAGNOSIS — Z13.29 THYROID DISORDER SCREEN: ICD-10-CM

## 2020-03-13 DIAGNOSIS — E78.49 OTHER HYPERLIPIDEMIA: ICD-10-CM

## 2020-03-13 DIAGNOSIS — I10 ESSENTIAL HYPERTENSION: ICD-10-CM

## 2020-03-13 DIAGNOSIS — E55.9 VITAMIN D DEFICIENCY: ICD-10-CM

## 2020-03-13 DIAGNOSIS — Z12.5 SCREENING PSA (PROSTATE SPECIFIC ANTIGEN): ICD-10-CM

## 2020-03-13 LAB
A/G RATIO: 1.5 (ref 1.1–2.2)
ALBUMIN SERPL-MCNC: 4.1 G/DL (ref 3.4–5)
ALP BLD-CCNC: 88 U/L (ref 40–129)
ALT SERPL-CCNC: 27 U/L (ref 10–40)
ANION GAP SERPL CALCULATED.3IONS-SCNC: 14 MMOL/L (ref 3–16)
AST SERPL-CCNC: 27 U/L (ref 15–37)
BILIRUB SERPL-MCNC: 0.4 MG/DL (ref 0–1)
BUN BLDV-MCNC: 22 MG/DL (ref 7–20)
CALCIUM SERPL-MCNC: 9.4 MG/DL (ref 8.3–10.6)
CHLORIDE BLD-SCNC: 101 MMOL/L (ref 99–110)
CHOLESTEROL, TOTAL: 138 MG/DL (ref 0–199)
CO2: 27 MMOL/L (ref 21–32)
CREAT SERPL-MCNC: 0.9 MG/DL (ref 0.8–1.3)
GFR AFRICAN AMERICAN: >60
GFR NON-AFRICAN AMERICAN: >60
GLOBULIN: 2.7 G/DL
GLUCOSE BLD-MCNC: 104 MG/DL (ref 70–99)
HCT VFR BLD CALC: 44.6 % (ref 40.5–52.5)
HDLC SERPL-MCNC: 42 MG/DL (ref 40–60)
HEMOGLOBIN: 14.9 G/DL (ref 13.5–17.5)
LDL CHOLESTEROL CALCULATED: 74 MG/DL
MCH RBC QN AUTO: 29 PG (ref 26–34)
MCHC RBC AUTO-ENTMCNC: 33.4 G/DL (ref 31–36)
MCV RBC AUTO: 86.9 FL (ref 80–100)
PDW BLD-RTO: 14.2 % (ref 12.4–15.4)
PLATELET # BLD: 218 K/UL (ref 135–450)
PMV BLD AUTO: 8 FL (ref 5–10.5)
POTASSIUM SERPL-SCNC: 3.8 MMOL/L (ref 3.5–5.1)
PROSTATE SPECIFIC ANTIGEN: 2.25 NG/ML (ref 0–4)
RBC # BLD: 5.14 M/UL (ref 4.2–5.9)
SODIUM BLD-SCNC: 142 MMOL/L (ref 136–145)
TOTAL PROTEIN: 6.8 G/DL (ref 6.4–8.2)
TRIGL SERPL-MCNC: 108 MG/DL (ref 0–150)
TSH REFLEX: 3.35 UIU/ML (ref 0.27–4.2)
VITAMIN D 25-HYDROXY: 43.2 NG/ML
VLDLC SERPL CALC-MCNC: 22 MG/DL
WBC # BLD: 12 K/UL (ref 4–11)

## 2020-04-06 ENCOUNTER — VIRTUAL VISIT (OUTPATIENT)
Dept: SLEEP MEDICINE | Age: 75
End: 2020-04-06

## 2020-04-06 RX ORDER — TEMAZEPAM 30 MG/1
CAPSULE ORAL
Qty: 30 CAPSULE | Refills: 5 | Status: SHIPPED | OUTPATIENT
Start: 2020-04-06 | End: 2020-05-11

## 2020-04-06 ASSESSMENT — ENCOUNTER SYMPTOMS
EYES NEGATIVE: 1
ALLERGIC/IMMUNOLOGIC NEGATIVE: 1
GASTROINTESTINAL NEGATIVE: 1
RESPIRATORY NEGATIVE: 1
APNEA: 0

## 2020-04-06 NOTE — PROGRESS NOTES
file     Attends Zoroastrian service: Not on file     Active member of club or organization: Not on file     Attends meetings of clubs or organizations: Not on file     Relationship status: Not on file    Intimate partner violence     Fear of current or ex partner: Not on file     Emotionally abused: Not on file     Physically abused: Not on file     Forced sexual activity: Not on file   Other Topics Concern    Not on file   Social History Narrative    Caffeine:        SODA - 0          TEA - 0        COFFEE - 3 cups a day       Prior to Admission medications    Medication Sig Start Date End Date Taking? Authorizing Provider   temazepam (RESTORIL) 30 MG capsule One cap QHS PRN 4/6/20 4/6/24 Yes Emanuel Carmona MD   Fluticasone furoate-vilanterol (BREO ELLIPTA) 200-25 MCG/INH AEPB inhaler Inhale 1 puff into the lungs daily 3/11/20 4/10/20  Juliette Epley, MD   venlafaxine (EFFEXOR XR) 150 MG extended release capsule Take 1 capsule by mouth daily 3/5/20   Juliette Epley, MD   losartan (COZAAR) 100 MG tablet Take 1 tablet by mouth daily 2/18/20   Juliette Epley, MD   furosemide (LASIX) 40 MG tablet Take 1 tablet by mouth daily 2/14/20   Juliette Epley, MD   amLODIPine (NORVASC) 10 MG tablet Take 0.5 tablets by mouth daily 2/5/20   Juliette Epley, MD   atenolol (TENORMIN) 100 MG tablet Take 1 tablet by mouth daily 1/20/20   Juliette Epley, MD   ibuprofen (ADVIL;MOTRIN) 600 MG tablet Take 1 tablet by mouth every 6 hours as needed for Pain Take with food.  12/9/19   Juliette Epley, MD   pravastatin (PRAVACHOL) 40 MG tablet Take 1 tablet by mouth daily 10/29/19   Juliette Epley, MD   QUEtiapine (SEROQUEL) 50 MG tablet Take 1 tablet by mouth nightly 10/29/19   Juliette Epley, MD   hydrochlorothiazide (HYDRODIURIL) 25 MG tablet Take 1 tablet by mouth daily 10/8/19   Juliette Epley, MD   diphenoxylate-atropine (LOMOTIL) 2.5-0.025 MG per tablet Take 1 tablet by mouth 4 times daily as needed for Diarrhea for up to 60 Negative. Neurological: Negative. Hematological: Negative. Psychiatric/Behavioral: Negative. Objective:     Vitals:  Weight BMI Neck circumference    Wt Readings from Last 3 Encounters:   02/14/20 172 lb (78 kg)   12/09/19 173 lb (78.5 kg)   10/07/19 172 lb (78 kg)    There is no height or weight on file to calculate BMI. BP HR SaO2   BP Readings from Last 3 Encounters:   02/14/20 (!) 166/80   12/09/19 138/76   10/07/19 138/84    Pulse Readings from Last 3 Encounters:   02/14/20 74   12/09/19 70   10/07/19 76    SpO2 Readings from Last 3 Encounters:   02/14/20 97%   12/09/19 96%   10/07/19 96%                                    Themandibular molar Class :   []1 []2 []3      Mallampati I Airway Classification:   []1 []2 []3 []4      Physical Exam    Assessment:        Diagnosis Orders   1. Chronic insomnia  temazepam (RESTORIL) 30 MG capsule     Plan: Will continue the Temazepam and Seroqul  Has to wear mask when he goes out. No orders of the defined types were placed in this encounter. No follow-ups on file.     Shu Martinez MD  Medical Director - [facility]

## 2020-04-10 RX ORDER — HYDROCHLOROTHIAZIDE 25 MG/1
25 TABLET ORAL DAILY
Qty: 90 TABLET | Refills: 1 | Status: SHIPPED | OUTPATIENT
Start: 2020-04-10 | End: 2021-02-03

## 2020-04-20 RX ORDER — PRAVASTATIN SODIUM 40 MG
40 TABLET ORAL DAILY
Qty: 90 TABLET | Refills: 1 | Status: SHIPPED | OUTPATIENT
Start: 2020-04-20 | End: 2020-11-02

## 2020-04-20 RX ORDER — DIPHENOXYLATE HYDROCHLORIDE AND ATROPINE SULFATE 2.5; .025 MG/1; MG/1
1 TABLET ORAL 4 TIMES DAILY PRN
Qty: 120 TABLET | Refills: 1 | Status: SHIPPED | OUTPATIENT
Start: 2020-04-20 | End: 2021-07-06

## 2020-04-28 ENCOUNTER — TELEPHONE (OUTPATIENT)
Dept: INTERNAL MEDICINE CLINIC | Age: 75
End: 2020-04-28

## 2020-04-28 NOTE — TELEPHONE ENCOUNTER
Head ache Friday , Saturday , Sunday. Chills lasted about a 1/2 hour . Yesterday he feels fine, and today he feels fine. No fever, he wants to know what to do , and should he be tested .  Please call him back at [phone]

## 2020-05-11 ENCOUNTER — VIRTUAL VISIT (OUTPATIENT)
Dept: INTERNAL MEDICINE CLINIC | Age: 75
End: 2020-05-11
Payer: MEDICARE

## 2020-05-11 PROCEDURE — 99214 OFFICE O/P EST MOD 30 MIN: CPT | Performed by: FAMILY MEDICINE

## 2020-05-11 PROCEDURE — G8428 CUR MEDS NOT DOCUMENT: HCPCS | Performed by: FAMILY MEDICINE

## 2020-05-11 PROCEDURE — 3023F SPIROM DOC REV: CPT | Performed by: FAMILY MEDICINE

## 2020-05-11 PROCEDURE — G8926 SPIRO NO PERF OR DOC: HCPCS | Performed by: FAMILY MEDICINE

## 2020-05-11 PROCEDURE — 4040F PNEUMOC VAC/ADMIN/RCVD: CPT | Performed by: FAMILY MEDICINE

## 2020-05-11 PROCEDURE — 1036F TOBACCO NON-USER: CPT | Performed by: FAMILY MEDICINE

## 2020-05-11 PROCEDURE — G8420 CALC BMI NORM PARAMETERS: HCPCS | Performed by: FAMILY MEDICINE

## 2020-05-11 PROCEDURE — 1123F ACP DISCUSS/DSCN MKR DOCD: CPT | Performed by: FAMILY MEDICINE

## 2020-05-11 RX ORDER — QUETIAPINE FUMARATE 25 MG/1
25 TABLET, FILM COATED ORAL NIGHTLY
Qty: 90 TABLET | Refills: 1 | Status: SHIPPED | OUTPATIENT
Start: 2020-05-11 | End: 2020-11-02

## 2020-05-11 NOTE — PROGRESS NOTES
MG tablet Take 0.5 tablets by mouth daily  Aline Guo MD   atenolol (TENORMIN) 100 MG tablet Take 1 tablet by mouth daily  Aline Guo MD   ibuprofen (ADVIL;MOTRIN) 600 MG tablet Take 1 tablet by mouth every 6 hours as needed for Pain Take with food. Aline Guo MD   QUEtiapine (SEROQUEL) 50 MG tablet Take 1 tablet by mouth nightly  Aline Guo MD   albuterol sulfate HFA (VENTOLIN HFA) 108 (90 Base) MCG/ACT inhaler Inhale 2 puffs into the lungs every 6 hours as needed for Wheezing or Shortness of Breath  Aline Guo MD   fluticasone-vilanterol (BREO ELLIPTA) 100-25 MCG/INH AEPB inhaler Inhale into the lungs daily  Historical Provider, MD   aspirin 81 MG tablet Take 81 mg by mouth daily. Historical Provider, MD       Social History     Tobacco Use    Smoking status: Former Smoker     Packs/day: 1.00     Years: 20.00     Pack years: 20.00     Types: Cigarettes     Last attempt to quit: 1988     Years since quittin.7    Smokeless tobacco: Never Used   Substance Use Topics    Alcohol use: No    Drug use: No        No Known Allergies,   Past Medical History:   Diagnosis Date    Anxiety     COPD, mild ([address] 2017    Depression     Hyperlipidemia     Hypertension     IBS (irritable bowel syndrome) 10/16/2013    Insomnia    ,   Past Surgical History:   Procedure Laterality Date    APPENDECTOMY      COLONOSCOPY  2019    [place]    COLONOSCOPY N/A 3/19/2019    COLONOSCOPY WITH BIOPSY performed by Jessica Chandra MD at [address] COLONOSCOPY N/A 3/19/2019    COLONOSCOPY POLYPECTOMY SNARE/COLD BIOPSY performed by Jessica Chandra MD at [address]:    Constitutional: [] Appears well-developed and well-nourished [x] No apparent distress      [] Abnormal-   Mental status  [x] Alert and awake  [x] Oriented to person/place/time []Able to follow commands        1.  Essential in-person clinic visit. Patient and provider were located at their individual homes. --Linette Cid MD on 5/11/2020 at 2:39 PM    An electronic signature was used to authenticate this note.

## 2020-05-14 ENCOUNTER — PATIENT MESSAGE (OUTPATIENT)
Dept: INTERNAL MEDICINE CLINIC | Age: 75
End: 2020-05-14

## 2020-05-14 ENCOUNTER — TELEPHONE (OUTPATIENT)
Dept: INTERNAL MEDICINE CLINIC | Age: 75
End: 2020-05-14

## 2020-05-15 ENCOUNTER — TELEPHONE (OUTPATIENT)
Dept: INTERNAL MEDICINE CLINIC | Age: 75
End: 2020-05-15

## 2020-07-07 RX ORDER — AMLODIPINE BESYLATE 10 MG/1
5 TABLET ORAL DAILY
Qty: 45 TABLET | Refills: 1 | Status: SHIPPED | OUTPATIENT
Start: 2020-07-07 | End: 2020-10-15

## 2020-07-07 RX ORDER — IBUPROFEN 600 MG/1
600 TABLET ORAL EVERY 6 HOURS PRN
Qty: 90 TABLET | Refills: 1 | Status: SHIPPED | OUTPATIENT
Start: 2020-07-07 | End: 2020-10-12

## 2020-07-21 RX ORDER — ATENOLOL 100 MG/1
100 TABLET ORAL DAILY
Qty: 90 TABLET | Refills: 1 | Status: SHIPPED | OUTPATIENT
Start: 2020-07-21 | End: 2020-10-12

## 2020-07-21 NOTE — TELEPHONE ENCOUNTER
LOV: 5-11-20    Future Appointments   Date Time Provider Pedrito Nicholas   8/11/2020  2:30 PM MD МАРИЯ Dang

## 2020-07-28 ENCOUNTER — TELEPHONE (OUTPATIENT)
Dept: INTERNAL MEDICINE CLINIC | Age: 75
End: 2020-07-28

## 2020-07-28 NOTE — TELEPHONE ENCOUNTER
Called pt. [facility] I asked that he send us the results when he gets them.

## 2020-07-28 NOTE — TELEPHONE ENCOUNTER
----- Message from Tawanda Ramirez sent at 7/28/2020  2:30 PM EDT -----  Subject: Message to Provider    QUESTIONS  Information for Provider? PT STATES THAT HE AND WIFE WAS TESTED FOR   COVID-19  ---------------------------------------------------------------------------  --------------  CALL BACK INFO  What is the best way for the office to contact you? OK to leave message on   voicemail  Preferred Call Back Phone Number? [phone]  ---------------------------------------------------------------------------  --------------  SCRIPT ANSWERS  Relationship to Patient?  Self

## 2020-08-14 ENCOUNTER — OFFICE VISIT (OUTPATIENT)
Dept: INTERNAL MEDICINE CLINIC | Age: 75
End: 2020-08-14
Payer: MEDICARE

## 2020-08-14 VITALS
SYSTOLIC BLOOD PRESSURE: 136 MMHG | TEMPERATURE: 97.2 F | BODY MASS INDEX: 23.91 KG/M2 | OXYGEN SATURATION: 96 % | RESPIRATION RATE: 18 BRPM | HEART RATE: 71 BPM | DIASTOLIC BLOOD PRESSURE: 68 MMHG | WEIGHT: 169 LBS

## 2020-08-14 PROCEDURE — G8420 CALC BMI NORM PARAMETERS: HCPCS | Performed by: FAMILY MEDICINE

## 2020-08-14 PROCEDURE — 4040F PNEUMOC VAC/ADMIN/RCVD: CPT | Performed by: FAMILY MEDICINE

## 2020-08-14 PROCEDURE — 3023F SPIROM DOC REV: CPT | Performed by: FAMILY MEDICINE

## 2020-08-14 PROCEDURE — 1036F TOBACCO NON-USER: CPT | Performed by: FAMILY MEDICINE

## 2020-08-14 PROCEDURE — 1123F ACP DISCUSS/DSCN MKR DOCD: CPT | Performed by: FAMILY MEDICINE

## 2020-08-14 PROCEDURE — G8926 SPIRO NO PERF OR DOC: HCPCS | Performed by: FAMILY MEDICINE

## 2020-08-14 PROCEDURE — 99214 OFFICE O/P EST MOD 30 MIN: CPT | Performed by: FAMILY MEDICINE

## 2020-08-14 PROCEDURE — G8427 DOCREV CUR MEDS BY ELIG CLIN: HCPCS | Performed by: FAMILY MEDICINE

## 2020-08-14 ASSESSMENT — ENCOUNTER SYMPTOMS
TROUBLE SWALLOWING: 0
ABDOMINAL PAIN: 0
CONSTIPATION: 0
DIARRHEA: 0
SHORTNESS OF BREATH: 0
BLOOD IN STOOL: 0
VOICE CHANGE: 0

## 2020-08-14 NOTE — PROGRESS NOTES
2020     Williams Mccollum (:  1945) is a 66 y.o. male, here for evaluation of the following medical concerns:    HPI  Patient presented to the office for regular follow-up. He has hypertension controlled with current medication. He takes losartan and atenolol amlodipine and hydrochlorothiazide. He has hyperlipidemia tolerating statin, takes Pravachol 40 mg daily. He has COPD controlled with current medication, takes Breo and Ventolin inhaler. He also have IBS with diarrhea and takes Lomotil 4 times a day as needed. He has anxiety and mood disorder with insomnia. He takes Effexor, Seroquel and ProSom. He is doing fairly well with current regimen. .  Review of Systems   Constitutional: Negative for activity change. HENT: Negative for trouble swallowing and voice change. Eyes: Negative for visual disturbance. Respiratory: Negative for shortness of breath. Cardiovascular: Negative for chest pain and leg swelling. Gastrointestinal: Negative for abdominal pain, blood in stool, constipation and diarrhea. Genitourinary: Negative for difficulty urinating, dysuria, frequency, hematuria and scrotal swelling. Musculoskeletal: Negative for arthralgias and myalgias. Skin: Negative for rash. Neurological: Negative for dizziness. Psychiatric/Behavioral: Negative for behavioral problems. Prior to Visit Medications    Medication Sig Taking? Authorizing Provider   estazolam (PROSOM) 2 MG tablet TAKE 1 TO 2 TABLETS BY MOUTH AT NIGHT AS NEEDED  Dario Hameed MD   atenolol (TENORMIN) 100 MG tablet TAKE 1 TABLET BY MOUTH DAILY  Dario Hameed MD   ibuprofen (ADVIL;MOTRIN) 600 MG tablet Take 1 tablet by mouth every 6 hours as needed for Pain Take with food.   Dario Hameed MD   amLODIPine (NORVASC) 10 MG tablet Take 0.5 tablets by mouth daily  Dario Hameed MD   QUEtiapine (SEROQUEL) 25 MG tablet Take 1 tablet by mouth nightly  Dario Hameed MD   pravastatin (PRAVACHOL) Heart sounds: Normal heart sounds. No murmur. Pulmonary:      Effort: No respiratory distress. Breath sounds: Normal breath sounds. No wheezing or rales. Abdominal:      General: There is no distension. Palpations: Abdomen is soft. Musculoskeletal: Normal range of motion. Skin:     General: Skin is warm. Findings: No rash. Neurological:      Mental Status: He is alert and oriented to person, place, and time. Psychiatric:         Behavior: Behavior normal.         ASSESSMENT/PLAN:  1. Essential hypertension  Controlled. Continue atenolol 100 mg daily, amlodipine 10 mg daily, hydrochlorothiazide 25 mg daily and losartan 100 mg daily    2. Hyperlipidemia LDL goal <100  Controlled. Continue Pravachol 40 mg daily. 3. COPD, mild ([address]  Controlled. Continue Breo and Ventolin inhaler    4. Primary insomnia  Doing fairly well and ProSom 3 mg at night    5. Dysthymia  Stable. Continue Effexor 150 mg daily and Seroquel 25 mg at night. 6. Irritable bowel syndrome with diarrhea  Stable. Continue high-fiber diet and Lomotil 4 times a day as needed. RTC in 3 mos.     An electronic signature was used to authenticate this note.    --Peña Swanson MD on 8/14/2020 at 4:00 PM

## 2020-08-21 RX ORDER — LOSARTAN POTASSIUM 100 MG/1
100 TABLET ORAL DAILY
Qty: 90 TABLET | Refills: 1 | Status: SHIPPED | OUTPATIENT
Start: 2020-08-21 | End: 2021-02-08

## 2020-09-01 RX ORDER — ALBUTEROL SULFATE 90 UG/1
2 AEROSOL, METERED RESPIRATORY (INHALATION) EVERY 6 HOURS PRN
Qty: 3 INHALER | Refills: 1 | Status: SHIPPED | OUTPATIENT
Start: 2020-09-01 | End: 2021-07-06 | Stop reason: SDUPTHER

## 2020-09-04 RX ORDER — VENLAFAXINE HYDROCHLORIDE 150 MG/1
150 CAPSULE, EXTENDED RELEASE ORAL DAILY
Qty: 90 CAPSULE | Refills: 1 | Status: SHIPPED | OUTPATIENT
Start: 2020-09-04 | End: 2021-01-07 | Stop reason: SDUPTHER

## 2020-10-12 RX ORDER — ATENOLOL 100 MG/1
100 TABLET ORAL DAILY
Qty: 90 TABLET | Refills: 1 | Status: SHIPPED | OUTPATIENT
Start: 2020-10-12 | End: 2021-05-04 | Stop reason: SDUPTHER

## 2020-10-12 RX ORDER — IBUPROFEN 600 MG/1
TABLET ORAL
Qty: 90 TABLET | Refills: 1 | Status: SHIPPED | OUTPATIENT
Start: 2020-10-12 | End: 2020-12-16

## 2020-10-16 RX ORDER — AMLODIPINE BESYLATE 10 MG/1
5 TABLET ORAL DAILY
Qty: 45 TABLET | Refills: 1 | Status: SHIPPED | OUTPATIENT
Start: 2020-10-16 | End: 2021-03-26

## 2020-11-02 RX ORDER — QUETIAPINE FUMARATE 25 MG/1
TABLET, FILM COATED ORAL
Qty: 90 TABLET | Refills: 1 | Status: SHIPPED | OUTPATIENT
Start: 2020-11-02 | End: 2021-07-06

## 2020-11-02 RX ORDER — PRAVASTATIN SODIUM 40 MG
TABLET ORAL
Qty: 90 TABLET | Refills: 1 | Status: SHIPPED | OUTPATIENT
Start: 2020-11-02 | End: 2021-05-10

## 2020-11-02 NOTE — TELEPHONE ENCOUNTER
Last visit: 08/14/2020  Future Appointments   Date Time Provider Pedrito Nicholas   11/18/2020  3:15 PM MD МАРИЯ Montoya

## 2020-11-03 ENCOUNTER — OFFICE VISIT (OUTPATIENT)
Dept: PRIMARY CARE CLINIC | Age: 75
End: 2020-11-03
Payer: MEDICARE

## 2020-11-03 PROCEDURE — 99212 OFFICE O/P EST SF 10 MIN: CPT | Performed by: NURSE PRACTITIONER

## 2020-11-03 NOTE — PROGRESS NOTES
Soumya Olson received a viral test for COVID-19. They were educated on isolation and quarantine as appropriate. For any symptoms, they were directed to seek care from their PCP, given contact information to establish with a doctor, directed to an urgent care or the emergency room.

## 2020-11-05 LAB — SARS-COV-2, NAA: NOT DETECTED

## 2020-11-18 ENCOUNTER — OFFICE VISIT (OUTPATIENT)
Dept: INTERNAL MEDICINE CLINIC | Age: 75
End: 2020-11-18
Payer: MEDICARE

## 2020-11-18 VITALS
OXYGEN SATURATION: 97 % | TEMPERATURE: 97.2 F | HEART RATE: 80 BPM | BODY MASS INDEX: 24.53 KG/M2 | RESPIRATION RATE: 18 BRPM | DIASTOLIC BLOOD PRESSURE: 70 MMHG | WEIGHT: 173.4 LBS | SYSTOLIC BLOOD PRESSURE: 136 MMHG

## 2020-11-18 PROCEDURE — 1123F ACP DISCUSS/DSCN MKR DOCD: CPT | Performed by: FAMILY MEDICINE

## 2020-11-18 PROCEDURE — 1036F TOBACCO NON-USER: CPT | Performed by: FAMILY MEDICINE

## 2020-11-18 PROCEDURE — 99214 OFFICE O/P EST MOD 30 MIN: CPT | Performed by: FAMILY MEDICINE

## 2020-11-18 PROCEDURE — 4040F PNEUMOC VAC/ADMIN/RCVD: CPT | Performed by: FAMILY MEDICINE

## 2020-11-18 PROCEDURE — G8420 CALC BMI NORM PARAMETERS: HCPCS | Performed by: FAMILY MEDICINE

## 2020-11-18 PROCEDURE — 3023F SPIROM DOC REV: CPT | Performed by: FAMILY MEDICINE

## 2020-11-18 PROCEDURE — G8482 FLU IMMUNIZE ORDER/ADMIN: HCPCS | Performed by: FAMILY MEDICINE

## 2020-11-18 PROCEDURE — G8926 SPIRO NO PERF OR DOC: HCPCS | Performed by: FAMILY MEDICINE

## 2020-11-18 PROCEDURE — G8427 DOCREV CUR MEDS BY ELIG CLIN: HCPCS | Performed by: FAMILY MEDICINE

## 2020-11-18 PROCEDURE — 3288F FALL RISK ASSESSMENT DOCD: CPT | Performed by: FAMILY MEDICINE

## 2020-11-18 ASSESSMENT — ENCOUNTER SYMPTOMS
VOICE CHANGE: 0
DIARRHEA: 0
CONSTIPATION: 0
TROUBLE SWALLOWING: 0
SHORTNESS OF BREATH: 0
ABDOMINAL PAIN: 0
BLOOD IN STOOL: 0

## 2020-11-18 NOTE — PROGRESS NOTES
2020     Sylvia Flores (:  1945) is a 66 y.o. male, here for evaluation of the following medical concerns:    HPI  Patient presented to the office for follow-up. He has hypertension controlled with atenolol amlodipine and hydrochlorothiazide as well as losartan. He has hyperlipidemia and tolerating statin. He takes Pravachol 40 mg daily. He has COPD controlled with Breo and Ventolin inhaler. He has dysthymia and takes Effexor and Seroquel. Symptoms seems to be fairly controlled. He has insomnia and takes ProSom 3 mg daily. He has an IBS and takes Metamucil and Lomotil. Review of Systems   Constitutional: Negative for activity change. HENT: Negative for trouble swallowing and voice change. Eyes: Negative for visual disturbance. Respiratory: Negative for shortness of breath. Cardiovascular: Negative for chest pain and leg swelling. Gastrointestinal: Negative for abdominal pain, blood in stool, constipation and diarrhea. Genitourinary: Negative for difficulty urinating, dysuria, frequency, hematuria and scrotal swelling. Musculoskeletal: Negative for arthralgias and myalgias. Skin: Negative for rash. Neurological: Negative for dizziness. Psychiatric/Behavioral: Negative for behavioral problems. Prior to Visit Medications    Medication Sig Taking?  Authorizing Provider   estazolam (PROSOM) 2 MG tablet TAKE 1 TO 2 TABLETS BY MOUTH AT NIGHT AS NEEDED  Anu Knott MD   pravastatin (PRAVACHOL) 40 MG tablet TAKE ONE (1) TABLET BY MOUTH DAILY   Anu Knott MD   QUEtiapine (SEROQUEL) 25 MG tablet TAKE ONE (1) TABLET BY MOUTH NIGHTLY   Anu Knott MD   amLODIPine (NORVASC) 10 MG tablet Take 0.5 tablets by mouth daily  Anu Knott MD   atenolol (TENORMIN) 100 MG tablet Take 1 tablet by mouth daily  Anu Knott MD   ibuprofen (ADVIL;MOTRIN) 600 MG tablet TAKE ONE (1) TABLET BY MOUTH EVERY SIX (6) HOURS AS NEEDED TAKE WITH WITH FOOD   Mehnaz Bean, MD   Fluticasone furoate-vilanterol (BREO ELLIPTA) 200-25 MCG/INH AEPB inhaler Inhale 1 puff into the lungs daily  Martha Burns MD   venlafaxine (EFFEXOR XR) 150 MG extended release capsule TAKE 1 CAPSULE BY MOUTH DAILY  Martha Burns MD   albuterol sulfate HFA (VENTOLIN HFA) 108 (90 Base) MCG/ACT inhaler Inhale 2 puffs into the lungs every 6 hours as needed for Wheezing or Shortness of Breath  Martha Burns MD   losartan (COZAAR) 100 MG tablet Take 1 tablet by mouth daily  Martha Burns MD   diphenoxylate-atropine (LOMOTIL) 2.5-0.025 MG per tablet Take 1 tablet by mouth 4 times daily as needed for Diarrhea for up to 60 days. Martha Burns MD   hydroCHLOROthiazide (HYDRODIURIL) 25 MG tablet Take 1 tablet by mouth daily  Martha Burns MD   furosemide (LASIX) 40 MG tablet Take 1 tablet by mouth daily  Martha Burns MD   fluticasone-vilanterol (BREO ELLIPTA) 100-25 MCG/INH AEPB inhaler Inhale into the lungs daily  Historical Provider, MD   aspirin 81 MG tablet Take 81 mg by mouth daily. Historical Provider, MD        Social History     Tobacco Use    Smoking status: Former Smoker     Packs/day: 1.00     Years: 20.00     Pack years: 20.00     Types: Cigarettes     Last attempt to quit: 1988     Years since quittin.2    Smokeless tobacco: Never Used   Substance Use Topics    Alcohol use: No        Vitals:    20 1536   BP: 136/70   Pulse: 80   Resp: 18   Temp: 97.2 °F (36.2 °C)   TempSrc: Temporal   SpO2: 97%   Weight: 173 lb 6.4 oz (78.7 kg)     Estimated body mass index is 24.53 kg/m² as calculated from the following:    Height as of 10/7/19: 5' 10.5\" (1.791 m). Weight as of this encounter: 173 lb 6.4 oz (78.7 kg). Physical Exam  Constitutional:       General: He is not in acute distress. Appearance: He is well-developed. HENT:      Head: Normocephalic. Eyes:      Conjunctiva/sclera: Conjunctivae normal.   Neck:      Musculoskeletal: Neck supple.       Thyroid: No thyromegaly. Cardiovascular:      Rate and Rhythm: Normal rate and regular rhythm. Heart sounds: Normal heart sounds. No murmur. Pulmonary:      Effort: No respiratory distress. Breath sounds: Normal breath sounds. No wheezing or rales. Abdominal:      General: There is no distension. Palpations: Abdomen is soft. Musculoskeletal: Normal range of motion. Skin:     General: Skin is warm. Findings: No rash. Neurological:      Mental Status: He is alert and oriented to person, place, and time. Psychiatric:         Behavior: Behavior normal.         ASSESSMENT/PLAN:  1. Essential hypertension  Controlled. Continue hydrochlorothiazide 25 mg daily, atenolol 100 mg daily and amlodipine 10 mg daily. 2. Hyperlipidemia LDL goal <100  Controlled. Continue Pravachol 40 mg daily. 3. COPD, mild ([address]  Controlled. Continue Breo and Ventolin inhaler. 4. Irritable bowel syndrome with diarrhea  Controlled. Continue high-fiber diet, Metamucil and Lomotil as needed    5. Primary insomnia  Doing well and ProSom 3 mg at night    6. Dysthymia  Stable.  Continue Effexor 150 mg daily and Seroquel 25 mg at night      RTC in 3 mos    An electronic signature was used to authenticate this note.    --Madison Blanco MD on 11/18/2020 at 4:48 PM

## 2020-12-14 ENCOUNTER — TELEPHONE (OUTPATIENT)
Dept: PRIMARY CARE CLINIC | Age: 75
End: 2020-12-14

## 2020-12-14 NOTE — TELEPHONE ENCOUNTER
Patient has chest and nasal congestion, headaches, orthostatic dizziness, slight fever.  Please call and advise ASAP

## 2020-12-15 ENCOUNTER — TELEPHONE (OUTPATIENT)
Dept: PRIMARY CARE CLINIC | Age: 75
End: 2020-12-15

## 2020-12-15 RX ORDER — METHYLPREDNISOLONE 4 MG/1
TABLET ORAL
Qty: 1 KIT | Refills: 0 | Status: ON HOLD | OUTPATIENT
Start: 2020-12-15 | End: 2020-12-23 | Stop reason: HOSPADM

## 2020-12-15 NOTE — TELEPHONE ENCOUNTER
V.O. per Dr Nancy Eddy:     1) Tylenol PRN     2) increase fluid intake     3) Medrol Dose pack. covid was negative. Called pt. Elkin signal.       Rx sent to [facility].

## 2020-12-15 NOTE — TELEPHONE ENCOUNTER
Patient called and I tried to help him. He asked to speak with Jane Vick or Tj and I told him that they were both busy. Then a woman's voice started screaming at me saying she know how they operate and that they are not busy and I need to go get them right now. I said to her I don't know who you are talking to like that but you will not talk to me like that. The patient told her Rhunette Samples keep quiet and proceeded to talk to me and then asked was Layla Frost available and spoke with her.

## 2020-12-16 ENCOUNTER — HOSPITAL ENCOUNTER (INPATIENT)
Age: 75
LOS: 7 days | Discharge: HOME HEALTH | DRG: 871 | End: 2020-12-23
Attending: STUDENT IN AN ORGANIZED HEALTH CARE EDUCATION/TRAINING PROGRAM | Admitting: STUDENT IN AN ORGANIZED HEALTH CARE EDUCATION/TRAINING PROGRAM
Payer: MEDICARE

## 2020-12-16 ENCOUNTER — APPOINTMENT (OUTPATIENT)
Dept: GENERAL RADIOLOGY | Age: 75
DRG: 871 | End: 2020-12-16
Payer: MEDICARE

## 2020-12-16 PROBLEM — J96.01 ACUTE RESPIRATORY FAILURE WITH HYPOXIA (HCC): Status: ACTIVE | Noted: 2020-12-16

## 2020-12-16 LAB
A/G RATIO: 1.4 (ref 1.1–2.2)
ALBUMIN SERPL-MCNC: 3.4 G/DL (ref 3.4–5)
ALP BLD-CCNC: 79 U/L (ref 40–129)
ALT SERPL-CCNC: 36 U/L (ref 10–40)
ANION GAP SERPL CALCULATED.3IONS-SCNC: 17 MMOL/L (ref 3–16)
AST SERPL-CCNC: 54 U/L (ref 15–37)
BASE EXCESS VENOUS: -1.1 MMOL/L
BASOPHILS ABSOLUTE: 0 K/UL (ref 0–0.2)
BASOPHILS RELATIVE PERCENT: 0.2 %
BILIRUB SERPL-MCNC: <0.2 MG/DL (ref 0–1)
BILIRUBIN URINE: NEGATIVE
BLOOD, URINE: ABNORMAL
BUN BLDV-MCNC: 38 MG/DL (ref 7–20)
C-REACTIVE PROTEIN: 81.3 MG/L (ref 0–5.1)
CALCIUM SERPL-MCNC: 8.1 MG/DL (ref 8.3–10.6)
CARBOXYHEMOGLOBIN: 1.1 %
CHLORIDE BLD-SCNC: 104 MMOL/L (ref 99–110)
CLARITY: CLEAR
CO2: 21 MMOL/L (ref 21–32)
COLOR: YELLOW
CREAT SERPL-MCNC: 1.2 MG/DL (ref 0.8–1.3)
D DIMER: 359 NG/ML DDU (ref 0–229)
EOSINOPHILS ABSOLUTE: 0 K/UL (ref 0–0.6)
EOSINOPHILS RELATIVE PERCENT: 0 %
EPITHELIAL CELLS, UA: 1 /HPF (ref 0–5)
FERRITIN: 205.3 NG/ML (ref 30–400)
FIBRINOGEN: 396 MG/DL (ref 200–397)
GFR AFRICAN AMERICAN: >60
GFR NON-AFRICAN AMERICAN: 59
GLOBULIN: 2.5 G/DL
GLUCOSE BLD-MCNC: 117 MG/DL (ref 70–99)
GLUCOSE URINE: NEGATIVE MG/DL
HCO3 VENOUS: 23 MMOL/L (ref 23–29)
HCT VFR BLD CALC: 35.6 % (ref 40.5–52.5)
HEMOGLOBIN: 12.3 G/DL (ref 13.5–17.5)
HYALINE CASTS: 4 /LPF (ref 0–8)
KETONES, URINE: NEGATIVE MG/DL
LACTATE DEHYDROGENASE: 320 U/L (ref 100–190)
LACTIC ACID: 1.6 MMOL/L (ref 0.4–2)
LEUKOCYTE ESTERASE, URINE: NEGATIVE
LYMPHOCYTES ABSOLUTE: 0.4 K/UL (ref 1–5.1)
LYMPHOCYTES RELATIVE PERCENT: 7.3 %
MAGNESIUM: 2 MG/DL (ref 1.8–2.4)
MCH RBC QN AUTO: 28.8 PG (ref 26–34)
MCHC RBC AUTO-ENTMCNC: 34.5 G/DL (ref 31–36)
MCV RBC AUTO: 83.5 FL (ref 80–100)
METHEMOGLOBIN VENOUS: 0.5 %
MICROSCOPIC EXAMINATION: YES
MONOCYTES ABSOLUTE: 0.5 K/UL (ref 0–1.3)
MONOCYTES RELATIVE PERCENT: 8.8 %
NEUTROPHILS ABSOLUTE: 4.6 K/UL (ref 1.7–7.7)
NEUTROPHILS RELATIVE PERCENT: 83.7 %
NITRITE, URINE: NEGATIVE
O2 CONTENT, VEN: 16 ML/DL
O2 SAT, VEN: 96 %
O2 THERAPY: ABNORMAL
PCO2, VEN: 33.7 MMHG (ref 40–50)
PDW BLD-RTO: 14.9 % (ref 12.4–15.4)
PH UA: 6 (ref 5–8)
PH VENOUS: 7.43 (ref 7.35–7.45)
PLATELET # BLD: 131 K/UL (ref 135–450)
PMV BLD AUTO: 8.4 FL (ref 5–10.5)
PO2, VEN: 71 MMHG
POTASSIUM REFLEX MAGNESIUM: 2.6 MMOL/L (ref 3.5–5.1)
PROCALCITONIN: 0.41 NG/ML (ref 0–0.15)
PROTEIN UA: 100 MG/DL
RBC # BLD: 4.27 M/UL (ref 4.2–5.9)
RBC UA: 1 /HPF (ref 0–4)
SARS-COV-2, NAAT: DETECTED
SODIUM BLD-SCNC: 142 MMOL/L (ref 136–145)
SPECIFIC GRAVITY UA: >1.03 (ref 1–1.03)
TCO2 CALC VENOUS: 24 MMOL/L
TOTAL PROTEIN: 5.9 G/DL (ref 6.4–8.2)
TROPONIN: 0.02 NG/ML
URINE REFLEX TO CULTURE: ABNORMAL
URINE TYPE: ABNORMAL
UROBILINOGEN, URINE: 1 E.U./DL
WBC # BLD: 5.5 K/UL (ref 4–11)
WBC UA: 1 /HPF (ref 0–5)

## 2020-12-16 PROCEDURE — 82803 BLOOD GASES ANY COMBINATION: CPT

## 2020-12-16 PROCEDURE — 83615 LACTATE (LD) (LDH) ENZYME: CPT

## 2020-12-16 PROCEDURE — 86140 C-REACTIVE PROTEIN: CPT

## 2020-12-16 PROCEDURE — 6370000000 HC RX 637 (ALT 250 FOR IP): Performed by: NURSE PRACTITIONER

## 2020-12-16 PROCEDURE — 85025 COMPLETE CBC W/AUTO DIFF WBC: CPT

## 2020-12-16 PROCEDURE — 2580000003 HC RX 258: Performed by: NURSE PRACTITIONER

## 2020-12-16 PROCEDURE — 85384 FIBRINOGEN ACTIVITY: CPT

## 2020-12-16 PROCEDURE — 71045 X-RAY EXAM CHEST 1 VIEW: CPT

## 2020-12-16 PROCEDURE — 96360 HYDRATION IV INFUSION INIT: CPT

## 2020-12-16 PROCEDURE — 81001 URINALYSIS AUTO W/SCOPE: CPT

## 2020-12-16 PROCEDURE — 84484 ASSAY OF TROPONIN QUANT: CPT

## 2020-12-16 PROCEDURE — 83735 ASSAY OF MAGNESIUM: CPT

## 2020-12-16 PROCEDURE — 99285 EMERGENCY DEPT VISIT HI MDM: CPT

## 2020-12-16 PROCEDURE — 83605 ASSAY OF LACTIC ACID: CPT

## 2020-12-16 PROCEDURE — 85379 FIBRIN DEGRADATION QUANT: CPT

## 2020-12-16 PROCEDURE — 93005 ELECTROCARDIOGRAM TRACING: CPT | Performed by: NURSE PRACTITIONER

## 2020-12-16 PROCEDURE — 6360000002 HC RX W HCPCS: Performed by: NURSE PRACTITIONER

## 2020-12-16 PROCEDURE — 94640 AIRWAY INHALATION TREATMENT: CPT

## 2020-12-16 PROCEDURE — 84145 PROCALCITONIN (PCT): CPT

## 2020-12-16 PROCEDURE — 87040 BLOOD CULTURE FOR BACTERIA: CPT

## 2020-12-16 PROCEDURE — U0002 COVID-19 LAB TEST NON-CDC: HCPCS

## 2020-12-16 PROCEDURE — 82728 ASSAY OF FERRITIN: CPT

## 2020-12-16 PROCEDURE — 2700000000 HC OXYGEN THERAPY PER DAY

## 2020-12-16 PROCEDURE — 36415 COLL VENOUS BLD VENIPUNCTURE: CPT

## 2020-12-16 PROCEDURE — 94760 N-INVAS EAR/PLS OXIMETRY 1: CPT

## 2020-12-16 PROCEDURE — 1200000000 HC SEMI PRIVATE

## 2020-12-16 PROCEDURE — 80053 COMPREHEN METABOLIC PANEL: CPT

## 2020-12-16 RX ORDER — 0.9 % SODIUM CHLORIDE 0.9 %
1000 INTRAVENOUS SOLUTION INTRAVENOUS ONCE
Status: COMPLETED | OUTPATIENT
Start: 2020-12-16 | End: 2020-12-16

## 2020-12-16 RX ORDER — POTASSIUM CHLORIDE 20 MEQ/1
40 TABLET, EXTENDED RELEASE ORAL 2 TIMES DAILY WITH MEALS
Status: DISCONTINUED | OUTPATIENT
Start: 2020-12-17 | End: 2020-12-23 | Stop reason: HOSPADM

## 2020-12-16 RX ORDER — POTASSIUM CHLORIDE 7.45 MG/ML
10 INJECTION INTRAVENOUS ONCE
Status: COMPLETED | OUTPATIENT
Start: 2020-12-16 | End: 2020-12-16

## 2020-12-16 RX ORDER — ASPIRIN 81 MG/1
81 TABLET, CHEWABLE ORAL ONCE
Status: COMPLETED | OUTPATIENT
Start: 2020-12-16 | End: 2020-12-16

## 2020-12-16 RX ORDER — BENZONATATE 100 MG/1
100 CAPSULE ORAL 3 TIMES DAILY PRN
Status: DISCONTINUED | OUTPATIENT
Start: 2020-12-16 | End: 2020-12-17 | Stop reason: SDUPTHER

## 2020-12-16 RX ORDER — DEXAMETHASONE SODIUM PHOSPHATE 10 MG/ML
10 INJECTION, SOLUTION INTRAMUSCULAR; INTRAVENOUS ONCE
Status: COMPLETED | OUTPATIENT
Start: 2020-12-16 | End: 2020-12-16

## 2020-12-16 RX ORDER — ALBUTEROL SULFATE 90 UG/1
2 AEROSOL, METERED RESPIRATORY (INHALATION) ONCE
Status: COMPLETED | OUTPATIENT
Start: 2020-12-16 | End: 2020-12-16

## 2020-12-16 RX ORDER — POTASSIUM CHLORIDE 7.45 MG/ML
10 INJECTION INTRAVENOUS ONCE
Status: COMPLETED | OUTPATIENT
Start: 2020-12-16 | End: 2020-12-17

## 2020-12-16 RX ADMIN — POTASSIUM CHLORIDE 10 MEQ: 7.46 INJECTION, SOLUTION INTRAVENOUS at 21:51

## 2020-12-16 RX ADMIN — SODIUM CHLORIDE 1000 ML: 9 INJECTION, SOLUTION INTRAVENOUS at 20:24

## 2020-12-16 RX ADMIN — BENZONATATE 100 MG: 100 CAPSULE ORAL at 20:24

## 2020-12-16 RX ADMIN — CEFTRIAXONE 1 G: 1 INJECTION, POWDER, FOR SOLUTION INTRAMUSCULAR; INTRAVENOUS at 21:57

## 2020-12-16 RX ADMIN — Medication 2 PUFF: at 22:02

## 2020-12-16 RX ADMIN — ASPIRIN 81 MG: 81 TABLET, CHEWABLE ORAL at 21:56

## 2020-12-16 RX ADMIN — DEXAMETHASONE SODIUM PHOSPHATE 10 MG: 10 INJECTION, SOLUTION INTRAMUSCULAR; INTRAVENOUS at 21:51

## 2020-12-16 ASSESSMENT — PAIN SCALES - GENERAL
PAINLEVEL_OUTOF10: 0

## 2020-12-17 PROBLEM — U07.1 PNEUMONIA DUE TO COVID-19 VIRUS: Status: ACTIVE | Noted: 2020-12-17

## 2020-12-17 PROBLEM — J12.82 PNEUMONIA DUE TO COVID-19 VIRUS: Status: ACTIVE | Noted: 2020-12-17

## 2020-12-17 LAB
A/G RATIO: 1.4 (ref 1.1–2.2)
ALBUMIN SERPL-MCNC: 3.3 G/DL (ref 3.4–5)
ALP BLD-CCNC: 117 U/L (ref 40–129)
ALT SERPL-CCNC: 54 U/L (ref 10–40)
ANION GAP SERPL CALCULATED.3IONS-SCNC: 15 MMOL/L (ref 3–16)
AST SERPL-CCNC: 104 U/L (ref 15–37)
BILIRUB SERPL-MCNC: <0.2 MG/DL (ref 0–1)
BUN BLDV-MCNC: 32 MG/DL (ref 7–20)
CALCIUM SERPL-MCNC: 7.8 MG/DL (ref 8.3–10.6)
CHLORIDE BLD-SCNC: 103 MMOL/L (ref 99–110)
CO2: 23 MMOL/L (ref 21–32)
CREAT SERPL-MCNC: 1.1 MG/DL (ref 0.8–1.3)
EKG ATRIAL RATE: 49 BPM
EKG ATRIAL RATE: 90 BPM
EKG DIAGNOSIS: NORMAL
EKG DIAGNOSIS: NORMAL
EKG P AXIS: 35 DEGREES
EKG P AXIS: 37 DEGREES
EKG P-R INTERVAL: 214 MS
EKG P-R INTERVAL: 224 MS
EKG Q-T INTERVAL: 552 MS
EKG Q-T INTERVAL: 578 MS
EKG QRS DURATION: 142 MS
EKG QRS DURATION: 148 MS
EKG QTC CALCULATION (BAZETT): 498 MS
EKG QTC CALCULATION (BAZETT): 505 MS
EKG R AXIS: -20 DEGREES
EKG R AXIS: 1 DEGREES
EKG T AXIS: -4 DEGREES
EKG T AXIS: 31 DEGREES
EKG VENTRICULAR RATE: 46 BPM
EKG VENTRICULAR RATE: 49 BPM
GFR AFRICAN AMERICAN: >60
GFR NON-AFRICAN AMERICAN: >60
GLOBULIN: 2.3 G/DL
GLUCOSE BLD-MCNC: 145 MG/DL (ref 70–99)
POTASSIUM SERPL-SCNC: 2.6 MMOL/L (ref 3.5–5.1)
SODIUM BLD-SCNC: 141 MMOL/L (ref 136–145)
TOTAL PROTEIN: 5.6 G/DL (ref 6.4–8.2)

## 2020-12-17 PROCEDURE — 2700000000 HC OXYGEN THERAPY PER DAY

## 2020-12-17 PROCEDURE — 2500000003 HC RX 250 WO HCPCS: Performed by: NURSE PRACTITIONER

## 2020-12-17 PROCEDURE — 6370000000 HC RX 637 (ALT 250 FOR IP): Performed by: NURSE PRACTITIONER

## 2020-12-17 PROCEDURE — 6360000002 HC RX W HCPCS: Performed by: NURSE PRACTITIONER

## 2020-12-17 PROCEDURE — 2580000003 HC RX 258: Performed by: NURSE PRACTITIONER

## 2020-12-17 PROCEDURE — 97530 THERAPEUTIC ACTIVITIES: CPT

## 2020-12-17 PROCEDURE — 1200000000 HC SEMI PRIVATE

## 2020-12-17 PROCEDURE — 6370000000 HC RX 637 (ALT 250 FOR IP): Performed by: INTERNAL MEDICINE

## 2020-12-17 PROCEDURE — 97166 OT EVAL MOD COMPLEX 45 MIN: CPT

## 2020-12-17 PROCEDURE — XW033E5 INTRODUCTION OF REMDESIVIR ANTI-INFECTIVE INTO PERIPHERAL VEIN, PERCUTANEOUS APPROACH, NEW TECHNOLOGY GROUP 5: ICD-10-PCS | Performed by: INTERNAL MEDICINE

## 2020-12-17 PROCEDURE — 94640 AIRWAY INHALATION TREATMENT: CPT

## 2020-12-17 PROCEDURE — 93010 ELECTROCARDIOGRAM REPORT: CPT | Performed by: INTERNAL MEDICINE

## 2020-12-17 PROCEDURE — 93005 ELECTROCARDIOGRAM TRACING: CPT

## 2020-12-17 PROCEDURE — 36415 COLL VENOUS BLD VENIPUNCTURE: CPT

## 2020-12-17 PROCEDURE — 94761 N-INVAS EAR/PLS OXIMETRY MLT: CPT

## 2020-12-17 PROCEDURE — 97162 PT EVAL MOD COMPLEX 30 MIN: CPT

## 2020-12-17 PROCEDURE — 80053 COMPREHEN METABOLIC PANEL: CPT

## 2020-12-17 PROCEDURE — 6360000002 HC RX W HCPCS: Performed by: INTERNAL MEDICINE

## 2020-12-17 RX ORDER — ACETAMINOPHEN 325 MG/1
650 TABLET ORAL EVERY 6 HOURS PRN
Status: DISCONTINUED | OUTPATIENT
Start: 2020-12-17 | End: 2020-12-23 | Stop reason: HOSPADM

## 2020-12-17 RX ORDER — SODIUM CHLORIDE 0.9 % (FLUSH) 0.9 %
10 SYRINGE (ML) INJECTION PRN
Status: DISCONTINUED | OUTPATIENT
Start: 2020-12-17 | End: 2020-12-23 | Stop reason: HOSPADM

## 2020-12-17 RX ORDER — PRAVASTATIN SODIUM 40 MG
40 TABLET ORAL NIGHTLY
Status: DISCONTINUED | OUTPATIENT
Start: 2020-12-17 | End: 2020-12-23 | Stop reason: HOSPADM

## 2020-12-17 RX ORDER — POLYETHYLENE GLYCOL 3350 17 G/17G
17 POWDER, FOR SOLUTION ORAL DAILY PRN
Status: DISCONTINUED | OUTPATIENT
Start: 2020-12-17 | End: 2020-12-23 | Stop reason: HOSPADM

## 2020-12-17 RX ORDER — POTASSIUM CHLORIDE 7.45 MG/ML
10 INJECTION INTRAVENOUS PRN
Status: DISCONTINUED | OUTPATIENT
Start: 2020-12-17 | End: 2020-12-23 | Stop reason: HOSPADM

## 2020-12-17 RX ORDER — DEXAMETHASONE SODIUM PHOSPHATE 10 MG/ML
8 INJECTION, SOLUTION INTRAMUSCULAR; INTRAVENOUS DAILY
Status: DISCONTINUED | OUTPATIENT
Start: 2020-12-17 | End: 2020-12-17

## 2020-12-17 RX ORDER — LOSARTAN POTASSIUM 100 MG/1
100 TABLET ORAL DAILY
Status: DISCONTINUED | OUTPATIENT
Start: 2020-12-17 | End: 2020-12-23 | Stop reason: HOSPADM

## 2020-12-17 RX ORDER — ZINC SULFATE 50(220)MG
50 CAPSULE ORAL DAILY
Status: DISCONTINUED | OUTPATIENT
Start: 2020-12-17 | End: 2020-12-23 | Stop reason: HOSPADM

## 2020-12-17 RX ORDER — VITAMIN B COMPLEX
1000 TABLET ORAL DAILY
Status: DISCONTINUED | OUTPATIENT
Start: 2020-12-17 | End: 2020-12-17

## 2020-12-17 RX ORDER — ASPIRIN 81 MG/1
81 TABLET, CHEWABLE ORAL DAILY
Status: DISCONTINUED | OUTPATIENT
Start: 2020-12-17 | End: 2020-12-23 | Stop reason: HOSPADM

## 2020-12-17 RX ORDER — MAGNESIUM SULFATE 1 G/100ML
1 INJECTION INTRAVENOUS PRN
Status: DISCONTINUED | OUTPATIENT
Start: 2020-12-17 | End: 2020-12-23 | Stop reason: HOSPADM

## 2020-12-17 RX ORDER — POTASSIUM CHLORIDE 20 MEQ/1
40 TABLET, EXTENDED RELEASE ORAL PRN
Status: DISCONTINUED | OUTPATIENT
Start: 2020-12-17 | End: 2020-12-23 | Stop reason: HOSPADM

## 2020-12-17 RX ORDER — VENLAFAXINE HYDROCHLORIDE 75 MG/1
150 CAPSULE, EXTENDED RELEASE ORAL ONCE
Status: COMPLETED | OUTPATIENT
Start: 2020-12-17 | End: 2020-12-17

## 2020-12-17 RX ORDER — BENZONATATE 100 MG/1
100 CAPSULE ORAL 3 TIMES DAILY PRN
Status: DISCONTINUED | OUTPATIENT
Start: 2020-12-17 | End: 2020-12-20

## 2020-12-17 RX ORDER — DEXAMETHASONE SODIUM PHOSPHATE 10 MG/ML
10 INJECTION, SOLUTION INTRAMUSCULAR; INTRAVENOUS DAILY
Status: DISCONTINUED | OUTPATIENT
Start: 2020-12-17 | End: 2020-12-23 | Stop reason: HOSPADM

## 2020-12-17 RX ORDER — LORAZEPAM 0.5 MG/1
0.5 TABLET ORAL NIGHTLY PRN
Status: DISCONTINUED | OUTPATIENT
Start: 2020-12-17 | End: 2020-12-23 | Stop reason: HOSPADM

## 2020-12-17 RX ORDER — AMLODIPINE BESYLATE 5 MG/1
5 TABLET ORAL DAILY
Status: DISCONTINUED | OUTPATIENT
Start: 2020-12-17 | End: 2020-12-23 | Stop reason: HOSPADM

## 2020-12-17 RX ORDER — BUDESONIDE AND FORMOTEROL FUMARATE DIHYDRATE 160; 4.5 UG/1; UG/1
2 AEROSOL RESPIRATORY (INHALATION) 2 TIMES DAILY
Status: DISCONTINUED | OUTPATIENT
Start: 2020-12-17 | End: 2020-12-23 | Stop reason: HOSPADM

## 2020-12-17 RX ORDER — VENLAFAXINE HYDROCHLORIDE 75 MG/1
150 CAPSULE, EXTENDED RELEASE ORAL DAILY
Status: DISCONTINUED | OUTPATIENT
Start: 2020-12-17 | End: 2020-12-17

## 2020-12-17 RX ORDER — PROMETHAZINE HYDROCHLORIDE 25 MG/1
12.5 TABLET ORAL EVERY 6 HOURS PRN
Status: DISCONTINUED | OUTPATIENT
Start: 2020-12-17 | End: 2020-12-23 | Stop reason: HOSPADM

## 2020-12-17 RX ORDER — SODIUM CHLORIDE 0.9 % (FLUSH) 0.9 %
10 SYRINGE (ML) INJECTION EVERY 12 HOURS SCHEDULED
Status: DISCONTINUED | OUTPATIENT
Start: 2020-12-17 | End: 2020-12-23 | Stop reason: HOSPADM

## 2020-12-17 RX ORDER — ATENOLOL 50 MG/1
100 TABLET ORAL DAILY
Status: DISCONTINUED | OUTPATIENT
Start: 2020-12-17 | End: 2020-12-23 | Stop reason: HOSPADM

## 2020-12-17 RX ORDER — ASCORBIC ACID 500 MG
500 TABLET ORAL DAILY
Status: DISCONTINUED | OUTPATIENT
Start: 2020-12-17 | End: 2020-12-18

## 2020-12-17 RX ORDER — ALBUTEROL SULFATE 90 UG/1
2 AEROSOL, METERED RESPIRATORY (INHALATION) 4 TIMES DAILY
Status: DISCONTINUED | OUTPATIENT
Start: 2020-12-17 | End: 2020-12-23 | Stop reason: HOSPADM

## 2020-12-17 RX ORDER — HYDROCHLOROTHIAZIDE 25 MG/1
25 TABLET ORAL DAILY
Status: DISCONTINUED | OUTPATIENT
Start: 2020-12-17 | End: 2020-12-23 | Stop reason: HOSPADM

## 2020-12-17 RX ORDER — VITAMIN B COMPLEX
2000 TABLET ORAL DAILY
Status: DISCONTINUED | OUTPATIENT
Start: 2020-12-18 | End: 2020-12-23 | Stop reason: HOSPADM

## 2020-12-17 RX ORDER — ACETAMINOPHEN 650 MG/1
650 SUPPOSITORY RECTAL EVERY 6 HOURS PRN
Status: DISCONTINUED | OUTPATIENT
Start: 2020-12-17 | End: 2020-12-23 | Stop reason: HOSPADM

## 2020-12-17 RX ORDER — ONDANSETRON 2 MG/ML
4 INJECTION INTRAMUSCULAR; INTRAVENOUS EVERY 6 HOURS PRN
Status: DISCONTINUED | OUTPATIENT
Start: 2020-12-17 | End: 2020-12-23 | Stop reason: HOSPADM

## 2020-12-17 RX ORDER — FAMOTIDINE 20 MG/1
20 TABLET, FILM COATED ORAL 2 TIMES DAILY
Status: DISCONTINUED | OUTPATIENT
Start: 2020-12-17 | End: 2020-12-23 | Stop reason: HOSPADM

## 2020-12-17 RX ORDER — GUAIFENESIN/DEXTROMETHORPHAN 100-10MG/5
10 SYRUP ORAL EVERY 6 HOURS PRN
Status: DISCONTINUED | OUTPATIENT
Start: 2020-12-17 | End: 2020-12-19

## 2020-12-17 RX ORDER — ALBUTEROL SULFATE 90 UG/1
2 AEROSOL, METERED RESPIRATORY (INHALATION) EVERY 6 HOURS PRN
Status: DISCONTINUED | OUTPATIENT
Start: 2020-12-17 | End: 2020-12-23 | Stop reason: HOSPADM

## 2020-12-17 RX ORDER — VENLAFAXINE HYDROCHLORIDE 75 MG/1
150 CAPSULE, EXTENDED RELEASE ORAL NIGHTLY
Status: DISCONTINUED | OUTPATIENT
Start: 2020-12-17 | End: 2020-12-23 | Stop reason: HOSPADM

## 2020-12-17 RX ORDER — FUROSEMIDE 40 MG/1
40 TABLET ORAL DAILY
Status: DISCONTINUED | OUTPATIENT
Start: 2020-12-17 | End: 2020-12-17

## 2020-12-17 RX ADMIN — Medication 2 PUFF: at 09:54

## 2020-12-17 RX ADMIN — AZITHROMYCIN MONOHYDRATE 500 MG: 500 INJECTION, POWDER, LYOPHILIZED, FOR SOLUTION INTRAVENOUS at 00:44

## 2020-12-17 RX ADMIN — Medication 2 PUFF: at 15:35

## 2020-12-17 RX ADMIN — POTASSIUM CHLORIDE 10 MEQ: 7.46 INJECTION, SOLUTION INTRAVENOUS at 00:45

## 2020-12-17 RX ADMIN — POTASSIUM CHLORIDE 40 MEQ: 1500 TABLET, EXTENDED RELEASE ORAL at 17:25

## 2020-12-17 RX ADMIN — Medication 2 PUFF: at 15:34

## 2020-12-17 RX ADMIN — POTASSIUM CHLORIDE 40 MEQ: 1500 TABLET, EXTENDED RELEASE ORAL at 09:15

## 2020-12-17 RX ADMIN — POTASSIUM CHLORIDE 10 MEQ: 7.46 INJECTION, SOLUTION INTRAVENOUS at 17:55

## 2020-12-17 RX ADMIN — POTASSIUM CHLORIDE 10 MEQ: 7.46 INJECTION, SOLUTION INTRAVENOUS at 14:33

## 2020-12-17 RX ADMIN — Medication 2 PUFF: at 20:35

## 2020-12-17 RX ADMIN — FAMOTIDINE 20 MG: 10 INJECTION, SOLUTION INTRAVENOUS at 09:16

## 2020-12-17 RX ADMIN — HYDROCHLOROTHIAZIDE 25 MG: 25 TABLET ORAL at 09:15

## 2020-12-17 RX ADMIN — Medication 2 PUFF: at 13:11

## 2020-12-17 RX ADMIN — VENLAFAXINE HYDROCHLORIDE 150 MG: 75 CAPSULE, EXTENDED RELEASE ORAL at 03:12

## 2020-12-17 RX ADMIN — OXYCODONE HYDROCHLORIDE AND ACETAMINOPHEN 500 MG: 500 TABLET ORAL at 09:15

## 2020-12-17 RX ADMIN — SODIUM CHLORIDE, PRESERVATIVE FREE 10 ML: 5 INJECTION INTRAVENOUS at 09:16

## 2020-12-17 RX ADMIN — POTASSIUM CHLORIDE 10 MEQ: 7.46 INJECTION, SOLUTION INTRAVENOUS at 16:57

## 2020-12-17 RX ADMIN — LOSARTAN POTASSIUM 100 MG: 100 TABLET, FILM COATED ORAL at 09:15

## 2020-12-17 RX ADMIN — Medication 1000 UNITS: at 09:15

## 2020-12-17 RX ADMIN — BENZONATATE 100 MG: 100 CAPSULE ORAL at 03:13

## 2020-12-17 RX ADMIN — VENLAFAXINE HYDROCHLORIDE 150 MG: 75 CAPSULE, EXTENDED RELEASE ORAL at 21:09

## 2020-12-17 RX ADMIN — Medication 2 PUFF: at 03:42

## 2020-12-17 RX ADMIN — ENOXAPARIN SODIUM 30 MG: 30 INJECTION SUBCUTANEOUS at 21:10

## 2020-12-17 RX ADMIN — GUAIFENESIN AND DEXTROMETHORPHAN 10 ML: 100; 10 SYRUP ORAL at 03:12

## 2020-12-17 RX ADMIN — ASPIRIN 81 MG: 81 TABLET, CHEWABLE ORAL at 09:15

## 2020-12-17 RX ADMIN — PRAVASTATIN SODIUM 40 MG: 40 TABLET ORAL at 21:09

## 2020-12-17 RX ADMIN — FAMOTIDINE 20 MG: 10 INJECTION, SOLUTION INTRAVENOUS at 03:12

## 2020-12-17 RX ADMIN — SODIUM CHLORIDE, PRESERVATIVE FREE 10 ML: 5 INJECTION INTRAVENOUS at 21:10

## 2020-12-17 RX ADMIN — AMLODIPINE BESYLATE 5 MG: 5 TABLET ORAL at 09:15

## 2020-12-17 RX ADMIN — ENOXAPARIN SODIUM 40 MG: 40 INJECTION SUBCUTANEOUS at 09:16

## 2020-12-17 RX ADMIN — POTASSIUM CHLORIDE 10 MEQ: 7.46 INJECTION, SOLUTION INTRAVENOUS at 10:12

## 2020-12-17 RX ADMIN — DEXAMETHASONE SODIUM PHOSPHATE 10 MG: 10 INJECTION, SOLUTION INTRAMUSCULAR; INTRAVENOUS at 09:20

## 2020-12-17 RX ADMIN — POTASSIUM CHLORIDE 10 MEQ: 7.46 INJECTION, SOLUTION INTRAVENOUS at 13:40

## 2020-12-17 RX ADMIN — POTASSIUM CHLORIDE 10 MEQ: 7.46 INJECTION, SOLUTION INTRAVENOUS at 11:09

## 2020-12-17 RX ADMIN — FAMOTIDINE 20 MG: 20 TABLET, FILM COATED ORAL at 21:10

## 2020-12-17 RX ADMIN — ZINC SULFATE 220 MG (50 MG) CAPSULE 50 MG: CAPSULE at 09:15

## 2020-12-17 RX ADMIN — Medication 2 PUFF: at 13:10

## 2020-12-17 RX ADMIN — LORAZEPAM 0.5 MG: 0.5 TABLET ORAL at 03:12

## 2020-12-17 RX ADMIN — GUAIFENESIN AND DEXTROMETHORPHAN 10 ML: 100; 10 SYRUP ORAL at 21:17

## 2020-12-17 RX ADMIN — REMDESIVIR 200 MG: 100 INJECTION, POWDER, LYOPHILIZED, FOR SOLUTION INTRAVENOUS at 10:35

## 2020-12-17 ASSESSMENT — PAIN SCALES - GENERAL: PAINLEVEL_OUTOF10: 0

## 2020-12-17 NOTE — CARE COORDINATION
Attempted to speak with patient over the phone X 2 for initial assessment and ACP, but phone rang busy. Will try back later.   Melva Duckworth RN, BSN, Case Management  Phone: [phone]  Electronically signed by Melva Duckworth RN on 12/17/2020 at 5:31 PM

## 2020-12-17 NOTE — PLAN OF CARE
Problem: Falls - Risk of:   Goal: Will remain free from falls  Description: Will remain free from falls  Outcome: Ongoing  Goal: Absence of physical injury  Description: Absence of physical injury  Outcome: Ongoing     Problem: Airway Clearance - Ineffective  Goal: Achieve or maintain patent airway  Outcome: Ongoing     Problem: Gas Exchange - Impaired  Goal: Absence of hypoxia  Outcome: Ongoing  Goal: Promote optimal lung function  Outcome: Ongoing     Problem: Breathing Pattern - Ineffective  Goal: Ability to achieve and maintain a regular respiratory rate  Outcome: Ongoing     Problem:  Body Temperature -  Risk of, Imbalanced  Goal: Ability to maintain a body temperature within defined limits  Outcome: Ongoing  Goal: Will regain or maintain usual level of consciousness  Outcome: Ongoing  Goal: Complications related to the disease process, condition or treatment will be avoided or minimized  Outcome: Ongoing

## 2020-12-17 NOTE — PROGRESS NOTES
Occupational Therapy   Occupational Therapy Initial Assessment  Date: 2020   Patient Name: Paula Ruff  MRN: [id number]     : 1945    Date of Service: 2020    Assessment: Pt is 66 y.o. M who presents with worsening SOB and nonproductive cough. Pt is COVID+. PTA pt lives with wife in one story home with ramp to enter. Pt reports independence in self-care tasks, shares homemaking responsibilities, and functional mobility with no device. Currently, pt presents with ROM/strength in [facility] for self-care and transfers. Pt completed bed mobility with min A and sit <> stand transfers with CGA/min A. Pt completed functional mobiltiy with RW with CGA/min A. Anticipate pt to require mod A for ADL needs. Pt is unsafe to d/c home at this time and would benefit from continued therapy to increase mobility, safety, and independence. Will continue to assess as pt progresses with therapy. Discharge Recommendations:  3-5 sessions per week     Paula Ruff scored a 16/24 on the AM-PAC ADL Inpatient form. Current research shows that an AM-PAC score of 17 or less is typically not associated with a discharge to the patient's home setting. Based on the patient's AM-PAC score and their current ADL deficits, it is recommended that the patient have 3-5 sessions per week of Occupational Therapy at d/c to increase the patient's independence. Please see assessment section for further patient specific details. If patient discharges prior to next session this note will serve as a discharge summary. Please see below for the latest assessment towards goals. Assessment   Performance deficits / Impairments: Decreased functional mobility ; Decreased endurance;Decreased strength;Decreased ADL status; Decreased balance Assessment: Pt is 66 y.o. M who presents with worsening SOB and nonproductive cough. Pt is COVID+. PTA pt lives with wife in one story home with ramp to enter. Pt reports independence in self-care tasks, shares homemaking responsibilities, and functional mobility with no device. Currently, pt presents with ROM/strength in [facility] for self-care and transfers. Pt completed bed mobility with min A and sit <> stand transfers with CGA/min A. Pt completed functional mobiltiy with RW with CGA/min A. Anticipate pt to require mod A for ADL needs. Pt is unsafe to d/c home at this time and would benefit from continued therapy to increase mobility, safety, and independence. Will continue to assess as pt progresses with therapy. Prognosis: Fair  Decision Making: Medium Complexity  History: PMH: anxiety, mild COPD, depression, HTN, insomnia, IBS  Exam: ADLs, transfers, func mob, bed mob  Assistance / Modification: CGA/min A for mobility, mod A for ADLs  OT Education: Transfer Training;OT Role;Plan of Care;Precautions; ADL Adaptive Strategies  REQUIRES OT FOLLOW UP: Yes  Activity Tolerance  Activity Tolerance: Patient Tolerated treatment well;Patient limited by fatigue  Activity Tolerance: 10L of O2, O2 sats at or above 90% throughout activity  Safety Devices  Safety Devices in place: Yes([employer] [facility]) aware)  Type of devices: Nurse notified;Gait belt;Call light within reach; Left in chair;Chair alarm in place           Patient Diagnosis(es): The primary encounter diagnosis was COVID-19 virus infection. Diagnoses of Hypoxia, Hypokalemia, Acidosis, Thrombocytopenia ([address] Hypocalcemia, Elevated troponin, and Elevated procalcitonin were also pertinent to this visit. has a past medical history of Anxiety, COPD, mild ([address] Depression, History of blood transfusion, Hyperlipidemia, Hypertension, IBS (irritable bowel syndrome), and Insomnia. has a past surgical history that includes Appendectomy; Tonsillectomy and adenoidectomy; Colonoscopy (03/19/2019); Colonoscopy (N/A, 3/19/2019); and Colonoscopy (N/A, 3/19/2019). Restrictions  Restrictions/Precautions  Restrictions/Precautions: Isolation, Contact Precautions, Fall Risk  Position Activity Restriction  Other position/activity restrictions: 10L of O2; COVID+, droplet plus precautions    Subjective   General  Chart Reviewed: Yes  Patient assessed for rehabilitation services?: Yes  Additional Pertinent Hx: Pt is 66 y.o. M who presents with worsening SOB and nonproductive cough. Pt is COVID+. PMH: anxiety, mild COPD, depression, HTN, insomnia, IBS  Family / Caregiver Present: No  Referring Practitioner: Farhat Tate MD  Subjective  Subjective: Pt met bedside, agreeable for therapy evaluation and OOB activity.   Patient Currently in Pain: (Denies pain at this time)  Vital Signs  Temp: 97.5 °F (36.4 °C)  Temp Source: Oral  Pulse: (!) 46  Heart Rate Source: Monitor  Resp: 18  BP: (!) 159/69  BP Location: Right Arm  MAP (mmHg): 99  Patient Currently in Pain: (Denies pain at this time)  Oxygen Therapy  SpO2: 95 %  O2 Device: Nasal cannula     Social/Functional History  Social/Functional History  Lives With: Spouse  Type of Home: House  Home Layout: One level, Laundry in basement  Home Access: Ramped entrance(Ramp from garage)  Bathroom Shower/Tub: Tub/Shower unit, Walk-in shower  Home Equipment: (no DME)  ADL Assistance: Independent  Homemaking Assistance: (Pt completes, wife does \"a little\")  Ambulation Assistance: Independent  Transfer Assistance: Independent  Active : Yes  Type of occupation: [employer] involved in one course  Additional Comments: Recent falls likely d/t weakness from COVID       Objective   Vision: Impaired  Vision Exceptions: Wears glasses at all times  Hearing: Within functional limits      Orientation  Overall Orientation Status: Within Functional Limits Orientation Level: Oriented to time;Oriented to situation;Oriented to person;Oriented to place     Balance  Sitting Balance: Stand by assistance  Standing Balance: Contact guard assistance  Standing Balance  Time: ~1 minute  Activity: Func mob, transfers    Functional Mobility  Functional - Mobility Device: Rolling Walker  Activity: Other  Assist Level: (CGA/min A)  Functional Mobility Comments: Pt completed initial mobility with min hand held A to chair and then functional mobility with RW CGA/Mona ~ 5 feet forward and back, slightly unsteady and LOB when stepping backwards. ADL  Additional Comments: PTA pt reports independence in self-care tasks. Anticipate pt to require mod A for bathing/dressing/toileting needs at this time.      Tone RUE  RUE Tone: Normotonic  Tone LUE  LUE Tone: Normotonic  Coordination  Movements Are Fluid And Coordinated: Yes     Bed mobility  Supine to Sit: Minimal assistance(HOB elevated, min hand held A)  Sit to Supine: Unable to assess(Up in chair at end of session)     Transfers  Sit to stand: Contact guard assistance;Minimal assistance  Stand to sit: Contact guard assistance;Minimal assistance  Transfer Comments: CGA/min A for sit <> stand from EOB and sit to recliner; additional sit <> stand from recliner chair with cues for hand placement     Cognition  Overall Cognitive Status: WFL        Sensation  Overall Sensation Status: WFL        LUE AROM (degrees)  LUE AROM : WFL  Left Hand AROM (degrees)  Left Hand AROM: WFL  RUE AROM (degrees)  RUE AROM : WFL  Right Hand AROM (degrees)  Right Hand AROM: WFL     LUE Strength  LUE Strength Comment: General weakness noted  RUE Strength  RUE Strength Comment: General weakness noted          Plan   Plan  Times per week: 3-5 Current Treatment Recommendations: Strengthening, Endurance Training, Balance Training, Functional Mobility Training, Safety Education & Training, Equipment Evaluation, Education, & procurement, Patient/Caregiver Education & Training, Self-Care / ADL           AM-PAC Score    Bryce Arvizu scored a 16/24 on the AM-[facility] ADL Inpatient form. Current research shows that an AM-PAC score of 17 or less is typically not associated with a discharge to the patient's home setting. Based on the patient's AM-PAC score and their current ADL deficits, it is recommended that the patient have 3-5 sessions per week of Occupational Therapy at d/c to increase the patient's independence. Please see assessment section for further patient specific details. If patient discharges prior to next session this note will serve as a discharge summary. Please see below for the latest assessment towards goals. AM-PAC Inpatient Daily Activity Raw Score: 16 (12/17/20 1351)  AM-PAC Inpatient ADL T-Scale Score : 35.96 (12/17/20 1351)  ADL Inpatient CMS 0-100% Score: 53.32 (12/17/20 [facility])  ADL Inpatient CMS G-Code Modifier : CK (12/17/20 1351)    Goals  Short term goals  Time Frame for Short term goals: prior to d/c  Short term goal 1: Pt will complete bed mobility with SBA  Short term goal 2: Pt will complete transfers with SBA  Short term goal 3: Pt will complete functional mobility with RW with SBA  Short term goal 4: Pt will complete toileting with SBA  Short term goal 5: Pt will tolerate 3+ minutes of standing activity to increase strength and activity tolerance for self-care and transfers with O2 sats WFL. Therapy Time   Individual Concurrent Group Co-treatment   Time In       1300   Time Out       1345   Minutes       45   Timed Code Treatment Minutes: 30 Minutes(15 min eval)     If pt is discharged prior to next OT session, this note will serve as the discharge summary.     Florentino Olson, [id number]

## 2020-12-17 NOTE — PROGRESS NOTES
Hospitalist Progress Note    CC: Pneumonia due to COVID-19 virus    Hospital course:  66yo WM with PMHx sig for HTN, COPD, hyperlipidemia presents with several days of SOB. Apparently pt was at a conference last week with 12 other people. Only 2 people including himself, were wearing masks. He became increasingly SOB. He does have asymptomatic episodes of bradycardia. Currently pt on 9L oxygen and desats with eating. Pt meets criteria for sepsis with tachypnea, acute resp failure with hypoxia, documented pneumonia due to COVID19, elevated pro calcitonin. Pt started on remdesivir and decadron. Admit date: 12/16/2020  Days in hospital:  1    24 Hour Events: pt hypoxic with talking    Subjective: oxygen needs have increased - he is now up to 9L oxygen with sats at 90% - has bradycardia which as POA and continues, so wrote holding parameters for beta blocker - pt asymptomatic with this    ROS:   A comprehensive review of systems was negative except for: dyspnea on exertion, short of breath if talking too much or eating . Objective:    BP (!) 156/81   Pulse 50   Temp 98.4 °F (36.9 °C) (Oral)   Resp 19   Ht 5' 11\" (1.803 m)   Wt 163 lb 2.3 oz (74 kg)   SpO2 95%   BMI 22.75 kg/m²     Gen: ill appearing  HEENT: NC/AT, moist mucous membranes, no oropharyngeal erythema or exudate  Neck: supple, trachea midline, no anterior cervical or SC LAD  Heart:  Normal s1/s2, RRR, no murmurs, gallops, or rubs. no leg edema  Lungs:  diminished bilaterally, no wheeze, no rales, no rhonchi, no crackles, pos use of accessory muscles with exertion  Abd: bowel sounds present, soft, nontender, nondistended, no masses  Extrem:  No clubbing, cyanosis,  no edema  Skin: no rashes or lesions  Psych: A & O x3  Neuro: grossly intact, moves all four extremities.       Assessment:    Principal Problem:    Pneumonia due to COVID-19 virus  Active Problems: Essential hypertension    COPD, mild (HCC)    Acute respiratory failure with hypoxia (HCC)  Resolved Problems:    * No resolved hospital problems. *      Plan:  1. COVID19 pneumonia - change decadron to IV, continue with remdesivir, not candidate for convalescent plasma based on level of hypoxia  2. Acute resp failure with hypoxia - sats down, so oxygen increased to 9L -   3. Hypokalemia - replace and follow mag level  4.   HTN - continue with cozaar, lasix, HCTZ and atenolol    Prognosis:  Fair    Code status:  Full code    DVT prophylaxis: Lovenox  GI prophylaxis: H2 blocker  Antibiotic prophylaxis indicated:   no  Diet:  DIET GENERAL;    Disposition:  SNF    Medications:  Scheduled Meds:   amLODIPine  5 mg Oral Daily    aspirin  81 mg Oral Daily    atenolol  100 mg Oral Daily    budesonide-formoterol  2 puff Inhalation BID    hydroCHLOROthiazide  25 mg Oral Daily    losartan  100 mg Oral Daily    pravastatin  40 mg Oral Nightly    sodium chloride flush  10 mL Intravenous 2 times per day    albuterol sulfate HFA  2 puff Inhalation 4x daily    And    ipratropium  2 puff Inhalation 4x daily    ascorbic acid  500 mg Oral Daily    zinc sulfate  50 mg Oral Daily    venlafaxine  150 mg Oral Nightly    [START ON 12/18/2020] remdesivir IVPB  100 mg Intravenous Q24H    dexamethasone  10 mg Intravenous Daily    enoxaparin  30 mg Subcutaneous BID    famotidine  20 mg Oral BID    [START ON 12/18/2020] Vitamin D  2,000 Units Oral Daily    potassium chloride  40 mEq Oral BID WC       PRN Meds:  albuterol sulfate HFA, LORazepam, sodium chloride flush, promethazine **OR** ondansetron, polyethylene glycol, acetaminophen **OR** acetaminophen, guaiFENesin-dextromethorphan, benzonatate, potassium chloride **OR** potassium alternative oral replacement **OR** potassium chloride, magnesium sulfate    IV:        Intake/Output Summary (Last 24 hours) at 12/17/2020 8078  Last data filed at 12/17/2020 0834 Gross per 24 hour   Intake 610 ml   Output 200 ml   Net 410 ml       Results:  CBC:   Recent Labs     12/16/20 2007   WBC 5.5   HGB 12.3*   HCT 35.6*   MCV 83.5   *     BMP:   Recent Labs     12/16/20 2007 12/17/20  0842    141   K 2.6* 2.6*    103   CO2 21 23   BUN 38* 32*   CREATININE 1.2 1.1     Mag: No results for input(s): MAG in the last 72 hours. Phos: No results found for: PHOS  No components found for: GLU    LIVER PROFILE:   Recent Labs     12/16/20 2007 12/17/20  0842   AST 54* 104*   ALT 36 54*   BILITOT <0.2 <0.2   ALKPHOS 79 117     PT/INR: No results for input(s): PROTIME, INR in the last 72 hours. APTT: No results for input(s): APTT in the last 72 hours.   UA:  Recent Labs     12/16/20 2032   COLORU YELLOW   PHUR 6.0   WBCUA 1   RBCUA 1   CLARITYU Clear   SPECGRAV >1.030   LEUKOCYTESUR Negative   UROBILINOGEN 1.0   BILIRUBINUR Negative   BLOODU TRACE*   GLUCOSEU Negative       Invalid input(s): ABG  Lab Results   Component Value Date    CALCIUM 7.8 (L) 12/17/2020                 Electronically signed by Loan Go MD on 12/17/2020 at 6:58 PM

## 2020-12-17 NOTE — ED NOTES
Report called and given to [facility] ([id number]). Pt is going to  1919.      Alfred Alvarado, FLACA  12/16/20 Fernando 43 Lupe Ahumada RN  12/16/20 3544

## 2020-12-17 NOTE — PROGRESS NOTES
Physical Therapy    Facility/Department: [address] MED SURG  Initial Assessment    NAME: Fran Hendricks  : 1945  MRN: [id number]    Date of Service: 2020    Discharge Recommendations:  Patient would benefit from continued therapy after discharge, 3-5 sessions per week   PT Equipment Recommendations  Equipment Needed: No  Fran Hendricks scored a 16/24 on the AM-PAC short mobility form. Current research shows that an AM-PAC score of 17 or less is typically not associated with a discharge to the patient's home setting. Based on the patient's AM-PAC score and their current functional mobility deficits, it is recommended that the patient have 3-5 sessions per week of Physical Therapy at d/c to increase the patient's independence. Please see assessment section for further patient specific details. If patient discharges prior to next session this note will serve as a discharge summary. Please see below for the latest assessment towards goals. Assessment   Body structures, Functions, Activity limitations: Decreased functional mobility   Assessment: The patient is a 66 y.o. male who presents to [facility] on 20 with PMHx: Anxiety, mild COPD, depression, HLD, HTN, insomnia, IBS. Presented to the emergency department with progressive shortness of breath and exertional dyspnea since Monday. Pt reports he was independent with functional mobility prior to admission. Pt currently functioning below baseline - currently not safe to return home with unsteadiness. Recommend continued skilled therapy 3-5x/week to maximize independence and safety with functional mobility.   Treatment Diagnosis: impaired mobility  Prognosis: Good  Decision Making: Medium Complexity  History: see below  Exam: see below  Clinical Presentation: evolving  PT Education: PT Role;Plan of Care;General Safety  REQUIRES PT FOLLOW UP: Yes  Activity Tolerance Activity Tolerance: Patient Tolerated treatment well;Patient limited by fatigue       Patient Diagnosis(es): The primary encounter diagnosis was COVID-19 virus infection. Diagnoses of Hypoxia, Hypokalemia, Acidosis, Thrombocytopenia ([address] Hypocalcemia, Elevated troponin, and Elevated procalcitonin were also pertinent to this visit. has a past medical history of Anxiety, COPD, mild ([address] Depression, History of blood transfusion, Hyperlipidemia, Hypertension, IBS (irritable bowel syndrome), and Insomnia. has a past surgical history that includes Appendectomy; Tonsillectomy and adenoidectomy; Colonoscopy (03/19/2019); Colonoscopy (N/A, 3/19/2019); and Colonoscopy (N/A, 3/19/2019). Restrictions  Restrictions/Precautions  Restrictions/Precautions: Isolation, Contact Precautions, Fall Risk  Position Activity Restriction  Other position/activity restrictions: 10L of O2; COVID+, droplet plus precautions     Vision/Hearing  Vision: Impaired  Vision Exceptions: Wears glasses at all times  Hearing: Within functional limits       Subjective  General  Chart Reviewed: Yes  Patient assessed for rehabilitation services?: Yes  Additional Pertinent Hx: The patient is a 66 y.o. male who presents to [facility] on 12/16/20 with PMHx: Anxiety, mild COPD, depression, HLD, HTN, insomnia, IBS. Presented to the emergency department with progressive shortness of breath and exertional dyspnea since Monday.   Response To Previous Treatment: Not applicable  Family / Caregiver Present: No  Referring Practitioner: Xavier Andrade MD  Referral Date : 12/17/20  Diagnosis: COVID  Follows Commands: Within Functional Limits  Subjective  Subjective: Pt is agreeable to PT  Pain Screening  Patient Currently in Pain: (Denies pain at this time)          Orientation  Orientation  Overall Orientation Status: Within Functional Limits     Social/Functional History  Social/Functional History  Lives With: Spouse  Type of Home: House Home Layout: One level, Laundry in basement  Home Access: Ramped entrance(Ramp from garage)  Bathroom Shower/Tub: Tub/Shower unit, Walk-in shower  Home Equipment: (no DME)  ADL Assistance: Independent  Homemaking Assistance: (Pt completes, wife does \"a little\")  Ambulation Assistance: Independent  Transfer Assistance: Independent  Active : Yes  Type of occupation: [employer] involved in one course  Additional Comments: Recent falls likely d/t weakness from [address] [place] [address]  Overall Cognitive Status: WFL    Objective  Strength RLE  Strength RLE: Exception  Comment: very mild generalized weakness  Strength LLE  Strength LLE: Exception  Comment: very mild generalized weakness  Motor Control  Gross Motor?: WFL  Sensation  Overall Sensation Status: WFL  Bed mobility  Supine to Sit: Minimal assistance(HOB elevated, min hand held A)  Sit to Supine: Unable to assess(Up in chair at end of session)  Comment: SpO2 mid 90's while SOSOB on 10L O2. Transfers  Sit to Stand: Contact guard assistance;Minimal Assistance  Stand to sit: Contact guard assistance;Minimal Assistance  Ambulation  Ambulation?: Yes  Ambulation 1  Surface: level tile  Device: Rolling Walker;Hand-Held Assist  Other Apparatus: O2(10L)  Assistance: Contact guard assistance;Minimal assistance  Quality of Gait: LOB with retropulsion  Gait Deviations: Slow Phoebe;Decreased step length;Decreased step height  Distance: 5' bed to recliner with HHA, then 5' fwd/bkwd with RW (slightly unsteady with both trials)  Comments: SpO2 drops to 90% on 10L O2.   Stairs/Curb  Stairs?: No     Balance  Posture: Good  Sitting - Static: Good  Sitting - Dynamic: Good  Standing - Static: Fair  Standing - Dynamic: Poor;+        Plan   Plan  Times per week: 3-5x/week  Current Treatment Recommendations: Functional Mobility Training  Safety Devices Type of devices:  All fall risk precautions in place, Call light within reach, Gait belt, Patient at risk for falls, Left in chair, Chair alarm in place, Nurse notified      AM-PAC Score  AM-PAC Inpatient Mobility Raw Score : 16 (12/17/20 1350)  AM-PAC Inpatient T-Scale Score : 40.78 (12/17/20 1350)  Mobility Inpatient CMS 0-100% Score: 54.16 (12/17/20 1350)  Mobility Inpatient CMS G-Code Modifier : CK (12/17/20 1350)          Goals  Short term goals  Time Frame for Short term goals: by acute discharge  Short term goal 1: bed mobility with SBA  Short term goal 2: Sit<>stand with SBA  Short term goal 3: ambulate >40' with LRAD and SBA  Patient Goals   Patient goals : none stated       Therapy Time   Individual Concurrent Group Co-treatment   Time In 1300         Time Out 1345         Minutes 45         Timed Code Treatment Minutes: 30 Minutes       Chni Crain, PT

## 2020-12-17 NOTE — PLAN OF CARE
Problem: Falls - Risk of:  Goal: Will remain free from falls  Description: Will remain free from falls  12/17/2020 0958 by Meghan Lara RN  Outcome: Ongoing     Problem: Falls - Risk of:  Goal: Absence of physical injury  Description: Absence of physical injury  12/17/2020 0958 by Meghan Lara RN  Outcome: Ongoing     Problem: Airway Clearance - Ineffective  Goal: Achieve or maintain patent airway  12/17/2020 0958 by Meghan Lara RN  Outcome: Ongoing     Problem: Gas Exchange - Impaired  Goal: Absence of hypoxia  12/17/2020 0958 by Meghan Lara RN  Outcome: Ongoing     Problem: Gas Exchange - Impaired  Goal: Promote optimal lung function  12/17/2020 0958 by Meghan Lara RN  Outcome: Ongoing     Problem: Breathing Pattern - Ineffective  Goal: Ability to achieve and maintain a regular respiratory rate  12/17/2020 0958 by Meghan Lara RN  Outcome: Ongoing     Problem: Body Temperature -  Risk of, Imbalanced  Goal: Ability to maintain a body temperature within defined limits  12/17/2020 0958 by Meghan Lara RN  Outcome: Ongoing     Problem: Body Temperature -  Risk of, Imbalanced  Goal: Will regain or maintain usual level of consciousness  12/17/2020 0958 by Meghan Lara RN  Outcome: Ongoing     Problem:  Body Temperature -  Risk of, Imbalanced  Goal: Complications related to the disease process, condition or treatment will be avoided or minimized  12/17/2020 0958 by Meghan Lara RN  Outcome: Ongoing     Problem: Isolation Precautions - Risk of Spread of Infection  Goal: Prevent transmission of infection  12/17/2020 0958 by Meghan Lara RN  Outcome: Ongoing     Problem: Nutrition Deficits  Goal: Optimize nutrtional status  12/17/2020 0958 by Meghan Lara RN  Outcome: Ongoing     Problem: Risk for Fluid Volume Deficit  Goal: Maintain normal heart rhythm  12/17/2020 0958 by Meghan Lara RN  Outcome: Ongoing     Problem: Risk for Fluid Volume Deficit Goal: Maintain absence of muscle cramping  12/17/2020 0958 by Evaline Halsted, RN  Outcome: Ongoing     Problem: Risk for Fluid Volume Deficit  Goal: Maintain normal serum potassium, sodium, calcium, phosphorus, and pH  12/17/2020 0958 by Evaline Halsted, RN  Outcome: Ongoing     Problem: Loneliness or Risk for Loneliness  Goal: Demonstrate positive use of time alone when socialization is not possible  12/17/2020 0958 by Evaline Halsted, RN  Outcome: Ongoing     Problem: Fatigue  Goal: Verbalize increase energy and improved vitality  12/17/2020 0958 by Evaline Halsted, RN  Outcome: Ongoing     Problem: Patient Education: Go to Patient Education Activity  Goal: Patient/Family Education  12/17/2020 0958 by Evaline Halsted, RN  Outcome: Ongoing

## 2020-12-17 NOTE — H&P
Hospital Medicine History & Physical      PCP: Amber Josue MD    Date of Admission: 12/16/2020    Date of Service: Pt seen/examined on 12/16/2020 and Admitted to Inpatient . Chief Complaint: Cough, fatigue, weakness, shortness of breath since Monday. History Of Present Illness: The patient is a 66 y.o. male who presents to [facility] with PMHx: Anxiety, mild COPD, depression, HLD, HTN, insomnia, IBS. Not oxygen dependent. Former smoker. 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 Onset of symptoms Monday. Presented to the emergency department with progressive shortness of breath and exertional dyspnea since Monday. He has been using his MDI inhalers without relief. Cough is pretty much progressive and also making the shortness of breath worse. This is a nonproductive cough. He attended a meeting in [place] approximately 1 week ago. He and one other person were the only 2 wearing masks. He suspects that that is his place of exposure. He went to his primary care on December 14 and had a Covid test done but has not received the results. He has had ongoing and progressive mild lieges and nausea as well without vomiting. ED work-up reveals hypoxia on room air 83% on arrival.  He was placed on oxygen therapy. Covid is positive. Chest x-ray indicating bilateral airspace disease consistent with Covid. Mild lymphopenia: 0.4. Total white blood cell count 5.5. Mild thrombocytopenia. VBG stable. Blood cultures x2 had been obtained. D-dimer elevated 359 CRP elevated 81, fibrinogen elevated 398 LDH elevated 320. Patient was noted to be mildly hypokalemic as well as with metabolic acidosis with anion gap. ED did implement potassium repletion. Fluticasone furoate-vilanterol (BREO ELLIPTA) 200-25 MCG/INH AEPB inhaler Inhale 1 puff into the lungs daily 10/5/20  Yes Arnel Seay MD   venlafaxine (EFFEXOR XR) 150 MG extended release capsule TAKE 1 CAPSULE BY MOUTH DAILY 9/4/20  Yes Arnel Seay MD   albuterol sulfate HFA (VENTOLIN HFA) 108 (90 Base) MCG/ACT inhaler Inhale 2 puffs into the lungs every 6 hours as needed for Wheezing or Shortness of Breath 9/1/20  Yes Arnel Seay MD   losartan (COZAAR) 100 MG tablet Take 1 tablet by mouth daily 8/21/20  Yes Arnel Seay MD   hydroCHLOROthiazide (HYDRODIURIL) 25 MG tablet Take 1 tablet by mouth daily 4/10/20  Yes Arnel Seay MD   fluticasone-vilanterol (BREO ELLIPTA) 100-25 MCG/INH AEPB inhaler Inhale into the lungs daily   Yes Historical Provider, MD   aspirin 81 MG tablet Take 81 mg by mouth daily. Yes Historical Provider, MD   diphenoxylate-atropine (LOMOTIL) 2.5-0.025 MG per tablet Take 1 tablet by mouth 4 times daily as needed for Diarrhea for up to 60 days. 4/20/20 6/19/20  Arnel Seay MD   furosemide (LASIX) 40 MG tablet Take 1 tablet by mouth daily 2/14/20   Arnel Seay MD       Allergies:  Patient has no known allergies. Social History:  The patient currently lives at home with wife. TOBACCO:   reports that he quit smoking about 32 years ago. His smoking use included cigarettes. He has a 20.00 pack-year smoking history. He has never used smokeless tobacco.  ETOH:   reports no history of alcohol use. Family History:  Reviewed in detail and negative for DM, Early CAD, Cancer, CVA. Positive as follows:        Problem Relation Age of Onset    Stroke Maternal Grandfather     Cancer Other         leukemia    Hypertension Sister     Hypertension Brother     Hypertension Brother        REVIEW OF SYSTEMS:   Positive for cough and shortness of breath and as noted in the HPI. All other systems reviewed and negative.     PHYSICAL EXAM: BP (!) 170/56   Pulse 65   Temp 98 °F (36.7 °C) (Oral)   Resp 17   Ht 5' 11\" (1.803 m)   Wt 175 lb 7.8 oz (79.6 kg)   SpO2 100%   BMI 24.48 kg/m²     General appearance: No apparent distress appears stated age and cooperative. HEENT Normal cephalic, atraumatic without obvious deformity. Pupils equal, round, and reactive to light. Extra ocular muscles intact. Conjunctivae/corneas clear. Neck: Supple, No jugular venous distention/bruits. Trachea midline without thyromegaly or adenopathy with full range of motion. Lungs: Mildly tachypneic at a rate of 24, respirations are easy regular shallow, frequent nonproductive hacking cough, conversational dyspnea. Breath sounds decreased throughout with scattered wheezing. Ric Wagoner Heart: Regular rate and rhythm with Normal S1/S2 without murmurs, rubs or gallops, point of maximum impulse non-displaced  Abdomen: Soft, non-tender or non-distended without rigidity or guarding and positive bowel sounds all four quadrants. Extremities: No clubbing, cyanosis, or edema bilaterally. Full range of motion without deformity and normal gait intact. Skin: Skin color, texture, turgor normal.  No rashes or lesions. Neurologic: Alert and oriented X 3, neurovascularly intact with sensory/motor intact upper extremities/lower extremities, bilaterally. Cranial nerves: II-XII intact, grossly non-focal.  Mental status: Alert, oriented, thought content appropriate.   Capillary Refill: Acceptable  < 3 seconds  Peripheral Pulses: +3 Easily felt, not easily obliterated with pressure      CXR:  I have reviewed the CXR with the following interpretation: Bibasilar airspace opacities consistent with Covid  EKG:  I have reviewed the EKG with the following interpretation: N/A    CBC   Recent Labs     12/16/20 2007   WBC 5.5   HGB 12.3*   HCT 35.6*   *      RENAL  Recent Labs     12/16/20 2007      K 2.6*      CO2 21   BUN 38*   CREATININE 1.2     LFT'S  Recent Labs Insomnia, home Estazolam:   not on formulary  Ativan 0.5 mg PO at bedtime  Seroquel held for right now    Depression: Continue venlafaxine  HLD: Continue pravastatin        DVT Prophylaxis: Lovenox  Diet: DIET GENERAL;  Code Status: Full Code  PT/OT Eval Status: independent    Dispo - admit, inpt       Yudelka Cleary, APRN - CNP    Thank you Corky Heredia MD for the opportunity to be involved in this patient's care. If you have any questions or concerns please feel free to contact me at [phone].

## 2020-12-17 NOTE — ED PROVIDER NOTES
[address] [facility]        Pt Name: Nabila Hedrick  MRN: [id number]  [place] 1945  Date of evaluation: 12/16/2020  Provider: KLAUS Davidson - CNP  PCP: Vee Hyde MD    KINSEY. I have evaluated this patient. My supervising physician was available for consultation. CHIEF COMPLAINT       Chief Complaint   Patient presents with    Concern For COVID-19     cough, weakness and SOB since monday       HISTORY OF PRESENT ILLNESS   (Location, Timing/Onset, Context/Setting, Quality, Duration, Modifying Factors, Severity, Associated Signs and Symptoms)  Note limiting factors. Nabila Hedrick is a 66 y.o. male history of anxiety, COPD, depression, HTN, HLD, insomnia, IBS, appendectomy who presents the ED with complaints of worsening shortness of air with dyspnea on exertion that has been progressive since 12/13/2020. Patient did note that he attended a meeting approximately 1 week ago when himself another person revealing people out of 10 people wearing a mask. He does not know of anyone at that time who tested positive for COVID-19 but he is suspected he was then exposed. He did go to PCP 12/14/2020 to obtain a COVID-19 test and has not received the results yet. Throughout the week he has noticed progressive myalgias with nausea and fatigue. Today when he ambulated to triage on arrival his pulse ox was 83% on room air. Patient said he does have underlying COPD, however is not on oxygen at home. He does have an MDI at home that he has been using without much relief of symptoms. He does live with his wife and said that she is asymptomatic. Former smoker, denies alcohol use or street drugs. Nursing Notes were all reviewed and agreed with or any disagreements were addressed in the HPI.     REVIEW OF SYSTEMS    (2-9 systems for level 4, 10 or more for level 5)     Review of Systems LOSARTAN (COZAAR) 100 MG TABLET    Take 1 tablet by mouth daily    METHYLPREDNISOLONE (MEDROL DOSEPACK) 4 MG TABLET    Use as directed on package. PRAVASTATIN (PRAVACHOL) 40 MG TABLET    TAKE ONE (1) TABLET BY MOUTH DAILY     QUETIAPINE (SEROQUEL) 25 MG TABLET    TAKE ONE (1) TABLET BY MOUTH NIGHTLY     VENLAFAXINE (EFFEXOR XR) 150 MG EXTENDED RELEASE CAPSULE    TAKE 1 CAPSULE BY MOUTH DAILY         ALLERGIES     Patient has no known allergies. FAMILYHISTORY       Family History   Problem Relation Age of Onset    Stroke Maternal Grandfather     Cancer Other         leukemia    Hypertension Sister     Hypertension Brother     Hypertension Brother           SOCIAL HISTORY       Social History     Tobacco Use    Smoking status: Former Smoker     Packs/day: 1.00     Years: 20.00     Pack years: 20.00     Types: Cigarettes     Quit date: 1988     Years since quittin.3    Smokeless tobacco: Never Used   Substance Use Topics    Alcohol use: No    Drug use: No       SCREENINGS             PHYSICAL EXAM    (up to 7 for level 4, 8 or more for level 5)     ED Triage Vitals   BP Temp Temp Source Pulse Resp SpO2 Height Weight   20 19420 1948 20 1902 20 1902   (!) 136/55 98.2 °F (36.8 °C) Oral 55 (!) 35 (!) 83 % 5' 11\" (1.803 m) 175 lb 7.8 oz (79.6 kg)       Physical Exam  Vitals signs and nursing note reviewed. Constitutional:       General: He is awake. Appearance: Normal appearance. He is well-developed and normal weight. Interventions: Nasal cannula in place. HENT:      Head: Normocephalic and atraumatic. Nose: Nose normal.   Eyes:      General:         Right eye: No discharge. Left eye: No discharge. Neck:      Musculoskeletal: Normal range of motion. Cardiovascular:      Rate and Rhythm: Regular rhythm. Bradycardia present. Heart sounds: Normal heart sounds.    Pulmonary: Effort: Pulmonary effort is normal. Tachypnea present. No respiratory distress. Breath sounds: Decreased breath sounds present. Abdominal:      General: Bowel sounds are normal.      Palpations: Abdomen is soft. Tenderness: There is no abdominal tenderness. Musculoskeletal: Normal range of motion. Skin:     General: Skin is warm and dry. Coloration: Skin is not pale. Neurological:      Mental Status: He is alert and oriented to person, place, and time. Psychiatric:         Behavior: Behavior normal. Behavior is cooperative.          DIAGNOSTIC RESULTS   LABS:    Labs Reviewed   CBC WITH AUTO DIFFERENTIAL - Abnormal; Notable for the following components:       Result Value    Hemoglobin 12.3 (*)     Hematocrit 35.6 (*)     Platelets 294 (*)     Lymphocytes Absolute 0.4 (*)     All other components within normal limits    Narrative:     Performed at:  [address] [employer] [address]   Phone [phone]   COMPREHENSIVE METABOLIC PANEL W/ REFLEX TO MG FOR LOW K - Abnormal; Notable for the following components:    Potassium reflex Magnesium 2.6 (*)     Anion Gap 17 (*)     Glucose 117 (*)     BUN 38 (*)     GFR Non-African American 59 (*)     Calcium 8.1 (*)     Total Protein 5.9 (*)     AST 54 (*)     All other components within normal limits    Narrative:     Noé Matthews tel. [phone],  Chemistry results called to and read back by Faby Vaca RN, 12/16/2020  21:13, by Edgar Parr  Performed at:  [address] [employer] [address]   Phone [phone]   URINE RT REFLEX TO CULTURE - Abnormal; Notable for the following components:    Blood, Urine TRACE (*)     Protein,  (*)     All other components within normal limits    Narrative:     Performed at:  [facility]  [address] [facility] [employer] [address]   Phone [phone] BLOOD GAS, VENOUS - Abnormal; Notable for the following components:    pCO2, Meek 33.7 (*)     All other components within normal limits    Narrative:     Performed at:  [facility]  [address] [place], [place] [facility] [employer] [address]   Phone [phone]   TROPONIN - Abnormal; Notable for the following components:    Troponin 0.02 (*)     All other components within normal limits    Narrative:     Performed at:  [facility]  [address] [facility] [employer] [address]   Phone [phone]   D-DIMER, QUANTITATIVE - Abnormal; Notable for the following components:    D-Dimer, Quant 359 (*)     All other components within normal limits    Narrative:     Performed at:  [facility]  [address] [place], [place] [facility] [employer] [address]   Phone [phone]   C-REACTIVE PROTEIN - Abnormal; Notable for the following components:    CRP 81.3 (*)     All other components within normal limits    Narrative:     [employer] tel. [phone],  Chemistry results called to and read back by Meli Naranjo RN, 12/16/2020  21:13, by Gloria Rodriguez  Performed at:  [facility]  [address] [place], [place] [facility] [employer] [address]   Phone [phone]   PROCALCITONIN - Abnormal; Notable for the following components:    Procalcitonin 0.41 (*)     All other components within normal limits    Narrative:     Performed at:  [facility]  [address] [place], [place] [facility] [employer] [address]   Phone [phone]   LACTATE DEHYDROGENASE - Abnormal; Notable for the following components:     (*)     All other components within normal limits    Narrative:     [employer]  tel. [phone],  Chemistry results called to and read back by Meli Naranjo RN, 12/16/2020  21:13, by Gloria Rodriguez  Performed at:  [place] [address] [place], [place] [facility] [employer] [address]   Phone [phone] COVID-19 - Abnormal; Notable for the following components:    SARS-CoV-2, NAAT DETECTED (*)     All other components within normal limits    Narrative:     Performed at:  [facility]  [address] [place], [place] [address]   Phone [phone]   CULTURE, BLOOD 1   CULTURE, BLOOD 2   LACTIC ACID, PLASMA    Narrative:     Performed at:  [facility]  [address] [place], [place] [address]   Phone [phone]   FIBRINOGEN    Narrative:     Performed at:  [facility]  [address] [place], [place] [address]   Phone [phone]   MICROSCOPIC URINALYSIS    Narrative:     Performed at:  [facility]  [address] [place], [place] [address]   Phone [phone]   FERRITIN   MAGNESIUM       All other labs were within normal range or not returned as of this dictation. EKG: All EKG's are interpreted by the Emergency Department Physician in the absence of a cardiologist.  Please see their note for interpretation of EKG. RADIOLOGY:   Non-plain film images such as CT, Ultrasound and MRI are read by the radiologist. Plain radiographic images are visualized and preliminarily interpreted by the ED Provider with the below findings:        Interpretation per the Radiologist below, if available at the time of this note:    XR CHEST PORTABLE   Final Result   Inhomogeneous bilateral airspace disease. Pattern is consistent with COVID   pneumonia, although other etiologies of atypical or typical pneumonia or   aspiration pneumonitis may have a similar appearance.            Xr Chest Portable    Result Date: 12/16/2020 EXAMINATION: ONE XRAY VIEW OF THE CHEST 12/16/2020 8:09 pm COMPARISON: 02/08/2019 HISTORY: ORDERING SYSTEM PROVIDED HISTORY: soa, koehler, poss covid TECHNOLOGIST PROVIDED HISTORY: Reason for exam:->soa, koehler, poss covid Reason for Exam: soa, koehler, poss covid FINDINGS: The cardiac silhouette is globally prominent. Thoracic aorta is tortuous. There is inhomogeneous airspace disease in the lungs, most prominent in the right mid region. No pleural effusion or pneumothorax is seen. Remote right mid clavicle fracture with callus. Inhomogeneous bilateral airspace disease. Pattern is consistent with COVID pneumonia, although other etiologies of atypical or typical pneumonia or aspiration pneumonitis may have a similar appearance.            PROCEDURES   Unless otherwise noted below, none     Procedures    CRITICAL CARE TIME   N/A    CONSULTS:  None      EMERGENCY DEPARTMENT COURSE and DIFFERENTIAL DIAGNOSIS/MDM:   Vitals:    Vitals:    12/16/20 1950 12/16/20 2015 12/16/20 2032 12/16/20 2047   BP:  (!) 131/56 (!) 148/57 (!) 131/55   Pulse: 80 51 67 58   Resp: 15 24 21 24   Temp:       TempSrc:    Oral   SpO2: 94%   95%   Weight:       Height:           Patient was given the following medications:  Medications   benzonatate (TESSALON) capsule 100 mg (100 mg Oral Given 12/16/20 2024)   potassium chloride (KLOR-CON M) extended release tablet 40 mEq (has no administration in time range)   potassium chloride 10 mEq/100 mL IVPB (Peripheral Line) (has no administration in time range)   potassium chloride 10 mEq/100 mL IVPB (Peripheral Line) (has no administration in time range)   dexamethasone (PF) (DECADRON) injection 10 mg (has no administration in time range)   albuterol sulfate  (90 Base) MCG/ACT inhaler 2 puff (has no administration in time range)   cefTRIAXone (ROCEPHIN) 1 g IVPB in 50 mL D5W minibag (has no administration in time range)     And azithromycin (ZITHROMAX) 500 mg in D5W 250ml addavial (has no administration in time range)   aspirin chewable tablet 81 mg (has no administration in time range)   0.9 % sodium chloride bolus (1,000 mLs Intravenous New Bag 12/16/20 2024)             Covid Decompensation Risk Scale    Screen for Imminent Risk:  -O2 Sat <95%, SBP <100 or DBP <60?  -RR >22?  -Age 61+ AND Pulse >100 at time of disposition? ---------> High Risk- Recommend Admission    Score >9+:  High Risk? Admit. Pertinent Labs & Imaging studies reviewed. (See chart for details)   -  Patient seen and evaluated in the emergency department. -  Triage and nursing notes reviewed and incorporated. -  Old chart records reviewed and incorporated. -  Patient case is not discussed with attending physician,  although Dr. Sher Bran was available for consultation  -  Differential diagnosis includes:  Pneumonia, dehydration, UTI, pleural effusion, pneumothorax, dissection, lung cancer, COPD exacerbation, versus COVID-19  -  Work-up included:  See above CXR, EKG, blood culture x2, COVID-19, magnesium, CBC, CMP, lactic acid, UA, VBG, troponin, D-dimer, CRP, pro-Alfredo, LDH, ferritin  -  ED treatment included:   Potassium, Decadron, albuterol, Rocephin, Zithromax, aspirin, Tessalon Perles, normal saline  - Consults: Hospitalist, Dorene Cleary  -  Results discussed with patient. Labs show  UA with trace blood, 100 protein. VBG with PCO2 venous 33.7. CMP with potassium 2.6, anion gap of 17, glucose 117, BUN 38, calcium 8.1, protein 5.9, AST 54. Lactic acid 1.6. Troponin 0 0.02.  D-dimer 359. CRP 81.3. Procalcitonin 0.41. Fibrinogen 396. COVID-19 is detected. Blood culture x2 pending. CXR shows inhomogeneous bilateral airspace disease. Pattern is consistent with Covid pneumonia although other etiologies of atypical or typical pneumonia or aspiration pneumonitis may have similar appearance. The patient is agreeable with plan of care and disposition. -  Disposition:   admission    CRITICAL CARE TIME   Total Critical Care time was 35 minutes, excluding separately reportable procedures. There was a high probability of clinically significant/life threatening deterioration in the patient's condition which required my urgent intervention. FINAL IMPRESSION      1. COVID-19 virus infection    2. Hypoxia    3. Hypokalemia    4. Acidosis    5. Thrombocytopenia ([address]    6. Hypocalcemia    7. Elevated troponin    8.  Elevated procalcitonin          DISPOSITION/PLAN   DISPOSITION Decision To Admit 12/16/2020 08:57:53 PM  Admission         (Please note that portions of this note were completed with a voice recognition program.  Efforts were made to edit the dictations but occasionally words are mis-transcribed.)    KLAUS Davidson CNP (electronically signed)            KLAUS Davidson CNP  12/16/20 7446

## 2020-12-17 NOTE — ED TRIAGE NOTES
Patient to ED c/o cough, weakness/fatigue, and shortness of breath since Monday. Reports he cant sleep through the night due to coughing. Patient states he had a work meeting two weeks ago and no one wore a mask but him and one other person. Patient states his close personal friend has tested positive. He has a PCR test done Monday, has not resulted yet.

## 2020-12-18 PROBLEM — R00.1 BRADYCARDIA: Status: ACTIVE | Noted: 2020-12-18

## 2020-12-18 LAB
A/G RATIO: 1.2 (ref 1.1–2.2)
ALBUMIN SERPL-MCNC: 3.2 G/DL (ref 3.4–5)
ALP BLD-CCNC: 117 U/L (ref 40–129)
ALT SERPL-CCNC: 70 U/L (ref 10–40)
ANION GAP SERPL CALCULATED.3IONS-SCNC: 12 MMOL/L (ref 3–16)
AST SERPL-CCNC: 123 U/L (ref 15–37)
BILIRUB SERPL-MCNC: 0.4 MG/DL (ref 0–1)
BUN BLDV-MCNC: 33 MG/DL (ref 7–20)
CALCIUM SERPL-MCNC: 8.3 MG/DL (ref 8.3–10.6)
CHLORIDE BLD-SCNC: 104 MMOL/L (ref 99–110)
CO2: 23 MMOL/L (ref 21–32)
CREAT SERPL-MCNC: 0.7 MG/DL (ref 0.8–1.3)
GFR AFRICAN AMERICAN: >60
GFR NON-AFRICAN AMERICAN: >60
GLOBULIN: 2.7 G/DL
GLUCOSE BLD-MCNC: 107 MG/DL (ref 70–99)
MAGNESIUM: 2.1 MG/DL (ref 1.8–2.4)
POTASSIUM SERPL-SCNC: 3.7 MMOL/L (ref 3.5–5.1)
SODIUM BLD-SCNC: 139 MMOL/L (ref 136–145)
TOTAL PROTEIN: 5.9 G/DL (ref 6.4–8.2)

## 2020-12-18 PROCEDURE — 80053 COMPREHEN METABOLIC PANEL: CPT

## 2020-12-18 PROCEDURE — 2580000003 HC RX 258: Performed by: NURSE PRACTITIONER

## 2020-12-18 PROCEDURE — 36415 COLL VENOUS BLD VENIPUNCTURE: CPT

## 2020-12-18 PROCEDURE — 2700000000 HC OXYGEN THERAPY PER DAY

## 2020-12-18 PROCEDURE — 97110 THERAPEUTIC EXERCISES: CPT

## 2020-12-18 PROCEDURE — 6370000000 HC RX 637 (ALT 250 FOR IP): Performed by: NURSE PRACTITIONER

## 2020-12-18 PROCEDURE — 97530 THERAPEUTIC ACTIVITIES: CPT

## 2020-12-18 PROCEDURE — 83735 ASSAY OF MAGNESIUM: CPT

## 2020-12-18 PROCEDURE — 6370000000 HC RX 637 (ALT 250 FOR IP): Performed by: INTERNAL MEDICINE

## 2020-12-18 PROCEDURE — 2500000003 HC RX 250 WO HCPCS: Performed by: NURSE PRACTITIONER

## 2020-12-18 PROCEDURE — 94640 AIRWAY INHALATION TREATMENT: CPT

## 2020-12-18 PROCEDURE — 94761 N-INVAS EAR/PLS OXIMETRY MLT: CPT

## 2020-12-18 PROCEDURE — 1200000000 HC SEMI PRIVATE

## 2020-12-18 PROCEDURE — 6360000002 HC RX W HCPCS: Performed by: INTERNAL MEDICINE

## 2020-12-18 RX ORDER — QUETIAPINE FUMARATE 25 MG/1
25 TABLET, FILM COATED ORAL NIGHTLY
Status: DISCONTINUED | OUTPATIENT
Start: 2020-12-18 | End: 2020-12-22 | Stop reason: SDUPTHER

## 2020-12-18 RX ORDER — ASCORBIC ACID 500 MG
500 TABLET ORAL 3 TIMES DAILY
Status: DISCONTINUED | OUTPATIENT
Start: 2020-12-18 | End: 2020-12-23 | Stop reason: HOSPADM

## 2020-12-18 RX ADMIN — OXYCODONE HYDROCHLORIDE AND ACETAMINOPHEN 500 MG: 500 TABLET ORAL at 15:23

## 2020-12-18 RX ADMIN — ENOXAPARIN SODIUM 30 MG: 30 INJECTION SUBCUTANEOUS at 09:03

## 2020-12-18 RX ADMIN — ATENOLOL 100 MG: 50 TABLET ORAL at 09:03

## 2020-12-18 RX ADMIN — Medication 2 PUFF: at 12:59

## 2020-12-18 RX ADMIN — POTASSIUM CHLORIDE 40 MEQ: 1500 TABLET, EXTENDED RELEASE ORAL at 16:51

## 2020-12-18 RX ADMIN — VENLAFAXINE HYDROCHLORIDE 150 MG: 75 CAPSULE, EXTENDED RELEASE ORAL at 19:40

## 2020-12-18 RX ADMIN — Medication 2 PUFF: at 16:28

## 2020-12-18 RX ADMIN — FAMOTIDINE 20 MG: 20 TABLET, FILM COATED ORAL at 09:03

## 2020-12-18 RX ADMIN — AMLODIPINE BESYLATE 5 MG: 5 TABLET ORAL at 09:03

## 2020-12-18 RX ADMIN — GUAIFENESIN AND DEXTROMETHORPHAN 10 ML: 100; 10 SYRUP ORAL at 16:51

## 2020-12-18 RX ADMIN — FAMOTIDINE 20 MG: 20 TABLET, FILM COATED ORAL at 19:40

## 2020-12-18 RX ADMIN — Medication 2 PUFF: at 08:44

## 2020-12-18 RX ADMIN — ZINC SULFATE 220 MG (50 MG) CAPSULE 50 MG: CAPSULE at 09:03

## 2020-12-18 RX ADMIN — Medication 2 PUFF: at 21:30

## 2020-12-18 RX ADMIN — OXYCODONE HYDROCHLORIDE AND ACETAMINOPHEN 500 MG: 500 TABLET ORAL at 19:40

## 2020-12-18 RX ADMIN — Medication 2 PUFF: at 08:40

## 2020-12-18 RX ADMIN — DEXAMETHASONE SODIUM PHOSPHATE 10 MG: 10 INJECTION, SOLUTION INTRAMUSCULAR; INTRAVENOUS at 09:03

## 2020-12-18 RX ADMIN — GUAIFENESIN AND DEXTROMETHORPHAN 10 ML: 100; 10 SYRUP ORAL at 04:23

## 2020-12-18 RX ADMIN — CHOLECALCIFEROL TAB 25 MCG (1000 UNIT) 2000 UNITS: 25 TAB at 09:03

## 2020-12-18 RX ADMIN — SODIUM CHLORIDE, PRESERVATIVE FREE 10 ML: 5 INJECTION INTRAVENOUS at 09:04

## 2020-12-18 RX ADMIN — QUETIAPINE FUMARATE 25 MG: 25 TABLET ORAL at 19:40

## 2020-12-18 RX ADMIN — POTASSIUM CHLORIDE 40 MEQ: 1500 TABLET, EXTENDED RELEASE ORAL at 09:03

## 2020-12-18 RX ADMIN — SODIUM CHLORIDE, PRESERVATIVE FREE 10 ML: 5 INJECTION INTRAVENOUS at 19:41

## 2020-12-18 RX ADMIN — LORAZEPAM 0.5 MG: 0.5 TABLET ORAL at 01:39

## 2020-12-18 RX ADMIN — ASPIRIN 81 MG: 81 TABLET, CHEWABLE ORAL at 09:03

## 2020-12-18 RX ADMIN — REMDESIVIR 100 MG: 5 INJECTION INTRAVENOUS at 04:23

## 2020-12-18 RX ADMIN — PRAVASTATIN SODIUM 40 MG: 40 TABLET ORAL at 19:40

## 2020-12-18 RX ADMIN — ENOXAPARIN SODIUM 30 MG: 30 INJECTION SUBCUTANEOUS at 19:40

## 2020-12-18 RX ADMIN — HYDROCHLOROTHIAZIDE 25 MG: 25 TABLET ORAL at 09:03

## 2020-12-18 RX ADMIN — LOSARTAN POTASSIUM 100 MG: 100 TABLET, FILM COATED ORAL at 09:03

## 2020-12-18 RX ADMIN — OXYCODONE HYDROCHLORIDE AND ACETAMINOPHEN 500 MG: 500 TABLET ORAL at 09:02

## 2020-12-18 ASSESSMENT — PAIN SCALES - GENERAL: PAINLEVEL_OUTOF10: 0

## 2020-12-18 NOTE — PLAN OF CARE
Problem: Falls - Risk of:  Goal: Will remain free from falls  Description: Will remain free from falls  Outcome: Ongoing  Goal: Absence of physical injury  Description: Absence of physical injury  Outcome: Ongoing  Fall risk assessed. Precautions in place. Bed low and locked with side rails up x2. Nonskid socks on when OOB. Bed/chair alarm utilized. Call light within reach. Frequent checks performed. No falls at this time. Problem: Airway Clearance - Ineffective  Goal: Achieve or maintain patent airway  Outcome: Ongoing     Problem: Gas Exchange - Impaired  Goal: Absence of hypoxia  Outcome: Ongoing  Goal: Promote optimal lung function  Outcome: Ongoing     Problem: Breathing Pattern - Ineffective  Goal: Ability to achieve and maintain a regular respiratory rate  Outcome: Ongoing  Pt respiratory status assessed. Oxygen saturations >90% at all times with supplemental O2 as needed. Encourage to cough and deep breath. Encourage HHN as ordered. Encourage IS. Will assess respiratory status every shift and PRN. Problem:  Body Temperature -  Risk of, Imbalanced  Goal: Ability to maintain a body temperature within defined limits  Outcome: Ongoing  Goal: Will regain or maintain usual level of consciousness  Outcome: Ongoing  Goal: Complications related to the disease process, condition or treatment will be avoided or minimized  Outcome: Ongoing     Problem: Isolation Precautions - Risk of Spread of Infection  Goal: Prevent transmission of infection  Outcome: Ongoing     Problem: Nutrition Deficits  Goal: Optimize nutrtional status  Outcome: Ongoing     Problem: Risk for Fluid Volume Deficit  Goal: Maintain normal heart rhythm  Outcome: Ongoing  Goal: Maintain absence of muscle cramping  Outcome: Ongoing  Goal: Maintain normal serum potassium, sodium, calcium, phosphorus, and pH  Outcome: Ongoing     Problem: Loneliness or Risk for Loneliness Goal: Demonstrate positive use of time alone when socialization is not possible  Outcome: Ongoing     Problem: Fatigue  Goal: Verbalize increase energy and improved vitality  Outcome: Ongoing     Problem: Patient Education: Go to Patient Education Activity  Goal: Patient/Family Education  Outcome: Ongoing

## 2020-12-18 NOTE — PLAN OF CARE
Problem: Falls - Risk of:  Goal: Will remain free from falls  Description: Will remain free from falls  12/18/2020 0755 by Shakeel Block RN  Outcome: Ongoing     Problem: Falls - Risk of:  Goal: Absence of physical injury  Description: Absence of physical injury  12/18/2020 0755 by Shakeel Block RN  Outcome: Ongoing     Problem: Airway Clearance - Ineffective  Goal: Achieve or maintain patent airway  12/18/2020 0755 by Shakeel Block RN  Outcome: Ongoing     Problem: Gas Exchange - Impaired  Goal: Absence of hypoxia  12/18/2020 0755 by Shakeel Block RN  Outcome: Ongoing     Problem: Gas Exchange - Impaired  Goal: Promote optimal lung function  12/18/2020 0755 by Shakeel Block RN  Outcome: Ongoing     Problem: Breathing Pattern - Ineffective  Goal: Ability to achieve and maintain a regular respiratory rate  12/18/2020 0755 by Shakeel Block RN  Outcome: Ongoing     Problem: Body Temperature -  Risk of, Imbalanced  Goal: Ability to maintain a body temperature within defined limits  12/18/2020 0755 by Shakeel Block RN  Outcome: Ongoing     Problem: Body Temperature -  Risk of, Imbalanced  Goal: Will regain or maintain usual level of consciousness  12/18/2020 0755 by Shakeel Block RN  Outcome: Ongoing     Problem:  Body Temperature -  Risk of, Imbalanced  Goal: Complications related to the disease process, condition or treatment will be avoided or minimized  12/18/2020 0755 by Shakeel Block RN  Outcome: Ongoing     Problem: Isolation Precautions - Risk of Spread of Infection  Goal: Prevent transmission of infection  12/18/2020 0755 by Shakeel Block RN  Outcome: Ongoing     Problem: Nutrition Deficits  Goal: Optimize nutrtional status  12/18/2020 0755 by Shakeel Block RN  Outcome: Ongoing     Problem: Risk for Fluid Volume Deficit  Goal: Maintain normal heart rhythm  12/18/2020 0755 by Shakeel Block RN  Outcome: Ongoing     Problem: Risk for Fluid Volume Deficit Goal: Maintain absence of muscle cramping  12/18/2020 0755 by Key Christy RN  Outcome: Ongoing     Problem: Risk for Fluid Volume Deficit  Goal: Maintain normal serum potassium, sodium, calcium, phosphorus, and pH  12/18/2020 0755 by Key Christy RN  Outcome: Ongoing     Problem: Loneliness or Risk for Loneliness  Goal: Demonstrate positive use of time alone when socialization is not possible  12/18/2020 0755 by Key Christy RN  Outcome: Ongoing     Problem: Fatigue  Goal: Verbalize increase energy and improved vitality  12/18/2020 0755 by Key Christy RN  Outcome: Ongoing     Problem: Patient Education: Go to Patient Education Activity  Goal: Patient/Family Education  12/18/2020 0755 by Key Christy RN  Outcome: Ongoing

## 2020-12-18 NOTE — CARE COORDINATION
INITIAL CASE MANAGEMENT ASSESSMENT    Reviewed chart, unable to meet with patient due to isolation status. Call to patient's room, spoke with patient over the phone to assess possible discharge needs. Explained Case Management role/services. Living Situation: confirmed address, lives with his wife in a 1 story home with ramp entry in garage and 3 steps at front    ADLs: independent - professor at [employer]     DME: none    PT/OT Recs: Patient would benefit from continued therapy after discharge, 3-5 sessions per week      Active Services: none     Transportation: active , wife to transport     Medications: confirmed Medicare and [facility], uses [facility] without issues    PCP: Sae Queen MD      HD/PD: N/A    PLAN/COMMENTS: Discussed Pt/OT recs, patient declines the need for rehab and thinks he is going to be able to go home with home care. Discussed that CM would follow and would need to make referrals to SNF if he does not improve. He states he thinks if he could get a walker for home, he would be good with home care and assistance from his wife. Discussed home care options and he was agreeable to [facility]. He did not wish to see a list.  The Plan for Transition of Care is related to the following treatment goals: strengthening    The Patient was provided with a choice of provider and agrees   with the discharge plan. [x] Yes [] No    Freedom of choice list was provided with basic dialogue that supports the patient's individualized plan of care/goals, treatment preferences and shares the quality data associated with the providers. [x] Yes [] No    [facility] is closed for the day and since D/C plan is not confirmed, will wait to make referral.    CM provided contact information for patient or family to call with any questions. CM will follow and assist as needed.     Juwan Blanco RN, BSN, Case Management  [phone]  Electronically signed by Juwan Blanco RN on 12/18/2020 at 5:06 PM

## 2020-12-18 NOTE — PROGRESS NOTES
Acute respiratory failure with hypoxia (HCC)    Bradycardia  Resolved Problems:    * No resolved hospital problems. *      Plan:  1. COVID19 pneumonia - change decadron to IV, continue with remdesivir, not candidate for convalescent plasma based on level of hypoxia -   2. Acute resp failure with hypoxia - sats down, so oxygen increased to 9L -   3. Hypokalemia - replace and follow mag level daily  4. HTN - continue with cozaar, lasix, HCTZ and atenolol  5.   Bradycardia - this appears to be chronic and pt is asymptomatic    Prognosis:  Fair    Code status:  Full code    DVT prophylaxis: Lovenox  GI prophylaxis: H2 blocker  Antibiotic prophylaxis indicated:   no  Diet:  DIET GENERAL;    Disposition:  SNF    Medications:  Scheduled Meds:   ascorbic acid  500 mg Oral TID    amLODIPine  5 mg Oral Daily    aspirin  81 mg Oral Daily    atenolol  100 mg Oral Daily    budesonide-formoterol  2 puff Inhalation BID    hydroCHLOROthiazide  25 mg Oral Daily    losartan  100 mg Oral Daily    pravastatin  40 mg Oral Nightly    sodium chloride flush  10 mL Intravenous 2 times per day    albuterol sulfate HFA  2 puff Inhalation 4x daily    And    ipratropium  2 puff Inhalation 4x daily    zinc sulfate  50 mg Oral Daily    venlafaxine  150 mg Oral Nightly    remdesivir IVPB  100 mg Intravenous Q24H    dexamethasone  10 mg Intravenous Daily    enoxaparin  30 mg Subcutaneous BID    famotidine  20 mg Oral BID    Vitamin D  2,000 Units Oral Daily    potassium chloride  40 mEq Oral BID WC       PRN Meds:  albuterol sulfate HFA, LORazepam, sodium chloride flush, promethazine **OR** ondansetron, polyethylene glycol, acetaminophen **OR** acetaminophen, guaiFENesin-dextromethorphan, benzonatate, potassium chloride **OR** potassium alternative oral replacement **OR** potassium chloride, magnesium sulfate    IV:        Intake/Output Summary (Last 24 hours) at 12/18/2020 4012  Last data filed at 12/18/2020 8971 Gross per 24 hour   Intake 1570 ml   Output 600 ml   Net 970 ml       Results:  CBC:   Recent Labs     12/16/20 2007   WBC 5.5   HGB 12.3*   HCT 35.6*   MCV 83.5   *     BMP:   Recent Labs     12/16/20 2007 12/17/20  0842    141   K 2.6* 2.6*    103   CO2 21 23   BUN 38* 32*   CREATININE 1.2 1.1     Mag: No results for input(s): MAG in the last 72 hours. Phos: No results found for: PHOS  No components found for: GLU    LIVER PROFILE:   Recent Labs     12/16/20 2007 12/17/20  0842   AST 54* 104*   ALT 36 54*   BILITOT <0.2 <0.2   ALKPHOS 79 117     PT/INR: No results for input(s): PROTIME, INR in the last 72 hours. APTT: No results for input(s): APTT in the last 72 hours.   UA:  Recent Labs     12/16/20 2032   COLORU YELLOW   PHUR 6.0   WBCUA 1   RBCUA 1   CLARITYU Clear   SPECGRAV >1.030   LEUKOCYTESUR Negative   UROBILINOGEN 1.0   BILIRUBINUR Negative   BLOODU TRACE*   GLUCOSEU Negative       Invalid input(s): ABG  Lab Results   Component Value Date    CALCIUM 7.8 (L) 12/17/2020                 Electronically signed by Gail Walker MD on 12/18/2020 at 8:34 AM

## 2020-12-18 NOTE — PROGRESS NOTES
Physical Therapy  Facility/Department: [address] MED SURG  Daily Treatment Note  NAME: Rashida Blair  : 1945  MRN: [id number]    Date of Service: 2020    Discharge Recommendations:  Patient would benefit from continued therapy after discharge, 3-5 sessions per week   PT Equipment Recommendations  Equipment Needed: No    Assessment   Body structures, Functions, Activity limitations: Decreased functional mobility   Assessment: Pt able to tolerate multiple rounds of standing exercises, and ambulate increased distance with walker, desaturating to mid 80s with extended standing, and requiring intermittent Min A to correct posterior LOB. Pt would benefit from continued therapy to further improve balance, endurance, and safety awareness. Recommend low-moderate frequency therapy upon D/C. Rashida Blair scored a 17/24 on the AM-PAC short mobility form. Current research shows that an AM-PAC score of 17 or less is typically not associated with a discharge to the patient's home setting. Based on the patient's AM-PAC score and their current functional mobility deficits, it is recommended that the patient have 3-5 sessions per week of Physical Therapy at d/c to increase the patient's independence. Please see assessment section for further patient specific details. If patient discharges prior to next session this note will serve as a discharge summary. Please see below for the latest assessment towards goals.      Decision Making: Medium Complexity  History: see below  PT Education: PT Role;Plan of Care;General Safety;Goals;Gait Training;Orientation;Home Exercise Program;Equipment;Transfer Training;Functional Mobility Training;Energy Conservation  REQUIRES PT FOLLOW UP: Yes  Activity Tolerance  Activity Tolerance: Patient Tolerated treatment well;Patient limited by fatigue Patient Diagnosis(es): The primary encounter diagnosis was COVID-19 virus infection. Diagnoses of Hypoxia, Hypokalemia, Acidosis, Thrombocytopenia ([address] Hypocalcemia, Elevated troponin, and Elevated procalcitonin were also pertinent to this visit. has a past medical history of Anxiety, COPD, mild ([address] Depression, History of blood transfusion, Hyperlipidemia, Hypertension, IBS (irritable bowel syndrome), and Insomnia. has a past surgical history that includes Appendectomy; Tonsillectomy and adenoidectomy; Colonoscopy (03/19/2019); Colonoscopy (N/A, 3/19/2019); and Colonoscopy (N/A, 3/19/2019). Restrictions  Restrictions/Precautions  Restrictions/Precautions: Isolation, Contact Precautions, Fall Risk  Position Activity Restriction  Other position/activity restrictions: 10L of O2; COVID+, droplet plus precautions     Subjective   General  Chart Reviewed: Yes  Additional Pertinent Hx: The patient is a 66 y.o. male who presents to [facility] on [facility]16[facility]20 with PMHx: Anxiety, mild COPD, depression, HLD, HTN, insomnia, IBS. Presented to the emergency department with progressive shortness of breath and exertional dyspnea since Monday. Response To Previous Treatment: Patient with no complaints from previous session.   Family / Caregiver Present: No  Referring Practitioner: Marlene Lee MD  Subjective  Subjective: Pt is agreeable to PT     Orientation  Orientation  Overall Orientation Status: Within Functional Limits    Objective      Bed mobility  Supine to Sit: Contact guard assistance     Transfers  Sit to Stand: Contact guard assistance;Minimal Assistance  Stand to sit: Contact guard assistance;Minimal Assistance  Stand Pivot Transfers: Minimal Assistance(Min A  - HHA)     Ambulation  Ambulation?: Yes  Ambulation 1  Surface: level tile  Device: Rolling Walker  Assistance: Contact guard assistance;Minimal assistance

## 2020-12-18 NOTE — ACP (ADVANCE CARE PLANNING)
Advance Care Planning     Advance Care Planning Activator (Inpatient)  Conversation Note      Date of ACP Conversation: 12/18/2020    Conversation Conducted with: Patient with Decision Making Capacity    ACP Activator: Jeannette [address] Decision Maker:     Current Designated Health Care Decision Maker:   Primary Decision Maker: Americo Landau - [phone]    Secondary Decision Maker: Misbah Soto - Brother/Sister - [phone]  Has a HPOA naming the above 2 decision makers at home. Encouraged to bring to the hospital when available. Care Preferences    Ventilation: \"If you were in your present state of health and suddenly became very ill and were unable to breathe on your own, what would your preference be about the use of a ventilator (breathing machine) if it were available to you? \"      Would the patient desire the use of ventilator (breathing machine)?: yes    \"If your health worsens and it becomes clear that your chance of recovery is unlikely, what would your preference be about the use of a ventilator (breathing machine) if it were available to you? \"     Would the patient desire the use of ventilator (breathing machine)?: No      Resuscitation  \"CPR works best to restart the heart when there is a sudden event, like a heart attack, in someone who is otherwise healthy. Unfortunately, CPR does not typically restart the heart for people who have serious health conditions or who are very sick. \"    \"In the event your heart stopped as a result of an underlying serious health condition, would you want attempts to be made to restart your heart (answer \"yes\" for attempt to resuscitate) or would you prefer a natural death (answer \"no\" for do not attempt to resuscitate)? \" no- message sent to Dr. Donis Ponce to advise     [] Yes   [x] No   Educated Patient / [place] regarding differences between Advance Directives and portable DNR orders.     Length of ACP Conversation in minutes:  10 Conversation Outcomes:  [x] ACP discussion completed  [] Existing advance directive reviewed with patient; no changes to patient's previously recorded wishes  [] New Advance Directive completed  [] Portable Do Not Rescitate prepared for Provider review and signature  [] POLST/POST/MOLST/MOST prepared for Provider review and signature      Follow-up plan:    [] Schedule follow-up conversation to continue planning  [x] Referred individual to Provider for additional questions/concerns   [] Advised patient/agent/surrogate to review completed ACP document and update if needed with changes in condition, patient preferences or care setting    [x] This note routed to one or more involved healthcare providers       Rock Pierre RN, BSN, Case Management  Phone: [phone]  Electronically signed by Rock Pierre RN on 12/18/2020 at 5:04 PM

## 2020-12-19 LAB
A/G RATIO: 1.2 (ref 1.1–2.2)
ALBUMIN SERPL-MCNC: 3.2 G/DL (ref 3.4–5)
ALP BLD-CCNC: 116 U/L (ref 40–129)
ALT SERPL-CCNC: 121 U/L (ref 10–40)
ANION GAP SERPL CALCULATED.3IONS-SCNC: 17 MMOL/L (ref 3–16)
AST SERPL-CCNC: 153 U/L (ref 15–37)
BILIRUB SERPL-MCNC: 0.5 MG/DL (ref 0–1)
BUN BLDV-MCNC: 32 MG/DL (ref 7–20)
CALCIUM SERPL-MCNC: 8.2 MG/DL (ref 8.3–10.6)
CHLORIDE BLD-SCNC: 101 MMOL/L (ref 99–110)
CO2: 23 MMOL/L (ref 21–32)
CREAT SERPL-MCNC: 0.7 MG/DL (ref 0.8–1.3)
GFR AFRICAN AMERICAN: >60
GFR NON-AFRICAN AMERICAN: >60
GLOBULIN: 2.6 G/DL
GLUCOSE BLD-MCNC: 173 MG/DL (ref 70–99)
MAGNESIUM: 2.1 MG/DL (ref 1.8–2.4)
POTASSIUM SERPL-SCNC: 3.7 MMOL/L (ref 3.5–5.1)
SODIUM BLD-SCNC: 141 MMOL/L (ref 136–145)
TOTAL PROTEIN: 5.8 G/DL (ref 6.4–8.2)

## 2020-12-19 PROCEDURE — 83735 ASSAY OF MAGNESIUM: CPT

## 2020-12-19 PROCEDURE — 94640 AIRWAY INHALATION TREATMENT: CPT

## 2020-12-19 PROCEDURE — 6370000000 HC RX 637 (ALT 250 FOR IP): Performed by: NURSE PRACTITIONER

## 2020-12-19 PROCEDURE — 1200000000 HC SEMI PRIVATE

## 2020-12-19 PROCEDURE — 2580000003 HC RX 258: Performed by: NURSE PRACTITIONER

## 2020-12-19 PROCEDURE — 2500000003 HC RX 250 WO HCPCS: Performed by: NURSE PRACTITIONER

## 2020-12-19 PROCEDURE — 36415 COLL VENOUS BLD VENIPUNCTURE: CPT

## 2020-12-19 PROCEDURE — 6370000000 HC RX 637 (ALT 250 FOR IP): Performed by: INTERNAL MEDICINE

## 2020-12-19 PROCEDURE — 94761 N-INVAS EAR/PLS OXIMETRY MLT: CPT

## 2020-12-19 PROCEDURE — 80053 COMPREHEN METABOLIC PANEL: CPT

## 2020-12-19 PROCEDURE — 6360000002 HC RX W HCPCS: Performed by: INTERNAL MEDICINE

## 2020-12-19 PROCEDURE — 2700000000 HC OXYGEN THERAPY PER DAY

## 2020-12-19 RX ORDER — GUAIFENESIN/DEXTROMETHORPHAN 100-10MG/5
10 SYRUP ORAL EVERY 4 HOURS PRN
Status: DISCONTINUED | OUTPATIENT
Start: 2020-12-19 | End: 2020-12-23 | Stop reason: HOSPADM

## 2020-12-19 RX ORDER — HYDRALAZINE HYDROCHLORIDE 20 MG/ML
10 INJECTION INTRAMUSCULAR; INTRAVENOUS EVERY 4 HOURS PRN
Status: DISCONTINUED | OUTPATIENT
Start: 2020-12-19 | End: 2020-12-23 | Stop reason: HOSPADM

## 2020-12-19 RX ADMIN — ENOXAPARIN SODIUM 30 MG: 30 INJECTION SUBCUTANEOUS at 08:54

## 2020-12-19 RX ADMIN — POTASSIUM CHLORIDE 40 MEQ: 1500 TABLET, EXTENDED RELEASE ORAL at 08:55

## 2020-12-19 RX ADMIN — QUETIAPINE FUMARATE 25 MG: 25 TABLET ORAL at 21:23

## 2020-12-19 RX ADMIN — OXYCODONE HYDROCHLORIDE AND ACETAMINOPHEN 500 MG: 500 TABLET ORAL at 15:56

## 2020-12-19 RX ADMIN — HYDROCHLOROTHIAZIDE 25 MG: 25 TABLET ORAL at 08:55

## 2020-12-19 RX ADMIN — Medication 2 PUFF: at 08:49

## 2020-12-19 RX ADMIN — OXYCODONE HYDROCHLORIDE AND ACETAMINOPHEN 500 MG: 500 TABLET ORAL at 08:55

## 2020-12-19 RX ADMIN — Medication 2 PUFF: at 21:00

## 2020-12-19 RX ADMIN — ZINC SULFATE 220 MG (50 MG) CAPSULE 50 MG: CAPSULE at 08:54

## 2020-12-19 RX ADMIN — AMLODIPINE BESYLATE 5 MG: 5 TABLET ORAL at 08:59

## 2020-12-19 RX ADMIN — PRAVASTATIN SODIUM 40 MG: 40 TABLET ORAL at 21:23

## 2020-12-19 RX ADMIN — GUAIFENESIN AND DEXTROMETHORPHAN 10 ML: 100; 10 SYRUP ORAL at 08:54

## 2020-12-19 RX ADMIN — ASPIRIN 81 MG: 81 TABLET, CHEWABLE ORAL at 08:55

## 2020-12-19 RX ADMIN — GUAIFENESIN AND DEXTROMETHORPHAN 10 ML: 100; 10 SYRUP ORAL at 17:21

## 2020-12-19 RX ADMIN — LOSARTAN POTASSIUM 100 MG: 100 TABLET, FILM COATED ORAL at 08:59

## 2020-12-19 RX ADMIN — BENZONATATE 100 MG: 100 CAPSULE ORAL at 15:56

## 2020-12-19 RX ADMIN — Medication 2 PUFF: at 08:48

## 2020-12-19 RX ADMIN — Medication 2 PUFF: at 12:19

## 2020-12-19 RX ADMIN — Medication 2 PUFF: at 16:18

## 2020-12-19 RX ADMIN — REMDESIVIR 100 MG: 5 INJECTION INTRAVENOUS at 05:13

## 2020-12-19 RX ADMIN — ENOXAPARIN SODIUM 30 MG: 30 INJECTION SUBCUTANEOUS at 21:22

## 2020-12-19 RX ADMIN — OXYCODONE HYDROCHLORIDE AND ACETAMINOPHEN 500 MG: 500 TABLET ORAL at 21:23

## 2020-12-19 RX ADMIN — GUAIFENESIN AND DEXTROMETHORPHAN 10 ML: 100; 10 SYRUP ORAL at 12:59

## 2020-12-19 RX ADMIN — Medication 2 PUFF: at 12:20

## 2020-12-19 RX ADMIN — ATENOLOL 100 MG: 50 TABLET ORAL at 08:59

## 2020-12-19 RX ADMIN — DEXAMETHASONE SODIUM PHOSPHATE 10 MG: 10 INJECTION, SOLUTION INTRAMUSCULAR; INTRAVENOUS at 08:54

## 2020-12-19 RX ADMIN — VENLAFAXINE HYDROCHLORIDE 150 MG: 75 CAPSULE, EXTENDED RELEASE ORAL at 21:22

## 2020-12-19 RX ADMIN — FAMOTIDINE 20 MG: 20 TABLET, FILM COATED ORAL at 08:55

## 2020-12-19 RX ADMIN — GUAIFENESIN AND DEXTROMETHORPHAN 10 ML: 100; 10 SYRUP ORAL at 02:53

## 2020-12-19 RX ADMIN — FAMOTIDINE 20 MG: 20 TABLET, FILM COATED ORAL at 21:22

## 2020-12-19 RX ADMIN — GUAIFENESIN AND DEXTROMETHORPHAN 10 ML: 100; 10 SYRUP ORAL at 21:23

## 2020-12-19 RX ADMIN — SODIUM CHLORIDE, PRESERVATIVE FREE 10 ML: 5 INJECTION INTRAVENOUS at 21:30

## 2020-12-19 RX ADMIN — POTASSIUM CHLORIDE 40 MEQ: 1500 TABLET, EXTENDED RELEASE ORAL at 17:21

## 2020-12-19 RX ADMIN — CHOLECALCIFEROL TAB 25 MCG (1000 UNIT) 2000 UNITS: 25 TAB at 08:55

## 2020-12-19 RX ADMIN — HYDRALAZINE HYDROCHLORIDE 10 MG: 20 INJECTION, SOLUTION INTRAMUSCULAR; INTRAVENOUS at 11:47

## 2020-12-19 RX ADMIN — SODIUM CHLORIDE, PRESERVATIVE FREE 10 ML: 5 INJECTION INTRAVENOUS at 08:55

## 2020-12-19 RX ADMIN — HYDRALAZINE HYDROCHLORIDE 10 MG: 20 INJECTION, SOLUTION INTRAMUSCULAR; INTRAVENOUS at 21:26

## 2020-12-19 ASSESSMENT — PAIN SCALES - GENERAL
PAINLEVEL_OUTOF10: 0
PAINLEVEL_OUTOF10: 0

## 2020-12-19 NOTE — PLAN OF CARE
Problem: Falls - Risk of:  Goal: Will remain free from falls  Description: Will remain free from falls  Outcome: Ongoing  Goal: Absence of physical injury  Description: Absence of physical injury  Outcome: Ongoing  Fall risk assessed. Precautions in place. Bed low and locked with side rails up x2. Nonskid socks on when OOB. Bed/chair alarm utilized. Call light within reach. Frequent checks performed. No falls at this time. Problem: Airway Clearance - Ineffective  Goal: Achieve or maintain patent airway  Outcome: Ongoing     Problem: Gas Exchange - Impaired  Goal: Absence of hypoxia  Outcome: Ongoing  Goal: Promote optimal lung function  Outcome: Ongoing     Problem: Breathing Pattern - Ineffective  Goal: Ability to achieve and maintain a regular respiratory rate  Outcome: Ongoing  Pt respiratory status assessed. Oxygen saturations >90% at all times with supplemental O2 as needed. Encourage to cough and deep breath. Encourage HHN as ordered. Encourage IS. Will assess respiratory status every shift and PRN. Problem:  Body Temperature -  Risk of, Imbalanced  Goal: Ability to maintain a body temperature within defined limits  Outcome: Ongoing  Goal: Will regain or maintain usual level of consciousness  Outcome: Ongoing  Goal: Complications related to the disease process, condition or treatment will be avoided or minimized  Outcome: Ongoing     Problem: Isolation Precautions - Risk of Spread of Infection  Goal: Prevent transmission of infection  Outcome: Ongoing     Problem: Nutrition Deficits  Goal: Optimize nutrtional status  Outcome: Ongoing     Problem: Risk for Fluid Volume Deficit  Goal: Maintain normal heart rhythm  Outcome: Ongoing  Goal: Maintain absence of muscle cramping  Outcome: Ongoing  Goal: Maintain normal serum potassium, sodium, calcium, phosphorus, and pH  Outcome: Ongoing     Problem: Loneliness or Risk for Loneliness Goal: Demonstrate positive use of time alone when socialization is not possible  Outcome: Ongoing     Problem: Fatigue  Goal: Verbalize increase energy and improved vitality  Outcome: Ongoing     Problem: Patient Education: Go to Patient Education Activity  Goal: Patient/Family Education  Outcome: Ongoing     Problem: Skin Integrity:  Goal: Will show no infection signs and symptoms  Description: Will show no infection signs and symptoms  Outcome: Ongoing  Goal: Absence of new skin breakdown  Description: Absence of new skin breakdown  Outcome: Ongoing  Skin assessed per protocol. John score obtained. Skin kept clean, dry and warm. Encouraged pt to reposition to reduce the risk of PUs. Pillow support in place. Will continue to monitor.

## 2020-12-19 NOTE — PLAN OF CARE
Problem: Falls - Risk of:  Goal: Will remain free from falls  Description: Will remain free from falls  12/19/2020 0748 by Everton Mcnair RN  Outcome: Ongoing  Note: Fall risk assessment completed. Fall precautions in place. Call light within reach. Pt educated on calling for assistance before getting up. Walkway free of clutter. Will continue to monitor. Electronically signed by Everton Mcnair RN on 12/19/2020 at 7:48 AM      Problem: Airway Clearance - Ineffective  Goal: Achieve or maintain patent airway  12/19/2020 0748 by Everton Mcnair RN  Outcome: Ongoing  Note: Patient remains free of significant airway secretions. Patient able to effectively cough and clear any secretions that need cleared. Will continue to monitor patient throughout shift. Electronically signed by Everton Mcnair RN on 12/19/2020 at 7:49 AM      Problem: Gas Exchange - Impaired  Goal: Absence of hypoxia  12/19/2020 0748 by Everton Mcnair RN  Outcome: Ongoing  Note: Patients oxygen levels are within normal range. Educated on turning, coughing, deep breathing. Will continue to monitor throughout the shift. Electronically signed by Everton Mcnair RN on 12/19/2020 at 7:49 AM      Problem: Breathing Pattern - Ineffective  Goal: Ability to achieve and maintain a regular respiratory rate  12/19/2020 0748 by Everton Mcnair RN  Outcome: Ongoing  Note: Pt educated on use of effective breathing techniques during rest and ambulation. Will continue to monitor patient for use of these techniques. Electronically signed by Everton Mcnair RN on 12/19/2020 at 7:49 AM      Problem:  Body Temperature -  Risk of, Imbalanced  Goal: Ability to maintain a body temperature within defined limits  12/19/2020 0748 by Everton Mcnair RN  Outcome: Ongoing     Problem: Isolation Precautions - Risk of Spread of Infection  Goal: Prevent transmission of infection  12/19/2020 0748 by Everton Mcnair RN  Outcome: Ongoing     Problem: Nutrition Deficits Goal: Optimize nutrtional status  12/19/2020 0748 by Nick Jackson RN  Outcome: Ongoing     Problem: Risk for Fluid Volume Deficit  Goal: Maintain normal heart rhythm  12/19/2020 0748 by Nick Jackson RN  Outcome: Ongoing     Problem: Skin Integrity:  Goal: Will show no infection signs and symptoms  Description: Will show no infection signs and symptoms  12/19/2020 0748 by Nick Jackson RN  Outcome: Ongoing  Note: Will monitor skin and mucous membranes. Will turn patient every 2 hours, monitor for friction and sheering, and change dressings as needed. Will preform skin assessment every shift.    Electronically signed by Nick Jackson RN on 12/19/2020 at 7:49 AM

## 2020-12-19 NOTE — PROGRESS NOTES
Hospitalist Progress Note    CC: Pneumonia due to COVID-19 virus    Hospital course:  66yo WM with PMHx sig for HTN, COPD, hyperlipidemia presents with several days of SOB. Apparently pt was at a conference last week with 12 other people. Only 2 people including himself, were wearing masks. He became increasingly SOB. He does have asymptomatic episodes of bradycardia. Currently pt on 9L oxygen and desats with eating. Pt meets criteria for sepsis with tachypnea, acute resp failure with hypoxia, documented pneumonia due to COVID19, elevated pro calcitonin. Pt started on remdesivir and decadron. Admit date: 12/16/2020  Days in hospital:  3    24 Hour Events: weaning off of oxygen    Subjective: down to 6L oxygen today - still with wet cough - breathing better, he wants intubation if needed, but otherwise no code    ROS:   A comprehensive review of systems was negative except for: dyspnea on exertion, short of breath if talking too much or eating . Objective:    BP (!) 153/83   Pulse 67   Temp 97.7 °F (36.5 °C) (Oral)   Resp 15   Ht 5' 11\" (1.803 m)   Wt 164 lb 10.9 oz (74.7 kg)   SpO2 92%   BMI 22.97 kg/m²     Gen: ill appearing  HEENT: NC/AT, moist mucous membranes, no oropharyngeal erythema or exudate  Neck: supple, trachea midline, no anterior cervical or SC LAD  Heart:  Normal s1/s2, RRR, no murmurs, gallops, or rubs. no leg edema  Lungs:  diminished bilaterally, no wheeze, no rales, occ rhonchi, no crackles, pos use of accessory muscles with exertion  Abd: bowel sounds present, soft, nontender, nondistended, no masses  Extrem:  No clubbing, cyanosis,  no edema  Skin: no rashes or lesions  Psych: A & O x3  Neuro: grossly intact, moves all four extremities.       Assessment:    Principal Problem:    Pneumonia due to COVID-19 virus  Active Problems:    Essential hypertension    COPD, mild (HCC)    Acute respiratory failure with hypoxia ([address] Bradycardia  Resolved Problems:    * No resolved hospital problems. *      Plan:  1. COVID19 pneumonia - change decadron to IV, continue with remdesivir, not candidate for convalescent plasma based on level of hypoxia - weaning off of oxygen at present - doing better overall  2. Acute resp failure with hypoxia - oxygen needs improving - down to 6L per NC   3. Hypokalemia - continue to replace and follow mag level daily  4. HTN - continue with cozaar, lasix, HCTZ and atenolol - will add prn hydralazine  5.   Bradycardia - this appears to be chronic and pt is asymptomatic    Prognosis:  Fair    Code status:  Full code    DVT prophylaxis: Lovenox  GI prophylaxis: H2 blocker  Antibiotic prophylaxis indicated:   no  Diet:  DIET GENERAL;    Disposition:  SNF    Medications:  Scheduled Meds:   ascorbic acid  500 mg Oral TID    QUEtiapine  25 mg Oral Nightly    amLODIPine  5 mg Oral Daily    aspirin  81 mg Oral Daily    atenolol  100 mg Oral Daily    budesonide-formoterol  2 puff Inhalation BID    hydroCHLOROthiazide  25 mg Oral Daily    losartan  100 mg Oral Daily    pravastatin  40 mg Oral Nightly    sodium chloride flush  10 mL Intravenous 2 times per day    albuterol sulfate HFA  2 puff Inhalation 4x daily    And    ipratropium  2 puff Inhalation 4x daily    zinc sulfate  50 mg Oral Daily    venlafaxine  150 mg Oral Nightly    remdesivir IVPB  100 mg Intravenous Q24H    dexamethasone  10 mg Intravenous Daily    enoxaparin  30 mg Subcutaneous BID    famotidine  20 mg Oral BID    Vitamin D  2,000 Units Oral Daily    potassium chloride  40 mEq Oral BID WC       PRN Meds:  albuterol sulfate HFA, LORazepam, sodium chloride flush, promethazine **OR** ondansetron, polyethylene glycol, acetaminophen **OR** acetaminophen, guaiFENesin-dextromethorphan, benzonatate, potassium chloride **OR** potassium alternative oral replacement **OR** potassium chloride, magnesium sulfate    IV: Intake/Output Summary (Last 24 hours) at 12/19/2020 0703  Last data filed at 12/19/2020 6208  Gross per 24 hour   Intake 1710 ml   Output 900 ml   Net 810 ml       Results:  CBC:   Recent Labs     12/16/20 2007   WBC 5.5   HGB 12.3*   HCT 35.6*   MCV 83.5   *     BMP:   Recent Labs     12/16/20 2007 12/17/20  0842 12/18/20  0913    141 139   K 2.6* 2.6* 3.7    103 104   CO2 21 23 23   BUN 38* 32* 33*   CREATININE 1.2 1.1 0.7*     Mag: No results for input(s): MAG in the last 72 hours. Phos: No results found for: PHOS  No components found for: GLU    LIVER PROFILE:   Recent Labs     12/16/20 2007 12/17/20  0842 12/18/20  0913   AST 54* 104* 123*   ALT 36 54* 70*   BILITOT <0.2 <0.2 0.4   ALKPHOS 79 117 117     PT/INR: No results for input(s): PROTIME, INR in the last 72 hours. APTT: No results for input(s): APTT in the last 72 hours.   UA:  Recent Labs     12/16/20 2032   COLORU YELLOW   PHUR 6.0   WBCUA 1   RBCUA 1   CLARITYU Clear   SPECGRAV >1.030   LEUKOCYTESUR Negative   UROBILINOGEN 1.0   BILIRUBINUR Negative   BLOODU TRACE*   GLUCOSEU Negative       Invalid input(s): ABG  Lab Results   Component Value Date    CALCIUM 8.3 12/18/2020                 Electronically signed by Loren Dominguez MD on 12/19/2020 at 7:03 AM

## 2020-12-19 NOTE — PROGRESS NOTES
Patient resting quietly in bed eating breakfast. Alert and oriented. Denies pain. 94% on 5L high flow oxygen. Morning medications given without difficulty. Robitussin given for persistent cough. VSS. Bed alarm on. Call light and belongings within reach. Will continue to monitor.      Electronically signed by Devin Cruz RN on 12/19/2020 at 12:47 PM

## 2020-12-20 LAB
A/G RATIO: 1.2 (ref 1.1–2.2)
ALBUMIN SERPL-MCNC: 3.1 G/DL (ref 3.4–5)
ALP BLD-CCNC: 113 U/L (ref 40–129)
ALT SERPL-CCNC: 155 U/L (ref 10–40)
ANION GAP SERPL CALCULATED.3IONS-SCNC: 12 MMOL/L (ref 3–16)
AST SERPL-CCNC: 134 U/L (ref 15–37)
BILIRUB SERPL-MCNC: 0.6 MG/DL (ref 0–1)
BLOOD CULTURE, ROUTINE: NORMAL
BUN BLDV-MCNC: 27 MG/DL (ref 7–20)
CALCIUM SERPL-MCNC: 8.2 MG/DL (ref 8.3–10.6)
CHLORIDE BLD-SCNC: 100 MMOL/L (ref 99–110)
CO2: 24 MMOL/L (ref 21–32)
CREAT SERPL-MCNC: 0.7 MG/DL (ref 0.8–1.3)
CULTURE, BLOOD 2: NORMAL
GFR AFRICAN AMERICAN: >60
GFR NON-AFRICAN AMERICAN: >60
GLOBULIN: 2.5 G/DL
GLUCOSE BLD-MCNC: 167 MG/DL (ref 70–99)
GLUCOSE BLD-MCNC: 190 MG/DL (ref 70–99)
GLUCOSE BLD-MCNC: 225 MG/DL (ref 70–99)
MAGNESIUM: 2.1 MG/DL (ref 1.8–2.4)
PERFORMED ON: ABNORMAL
PERFORMED ON: ABNORMAL
POTASSIUM SERPL-SCNC: 4 MMOL/L (ref 3.5–5.1)
SODIUM BLD-SCNC: 136 MMOL/L (ref 136–145)
TOTAL PROTEIN: 5.6 G/DL (ref 6.4–8.2)

## 2020-12-20 PROCEDURE — 83735 ASSAY OF MAGNESIUM: CPT

## 2020-12-20 PROCEDURE — 2500000003 HC RX 250 WO HCPCS: Performed by: NURSE PRACTITIONER

## 2020-12-20 PROCEDURE — 6370000000 HC RX 637 (ALT 250 FOR IP): Performed by: INTERNAL MEDICINE

## 2020-12-20 PROCEDURE — 1200000000 HC SEMI PRIVATE

## 2020-12-20 PROCEDURE — 94761 N-INVAS EAR/PLS OXIMETRY MLT: CPT

## 2020-12-20 PROCEDURE — 36415 COLL VENOUS BLD VENIPUNCTURE: CPT

## 2020-12-20 PROCEDURE — 6370000000 HC RX 637 (ALT 250 FOR IP): Performed by: NURSE PRACTITIONER

## 2020-12-20 PROCEDURE — 6360000002 HC RX W HCPCS: Performed by: INTERNAL MEDICINE

## 2020-12-20 PROCEDURE — 80053 COMPREHEN METABOLIC PANEL: CPT

## 2020-12-20 PROCEDURE — 94640 AIRWAY INHALATION TREATMENT: CPT

## 2020-12-20 PROCEDURE — 2580000003 HC RX 258: Performed by: NURSE PRACTITIONER

## 2020-12-20 PROCEDURE — 2700000000 HC OXYGEN THERAPY PER DAY

## 2020-12-20 RX ORDER — GUAIFENESIN 600 MG/1
600 TABLET, EXTENDED RELEASE ORAL 2 TIMES DAILY
Status: DISCONTINUED | OUTPATIENT
Start: 2020-12-20 | End: 2020-12-23 | Stop reason: HOSPADM

## 2020-12-20 RX ORDER — FUROSEMIDE 10 MG/ML
20 INJECTION INTRAMUSCULAR; INTRAVENOUS ONCE
Status: COMPLETED | OUTPATIENT
Start: 2020-12-20 | End: 2020-12-20

## 2020-12-20 RX ORDER — NICOTINE POLACRILEX 4 MG
15 LOZENGE BUCCAL PRN
Status: DISCONTINUED | OUTPATIENT
Start: 2020-12-20 | End: 2020-12-23 | Stop reason: HOSPADM

## 2020-12-20 RX ORDER — BENZONATATE 100 MG/1
200 CAPSULE ORAL 3 TIMES DAILY
Status: DISCONTINUED | OUTPATIENT
Start: 2020-12-20 | End: 2020-12-23 | Stop reason: HOSPADM

## 2020-12-20 RX ORDER — DEXTROSE MONOHYDRATE 50 MG/ML
100 INJECTION, SOLUTION INTRAVENOUS PRN
Status: DISCONTINUED | OUTPATIENT
Start: 2020-12-20 | End: 2020-12-23 | Stop reason: HOSPADM

## 2020-12-20 RX ORDER — DEXTROSE MONOHYDRATE 25 G/50ML
12.5 INJECTION, SOLUTION INTRAVENOUS PRN
Status: DISCONTINUED | OUTPATIENT
Start: 2020-12-20 | End: 2020-12-23 | Stop reason: HOSPADM

## 2020-12-20 RX ADMIN — ENOXAPARIN SODIUM 30 MG: 30 INJECTION SUBCUTANEOUS at 20:34

## 2020-12-20 RX ADMIN — QUETIAPINE FUMARATE 25 MG: 25 TABLET ORAL at 20:27

## 2020-12-20 RX ADMIN — PRAVASTATIN SODIUM 40 MG: 40 TABLET ORAL at 20:27

## 2020-12-20 RX ADMIN — HYDRALAZINE HYDROCHLORIDE 10 MG: 20 INJECTION, SOLUTION INTRAMUSCULAR; INTRAVENOUS at 20:30

## 2020-12-20 RX ADMIN — Medication 2 PUFF: at 12:39

## 2020-12-20 RX ADMIN — ASPIRIN 81 MG: 81 TABLET, CHEWABLE ORAL at 08:06

## 2020-12-20 RX ADMIN — OXYCODONE HYDROCHLORIDE AND ACETAMINOPHEN 500 MG: 500 TABLET ORAL at 20:27

## 2020-12-20 RX ADMIN — VENLAFAXINE HYDROCHLORIDE 150 MG: 75 CAPSULE, EXTENDED RELEASE ORAL at 20:27

## 2020-12-20 RX ADMIN — CHOLECALCIFEROL TAB 25 MCG (1000 UNIT) 2000 UNITS: 25 TAB at 08:07

## 2020-12-20 RX ADMIN — FAMOTIDINE 20 MG: 20 TABLET, FILM COATED ORAL at 08:07

## 2020-12-20 RX ADMIN — BENZONATATE 200 MG: 100 CAPSULE ORAL at 10:32

## 2020-12-20 RX ADMIN — REMDESIVIR 100 MG: 5 INJECTION INTRAVENOUS at 05:54

## 2020-12-20 RX ADMIN — ENOXAPARIN SODIUM 30 MG: 30 INJECTION SUBCUTANEOUS at 08:07

## 2020-12-20 RX ADMIN — Medication 2 PUFF: at 15:54

## 2020-12-20 RX ADMIN — DEXAMETHASONE SODIUM PHOSPHATE 10 MG: 10 INJECTION, SOLUTION INTRAMUSCULAR; INTRAVENOUS at 08:07

## 2020-12-20 RX ADMIN — Medication 2 PUFF: at 21:11

## 2020-12-20 RX ADMIN — ATENOLOL 100 MG: 50 TABLET ORAL at 08:06

## 2020-12-20 RX ADMIN — FUROSEMIDE 20 MG: 10 INJECTION, SOLUTION INTRAMUSCULAR; INTRAVENOUS at 10:33

## 2020-12-20 RX ADMIN — LOSARTAN POTASSIUM 100 MG: 100 TABLET, FILM COATED ORAL at 08:07

## 2020-12-20 RX ADMIN — BENZONATATE 200 MG: 100 CAPSULE ORAL at 15:20

## 2020-12-20 RX ADMIN — BENZONATATE 200 MG: 100 CAPSULE ORAL at 20:27

## 2020-12-20 RX ADMIN — FAMOTIDINE 20 MG: 20 TABLET, FILM COATED ORAL at 20:27

## 2020-12-20 RX ADMIN — GUAIFENESIN AND DEXTROMETHORPHAN 10 ML: 100; 10 SYRUP ORAL at 02:42

## 2020-12-20 RX ADMIN — OXYCODONE HYDROCHLORIDE AND ACETAMINOPHEN 500 MG: 500 TABLET ORAL at 15:20

## 2020-12-20 RX ADMIN — POTASSIUM CHLORIDE 40 MEQ: 1500 TABLET, EXTENDED RELEASE ORAL at 08:06

## 2020-12-20 RX ADMIN — SODIUM CHLORIDE, PRESERVATIVE FREE 10 ML: 5 INJECTION INTRAVENOUS at 08:08

## 2020-12-20 RX ADMIN — OXYCODONE HYDROCHLORIDE AND ACETAMINOPHEN 500 MG: 500 TABLET ORAL at 08:07

## 2020-12-20 RX ADMIN — INSULIN LISPRO 1 UNITS: 100 INJECTION, SOLUTION INTRAVENOUS; SUBCUTANEOUS at 18:02

## 2020-12-20 RX ADMIN — AMLODIPINE BESYLATE 5 MG: 5 TABLET ORAL at 08:07

## 2020-12-20 RX ADMIN — ZINC SULFATE 220 MG (50 MG) CAPSULE 50 MG: CAPSULE at 08:07

## 2020-12-20 RX ADMIN — HYDROCHLOROTHIAZIDE 25 MG: 25 TABLET ORAL at 08:06

## 2020-12-20 RX ADMIN — POTASSIUM CHLORIDE 40 MEQ: 1500 TABLET, EXTENDED RELEASE ORAL at 17:59

## 2020-12-20 RX ADMIN — INSULIN LISPRO 1 UNITS: 100 INJECTION, SOLUTION INTRAVENOUS; SUBCUTANEOUS at 20:35

## 2020-12-20 RX ADMIN — GUAIFENESIN 600 MG: 600 TABLET ORAL at 10:33

## 2020-12-20 RX ADMIN — Medication 2 PUFF: at 08:33

## 2020-12-20 RX ADMIN — LORAZEPAM 0.5 MG: 0.5 TABLET ORAL at 02:42

## 2020-12-20 RX ADMIN — GUAIFENESIN 600 MG: 600 TABLET ORAL at 20:27

## 2020-12-20 ASSESSMENT — PAIN SCALES - GENERAL
PAINLEVEL_OUTOF10: 0

## 2020-12-20 NOTE — PROGRESS NOTES
Patient alert and oriented x 4. No complains at this time. Patient has constant coughing that bothers him during the night . Pt medicated with cough syrup and ativan per order for sleep. Pt satisfied. Oxygen sat above 95% on 5 L. Stable. Pt resting eyes closed. Uses bed side urinal. PRN hydralazine had been administered with night medication BP was 182 /99; had come down since. Mid night vitals 155/66. Pt resting eyes closed. All fall precautions In place. Respirations easy and unlabored. Call light within reach.  Calls approprietly

## 2020-12-20 NOTE — PROGRESS NOTES
Hospitalist Progress Note    CC: Pneumonia due to COVID-19 virus    Hospital course:  64yo WM with PMHx sig for HTN, COPD, hyperlipidemia presents with several days of SOB. Apparently pt was at a conference last week with 12 other people. Only 2 people including himself, were wearing masks. He became increasingly SOB. He does have asymptomatic episodes of bradycardia. Currently pt on 9L oxygen and desats with eating. Pt meets criteria for sepsis with tachypnea, acute resp failure with hypoxia, documented pneumonia due to COVID19, elevated pro calcitonin. Pt started on remdesivir and decadron. Pt down to 5L oxygen at present time    Admit date: 12/16/2020  Days in hospital:  4    24 Hour Events: weaning off of oxygen    Subjective: down to 5L oxygen today - looking better, has very wet cough today and paroxysmal cough when speaking      ROS:   A comprehensive review of systems was negative except for: dyspnea on exertion, short of breath if talking too much or eating . Objective:    BP (!) 155/72   Pulse 73   Temp 98.6 °F (37 °C) (Oral)   Resp 18   Ht 5' 11\" (1.803 m)   Wt 172 lb 6.4 oz (78.2 kg)   SpO2 93%   BMI 24.04 kg/m²     Gen: ill appearing  HEENT: NC/AT, moist mucous membranes, no oropharyngeal erythema or exudate  Neck: supple, trachea midline, no anterior cervical or SC LAD  Heart:  Normal s1/s2, RRR, no murmurs, gallops, or rubs. no leg edema  Lungs:  diminished bilaterally with rhonchi, no wheeze, no rales,  Rhonchi throughout bilateral, no crackles, pos use of accessory muscles with exertion  Abd: bowel sounds present, soft, nontender, nondistended, no masses  Extrem:  No clubbing, cyanosis,  no edema  Skin: no rashes or lesions  Psych: A & O x3  Neuro: grossly intact, moves all four extremities.       Assessment:    Principal Problem:    Pneumonia due to COVID-19 virus  Active Problems:    Essential hypertension    COPD, mild ([address] hydrALAZINE, guaiFENesin-dextromethorphan, albuterol sulfate HFA, LORazepam, sodium chloride flush, promethazine **OR** ondansetron, polyethylene glycol, acetaminophen **OR** acetaminophen, benzonatate, potassium chloride **OR** potassium alternative oral replacement **OR** potassium chloride, magnesium sulfate    IV:        Intake/Output Summary (Last 24 hours) at 12/20/2020 0636  Last data filed at 12/19/2020 1830  Gross per 24 hour   Intake 840 ml   Output 1150 ml   Net -310 ml       Results:  CBC:   No results for input(s): WBC, HGB, HCT, MCV, PLT in the last 72 hours. BMP:   Recent Labs     12/17/20  0842 12/18/20  0913 12/19/20  0928    139 141   K 2.6* 3.7 3.7    104 101   CO2 23 23 23   BUN 32* 33* 32*   CREATININE 1.1 0.7* 0.7*     Mag: No results for input(s): MAG in the last 72 hours. Phos: No results found for: PHOS  No components found for: GLU    LIVER PROFILE:   Recent Labs     12/17/20  0842 12/18/20  0913 12/19/20  0928   * 123* 153*   ALT 54* 70* 121*   BILITOT <0.2 0.4 0.5   ALKPHOS 117 117 116     PT/INR: No results for input(s): PROTIME, INR in the last 72 hours. APTT: No results for input(s): APTT in the last 72 hours. UA:  No results for input(s): NITRITE, COLORU, PHUR, LABCAST, WBCUA, RBCUA, MUCUS, TRICHOMONAS, YEAST, BACTERIA, CLARITYU, SPECGRAV, LEUKOCYTESUR, UROBILINOGEN, BILIRUBINUR, BLOODU, GLUCOSEU, AMORPHOUS in the last 72 hours.     Invalid input(s): [employer] input(s): ABG  Lab Results   Component Value Date    CALCIUM 8.2 (L) 12/19/2020                 Electronically signed by Leo Madrid MD on 12/20/2020 at 6:36 AM

## 2020-12-20 NOTE — PLAN OF CARE
Patient remains on 5L of O2 throughout the shift. His O2 sats have remained in the low 90s. Patient has not complained of any pain or SOB. Problem: Falls - Risk of:  Goal: Will remain free from falls  Description: Will remain free from falls  Outcome: Ongoing  Goal: Absence of physical injury  Description: Absence of physical injury  Outcome: Ongoing     Problem: Airway Clearance - Ineffective  Goal: Achieve or maintain patent airway  Outcome: Ongoing     Problem: Gas Exchange - Impaired  Goal: Absence of hypoxia  Outcome: Ongoing  Goal: Promote optimal lung function  Outcome: Ongoing     Problem: Breathing Pattern - Ineffective  Goal: Ability to achieve and maintain a regular respiratory rate  Outcome: Ongoing     Problem:  Body Temperature -  Risk of, Imbalanced  Goal: Ability to maintain a body temperature within defined limits  Outcome: Ongoing  Goal: Will regain or maintain usual level of consciousness  Outcome: Ongoing  Goal: Complications related to the disease process, condition or treatment will be avoided or minimized  Outcome: Ongoing     Problem: Isolation Precautions - Risk of Spread of Infection  Goal: Prevent transmission of infection  Outcome: Ongoing     Problem: Nutrition Deficits  Goal: Optimize nutrtional status  Outcome: Ongoing     Problem: Risk for Fluid Volume Deficit  Goal: Maintain normal heart rhythm  Outcome: Ongoing  Goal: Maintain absence of muscle cramping  Outcome: Ongoing  Goal: Maintain normal serum potassium, sodium, calcium, phosphorus, and pH  Outcome: Ongoing     Problem: Loneliness or Risk for Loneliness  Goal: Demonstrate positive use of time alone when socialization is not possible  Outcome: Ongoing     Problem: Fatigue  Goal: Verbalize increase energy and improved vitality  Outcome: Ongoing     Problem: Patient Education: Go to Patient Education Activity  Goal: Patient/Family Education  Outcome: Ongoing     Problem: Skin Integrity: Goal: Will show no infection signs and symptoms  Description: Will show no infection signs and symptoms  Outcome: Ongoing  Goal: Absence of new skin breakdown  Description: Absence of new skin breakdown  Outcome: Ongoing

## 2020-12-20 NOTE — PROGRESS NOTES
Speciality bed ordered , and also speech eval for rouble swallowing ordered at this time. Will continue to monitor.

## 2020-12-21 LAB
A/G RATIO: 1.2 (ref 1.1–2.2)
ALBUMIN SERPL-MCNC: 3.1 G/DL (ref 3.4–5)
ALP BLD-CCNC: 119 U/L (ref 40–129)
ALT SERPL-CCNC: 266 U/L (ref 10–40)
ANION GAP SERPL CALCULATED.3IONS-SCNC: 10 MMOL/L (ref 3–16)
AST SERPL-CCNC: 153 U/L (ref 15–37)
BILIRUB SERPL-MCNC: 0.8 MG/DL (ref 0–1)
BUN BLDV-MCNC: 23 MG/DL (ref 7–20)
CALCIUM SERPL-MCNC: 8.3 MG/DL (ref 8.3–10.6)
CHLORIDE BLD-SCNC: 101 MMOL/L (ref 99–110)
CO2: 22 MMOL/L (ref 21–32)
CREAT SERPL-MCNC: <0.5 MG/DL (ref 0.8–1.3)
D DIMER: 569 NG/ML DDU (ref 0–229)
GFR AFRICAN AMERICAN: >60
GFR NON-AFRICAN AMERICAN: >60
GLOBULIN: 2.6 G/DL
GLUCOSE BLD-MCNC: 131 MG/DL (ref 70–99)
GLUCOSE BLD-MCNC: 166 MG/DL (ref 70–99)
GLUCOSE BLD-MCNC: 167 MG/DL (ref 70–99)
GLUCOSE BLD-MCNC: 178 MG/DL (ref 70–99)
GLUCOSE BLD-MCNC: 202 MG/DL (ref 70–99)
MAGNESIUM: 2 MG/DL (ref 1.8–2.4)
PERFORMED ON: ABNORMAL
POTASSIUM SERPL-SCNC: 4 MMOL/L (ref 3.5–5.1)
SODIUM BLD-SCNC: 133 MMOL/L (ref 136–145)
TOTAL PROTEIN: 5.7 G/DL (ref 6.4–8.2)

## 2020-12-21 PROCEDURE — 6360000002 HC RX W HCPCS: Performed by: INTERNAL MEDICINE

## 2020-12-21 PROCEDURE — 1200000000 HC SEMI PRIVATE

## 2020-12-21 PROCEDURE — 83735 ASSAY OF MAGNESIUM: CPT

## 2020-12-21 PROCEDURE — 94640 AIRWAY INHALATION TREATMENT: CPT

## 2020-12-21 PROCEDURE — 6370000000 HC RX 637 (ALT 250 FOR IP): Performed by: INTERNAL MEDICINE

## 2020-12-21 PROCEDURE — 97535 SELF CARE MNGMENT TRAINING: CPT

## 2020-12-21 PROCEDURE — 36415 COLL VENOUS BLD VENIPUNCTURE: CPT

## 2020-12-21 PROCEDURE — 94761 N-INVAS EAR/PLS OXIMETRY MLT: CPT

## 2020-12-21 PROCEDURE — 6370000000 HC RX 637 (ALT 250 FOR IP): Performed by: NURSE PRACTITIONER

## 2020-12-21 PROCEDURE — 85379 FIBRIN DEGRADATION QUANT: CPT

## 2020-12-21 PROCEDURE — 80053 COMPREHEN METABOLIC PANEL: CPT

## 2020-12-21 PROCEDURE — 2500000003 HC RX 250 WO HCPCS: Performed by: NURSE PRACTITIONER

## 2020-12-21 PROCEDURE — 2580000003 HC RX 258: Performed by: NURSE PRACTITIONER

## 2020-12-21 PROCEDURE — 2700000000 HC OXYGEN THERAPY PER DAY

## 2020-12-21 RX ADMIN — GUAIFENESIN 600 MG: 600 TABLET ORAL at 09:03

## 2020-12-21 RX ADMIN — ZINC SULFATE 220 MG (50 MG) CAPSULE 50 MG: CAPSULE at 09:03

## 2020-12-21 RX ADMIN — AMLODIPINE BESYLATE 5 MG: 5 TABLET ORAL at 09:03

## 2020-12-21 RX ADMIN — GUAIFENESIN 600 MG: 600 TABLET ORAL at 19:49

## 2020-12-21 RX ADMIN — INSULIN LISPRO 1 UNITS: 100 INJECTION, SOLUTION INTRAVENOUS; SUBCUTANEOUS at 12:41

## 2020-12-21 RX ADMIN — LOSARTAN POTASSIUM 100 MG: 100 TABLET, FILM COATED ORAL at 09:03

## 2020-12-21 RX ADMIN — OXYCODONE HYDROCHLORIDE AND ACETAMINOPHEN 500 MG: 500 TABLET ORAL at 15:46

## 2020-12-21 RX ADMIN — FAMOTIDINE 20 MG: 20 TABLET, FILM COATED ORAL at 09:03

## 2020-12-21 RX ADMIN — OXYCODONE HYDROCHLORIDE AND ACETAMINOPHEN 500 MG: 500 TABLET ORAL at 19:49

## 2020-12-21 RX ADMIN — Medication 2 PUFF: at 20:19

## 2020-12-21 RX ADMIN — REMDESIVIR 100 MG: 5 INJECTION INTRAVENOUS at 05:39

## 2020-12-21 RX ADMIN — HYDRALAZINE HYDROCHLORIDE 10 MG: 20 INJECTION, SOLUTION INTRAMUSCULAR; INTRAVENOUS at 05:40

## 2020-12-21 RX ADMIN — ASPIRIN 81 MG: 81 TABLET, CHEWABLE ORAL at 09:03

## 2020-12-21 RX ADMIN — Medication 2 PUFF: at 09:29

## 2020-12-21 RX ADMIN — SODIUM CHLORIDE, PRESERVATIVE FREE 10 ML: 5 INJECTION INTRAVENOUS at 09:05

## 2020-12-21 RX ADMIN — LORAZEPAM 0.5 MG: 0.5 TABLET ORAL at 22:06

## 2020-12-21 RX ADMIN — ATENOLOL 100 MG: 50 TABLET ORAL at 09:03

## 2020-12-21 RX ADMIN — BENZONATATE 200 MG: 100 CAPSULE ORAL at 09:03

## 2020-12-21 RX ADMIN — SODIUM CHLORIDE, PRESERVATIVE FREE 10 ML: 5 INJECTION INTRAVENOUS at 19:52

## 2020-12-21 RX ADMIN — GUAIFENESIN AND DEXTROMETHORPHAN 10 ML: 100; 10 SYRUP ORAL at 05:40

## 2020-12-21 RX ADMIN — VENLAFAXINE HYDROCHLORIDE 150 MG: 75 CAPSULE, EXTENDED RELEASE ORAL at 22:06

## 2020-12-21 RX ADMIN — HYDROCHLOROTHIAZIDE 25 MG: 25 TABLET ORAL at 09:03

## 2020-12-21 RX ADMIN — POTASSIUM CHLORIDE 40 MEQ: 1500 TABLET, EXTENDED RELEASE ORAL at 15:46

## 2020-12-21 RX ADMIN — OXYCODONE HYDROCHLORIDE AND ACETAMINOPHEN 500 MG: 500 TABLET ORAL at 09:03

## 2020-12-21 RX ADMIN — QUETIAPINE FUMARATE 25 MG: 25 TABLET ORAL at 22:07

## 2020-12-21 RX ADMIN — ENOXAPARIN SODIUM 30 MG: 30 INJECTION SUBCUTANEOUS at 09:03

## 2020-12-21 RX ADMIN — INSULIN LISPRO 1 UNITS: 100 INJECTION, SOLUTION INTRAVENOUS; SUBCUTANEOUS at 22:06

## 2020-12-21 RX ADMIN — SODIUM CHLORIDE, PRESERVATIVE FREE 10 ML: 5 INJECTION INTRAVENOUS at 00:37

## 2020-12-21 RX ADMIN — FAMOTIDINE 20 MG: 20 TABLET, FILM COATED ORAL at 19:49

## 2020-12-21 RX ADMIN — Medication 2 PUFF: at 16:15

## 2020-12-21 RX ADMIN — HYDRALAZINE HYDROCHLORIDE 10 MG: 20 INJECTION, SOLUTION INTRAMUSCULAR; INTRAVENOUS at 09:03

## 2020-12-21 RX ADMIN — HYDRALAZINE HYDROCHLORIDE 10 MG: 20 INJECTION, SOLUTION INTRAMUSCULAR; INTRAVENOUS at 19:46

## 2020-12-21 RX ADMIN — INSULIN LISPRO 1 UNITS: 100 INJECTION, SOLUTION INTRAVENOUS; SUBCUTANEOUS at 17:38

## 2020-12-21 RX ADMIN — POTASSIUM CHLORIDE 40 MEQ: 1500 TABLET, EXTENDED RELEASE ORAL at 09:03

## 2020-12-21 RX ADMIN — PRAVASTATIN SODIUM 40 MG: 40 TABLET ORAL at 19:49

## 2020-12-21 RX ADMIN — DEXAMETHASONE SODIUM PHOSPHATE 10 MG: 10 INJECTION, SOLUTION INTRAMUSCULAR; INTRAVENOUS at 09:04

## 2020-12-21 RX ADMIN — GUAIFENESIN AND DEXTROMETHORPHAN 10 ML: 100; 10 SYRUP ORAL at 19:49

## 2020-12-21 RX ADMIN — BENZONATATE 200 MG: 100 CAPSULE ORAL at 22:07

## 2020-12-21 RX ADMIN — BENZONATATE 200 MG: 100 CAPSULE ORAL at 15:46

## 2020-12-21 RX ADMIN — ENOXAPARIN SODIUM 30 MG: 30 INJECTION SUBCUTANEOUS at 22:07

## 2020-12-21 ASSESSMENT — PAIN SCALES - GENERAL
PAINLEVEL_OUTOF10: 0

## 2020-12-21 NOTE — PROGRESS NOTES
Occupational Therapy  Facility/Department: [address] MED SURG  Daily Treatment Note  NAME: Soumya Olson  : 1945  MRN: [id number]    Date of Service: 2020    Assessment: Pt tolerated session well this date - on 4L of O2 with O2 sats dropping brielfy to 87% and recovering to 90s. Pt completed sit <> stand transfers with CGA/min A and functional mobility with RW with Min A. Pt tolerated ~2 minutes of standing trials x3. Pt completed grooming tasks in stance at sink with CGA for balance. Pt declined further ADL needs. Pt remains unsafe to dc home and would benefit from continued therapy to increase mobility, safety, and independence. Pt remains unsteady and wife would have to be able to provide hands on assist.    Discharge Recommendations:  3-5 sessions per week     Soumya Olson scored a 16/24 on the AM-PAC ADL Inpatient form. Current research shows that an AM-PAC score of 17 or less is typically not associated with a discharge to the patient's home setting. Based on the patient's AM-PAC score and their current ADL deficits, it is recommended that the patient have 3-5 sessions per week of Occupational Therapy at d/c to increase the patient's independence. Please see assessment section for further patient specific details. If patient discharges prior to next session this note will serve as a discharge summary. Please see below for the latest assessment towards goals. Assessment   Performance deficits / Impairments: Decreased functional mobility ; Decreased endurance;Decreased strength;Decreased ADL status; Decreased balance Assessment: Pt tolerated session well this date - on 4L of O2 with O2 sats dropping brielfy to 87% and recovering to 90s. Pt completed sit <> stand transfers with CGA/min A and functional mobility with RW with Min A. Pt tolerated ~2 minutes of standing trials x3. Pt completed grooming tasks in stance at sink with CGA for balance. Pt declined further ADL needs. Pt remains unsafe to dc home and would benefit from continued therapy to increase mobility, safety, and independence. Pt remains unsteady and wife would have to be able to provide hands on assist.  OT Education: Transfer Training;OT Role;Plan of Care;Precautions; ADL Adaptive Strategies  REQUIRES OT FOLLOW UP: Yes  Activity Tolerance  Activity Tolerance: Patient Tolerated treatment well;Patient limited by fatigue  Activity Tolerance: 4L of O2, dropped to high 80s with activity however recovered quickly; continues to have cough  Safety Devices  Safety Devices in place: Yes([facility]) aware)  Type of devices: Nurse notified;Gait belt;Call light within reach; Left in chair;Chair alarm in place         Patient Diagnosis(es): The primary encounter diagnosis was COVID-19 virus infection. Diagnoses of Hypoxia, Hypokalemia, Acidosis, Thrombocytopenia ([address] Hypocalcemia, Elevated troponin, and Elevated procalcitonin were also pertinent to this visit. has a past medical history of Anxiety, COPD, mild ([address] Depression, History of blood transfusion, Hyperlipidemia, Hypertension, IBS (irritable bowel syndrome), and Insomnia. has a past surgical history that includes Appendectomy; Tonsillectomy and adenoidectomy; Colonoscopy (03/19/2019); Colonoscopy (N/A, 3/19/2019); and Colonoscopy (N/A, 3/19/2019).     Restrictions  Restrictions/Precautions  Restrictions/Precautions: Isolation, Contact Precautions, Fall Risk  Position Activity Restriction  Other position/activity restrictions: 4L of O2; COVID+, droplet plus precautions     Subjective   General Chart Reviewed: Yes  Patient assessed for rehabilitation services?: Yes  Additional Pertinent Hx: Pt is 66 y.o. M who presents with worsening SOB and nonproductive cough. Pt is COVID+. PMH: anxiety, mild COPD, depression, HTN, insomnia, IBS  Family / Caregiver Present: No  Referring Practitioner: Farhat Tate MD  Subjective  Subjective: Pt met bedside, agreeable for therapy treatment session. Reporting he has not been getting around much because of the alarm. Re-educated patient on need to call for assistance not safe to ambulate on own. Vital Signs  Patient Currently in Pain: (No complaints of pain)        Objective    ADL  Grooming: Contact guard assistance(Pt completed hand/face washing in stance, required seated rest break and then stood again to complete oral care. Tolerated ~4 minutes total)  Toileting: (Declines need)  Additional Comments: Declined further ADL needs. Balance  Sitting Balance: Stand by assistance  Standing Balance: Contact guard assistance  Standing Balance  Time: ~2 minutes x 3 trials  Activity: Func mob, transfers; 2 minute trials x2 for ADL tasks    Functional Mobility  Functional - Mobility Device: Rolling Walker  Activity: Other(35 feet)  Assist Level: Minimal assistance  Functional Mobility Comments: Pt completed functional mobility with RW with Min A, slightly unsteady when stepping backward and upon initial stand.     Bed mobility  Comment: Up in chair at start and end of session     Transfers  Sit to stand: Contact guard assistance;Minimal assistance  Stand to sit: Contact guard assistance;Minimal assistance  Transfer Comments: CGA/min A for sit <> stand to/from recliner chair, cues for hand placement           Cognition  Overall Cognitive Status: WFL  Cognition Comment: Somewhat forgetful this session        Plan   Plan  Times per week: 3-5 Current Treatment Recommendations: Strengthening, Endurance Training, Balance Training, Functional Mobility Training, Safety Education & Training, Equipment Evaluation, Education, & procurement, Patient/Caregiver Education & Training, Self-Care / ADL    AM-PAC Score    Nabila Hedrick scored a 16/24 on the AM-PAC ADL Inpatient form. Current research shows that an AM-PAC score of 17 or less is typically not associated with a discharge to the patient's home setting. Based on the patient's AM-PAC score and their current ADL deficits, it is recommended that the patient have 3-5 sessions per week of Occupational Therapy at d/c to increase the patient's independence. Please see assessment section for further patient specific details. If patient discharges prior to next session this note will serve as a discharge summary. Please see below for the latest assessment towards goals. AM-PAC Inpatient Daily Activity Raw Score: 16 (12/21/20 1537)  AM-PAC Inpatient ADL T-Scale Score : 35.96 (12/21/20 1537)  ADL Inpatient CMS 0-100% Score: 53.32 (12/21/20 1537)  ADL Inpatient CMS G-Code Modifier : CK (12/21/20 1537)    Goals  Short term goals  Time Frame for Short term goals: prior to d/c: status of goals ongoing as of 12/21  Short term goal 1: Pt will complete bed mobility with SBA  Short term goal 2: Pt will complete transfers with SBA  Short term goal 3: Pt will complete functional mobility with RW with SBA  Short term goal 4: Pt will complete toileting with SBA  Short term goal 5: Pt will tolerate 3+ minutes of standing activity to increase strength and activity tolerance for self-care and transfers with O2 sats WFL.   Patient Goals   Patient goals : \"I want to get back on my stationary bike\"       Therapy Time   Individual Concurrent Group Co-treatment   Time In 1449         Time Out 1528         Minutes 39         Timed Code Treatment Minutes: 39 Minutes If pt is discharged prior to next OT session, this note will serve as the discharge summary.     Jannette Geiger, JWG/O#[id number]

## 2020-12-21 NOTE — PLAN OF CARE
Problem: Falls - Risk of:  Goal: Will remain free from falls  Description: Will remain free from falls  12/21/2020 0335 by Endy Cash RN  Outcome: Ongoing    Goal: Absence of physical injury  Description: Absence of physical injury  12/21/2020 0335 by Endy Cash RN  Outcome: Ongoing       Problem: Airway Clearance - Ineffective  Goal: Achieve or maintain patent airway  12/21/2020 0335 by Endy Cash RN  Outcome: Ongoing       Problem: Gas Exchange - Impaired  Goal: Promote optimal lung function  12/21/2020 0335 by Endy Cash RN  Outcome: Ongoing

## 2020-12-21 NOTE — PROGRESS NOTES
Physical Therapy    Chart review completed. Attempted to see pt at [phone] on 12/21/20. Pt found seated in recliner upon PT arrival.  Pt with bloody nose, stating that he just blew his nose and it started to bleed. He believes that he almost has the bleeding stopped. Pt politely declining session at this time, stating that he is tired from just having worked with OT. PT will re-attempt as schedule allows, RN notified of decline and bloody nose.     Indiana Marquez, PT

## 2020-12-21 NOTE — PROGRESS NOTES
 TONSILLECTOMY AND ADENOIDECTOMY         Allergies:  Patient has no known allergies. Past medical and surgical history reviewed. Any changes have been noted. PHYSICAL EXAM:  BP (!) 190/97 Comment: PRN hydralazine given  Pulse 68   Temp 98.1 °F (36.7 °C) (Oral)   Resp 18   Ht 5' 11\" (1.803 m)   Wt 167 lb 12.3 oz (76.1 kg)   SpO2 94%   BMI 23.40 kg/m²       Intake/Output Summary (Last 24 hours) at 12/21/2020 0818  Last data filed at 12/21/2020 0530  Gross per 24 hour   Intake    Output 975 ml   Net -975 ml        General appearance:   No apparent distress, appears stated age. Cooperative. HEENT:  Normocephalic, atraumatic. PERRLA. Conjunctivae/corneas clear, no icterus, non-injected. Neck: Supple, with full range of motion. No jugular venous distention. Trachea midline. Respiratory:  Coarse cough with deep breath, clears some after cough, No obvious crackles rales or rhonchi  Cardiovascular:  Regular rate and rhythm without murmurs, rubs or gallops. Abdomen: Soft, non-tender, non-distended, without rebound or guarding. Normal bowel sounds. Musculoskeletal:  No clubbing, cyanosis or edema bilaterally. Full range of motion without deformity. Skin: Skin color, texture, turgor normal.  No rashes or lesions.   Neurologic:  No focal deficits  Psychiatric:  Alert and oriented, thought content appropriate  Capillary Refill: Brisk,< 3 seconds   Peripheral Pulses: +2 palpable, equal bilaterally       LABS:    Lab Results   Component Value Date    WBC 5.5 12/16/2020    HGB 12.3 (L) 12/16/2020    HCT 35.6 (L) 12/16/2020    MCV 83.5 12/16/2020     (L) 12/16/2020    LYMPHOPCT 7.3 12/16/2020    RBC 4.27 12/16/2020    MCH 28.8 12/16/2020    MCHC 34.5 12/16/2020    RDW 14.9 12/16/2020       Lab Results   Component Value Date    CREATININE 0.7 (L) 12/20/2020    BUN 27 (H) 12/20/2020     12/20/2020    K 4.0 12/20/2020     12/20/2020    CO2 24 12/20/2020       Lab Results   Component Value Date MG 2.10 12/20/2020       Lab Results   Component Value Date     (H) 12/20/2020     (H) 12/20/2020    ALKPHOS 113 12/20/2020    BILITOT 0.6 12/20/2020        No flowsheet data found. No results found for: LABA1C    Imaging:  XR CHEST PORTABLE   Final Result   Inhomogeneous bilateral airspace disease. Pattern is consistent with COVID   pneumonia, although other etiologies of atypical or typical pneumonia or   aspiration pneumonitis may have a similar appearance.              Scheduled and prn Medications:    Scheduled Meds:   guaiFENesin  600 mg Oral BID    benzonatate  200 mg Oral TID    insulin lispro  0-6 Units Subcutaneous TID WC    insulin lispro  0-3 Units Subcutaneous Nightly    ascorbic acid  500 mg Oral TID    QUEtiapine  25 mg Oral Nightly    amLODIPine  5 mg Oral Daily    aspirin  81 mg Oral Daily    atenolol  100 mg Oral Daily    budesonide-formoterol  2 puff Inhalation BID    hydroCHLOROthiazide  25 mg Oral Daily    losartan  100 mg Oral Daily    pravastatin  40 mg Oral Nightly    sodium chloride flush  10 mL Intravenous 2 times per day    albuterol sulfate HFA  2 puff Inhalation 4x daily    And    ipratropium  2 puff Inhalation 4x daily    zinc sulfate  50 mg Oral Daily    venlafaxine  150 mg Oral Nightly    dexamethasone  10 mg Intravenous Daily    enoxaparin  30 mg Subcutaneous BID    famotidine  20 mg Oral BID    Vitamin D  2,000 Units Oral Daily    potassium chloride  40 mEq Oral BID WC     Continuous Infusions:   dextrose       PRN Meds:.glucose, dextrose, glucagon (rDNA), dextrose, hydrALAZINE, guaiFENesin-dextromethorphan, albuterol sulfate HFA, LORazepam, sodium chloride flush, promethazine **OR** ondansetron, polyethylene glycol, acetaminophen **OR** acetaminophen, potassium chloride **OR** potassium alternative oral replacement **OR** potassium chloride, magnesium sulfate    Assessment & Plan: 1. COVID19 pneumonia - change decadron to IV, continue with remdesivir, not candidate for convalescent plasma based on level of hypoxia - weaning off of oxygen at present - doing better overall - sounds wet so will give one dose of IV lasix today  2. Acute resp failure with hypoxia - oxygen needs improving - down to 4L per NC   3. Hypokalemia - continue to replace and follow mag level daily  4. HTN - continue with cozaar, lasix, HCTZ and atenolol - will add prn hydralazine  5. Bradycardia - this appears to be chronic and pt is asymptomatic  6. Hyperglycemia - due to steroids - add accuchecks and SSI       Continue current regimen/therapies. Monitor. Adjust medical regimen as appropriate. Body mass index is 23.4 kg/m². The patient and / or the family were informed of the results of any tests, a time was given to answer questions, a plan was proposed and they agreed with plan.       DVT ppx: lovenox 30mg bid   GI ppx: pepcid    Diet: DIET GENERAL;    Consults:  IP CONSULT TO SOCIAL WORK  IP CONSULT TO PHARMACY    DISPO/placement plan: pending    Code Status: KLAUS Villa - JAVIER  12/21/20

## 2020-12-21 NOTE — CARE COORDINATION
INITIAL CASE MANAGEMENT ASSESSMENT (follow up)     DME: Prior to medical admission patient used no durable medical equipment. He is currently breathing on 4L of oxygen at bedside. We will continue to monitor for needs until discharge.      PT/OT Recs: Patient would benefit from continued therapy after discharge, 3-5 sessions per week notes dated 12/18 from therapy team. Patient would prefer to go home with home care.        PLAN/COMMENTS:   1) Home with home care. [facility] patient. 2) Monitor for home oxygen needs. Respectfully submitted,    LOR Teixeira  [facility]   [phone]    Electronically signed by LOR Marlow on 12/21/2020 at 2:58 PM

## 2020-12-21 NOTE — PLAN OF CARE
Problem: Falls - Risk of:  Goal: Will remain free from falls  Description: Will remain free from falls  12/21/2020 0933 by Haja Garyson RN  Outcome: Ongoing     Problem: Falls - Risk of:  Goal: Absence of physical injury  Description: Absence of physical injury  12/21/2020 0933 by Haja Grayson RN  Outcome: Ongoing     Problem: Airway Clearance - Ineffective  Goal: Achieve or maintain patent airway  12/21/2020 0933 by Haja Grayson RN  Outcome: Ongoing     Problem: Gas Exchange - Impaired  Goal: Absence of hypoxia  12/21/2020 0933 by Haja Grayson RN  Outcome: Ongoing     Problem: Gas Exchange - Impaired  Goal: Promote optimal lung function  12/21/2020 0933 by Haja Grayson RN  Outcome: Ongoing     Problem: Breathing Pattern - Ineffective  Goal: Ability to achieve and maintain a regular respiratory rate  12/21/2020 0933 by Haja Grayson RN  Outcome: Ongoing     Problem: Body Temperature -  Risk of, Imbalanced  Goal: Ability to maintain a body temperature within defined limits  12/21/2020 0933 by Haja Grayson RN  Outcome: Ongoing     Problem:  Body Temperature -  Risk of, Imbalanced  Goal: Will regain or maintain usual level of consciousness  12/21/2020 0933 by Haja Grayson RN  Outcome: Ongoing     Problem: Isolation Precautions - Risk of Spread of Infection  Goal: Prevent transmission of infection  12/21/2020 0933 by Haja Grayson RN  Outcome: Ongoing     Problem: Nutrition Deficits  Goal: Optimize nutrtional status  12/21/2020 0933 by Haja Grayson RN  Outcome: Ongoing     Problem: Risk for Fluid Volume Deficit  Goal: Maintain normal heart rhythm  12/21/2020 0933 by Haja Grayson RN  Outcome: Ongoing     Problem: Risk for Fluid Volume Deficit  Goal: Maintain absence of muscle cramping  12/21/2020 0933 by Haja Grayson RN  Outcome: Ongoing     Problem: Risk for Fluid Volume Deficit  Goal: Maintain normal serum potassium, sodium, calcium, phosphorus, and pH 12/21/2020 0933 by Shawn Pfeiffer RN  Outcome: Ongoing     Problem: Loneliness or Risk for Loneliness  Goal: Demonstrate positive use of time alone when socialization is not possible  12/21/2020 0933 by Shawn Pfeiffer RN  Outcome: Ongoing     Problem: Fatigue  Goal: Verbalize increase energy and improved vitality  12/21/2020 0933 by Shawn Pfeiffer RN  Outcome: Ongoing     Problem: Patient Education: Go to Patient Education Activity  Goal: Patient/Family Education  12/21/2020 0933 by Shawn Pfeiffer RN  Outcome: Ongoing     Problem: Skin Integrity:  Goal: Will show no infection signs and symptoms  Description: Will show no infection signs and symptoms  12/21/2020 0933 by Shawn Pfeiffer RN  Outcome: Ongoing     Problem: Skin Integrity:  Goal: Absence of new skin breakdown  Description: Absence of new skin breakdown  12/21/2020 0933 by Shawn Pfeiffer RN  Outcome: Ongoing

## 2020-12-21 NOTE — PROGRESS NOTES
Patient alert and oriented x 4. No complains at this time . , call light within reach uses urinal approprietly. 5 L  nasal cannula . Complained of consistent cough at start of shift. Tessalon pearls administered with night medication, pt resting eyes closed now.  Will continue to monitor, vitals stable

## 2020-12-22 LAB
A/G RATIO: 1.3 (ref 1.1–2.2)
ALBUMIN SERPL-MCNC: 2.9 G/DL (ref 3.4–5)
ALP BLD-CCNC: 98 U/L (ref 40–129)
ALT SERPL-CCNC: 202 U/L (ref 10–40)
ANION GAP SERPL CALCULATED.3IONS-SCNC: 11 MMOL/L (ref 3–16)
AST SERPL-CCNC: 81 U/L (ref 15–37)
BILIRUB SERPL-MCNC: 0.7 MG/DL (ref 0–1)
BUN BLDV-MCNC: 30 MG/DL (ref 7–20)
CALCIUM SERPL-MCNC: 8.1 MG/DL (ref 8.3–10.6)
CHLORIDE BLD-SCNC: 102 MMOL/L (ref 99–110)
CO2: 23 MMOL/L (ref 21–32)
CREAT SERPL-MCNC: 0.5 MG/DL (ref 0.8–1.3)
GFR AFRICAN AMERICAN: >60
GFR NON-AFRICAN AMERICAN: >60
GLOBULIN: 2.2 G/DL
GLUCOSE BLD-MCNC: 129 MG/DL (ref 70–99)
GLUCOSE BLD-MCNC: 134 MG/DL (ref 70–99)
GLUCOSE BLD-MCNC: 141 MG/DL (ref 70–99)
GLUCOSE BLD-MCNC: 144 MG/DL (ref 70–99)
GLUCOSE BLD-MCNC: 190 MG/DL (ref 70–99)
MAGNESIUM: 2 MG/DL (ref 1.8–2.4)
PERFORMED ON: ABNORMAL
POTASSIUM SERPL-SCNC: 4 MMOL/L (ref 3.5–5.1)
SODIUM BLD-SCNC: 136 MMOL/L (ref 136–145)
TOTAL PROTEIN: 5.1 G/DL (ref 6.4–8.2)

## 2020-12-22 PROCEDURE — 6360000002 HC RX W HCPCS: Performed by: INTERNAL MEDICINE

## 2020-12-22 PROCEDURE — 6370000000 HC RX 637 (ALT 250 FOR IP): Performed by: NURSE PRACTITIONER

## 2020-12-22 PROCEDURE — 2580000003 HC RX 258: Performed by: NURSE PRACTITIONER

## 2020-12-22 PROCEDURE — 6370000000 HC RX 637 (ALT 250 FOR IP): Performed by: INTERNAL MEDICINE

## 2020-12-22 PROCEDURE — 80053 COMPREHEN METABOLIC PANEL: CPT

## 2020-12-22 PROCEDURE — 2700000000 HC OXYGEN THERAPY PER DAY

## 2020-12-22 PROCEDURE — 97110 THERAPEUTIC EXERCISES: CPT

## 2020-12-22 PROCEDURE — 36415 COLL VENOUS BLD VENIPUNCTURE: CPT

## 2020-12-22 PROCEDURE — 94640 AIRWAY INHALATION TREATMENT: CPT

## 2020-12-22 PROCEDURE — 1200000000 HC SEMI PRIVATE

## 2020-12-22 PROCEDURE — 83735 ASSAY OF MAGNESIUM: CPT

## 2020-12-22 PROCEDURE — 94761 N-INVAS EAR/PLS OXIMETRY MLT: CPT

## 2020-12-22 PROCEDURE — 97116 GAIT TRAINING THERAPY: CPT

## 2020-12-22 RX ORDER — QUETIAPINE FUMARATE 25 MG/1
25 TABLET, FILM COATED ORAL NIGHTLY
Status: DISCONTINUED | OUTPATIENT
Start: 2020-12-22 | End: 2020-12-23 | Stop reason: HOSPADM

## 2020-12-22 RX ADMIN — LORAZEPAM 0.5 MG: 0.5 TABLET ORAL at 21:07

## 2020-12-22 RX ADMIN — Medication 2 PUFF: at 08:49

## 2020-12-22 RX ADMIN — POTASSIUM CHLORIDE 40 MEQ: 1500 TABLET, EXTENDED RELEASE ORAL at 10:14

## 2020-12-22 RX ADMIN — POLYETHYLENE GLYCOL 3350 17 G: 17 POWDER, FOR SOLUTION ORAL at 18:04

## 2020-12-22 RX ADMIN — ENOXAPARIN SODIUM 30 MG: 30 INJECTION SUBCUTANEOUS at 10:14

## 2020-12-22 RX ADMIN — SODIUM CHLORIDE, PRESERVATIVE FREE 10 ML: 5 INJECTION INTRAVENOUS at 23:53

## 2020-12-22 RX ADMIN — INSULIN LISPRO 1 UNITS: 100 INJECTION, SOLUTION INTRAVENOUS; SUBCUTANEOUS at 21:09

## 2020-12-22 RX ADMIN — FAMOTIDINE 20 MG: 20 TABLET, FILM COATED ORAL at 10:13

## 2020-12-22 RX ADMIN — FAMOTIDINE 20 MG: 20 TABLET, FILM COATED ORAL at 21:07

## 2020-12-22 RX ADMIN — OXYCODONE HYDROCHLORIDE AND ACETAMINOPHEN 500 MG: 500 TABLET ORAL at 21:07

## 2020-12-22 RX ADMIN — OXYCODONE HYDROCHLORIDE AND ACETAMINOPHEN 500 MG: 500 TABLET ORAL at 10:14

## 2020-12-22 RX ADMIN — INSULIN LISPRO 1 UNITS: 100 INJECTION, SOLUTION INTRAVENOUS; SUBCUTANEOUS at 18:04

## 2020-12-22 RX ADMIN — GUAIFENESIN 600 MG: 600 TABLET ORAL at 21:07

## 2020-12-22 RX ADMIN — ZINC SULFATE 220 MG (50 MG) CAPSULE 50 MG: CAPSULE at 10:15

## 2020-12-22 RX ADMIN — INSULIN LISPRO 1 UNITS: 100 INJECTION, SOLUTION INTRAVENOUS; SUBCUTANEOUS at 10:15

## 2020-12-22 RX ADMIN — GUAIFENESIN 600 MG: 600 TABLET ORAL at 10:15

## 2020-12-22 RX ADMIN — POTASSIUM CHLORIDE 40 MEQ: 1500 TABLET, EXTENDED RELEASE ORAL at 18:04

## 2020-12-22 RX ADMIN — CHOLECALCIFEROL TAB 25 MCG (1000 UNIT) 2000 UNITS: 25 TAB at 10:13

## 2020-12-22 RX ADMIN — DEXAMETHASONE SODIUM PHOSPHATE 10 MG: 10 INJECTION, SOLUTION INTRAMUSCULAR; INTRAVENOUS at 10:14

## 2020-12-22 RX ADMIN — BENZONATATE 200 MG: 100 CAPSULE ORAL at 21:07

## 2020-12-22 RX ADMIN — Medication 2 PUFF: at 15:47

## 2020-12-22 RX ADMIN — LOSARTAN POTASSIUM 100 MG: 100 TABLET, FILM COATED ORAL at 10:15

## 2020-12-22 RX ADMIN — BENZONATATE 200 MG: 100 CAPSULE ORAL at 15:08

## 2020-12-22 RX ADMIN — OXYCODONE HYDROCHLORIDE AND ACETAMINOPHEN 500 MG: 500 TABLET ORAL at 15:08

## 2020-12-22 RX ADMIN — AMLODIPINE BESYLATE 5 MG: 5 TABLET ORAL at 10:13

## 2020-12-22 RX ADMIN — ATENOLOL 100 MG: 50 TABLET ORAL at 10:15

## 2020-12-22 RX ADMIN — Medication 2 PUFF: at 20:29

## 2020-12-22 RX ADMIN — ASPIRIN 81 MG: 81 TABLET, CHEWABLE ORAL at 10:15

## 2020-12-22 RX ADMIN — HYDROCHLOROTHIAZIDE 25 MG: 25 TABLET ORAL at 10:13

## 2020-12-22 RX ADMIN — HYDRALAZINE HYDROCHLORIDE 10 MG: 20 INJECTION, SOLUTION INTRAMUSCULAR; INTRAVENOUS at 15:07

## 2020-12-22 RX ADMIN — ENOXAPARIN SODIUM 30 MG: 30 INJECTION SUBCUTANEOUS at 21:07

## 2020-12-22 RX ADMIN — QUETIAPINE FUMARATE 25 MG: 25 TABLET ORAL at 21:07

## 2020-12-22 RX ADMIN — PRAVASTATIN SODIUM 40 MG: 40 TABLET ORAL at 21:07

## 2020-12-22 RX ADMIN — VENLAFAXINE HYDROCHLORIDE 150 MG: 75 CAPSULE, EXTENDED RELEASE ORAL at 21:07

## 2020-12-22 RX ADMIN — BENZONATATE 200 MG: 100 CAPSULE ORAL at 10:14

## 2020-12-22 RX ADMIN — SODIUM CHLORIDE, PRESERVATIVE FREE 10 ML: 5 INJECTION INTRAVENOUS at 10:16

## 2020-12-22 ASSESSMENT — PAIN SCALES - GENERAL
PAINLEVEL_OUTOF10: 0

## 2020-12-22 NOTE — PROGRESS NOTES
Hospital Medicine Progress Note      Admit Date: 12/16/2020       CC: F/U for pneumonia d/t COVID-19    HPI:   65yo WM with PMHx sig for HTN, COPD, hyperlipidemia presents with several days of SOB.  Apparently pt was at a conference last week with 12 other people. Dee Shameka 2 people including himself, were wearing masks. Marcia Smoker became increasingly SOB.  He does have asymptomatic episodes of bradycardia.  Currently pt on 9L oxygen and desats with eating.  Pt meets criteria for sepsis with tachypnea, acute resp failure with hypoxia, documented pneumonia due to COVID19, elevated pro calcitonin.  Pt started on remdesivir and decadron. Pt down to 5L oxygen at present time      12/20: down to 5L oxygen today - looking better, has very wet cough today and paroxysmal cough when speaking  12/21: today's labs unremarkable  Steroids through 12/22. Afebrile.      On 4l nc today.      Used Incentive spirometry with me in the room to 1000mL. States he can get it to 2500 at home. Urich Bound Has a cough with deep breath.      Cont remdesevir and steroids     Interval History/Subjective: sitting up in chair. Feels a little better. /AST 81 improved from yesterday 266/153 respectively    Cont with remdesevir and steroids     On 4L nc today able to wean down to 3L when I saw him. Had bloody nose yesterday after blowing it. Will order saline nose spray PRN   Deferred PT yesterday due to nose bleed. Will have them try again today. AM-PAC on 12/21 16/24. Likely will need SNF or rehab on d/c when O2 requrements better. Re-ordered his home seroquel for better sleep here     Asked about possibly being able to go home to be with his wife at [facility] since she is positive for covid as well. PT now recommending home with Prabhakar Liu with walker. AM-PAC 18/24    Home O2 eval for tomorrow. Inpatient for Prabhakar Liu orders placed. Possibly home with O2 if able tomorrow.     Review of Systems:     C/o cough with deep breaths and talking The patient denied headaches, visual changes, LOC, SOB, CP, ABD pain, N/V/D, skin changes, new or worsening weakness or neuromuscular deficits. Comprehensive ROS negative except as mentioned above. Past Medical History:        Diagnosis Date    Anxiety     COPD, mild ([address] 4/24/2017    Depression     History of blood transfusion     Hyperlipidemia     Hypertension     IBS (irritable bowel syndrome) 10/16/2013    Insomnia        Past Surgical History:        Procedure Laterality Date    APPENDECTOMY      COLONOSCOPY  03/19/2019    Morris    COLONOSCOPY N/A 3/19/2019    COLONOSCOPY WITH BIOPSY performed by Bishop Bobby MD at [address] COLONOSCOPY N/A 3/19/2019    COLONOSCOPY POLYPECTOMY SNARE/COLD BIOPSY performed by Bishop Bobby MD at [address]         Allergies:  Patient has no known allergies. Past medical and surgical history reviewed. Any changes have been noted. PHYSICAL EXAM:  BP (!) 166/83   Pulse 59   Temp 98.7 °F (37.1 °C) (Oral)   Resp 20   Ht 5' 11\" (1.803 m)   Wt 164 lb 3.9 oz (74.5 kg)   SpO2 93%   BMI 22.91 kg/m²       Intake/Output Summary (Last 24 hours) at 12/22/2020 0900  Last data filed at 12/22/2020 7910  Gross per 24 hour   Intake 840 ml   Output 1200 ml   Net -360 ml        General appearance:   No apparent distress, appears stated age. Cooperative. HEENT:  Normocephalic, atraumatic. PERRLA. Conjunctivae/corneas clear, no icterus, non-injected. Neck: Supple, with full range of motion. No jugular venous distention. Trachea midline. Respiratory:  tight bilaterally, Coarse cough with deep breath, clears some after cough, No obvious crackles rales or rhonchi  Cardiovascular:  Regular rate and rhythm without murmurs, rubs or gallops. Abdomen: Soft, non-tender, non-distended, without rebound or guarding. Normal bowel sounds.  sodium chloride flush  10 mL Intravenous 2 times per day    albuterol sulfate HFA  2 puff Inhalation 4x daily    And    ipratropium  2 puff Inhalation 4x daily    zinc sulfate  50 mg Oral Daily    venlafaxine  150 mg Oral Nightly    dexamethasone  10 mg Intravenous Daily    enoxaparin  30 mg Subcutaneous BID    famotidine  20 mg Oral BID    Vitamin D  2,000 Units Oral Daily    potassium chloride  40 mEq Oral BID WC     Continuous Infusions:   dextrose       PRN Meds:.glucose, dextrose, glucagon (rDNA), dextrose, hydrALAZINE, guaiFENesin-dextromethorphan, albuterol sulfate HFA, LORazepam, sodium chloride flush, promethazine **OR** ondansetron, polyethylene glycol, acetaminophen **OR** acetaminophen, potassium chloride **OR** potassium alternative oral replacement **OR** potassium chloride, magnesium sulfate    Assessment & Plan:        1.  COVID19 pneumonia - change decadron to IV, continue with remdesivir, not candidate for convalescent plasma based on level of hypoxia - weaning off of oxygen at present - doing better overall - sounds wet so will give one dose of IV lasix today  2.  Acute resp failure with hypoxia - oxygen needs improving - down to 3L per NC   3. Hypokalemia - continue to replace and follow mag level daily  4.  HTN - continue with cozaar, lasix, HCTZ and atenolol - will add prn hydralazine  5.  Bradycardia - this appears to be chronic and pt is asymptomatic  6.  Hyperglycemia - due to steroids - add accuchecks and SSI    Continue current regimen/therapies. Monitor. Adjust medical regimen as appropriate. Body mass index is 22.91 kg/m². The patient and / or the family were informed of the results of any tests, a time was given to answer questions, a plan was proposed and they agreed with plan.       DVT ppx: lovenox 30mg bid  GI ppx: pepcid    Diet: DIET GENERAL;    Consults:  IP CONSULT TO SOCIAL WORK  IP CONSULT TO PHARMACY DISPO/placement plan: pending -- will need likely SNF or rehab on d/c when O2 requirements improve    Code Status: Limited      KLAUS Freire - CNP  12/22/20

## 2020-12-22 NOTE — FLOWSHEET NOTE
12/22/20 1459   Vital Signs   BP (!) 178/88     Pt medicated with prn hydralazine as ordered per MAR.  Electronically signed by Nelson Grubbs RN on 12/22/2020 at 3:57 PM

## 2020-12-22 NOTE — PROGRESS NOTES
Patient resting in bed. States he is feeling much better, and cough is getting better. Patient given PRN hydralazine for pressure systolic over 170. All fall precautions in place bed alarm on. Respirations easy and unlabored stable on 4 L .

## 2020-12-22 NOTE — PLAN OF CARE
Problem: Falls - Risk of:  Goal: Will remain free from falls  Description: Will remain free from falls  12/21/2020 1955 by Peyton Post RN  Outcome: Ongoing    Goal: Absence of physical injury  Description: Absence of physical injury  12/21/2020 1955 by Peyton Post RN  Outcome: Ongoing       Problem: Airway Clearance - Ineffective  Goal: Achieve or maintain patent airway  12/21/2020 1955 by Peyton Post RN  Outcome: Ongoing    Problem: Gas Exchange - Impaired  Goal: Absence of hypoxia  12/21/2020 1955 by Peyton Post RN  Outcome: Ongoing    Goal: Promote optimal lung function  12/21/2020 1955 by Peyton Post RN  Outcome: Ongoing

## 2020-12-22 NOTE — PROGRESS NOTES
Physical Therapy  Facility/Department: [address] MED SURG  Daily Treatment Note  NAME: Tatiana Haro  : 1945  MRN: [id number]    Date of Service: 2020  Tatiana Haro scored a 18/24 on the AM-PAC short mobility form. Current research shows that an AM-PAC score of 17 or less is typically not associated with a discharge to the patient's home setting. Based on the patient's AM-PAC score and their current functional mobility deficits, it is recommended that the patient have 3-5 sessions per week of Physical Therapy at d/c to increase the patient's independence. Please see assessment section for further patient specific details. If patient discharges prior to next session this note will serve as a discharge summary. Please see below for the latest assessment towards goals. Discharge Recommendations:  S Level 1, 24 hour supervision or assist, Home with Home health PT, 2-3 sessions per week   PT Equipment Recommendations  Equipment Needed: Yes  Mobility Devices: Alyssa Se: Rolling  Other: Safest walking with FWW at this time    Assessment   Body structures, Functions, Activity limitations: Decreased functional mobility   Assessment: Pt. improving to exercise tolerance evident by SpO2 >90 on 3 L throughout session. Pt. does have NOEL and shallow breathing with coughing spells with deep breathing. Pt. has a wife at home who is Covid (+) but according to Pt. is asymptomatic. Anticipate Pt. may be able to return home at d/c if wife is available for 24 hr supervision. Would benefit from Home PT Level 1.   Prognosis: Good  PT Education: PT Role;Plan of Care;General Safety;Goals;Gait Training;Orientation;Home Exercise Program;Equipment;Transfer Training;Functional Mobility Training;Energy Conservation  Patient Education: Verbalized understanding  REQUIRES PT FOLLOW UP: Yes  Activity Tolerance  Activity Tolerance: Patient limited by endurance Activity Tolerance: SpO2 90-96% throughout session on 3 L but shallow breathing required cues to increase, deep breathing caused coughing spells     Patient Diagnosis(es): The primary encounter diagnosis was COVID-19 virus infection. Diagnoses of Hypoxia, Hypokalemia, Acidosis, Thrombocytopenia ([address] Hypocalcemia, Elevated troponin, and Elevated procalcitonin were also pertinent to this visit. has a past medical history of Anxiety, COPD, mild ([address] Depression, History of blood transfusion, Hyperlipidemia, Hypertension, IBS (irritable bowel syndrome), and Insomnia. has a past surgical history that includes Appendectomy; Tonsillectomy and adenoidectomy; Colonoscopy (03/19/2019); Colonoscopy (N/A, 3/19/2019); and Colonoscopy (N/A, 3/19/2019). Restrictions  Restrictions/Precautions  Restrictions/Precautions: Isolation, Contact Precautions, Fall Risk  Position Activity Restriction  Other position/activity restrictions: 3L of O2; COVID+, droplet plus precautions  Subjective   General  Chart Reviewed: Yes  Additional Pertinent Hx: The patient is a 66 y.o. male who [facility] with PMHx: Anxiety, mild COPD, depression, HLD, HTN, insomnia, IBS. Presented to the emergency department with progressive shortness of breath and exertional dyspnea since Monday. Response To Previous Treatment: Patient with no complaints from previous session.   Family / Caregiver Present: No  Subjective  Subjective: Pt is agreeable to PT  General Comment  Comments: Requesting to go to the bathroom          Orientation     Cognition      Objective   Bed mobility  Supine to Sit: Stand by assistance(HOB elevated and use of rail)  Sit to Supine: Unable to assess(Left up in chair)  Transfers  Sit to Stand: Contact guard assistance  Stand to sit: Contact guard assistance  Ambulation  Ambulation?: Yes  Ambulation 1  Surface: level tile  Device: Rolling Walker  Other Apparatus: O2(3L) Assistance: Contact guard assistance;Minimal assistance  Gait Deviations: Increased AR; Decreased step length;Decreased step height  Distance: 15' x 1 and 25' x 1  Comments: SpO2 - 90-96% on 3 L via n/c  Stairs/Curb  Stairs?: No     Balance  Posture: Good  Sitting - Static: Good  Sitting - Dynamic: Good  Standing - Static: Fair;+  Standing - Dynamic: Fair;+  Comments: Pt able to maintain sitting balance on commode with SBA while performing marcelo-care after BM. Stood with use of walker and SBA/CGA  Other exercises  Other exercises?: Yes  Other exercises 4: \"Y\" chest expansion with inhalation x 5 - this required frequent pauses d/t elicited coughing. Comment: Toilet transfer with CGA. Pericare on own. Set up with lunch tray in the chair. Left written handout for breathing exercise. G-Code     OutComes Score                                                     AM-PAC Score  AM-PAC Inpatient Mobility Raw Score : 18 (12/22/20 1326)  AM-PAC Inpatient T-Scale Score : 43.63 (12/22/20 1326)  Mobility Inpatient CMS 0-100% Score: 46.58 (12/22/20 1326)  Mobility Inpatient CMS G-Code Modifier : CK (12/22/20 1326)          Goals  Short term goals  Time Frame for Short term goals: by acute discharge  Short term goal 1: bed mobility with SBA  Short term goal 2: Sit<>stand with SBA  Short term goal 3: ambulate >40' with LRAD and SBA  Patient Goals   Patient goals : none stated    Plan    Plan  Times per week: 3-5x/week  Current Treatment Recommendations: Functional Mobility Training, Balance Training, Stair training, Gait Training, Transfer Training, Home Exercise Program, Safety Education & Training, Patient/Caregiver Education & Training, Equipment Evaluation, Education, & procurement  Safety Devices  Type of devices:  All fall risk precautions in place, Call light within reach, Gait belt, Patient at risk for falls, Left in chair, Chair alarm in place, Nurse notified  Restraints  Initially in place: No Therapy Time   Individual Concurrent Group Co-treatment   Time In 1210         Time Out 1240         Minutes 30         Timed Code Treatment Minutes: [address] [facility] [address] [place], [place], [id number]

## 2020-12-22 NOTE — DISCHARGE INSTR - COC
Continuity of Care Form    Patient Name: Ananya Ceron   :  1945  MRN:  [id number]    Admit date:  2020  Discharge date:  2020    Code Status Order: Limited   Advance Directives:   [facility] Documentation       Date/Time Healthcare Directive Type of Healthcare Directive Copy in [address] Agent's Name Healthcare Agent's Phone Number    [phone]  Yes, patient has an advance directive for healthcare treatment  Durable power of  for health care  No, copy requested from family  [facility] power of   Sarai [facility]  [phone]            Admitting Physician:  Max Erickson DO  PCP: Aditya Morris MD    Discharging Nurse: [address] [place] [address] Unit/Room#: Y3J-4975/8430-75  Discharging Unit Phone Number: [phone]    Emergency Contact:   Extended Emergency Contact Information  Primary Emergency Contact: Michelle Camacho  Address: [address] [place] [address],[address], [address] Phone: [phone]  Mobile Phone: [phone]  Relation: Spouse  Secondary Emergency Contact: Kimberly Blanco [address] [place] [address] Phone: [phone]  Relation: Brother/Sister    Past Surgical History:  Past Surgical History:   Procedure Laterality Date    APPENDECTOMY      COLONOSCOPY  2019    Morris    COLONOSCOPY N/A 3/19/2019    COLONOSCOPY WITH BIOPSY performed by Virginie Navarrete MD at [address] [place] [address]. N/A 3/19/2019    COLONOSCOPY POLYPECTOMY SNARE/COLD BIOPSY performed by Virginie Navarrete MD at [address] [place] [address]         Immunization History:   Immunization History   Administered Date(s) Administered    Influenza 10/16/2013    Influenza Virus Vaccine 10/20/2014, 2015    Influenza, High Dose (Fluzone 65 yrs and older) 10/01/2016, 2017, 10/15/2018, 2020  Influenza, High-dose, Quadv, 65 yrs +, IM (Fluzone) 08/13/2020    Influenza, Quadv, IM, PF (6 mo and older Fluzone, Flulaval, Fluarix, and 3 yrs and older Afluria) 09/18/2019    Pneumococcal Conjugate 13-valent ([id number]) 10/01/2016    Pneumococcal Polysaccharide (Garpfvbyd49) 07/12/2013, 08/13/2020    Td, unspecified formulation 10/16/2013    Tdap (Boostrix, Adacel) 05/06/2017, 06/13/2018    Zoster Live (Zostavax) 09/04/2013    Zoster Recombinant (Shingrix) 04/05/2018, 09/17/2018       Active Problems:  Patient Active Problem List   Diagnosis Code    Primary insomnia F51.01    Hyperlipidemia LDL goal <100 E78.5    Chronic diarrhea K52.9    Irritable bowel syndrome with diarrhea K58.0    Essential hypertension I10    Pure hypercholesterolemia E78.00    Dysthymia F34.1    Depression with anxiety F41.8    COPD, 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    Adenomatous polyp of rectum D12.8    Anxiety F41.9    Acute respiratory failure with hypoxia (HCC) J96.01    Pneumonia due to COVID-19 virus U07.1, J12.89    Bradycardia R00.1       Isolation/Infection:   Isolation            Droplet Plus          Patient Infection Status       Infection Onset Added Last Indicated Last Indicated By Review Planned Expiration Resolved Resolved By    COVID-19 12/16/20 12/16/20 12/16/20 COVID-19 12/23/20 12/30/20      Resolved    COVID-19 Rule Out 12/16/20 12/16/20 12/16/20 COVID-19 (Ordered)   12/16/20 Rule-Out Test Resulted            Nurse Assessment:  Last Vital Signs: BP (!) 178/88   Pulse 68   Temp 97.7 °F (36.5 °C)   Resp 18   Ht 5' 11\" (1.803 m)   Wt 164 lb 3.9 oz (74.5 kg)   SpO2 96%   BMI 22.91 kg/m²     Last documented pain score (0-10 scale): Pain Level: 0  Last Weight:   Wt Readings from Last 1 Encounters:   12/22/20 164 lb 3.9 oz (74.5 kg)     Mental Status:  alert    IV Access:  - None    Nursing Mobility/ADLs:  Walking   Assisted  Transfer  Assisted  Bathing  Independent  Dressing  Independent Patient's personal belongings (please select all that are sent with patient):  Glasses    RN SIGNATURE:  Electronically signed by Priscilla Lopez RN on 12/23/20 at 1:18 PM EST    CASE MANAGEMENT/SOCIAL WORK SECTION    Inpatient Status Date: 12/16/2020    Readmission Risk Assessment Score:  Readmission Risk              Risk of Unplanned Readmission:        24           Discharging to Facility/ Agency   · Name:  [facility]    · Address: [address] [place] [id number]  · Phone: [phone]  · Fax:  [phone]    / signature: Electronically signed by Bob Rodriguez RN on 12/22/20 at 5:17 PM EST    PHYSICIAN SECTION    Prognosis: Fair    Condition at Discharge: Stable    Rehab Potential (if transferring to Rehab): Fair    Recommended Labs or Other Treatments After Discharge: RN/PT/OT    Physician Certification: I certify the above information and transfer of Sylvia Flores  is necessary for the continuing treatment of the diagnosis listed and that he requires [address] for less 30 days.      Update Admission H&P: No change in H&P    PHYSICIAN SIGNATURE:  Electronically signed by KLAUS Chris CNP on 12/22/20 at 5:00 PM EST/ Dr. Teresa Mcdaniel

## 2020-12-22 NOTE — CARE COORDINATION
Per chart review, PT/OT now recommending [address] and a front wheeled walker. Attempted to reach the patient in the room to discuss but the phone is busy. Per previous notes, patient was agreeable to [facility]. Call to [facility] at [facility], advised of the referral.  She will check the staffing and advise. Will need to talk with patient regarding the walker that is recommended before referring to [address] [place] [address].   Denilson Gonzalez RN, BSN, Case Management  Phone: [phone]  Electronically signed by Denilson Gonzalez RN on 12/22/2020 at 5:05 PM

## 2020-12-22 NOTE — PLAN OF CARE
Problem: Falls - Risk of:  Goal: Will remain free from falls  Description: Will remain free from falls  Outcome: Ongoing  Note: Pt free from falls this shift. Non skid socks provided. Pt educated on use of call light for assistance. Bed alarm used. Call light always within reach. Pt able and agreeable to contact for safety appropriately. Problem: Falls - Risk of:  Goal: Absence of physical injury  Description: Absence of physical injury  Outcome: Ongoing     Problem: Airway Clearance - Ineffective  Goal: Achieve or maintain patent airway  Outcome: Ongoing     Problem: Breathing Pattern - Ineffective  Goal: Ability to achieve and maintain a regular respiratory rate  Outcome: Ongoing     Problem: Body Temperature -  Risk of, Imbalanced  Goal: Ability to maintain a body temperature within defined limits  Outcome: Ongoing     Problem: Body Temperature -  Risk of, Imbalanced  Goal: Will regain or maintain usual level of consciousness  Outcome: Ongoing     Problem:  Body Temperature -  Risk of, Imbalanced  Goal: Complications related to the disease process, condition or treatment will be avoided or minimized  Outcome: Ongoing     Problem: Nutrition Deficits  Goal: Optimize nutrtional status  Outcome: Ongoing     Problem: Risk for Fluid Volume Deficit  Goal: Maintain normal heart rhythm  Outcome: Ongoing     Problem: Risk for Fluid Volume Deficit  Goal: Maintain absence of muscle cramping  Outcome: Ongoing     Problem: Loneliness or Risk for Loneliness  Goal: Demonstrate positive use of time alone when socialization is not possible  Outcome: Ongoing     Problem: Patient Education: Go to Patient Education Activity  Goal: Patient/Family Education  Outcome: Ongoing     Problem: Skin Integrity:  Goal: Will show no infection signs and symptoms  Description: Will show no infection signs and symptoms  Outcome: Ongoing Note: Skin assessment performed each shift per protocol. Pt encouraged to reposition often. Will continue to monitor and assess for skin breakdown.         Problem: Skin Integrity:  Goal: Absence of new skin breakdown  Description: Absence of new skin breakdown  Outcome: Ongoing

## 2020-12-23 VITALS
DIASTOLIC BLOOD PRESSURE: 85 MMHG | OXYGEN SATURATION: 97 % | HEART RATE: 101 BPM | TEMPERATURE: 98.4 F | BODY MASS INDEX: 22.99 KG/M2 | RESPIRATION RATE: 24 BRPM | HEIGHT: 71 IN | SYSTOLIC BLOOD PRESSURE: 163 MMHG | WEIGHT: 164.24 LBS

## 2020-12-23 LAB
A/G RATIO: 1.4 (ref 1.1–2.2)
ALBUMIN SERPL-MCNC: 3 G/DL (ref 3.4–5)
ALP BLD-CCNC: 96 U/L (ref 40–129)
ALT SERPL-CCNC: 163 U/L (ref 10–40)
ANION GAP SERPL CALCULATED.3IONS-SCNC: 9 MMOL/L (ref 3–16)
AST SERPL-CCNC: 60 U/L (ref 15–37)
BILIRUB SERPL-MCNC: 0.9 MG/DL (ref 0–1)
BUN BLDV-MCNC: 30 MG/DL (ref 7–20)
CALCIUM SERPL-MCNC: 8.3 MG/DL (ref 8.3–10.6)
CHLORIDE BLD-SCNC: 100 MMOL/L (ref 99–110)
CO2: 23 MMOL/L (ref 21–32)
CREAT SERPL-MCNC: 0.6 MG/DL (ref 0.8–1.3)
GFR AFRICAN AMERICAN: >60
GFR NON-AFRICAN AMERICAN: >60
GLOBULIN: 2.2 G/DL
GLUCOSE BLD-MCNC: 122 MG/DL (ref 70–99)
GLUCOSE BLD-MCNC: 128 MG/DL (ref 70–99)
GLUCOSE BLD-MCNC: 133 MG/DL (ref 70–99)
MAGNESIUM: 2 MG/DL (ref 1.8–2.4)
PERFORMED ON: ABNORMAL
PERFORMED ON: ABNORMAL
POTASSIUM SERPL-SCNC: 4.1 MMOL/L (ref 3.5–5.1)
SODIUM BLD-SCNC: 132 MMOL/L (ref 136–145)
TOTAL PROTEIN: 5.2 G/DL (ref 6.4–8.2)

## 2020-12-23 PROCEDURE — 94640 AIRWAY INHALATION TREATMENT: CPT

## 2020-12-23 PROCEDURE — 6370000000 HC RX 637 (ALT 250 FOR IP): Performed by: NURSE PRACTITIONER

## 2020-12-23 PROCEDURE — 80053 COMPREHEN METABOLIC PANEL: CPT

## 2020-12-23 PROCEDURE — 97530 THERAPEUTIC ACTIVITIES: CPT

## 2020-12-23 PROCEDURE — 6360000002 HC RX W HCPCS: Performed by: INTERNAL MEDICINE

## 2020-12-23 PROCEDURE — 2580000003 HC RX 258: Performed by: NURSE PRACTITIONER

## 2020-12-23 PROCEDURE — 97535 SELF CARE MNGMENT TRAINING: CPT

## 2020-12-23 PROCEDURE — 2700000000 HC OXYGEN THERAPY PER DAY

## 2020-12-23 PROCEDURE — 6370000000 HC RX 637 (ALT 250 FOR IP): Performed by: INTERNAL MEDICINE

## 2020-12-23 PROCEDURE — 94761 N-INVAS EAR/PLS OXIMETRY MLT: CPT

## 2020-12-23 PROCEDURE — 36415 COLL VENOUS BLD VENIPUNCTURE: CPT

## 2020-12-23 PROCEDURE — 83735 ASSAY OF MAGNESIUM: CPT

## 2020-12-23 RX ORDER — BENZONATATE 200 MG/1
200 CAPSULE ORAL 3 TIMES DAILY
Qty: 21 CAPSULE | Refills: 0 | Status: SHIPPED | OUTPATIENT
Start: 2020-12-23 | End: 2021-01-04

## 2020-12-23 RX ORDER — FAMOTIDINE 20 MG/1
20 TABLET, FILM COATED ORAL 2 TIMES DAILY
Qty: 60 TABLET | Refills: 3 | Status: SHIPPED | OUTPATIENT
Start: 2020-12-23

## 2020-12-23 RX ORDER — GUAIFENESIN/DEXTROMETHORPHAN 100-10MG/5
10 SYRUP ORAL EVERY 4 HOURS PRN
Qty: 236 ML | Refills: 0 | Status: SHIPPED | OUTPATIENT
Start: 2020-12-23 | End: 2021-01-02

## 2020-12-23 RX ORDER — GUAIFENESIN 600 MG/1
600 TABLET, EXTENDED RELEASE ORAL 2 TIMES DAILY
Qty: 40 TABLET | Refills: 0 | Status: SHIPPED | OUTPATIENT
Start: 2020-12-23 | End: 2021-03-24

## 2020-12-23 RX ORDER — ASPIRIN 81 MG/1
162 TABLET, CHEWABLE ORAL ONCE
Status: COMPLETED | OUTPATIENT
Start: 2020-12-23 | End: 2020-12-23

## 2020-12-23 RX ADMIN — Medication 2 PUFF: at 08:23

## 2020-12-23 RX ADMIN — GUAIFENESIN 600 MG: 600 TABLET ORAL at 09:15

## 2020-12-23 RX ADMIN — ASPIRIN 81 MG: 81 TABLET, CHEWABLE ORAL at 09:16

## 2020-12-23 RX ADMIN — OXYCODONE HYDROCHLORIDE AND ACETAMINOPHEN 500 MG: 500 TABLET ORAL at 09:16

## 2020-12-23 RX ADMIN — ASPIRIN 162 MG: 81 TABLET, CHEWABLE ORAL at 12:51

## 2020-12-23 RX ADMIN — AMLODIPINE BESYLATE 5 MG: 5 TABLET ORAL at 09:17

## 2020-12-23 RX ADMIN — HYDROCHLOROTHIAZIDE 25 MG: 25 TABLET ORAL at 09:17

## 2020-12-23 RX ADMIN — POTASSIUM CHLORIDE 40 MEQ: 1500 TABLET, EXTENDED RELEASE ORAL at 09:14

## 2020-12-23 RX ADMIN — Medication 2 PUFF: at 12:14

## 2020-12-23 RX ADMIN — SODIUM CHLORIDE, PRESERVATIVE FREE 10 ML: 5 INJECTION INTRAVENOUS at 09:17

## 2020-12-23 RX ADMIN — Medication 2 PUFF: at 12:13

## 2020-12-23 RX ADMIN — LOSARTAN POTASSIUM 100 MG: 100 TABLET, FILM COATED ORAL at 09:16

## 2020-12-23 RX ADMIN — DEXAMETHASONE SODIUM PHOSPHATE 10 MG: 10 INJECTION, SOLUTION INTRAMUSCULAR; INTRAVENOUS at 09:17

## 2020-12-23 RX ADMIN — CHOLECALCIFEROL TAB 25 MCG (1000 UNIT) 2000 UNITS: 25 TAB at 09:14

## 2020-12-23 RX ADMIN — ZINC SULFATE 220 MG (50 MG) CAPSULE 50 MG: CAPSULE at 09:14

## 2020-12-23 RX ADMIN — FAMOTIDINE 20 MG: 20 TABLET, FILM COATED ORAL at 09:16

## 2020-12-23 RX ADMIN — ENOXAPARIN SODIUM 30 MG: 30 INJECTION SUBCUTANEOUS at 09:17

## 2020-12-23 RX ADMIN — ATENOLOL 100 MG: 50 TABLET ORAL at 09:17

## 2020-12-23 RX ADMIN — BENZONATATE 200 MG: 100 CAPSULE ORAL at 09:16

## 2020-12-23 ASSESSMENT — PAIN SCALES - GENERAL
PAINLEVEL_OUTOF10: 0
PAINLEVEL_OUTOF10: 0

## 2020-12-23 NOTE — CARE COORDINATION
CASE MANAGEMENT DISCHARGE SUMMARY:    DISCHARGE DATE: 12/23/2020    DISCHARGED TO: home with home care     HOME CARE AGENCY: [facility] [address] [address]     Phone: [phone]    TRANSPORTATION: wife to transport when nurse is ready           Patient reports he has a walker at home and does not need one at this time. Did not qualify for home oxygen.   Lori Qureshi RN, BSN, Case Management  [phone]  Electronically signed by Lori Qureshi RN on 12/23/2020 at 11:23 AM

## 2020-12-23 NOTE — PROGRESS NOTES
[facility]    Respiratory Therapy   Home Oxygen Evaluation        Name: Angel Blandon  Medical Record Number: [id number]  Age: 66 y.o. Gender:  male   : 1945  Today's date: 2020  Room: E3J-6034/4280-01      Assessment        BP (!) 163/85   Pulse 101   Temp 98.4 °F (36.9 °C) (Oral)   Resp 24   Ht 5' 11\" (1.803 m)   Wt 164 lb 3.9 oz (74.5 kg)   SpO2 91%   BMI 22.91 kg/m²     Patient Active Problem List   Diagnosis    Primary insomnia    Hyperlipidemia LDL goal <100    Chronic diarrhea    Irritable bowel syndrome with diarrhea    Essential hypertension    Pure hypercholesterolemia    Dysthymia    Depression with anxiety    COPD, mild (HCC)    Adenomatous polyp of rectum    Anxiety    Acute respiratory failure with hypoxia (HCC)    Pneumonia due to COVID-19 virus    Bradycardia       Social History:  Social History     Tobacco Use    Smoking status: Former Smoker     Packs/day: 1.00     Years: 20.00     Pack years: 20.00     Types: Cigarettes     Quit date: 1988     Years since quittin.3    Smokeless tobacco: Never Used   Substance Use Topics    Alcohol use: No    Drug use: No       Patient Room Air saturation at rest 95  %  Patient Room Air saturation upon ambulation 89 %        In your clinical assessment does the Patient Require Portable Oxygen Tanks?     No:               Latisha Hilton RCP on 2020 at 10:12 AM

## 2020-12-23 NOTE — PROGRESS NOTES
Nutrition Assessment     Type and Reason for Visit: Initial    Nutrition Recommendations/Plan:   No nutrition concerns at this time. Nutrition Assessment:  RD triggered for LOS assessment. Pt is at risk for nutrition compromise AEB 9lbs (5%) wt loss over the past month. COVID+. Pt was previously eating poorly d/t poor respiratory function as he would destat with eating. Pt's respiratory status and PO intake have improved since admission. PO intake at most meals yesterday %. No nutrition concerns at this time as PO intake is improving. Malnutrition Assessment:  Malnutrition Status: At risk for malnutrition (Comment)    Nutrition Related Findings: Active BS. No edema. Current Nutrition Therapies:    DIET GENERAL;     Anthropometric Measures:  · Height: 5' 11\" (180.3 cm)  · Current Body Wt: 164 lb (74.4 kg)   · BMI: 22.9    Nutrition Diagnosis:   · Unintended weight loss related to inadequate protein-energy intake as evidenced by weight loss greater than or equal to 5% in 1 month    Nutrition Interventions:   Food and/or Nutrient Delivery:  Continue Current Diet  Nutrition Education/Counseling:  Education not indicated, No recommendation at this time   Coordination of Nutrition Care:  Continue to monitor while inpatient    Goals:  Meal intake greater than 50%       Nutrition Monitoring and Evaluation:   Behavioral-Environmental Outcomes:  None Identified   Food/Nutrient Intake Outcomes:  Food and Nutrient Intake  Physical Signs/Symptoms Outcomes:  Weight     Discharge Planning:    No discharge needs at this time     Electronically signed by Edna Hammond RD, EMILY on 12/23/20 at 8:53 AM EST    Contact: [phone]

## 2020-12-23 NOTE — PLAN OF CARE
Problem: Falls - Risk of:  Goal: Will remain free from falls  Description: Will remain free from falls  12/22/2020 2234 by Gonzalez Petty RN  Outcome: Ongoing  12/22/2020 1237 by Mukund Juarez RN  Outcome: Ongoing  Note: Pt free from falls this shift. Non skid socks provided. Pt educated on use of call light for assistance. Bed alarm used. Call light always within reach. Pt able and agreeable to contact for safety appropriately.     Goal: Absence of physical injury  Description: Absence of physical injury  12/22/2020 2234 by Gonzalez Petty RN  Outcome: Ongoing    Problem: Airway Clearance - Ineffective  Goal: Achieve or maintain patent airway  12/22/2020 2234 by Gonzalez Petty RN  Outcome: Ongoing       Problem: Gas Exchange - Impaired  Goal: Absence of hypoxia  Outcome: Ongoing  Goal: Promote optimal lung function  Outcome: Ongoing

## 2020-12-23 NOTE — CARE COORDINATION
[id number] Jaylin Marino  Received referral from case management    Faxed orders to [address] care to see patient by   12/26/2020  [facility] MED [facility] LPN    [facility] CTN Cell [phone], Fax [phone]

## 2020-12-23 NOTE — PROGRESS NOTES
Occupational Therapy  Facility/Department: [address] MED SURG  Daily Treatment Note  NAME: Rosi Mcdaniel  : 1945  MRN: [id number]    Date of Service: 2020    Assessment: Pt tolerated session well this date - now on RA with O2 sats above 90% throughout session. Pt completed bed mobility with SPV and sit <> stand transfers with SBA. Pt completed functional mobility with RW with SBA, steady with no overt LOB. Pt tolerated 5+ minutes of standing at sink with SBA. Pt completed grooming tasks with SBA, anticipate completing ADLs with SBA at d/c. Anticipate pt safe to d/c home with 24/7 supervision/assist and home health OT. Discharge Recommendations:  24 hour supervision or assist, Home with Home health [address]: LEVEL 1 STANDARD    - Initial home health evaluation to occur within 24-48 hours, in patient home   - Therapy to evaluate with goal of regaining prior level of functioning   - Therapy to evaluate if patient has [address] needs for personal care    Assessment   Performance deficits / Impairments: Decreased functional mobility ; Decreased endurance;Decreased strength;Decreased ADL status; Decreased balance  Assessment: Pt tolerated session well this date - now on RA with O2 sats above 90% throughout session. Pt completed bed mobility with SPV and sit <> stand transfers with SBA. Pt completed functional mobility with RW with SBA, steady with no overt LOB. Pt tolerated 5+ minutes of standing at sink with SBA. Pt completed grooming tasks with SBA, anticipate completing ADLs with SBA at d/c. Anticipate pt safe to d/c home with 24/7 supervision/assist and home health OT. OT Education: Transfer Training;OT Role;Plan of Care;Precautions; ADL Adaptive Strategies  REQUIRES OT FOLLOW UP: Yes  Activity Tolerance  Activity Tolerance: Patient Tolerated treatment well  Activity Tolerance:  On RA, O2 sats WFL - above 90% throughout mobility and session  Safety Devices Safety Devices in place: Yes  Type of devices: Nurse notified;Gait belt;Call light within reach; Left in chair;Chair alarm in place         Patient Diagnosis(es): The primary encounter diagnosis was COVID-19 virus infection. Diagnoses of Hypoxia, Hypokalemia, Acidosis, Thrombocytopenia ([address] Hypocalcemia, Elevated troponin, and Elevated procalcitonin were also pertinent to this visit. has a past medical history of Anxiety, COPD, mild ([address] Depression, History of blood transfusion, Hyperlipidemia, Hypertension, IBS (irritable bowel syndrome), and Insomnia. has a past surgical history that includes Appendectomy; Tonsillectomy and adenoidectomy; Colonoscopy (03/19/2019); Colonoscopy (N/A, 3/19/2019); and Colonoscopy (N/A, 3/19/2019). Restrictions  Restrictions/Precautions  Restrictions/Precautions: Fall Risk  Position Activity Restriction  Other position/activity restrictions: RA; COVID+, droplet plus precautions     Subjective   General  Chart Reviewed: Yes  Patient assessed for rehabilitation services?: Yes  Additional Pertinent Hx: Pt is 66 y.o. M who presents with worsening SOB and nonproductive cough. Pt is COVID+. PMH: anxiety, mild COPD, depression, HTN, insomnia, IBS  Family / Caregiver Present: No  Referring Practitioner: Farhat Tate MD  Subjective  Subjective: Pt met bedside, agreeable for therapy session. Pt now on RA and plans to d/c home this date. Pt reports no concerns regarding discharge home. Vital Signs  Patient Currently in Pain: (No complaints of pain)     Objective    ADL  Grooming: Stand by assistance(Pt washed hands/face and completed oral care in stance at sink with SBA)  Additional Comments: Declined further ADL needs.         Balance  Sitting Balance: Supervision  Standing Balance: Stand by assistance  Standing Balance  Time: ~5 minutes  Activity: Func mob, transfers, and ADL tasks    Functional Mobility  Functional - Mobility Device: Renee Congo Activity: To/from bathroom  Assist Level: Stand by assistance  Functional Mobility Comments: Pt completed functional mobility with RW with SBA, steady with no overt LOB. Bed mobility  Supine to Sit: Supervision  Sit to Supine: Unable to assess(pt in recliner at end of session)     Transfers  Sit to stand: Stand by assistance  Stand to sit: Stand by assistance  Transfer Comments: SBA for sit <> stand transfers from EOB to RW and sit to recliner chair           Cognition  Overall Cognitive Status: [address]  Times per week: 3-5  Current Treatment Recommendations: Strengthening, Endurance Training, Balance Training, Functional Mobility Training, Safety Education & Training, Equipment Evaluation, Education, & procurement, Patient/Caregiver Education & Training, Self-Care / ADL    AM-[facility] Score    Jeancarlos Rosario scored a 19/24 on the AM-[facility] ADL Inpatient form. Current research shows that an AM-PAC score of 18 or greater is typically associated with a discharge to the patient's home setting. Based on the patient's AM-PAC score, and their current ADL deficits, it is recommended that the patient have 2-3 sessions per week of Occupational Therapy at d/c to increase the patient's independence. At this time, this patient demonstrates the endurance and safety to discharge home with home health OT (home vs OP services) and a follow up treatment frequency of 2-3x/wk. Please see assessment section for further patient specific details. If patient discharges prior to next session this note will serve as a discharge summary. Please see below for the latest assessment towards goals.         AM-PAC Inpatient Daily Activity Raw Score: 19 (12/23/20 1029)  AM-PAC Inpatient ADL T-Scale Score : 40.22 (12/23/20 1029)  ADL Inpatient CMS 0-100% Score: 42.8 (12/23/20 1029)  ADL Inpatient CMS G-Code Modifier : CK (12/23/20 1029)    Goals  Short term goals

## 2020-12-23 NOTE — CARE COORDINATION
DME order for rolling walker noted. Patient also qualifies for home oxygen. Attempted to call patient in the room, he was in the restroom. Left message for Dillan Resendiz at [facility] to advise of the need for the rolling walker as well as the home oxygen. Will try patient back later to advise of the above and confirm home care with [facility]. Spoke with Danae with [facility] to update on D/C today. Jhoana Gupta RN, BSN, Case Management  Phone: [phone]  Electronically signed by Jhoana Gupta RN on 12/23/2020 at 9:37 AM     Spoke with patient in the room. Respiratory has updated the note, the patient did not qualify for home oxygen. Patient states he has a walker that he is borrowing from a friend at home and does not need the walker. Advised he is set with [facility] and they will call him to set up a time. He verbalized understanding. Reviewed IMM.     Jhoana Gupta RN, BSN, Case Management  Phone: [phone]  Electronically signed by Jhoana Gupta RN on 12/23/2020 at 11:21 AM

## 2020-12-23 NOTE — DISCHARGE SUMMARY
Hospital Medicine Discharge Summary    Patient ID: Cortes Avila      Patient's PCP: Gabby Rodriguez MD    Admit Date: 12/16/2020     Discharge Date:   12/23/2020    Admitting Physician: Malik Bishop DO     Discharge Physician: KLAUS Sousa - CNP       Discharge Diagnoses: Active Hospital Problems    Diagnosis Date Noted    Bradycardia [R00.1] 12/18/2020    Pneumonia due to COVID-19 virus [U07.1, J12.89] 12/17/2020    Acute respiratory failure with hypoxia ([address] [J96.01] 12/16/2020    COPD, mild ([address] [J44.9] 04/24/2017    Essential hypertension [I10] 12/20/2015       The patient was seen and examined on day of discharge and this discharge summary is in conjunction with any daily progress note from day of discharge. Disposition:  [] Home  [x] Home with home health [] Rehab [] Psych [] SNF  [] LTAC  [] Long term nursing home or group home [] Transfer to ICU  [] Transfer to PCU [] Other:      Discharge Instructions/Follow-up:  pcp 1-2 wks    D/C condition: stable    Code status: limited     Activity: as tolerated    Diet: general    D/C Meds:  Mucinex, tessalon or robitussin for cough. Cont home meds as prescribed otherwise. Hospital Course:   20BY WM with PMHx sig for HTN, COPD, hyperlipidemia presents with several days of SOB.  Apparently pt was at a conference last week with 12 other people. [facility] himself, were wearing [facility]. Marilee Bernal became increasingly SOB.  He does have asymptomatic episodes of bradycardia.  Currently pt on 9L oxygen and desats with eating.  Pt meets criteria for sepsis with tachypnea, acute resp failure with hypoxia, documented pneumonia due to COVID19, elevated pro calcitonin.  Pt started on remdesivir and decadron. the patient received remdesevir and steroids per protocol. He was able to be titrated down and eventually off all oxygen. He has been using his  Incentive spirometry with me in the room to 1000mL. States he can get it to 2500 at home. Ivanna Townsend Has a cough with deep breath and talking and has some mucus. His LFTs were monitored here while on remdesevir. They were elevated but improved to 163/60 from 202/81 and I expect these to continue to come down now that his tx is complete.      PT now recommending home with Prabhakar Liu with walker. AM-PAC 18/24     Home O2 eval done. He is now ready for discharge home. F/u with PCP 1-2 wks. Exam:     BP (!) 163/85   Pulse 101   Temp 98.4 °F (36.9 °C) (Oral)   Resp 24   Ht 5' 11\" (1.803 m)   Wt 164 lb 3.9 oz (74.5 kg)   SpO2 91%   BMI 22.91 kg/m²   General appearance: No apparent distress, appears stated age and cooperative. HEENT: Pupils equal, round, and reactive to light. Conjunctivae/corneas clear. Neck: Supple, with full range of motion. No jugular venous distention. Trachea midline. Respiratory:  Normal respiratory effort. Clear to auscultation, bilaterally without Rales/Wheezes/Rhonchi. Cardiovascular: Regular rate and rhythm with normal S1/S2 without murmurs, rubs or gallops. Abdomen: Soft, non-tender, non-distended with normal bowel sounds. Musculoskelatal: No clubbing, cyanosis or edema bilaterally. Full range of motion without deformity. Skin: Skin color, texture, turgor normal.  No rashes or lesions. Neurologic:  Neurovascularly intact without any focal sensory/motor deficits. Cranial nerves: II-XII intact, grossly non-focal.  Psychiatric: Alert and oriented, thought content appropriate, normal insight      Consults:     IP CONSULT TO SOCIAL WORK  IP CONSULT TO HOME CARE NEEDS    Diagnostic tests: Imaging:  XR CHEST PORTABLE   Final Result   Inhomogeneous bilateral airspace disease.   Pattern is consistent with COVID   pneumonia, although other etiologies of atypical or typical pneumonia or   aspiration pneumonitis may have a similar appearance.     Labs: For convenience and continuity at follow-up the following most recent labs are provided:      CBC:    Lab Results   Component Value Date    WBC 5.5 12/16/2020    HGB 12.3 12/16/2020    HCT 35.6 12/16/2020     12/16/2020       Renal:    Lab Results   Component Value Date     12/23/2020    K 4.1 12/23/2020    K 2.6 12/16/2020     12/23/2020    CO2 23 12/23/2020    BUN 30 12/23/2020    CREATININE 0.6 12/23/2020    CALCIUM 8.3 12/23/2020       Discharge Medications:     Current Discharge Medication List           Details   benzonatate (TESSALON) 200 MG capsule Take 1 capsule by mouth 3 times daily for 7 days  Qty: 21 capsule, Refills: 0      guaiFENesin (MUCINEX) 600 MG extended release tablet Take 1 tablet by mouth 2 times daily  Qty: 40 tablet, Refills: 0      guaiFENesin-dextromethorphan (ROBITUSSIN DM) 100-10 MG/5ML syrup Take 10 mLs by mouth every 4 hours as needed for Cough  Qty: 236 mL, Refills: 0      famotidine (PEPCID) 20 MG tablet Take 1 tablet by mouth 2 times daily  Qty: 60 tablet, Refills: 3              Details   estazolam (PROSOM) 2 MG tablet Take 1-2 tabs nightly as needed for sleep  Qty: 60 tablet, Refills: 0    Associated Diagnoses: Psychophysiological insomnia      pravastatin (PRAVACHOL) 40 MG tablet TAKE ONE (1) TABLET BY MOUTH DAILY   Qty: 90 tablet, Refills: 1      QUEtiapine (SEROQUEL) 25 MG tablet TAKE ONE (1) TABLET BY MOUTH NIGHTLY   Qty: 90 tablet, Refills: 1      amLODIPine (NORVASC) 10 MG tablet Take 0.5 tablets by mouth daily  Qty: 45 tablet, Refills: 1      atenolol (TENORMIN) 100 MG tablet Take 1 tablet by mouth daily  Qty: 90 tablet, Refills: 1      Fluticasone furoate-vilanterol (BREO ELLIPTA) 200-25 MCG/INH AEPB inhaler Inhale 1 puff into the lungs daily  Qty: 180 each, Refills: 1      venlafaxine (EFFEXOR XR) 150 MG extended release capsule TAKE 1 CAPSULE BY MOUTH DAILY  Qty: 90 capsule, Refills: 1 albuterol sulfate HFA (VENTOLIN HFA) 108 (90 Base) MCG/ACT inhaler Inhale 2 puffs into the lungs every 6 hours as needed for Wheezing or Shortness of Breath  Qty: 3 Inhaler, Refills: 1      losartan (COZAAR) 100 MG tablet Take 1 tablet by mouth daily  Qty: 90 tablet, Refills: 1      hydroCHLOROthiazide (HYDRODIURIL) 25 MG tablet Take 1 tablet by mouth daily  Qty: 90 tablet, Refills: 1      aspirin 81 MG tablet Take 81 mg by mouth daily. diphenoxylate-atropine (LOMOTIL) 2.5-0.025 MG per tablet Take 1 tablet by mouth 4 times daily as needed for Diarrhea for up to 60 days. Qty: 120 tablet, Refills: 1    Associated Diagnoses: Chronic diarrhea             Time Spent on discharge is more than 30 minutes in the examination, evaluation, counseling and review of medications and discharge plan. Signed:    KLAUS Potter - CNP   12/23/2020      Thank you Terese Reid MD for the opportunity to be involved in this patient's care. If you have any questions or concerns please feel free to contact me at [phone].

## 2020-12-23 NOTE — FLOWSHEET NOTE
Discharge instruction and medications reviewed with patient. Pt. Verbalized understanding. Denies questions. Discontinued IV without complications. Pt. tolerated well. Reviewed covid precautions. Pt transferred off unit in wheelchair. Wife to drive pt home. No further needs expressed.    Electronically signed by Romain Ramirez RN on 12/23/2020 at 1:49 PM

## 2020-12-23 NOTE — PLAN OF CARE
Problem: Falls - Risk of:  Goal: Will remain free from falls  Outcome: Completed  Goal: Absence of physical injury  Outcome: Completed     Problem: Airway Clearance - Ineffective  Goal: Achieve or maintain patent airway  Outcome: Completed     Problem: Gas Exchange - Impaired  Goal: Absence of hypoxia  Outcome: Completed  Goal: Promote optimal lung function  Outcome: Completed     Problem: Breathing Pattern - Ineffective  Goal: Ability to achieve and maintain a regular respiratory rate  Outcome: Completed     Problem:  Body Temperature -  Risk of, Imbalanced  Goal: Ability to maintain a body temperature within defined limits  Outcome: Completed  Goal: Will regain or maintain usual level of consciousness  Outcome: Completed  Goal: Complications related to the disease process, condition or treatment will be avoided or minimized  Outcome: Completed     Problem: Isolation Precautions - Risk of Spread of Infection  Goal: Prevent transmission of infection  Outcome: Completed     Problem: Nutrition Deficits  Goal: Optimize nutrtional status  Outcome: Completed     Problem: Risk for Fluid Volume Deficit  Goal: Maintain normal heart rhythm  Outcome: Completed  Goal: Maintain absence of muscle cramping  Outcome: Completed  Goal: Maintain normal serum potassium, sodium, calcium, phosphorus, and pH  Outcome: Completed     Problem: Loneliness or Risk for Loneliness  Goal: Demonstrate positive use of time alone when socialization is not possible  Outcome: Completed     Problem: Fatigue  Goal: Verbalize increase energy and improved vitality  Outcome: Completed     Problem: Patient Education: Go to Patient Education Activity  Goal: Patient/Family Education  Outcome: Completed     Problem: Skin Integrity:  Goal: Will show no infection signs and symptoms  Outcome: Completed  Goal: Absence of new skin breakdown  Outcome: Completed

## 2020-12-23 NOTE — PROGRESS NOTES
Physical Therapy  Facility/Department: [address] MED SURG  Daily Treatment Note  NAME: Rashida Blair  : 1945  MRN: [id number]    Date of Service: 2020    Discharge Recommendations:  24 hour supervision or assist, Patient would benefit from continued therapy after discharge, S Level 1, Home with Home health PT   PT Equipment Recommendations  Equipment Needed: No  Rashida Blair scored a 20/24 on the AM-PAC short mobility form. Current research shows that an AM-PAC score of 18 or greater is typically associated with a discharge to the patient's home setting. Based on the patient's AM-PAC score and their current functional mobility deficits, it is recommended that the patient have 2-3 sessions per week of Physical Therapy at d/c to increase the patient's independence. At this time, this patient demonstrates the endurance and safety to discharge home with HHPT and a follow up treatment frequency of 2-3x/wk. Please see assessment section for further patient specific details. If patient discharges prior to next session this note will serve as a discharge summary. Please see below for the latest assessment towards goals.      HOME HEALTH CARE: LEVEL 1 STANDARD     -Initial home health evaluation to occur within 24-48 hours, in patient home    -Home health agency to establish plan of care for patient over 60 day period    -Medication Reconciliation    -PCP Visit scheduled within seven days of discharge    -PT/OT to evaluate with goal of regaining prior level of functioning    -OT to evaluate if patient has [address] needs for personal care       Assessment   Body structures, Functions, Activity limitations: Decreased functional mobility Additional Pertinent Hx: The patient is a 66 y.o. male who presents to [facility] on 12/16/20 with PMHx: Anxiety, mild COPD, depression, HLD, HTN, insomnia, IBS. Presented to the emergency department with progressive shortness of breath and exertional dyspnea since Monday. Response To Previous Treatment: Patient with no complaints from previous session.   Family / Caregiver Present: No  Referring Practitioner: Farhat Tate MD  Subjective  Subjective: Pt is agreeable to PT          Orientation  Orientation  Overall Orientation Status: Within Functional Limits     Cognition   Cognition  Overall Cognitive Status: WFL     Objective   Bed mobility  Supine to Sit: Supervision  Sit to Supine: Unable to assess(pt in recliner at end of session)  Transfers  Sit to Stand: Stand by assistance  Stand to sit: Stand by assistance  Ambulation  Ambulation?: Yes  Ambulation 1  Surface: level tile  Device: Rolling Walker  Assistance: Stand by assistance  Gait Deviations: Decreased step length;Decreased step height;Slow Phoebe  Distance: 50'  Comments: SpO2 - 90-96% on RA     Balance  Posture: Good  Sitting - Static: Good  Sitting - Dynamic: Good  Standing - Static: [email or web address])  Standing - Dynamic: [email or web address])        AM-PAC Score  AM-PAC Inpatient Mobility Raw Score : 20 (12/23/20 1027)  AM-PAC Inpatient T-Scale Score : 47.67 (12/23/20 1027)  Mobility Inpatient CMS 0-100% Score: 35.83 (12/23/20 1027)  Mobility Inpatient CMS G-Code Modifier : CJ (12/23/20 1027)          Goals  Short term goals  Time Frame for Short term goals: by acute discharge  Short term goal 1: bed mobility with SBA - MET 12/23  Short term goal 2: Sit<>stand with SBA - MET 12/23  Short term goal 3: ambulate >40' with LRAD and SBA - MET and ongoing 12/23  Patient Goals   Patient goals : none stated    Plan    Plan  Times per week: 3-5x/week Current Treatment Recommendations: Functional Mobility Training, Balance Training, Stair training, Gait Training, Transfer Training, Home Exercise Program, Safety Education & Training, Patient/Caregiver Education & Training, Equipment Evaluation, Education, & procurement  Safety Devices  Type of devices:  All fall risk precautions in place, Call light within reach, Gait belt, Left in chair, Chair alarm in place, Nurse notified  Restraints  Initially in place: No     Therapy Time   Individual Concurrent Group Co-treatment   Time In 1000         Time Out 1025         Minutes 25         Timed Code Treatment Minutes: [id number] [facility] 28, PT

## 2020-12-23 NOTE — PROGRESS NOTES
Pt was on room air at 87% when I arrived at room.  When I told him his SpO2 was low, he started breathing fast and deep until it went up to 92% on room air and then refused to wear O2 and said his SpO2 was only low because he had choked on a \"crumb\" from breakfast.

## 2020-12-23 NOTE — PROGRESS NOTES
[facility]    Respiratory Therapy   Home Oxygen Evaluation        Name: Nasim Leigh  Medical Record Number: [id number]  Age: 66 y.o. Gender:  male   : 1945  Today's date: 2020  Room: J0L-0646/4280-01      Assessment        BP (!) 146/75   Pulse 72   Temp 98.3 °F (36.8 °C) (Oral)   Resp 24   Ht 5' 11\" (1.803 m)   Wt 164 lb 3.9 oz (74.5 kg)   SpO2 (!) 87%   BMI 22.91 kg/m²     Patient Active Problem List   Diagnosis    Primary insomnia    Hyperlipidemia LDL goal <100    Chronic diarrhea    Irritable bowel syndrome with diarrhea    Essential hypertension    Pure hypercholesterolemia    Dysthymia    Depression with anxiety    COPD, mild (HCC)    Adenomatous polyp of rectum    Anxiety    Acute respiratory failure with hypoxia (HCC)    Pneumonia due to COVID-19 virus    Bradycardia       Social History:  Social History     Tobacco Use    Smoking status: Former Smoker     Packs/day: 1.00     Years: 20.00     Pack years: 20.00     Types: Cigarettes     Quit date: 1988     Years since quittin.3    Smokeless tobacco: Never Used   Substance Use Topics    Alcohol use: No    Drug use: No       Patient Room Air saturation at rest 87  %      Oxygen saturations of 88% or less on RA qualifies patient for Home Oxygen    Patient resting on 2  lmp  with an oxygen saturation of  93 %     Pt is refusing to wear O2 and only keeps his SpO2 above 90% by breathing deep and fast when you try to get his O2 reading. Qualifying patient for home oxygen with ambulation and continuous flow  @ 2 lpm.      In your clinical assessment does the Patient Require Portable Oxygen Tanks?     Yes              [facility]  home care [employer] contacted to follow care of patients home oxygen needs on 2020 at [id number] [place] on 2020 at 8:29 AM

## 2020-12-24 ENCOUNTER — CARE COORDINATION (OUTPATIENT)
Dept: CASE MANAGEMENT | Age: 75
End: 2020-12-24

## 2020-12-24 ENCOUNTER — TELEPHONE (OUTPATIENT)
Dept: PRIMARY CARE CLINIC | Age: 75
End: 2020-12-24

## 2020-12-24 NOTE — TELEPHONE ENCOUNTER
Isrrael Morataya from [facility] home care calling [phone]. Patient was discharged from hospital on 12/23/2020 and home health admitted patient today 12/24/2020 for home physical therapy, ot, and nursing.

## 2020-12-24 NOTE — CARE COORDINATION
Ed[facility] 45 Transitions Initial Follow Up Call    Call within 2 business days of discharge: Yes    Patient: Omi Kim Patient : 1945   MRN: [id number]  Reason for Admission: covid, pneumonia  Discharge Date: 20 RARS: Readmission Risk Score: 22      Last Discharge [facility]       Complaint Diagnosis Description Type Department Provider    20 Concern For COVID-19 COVID-19 virus infection . .. ED to Hosp-Admission (Discharged) (ADMITTED) Lana Chicas MD; Rebecca Hernandez... Challenges to be reviewed by the provider   Additional needs identified to be addressed with provider No  none    Discussed COVID-19 related testing which was available at this time. Test results were positive. Patient informed of results, if available? Yes         Method of communication with provider : none    Advance Care Planning:   Does patient have an Advance Directive:  not on file. Was this a readmission? No      Care Transition Nurse (CTN) contacted the family by telephone to perform post hospital discharge assessment. Verified name and  with family as identifiers. Provided introduction to self, and explanation of the CTN role. CTN reviewed discharge instructions, medical action plan and red flags with family who verbalized understanding. Family given an opportunity to ask questions and does not have any further questions or concerns at this time. Were discharge instructions available to patient? Yes. Reviewed appropriate site of care based on symptoms and resources available to patient including: PCP. The family agrees to contact the PCP office for questions related to their healthcare. Medication review of new and stopped medications was performed with family, [facility] 12-2[facility] today. Advised obtaining a 90-day supply of all daily and as-needed medications. Covid Risk Education    Patient has following risk factors of: COPD.    Education provided regarding infection prevention, and signs and symptoms of COVID-19 and when to seek medical attention with family who verbalized understanding. Discussed exposure protocols and quarantine From CDC: Are you at higher risk for severe illness?   and given an opportunity for questions and concerns. The family agrees to contact the COVID-19 hotline [phone] or PCP office for questions related to COVID-19. Patient/family/caregiver given information for [facility] and agrees to enroll yes  Patient's preferred e-mail: [email or web address]  Patient's preferred phone number: [phone]    Discussed follow-up appointments. If no appointment was previously scheduled, appointment scheduling offered: wife to call. Is follow up appointment scheduled within 7 days of discharge? NA  Non-[facility] follow up appointment(s): NA    Plan for follow-up call in 7-10 days based on severity of symptoms and risk factors. Plan for next call: symptom management-cough, decreased appetite and weakness  CTN provided contact information for future needs. Non-face-to-face services provided:  Obtained and reviewed discharge summary and/or continuity of care documents    Care Transitions 24 Hour Call    Do you have any ongoing symptoms?: Yes  Patient-reported symptoms: Cough, Weakness  Do you have a copy of your discharge instructions?: Yes  Do you have all of your prescriptions and are they filled?: No  Have you scheduled your follow up appointment?: No  Were you discharged with any Home Care or Post Acute Services: Yes  Care Transitions Interventions     Writer spoke with wife, Andrés Maldonado, (on hippa form), who stated patient was sleeping. She stated patient was not SOB, still had a cough, had little appetite and was unsteady, so was using a walker. She stated HC was coming today.       Follow Up  Future Appointments   Date Time Provider Pedrito Nicholas   3/24/2021  2:30 PM Alice Hernandez MD [facility]AMI [place]

## 2020-12-25 ENCOUNTER — CARE COORDINATION (OUTPATIENT)
Dept: CARE COORDINATION | Age: 75
End: 2020-12-25

## 2020-12-25 NOTE — CARE COORDINATION
Yellow alert noted in Loop remote symptom monitoring program. Messaged patient to notify Beto Barry if symptoms have worsened since yesterday.

## 2020-12-28 ENCOUNTER — CARE COORDINATION (OUTPATIENT)
Dept: CASE MANAGEMENT | Age: 75
End: 2020-12-28

## 2020-12-28 NOTE — TELEPHONE ENCOUNTER
Noted.  Pt has a VV/Hosp fu appt on 1/5/21 with Dr Mando Deutsch. Will have him review everything then.

## 2020-12-28 NOTE — CARE COORDINATION
Yellow alert noted in Loop remote symptom monitoring program. Messaged patient to notify Erika Ngo if symptoms have worsened since yesterday. Fish Ramirez RN, 1:02 PM   We are looking for pulse ox to be above 90 at rest and 88 with activity but you should have recovery to above 90 upon sitting.  If you have any questions you can call me at [address], [address] [facility] 45 Transition Nurse  [phone]

## 2020-12-31 ENCOUNTER — TELEPHONE (OUTPATIENT)
Dept: PRIMARY CARE CLINIC | Age: 75
End: 2020-12-31

## 2021-01-04 RX ORDER — BENZONATATE 200 MG/1
CAPSULE ORAL
Qty: 21 CAPSULE | Refills: 3 | Status: SHIPPED | OUTPATIENT
Start: 2021-01-04 | End: 2021-03-24

## 2021-01-06 ENCOUNTER — TELEPHONE (OUTPATIENT)
Dept: PRIMARY CARE CLINIC | Age: 76
End: 2021-01-06

## 2021-01-06 NOTE — TELEPHONE ENCOUNTER
Pt states that he requested a refill on the following:  ~venlafaxine (EFFEXOR XR) 150 MG extended release capsule   He wants to know if you will call this into   [facility] [phone]    Pt suze: [phone]

## 2021-01-07 DIAGNOSIS — F51.04 PSYCHOPHYSIOLOGICAL INSOMNIA: ICD-10-CM

## 2021-01-07 RX ORDER — VENLAFAXINE HYDROCHLORIDE 150 MG/1
150 CAPSULE, EXTENDED RELEASE ORAL DAILY
Qty: 90 CAPSULE | Refills: 0 | Status: SHIPPED | OUTPATIENT
Start: 2021-01-07 | End: 2021-03-08

## 2021-01-11 ENCOUNTER — CARE COORDINATION (OUTPATIENT)
Dept: CASE MANAGEMENT | Age: 76
End: 2021-01-11

## 2021-01-11 NOTE — CARE COORDINATION
[facility] 45 Transitions Follow Up Call    2021    Patient: Maykel Padilla  Patient : 1945   MRN: [id number]  Reason for Admission: pneumonia, covid  Discharge Date: 20 RARS: Readmission Risk Score: 22      Needs to be reviewed by the provider   Additional needs identified to be addressed with provider No  none  Discussed COVID-19 related testing which was available at this time. Test results were positive. Patient informed of results, if available? Yes         Method of communication with provider : none    Care Transition Nurse (CTN) contacted the patient by telephone to follow up after admission on 2020 Verified name and  with patient as identifiers. Follow up appointment completed? No, patient and wife, Lu Davidson on the phone together. They stated have had 2 PCP appts cancelled due to PCP having covid, wife stated next appt was scheduled for 2-1. Advance Care Planning:   Does patient have an Advance Directive:  not on file. CTN reviewed discharge instructions, medical action plan and red flags with patient and discussed any barriers to care and/or understanding of plan of care after discharge. Discussed appropriate site of care based on symptoms and resources available to patient including: PCP. The family agrees to contact the PCP office for questions related to their healthcare. Wife, Lu Davidson (on hippa form) answered the phone and had patientAgus  the phone, so both were all the phone during the call. Patient stated he is \"fine\" that his breathing is good. He stated PT was there today and coming back Wednesday and nurse will be there Thursday. He said PT checked his oxygen level and it was 98%,  Patient stated \"not really\" when asked if he had any S&S of covid, he stated he has COPD and feels like he is back to his baseline. Writer and patient discussed future f/u calls and patient stated he did not think it was necessary.   Writer reminded patient and wife they can call PCP office for any needs even if their PCP is not in the office. They verbalized understanding. Care transition complete. Care Transitions Subsequent and Final Call    Subsequent and Final Calls  Care Transitions Interventions  Other Interventions:            Follow Up  Future Appointments   Date Time Provider Pedrito Nicholas   2/1/2021  2:30 PM MD Kunal Phillips RD [facility]   3/24/2021  2:30 PM MD Kunal Phillips RD [facility]       Nuzhat Peña, RN

## 2021-02-01 ENCOUNTER — OFFICE VISIT (OUTPATIENT)
Dept: PRIMARY CARE CLINIC | Age: 76
End: 2021-02-01
Payer: MEDICARE

## 2021-02-01 VITALS
DIASTOLIC BLOOD PRESSURE: 77 MMHG | WEIGHT: 166.6 LBS | SYSTOLIC BLOOD PRESSURE: 147 MMHG | OXYGEN SATURATION: 97 % | RESPIRATION RATE: 18 BRPM | BODY MASS INDEX: 23.24 KG/M2 | HEART RATE: 78 BPM

## 2021-02-01 DIAGNOSIS — I10 ESSENTIAL HYPERTENSION: ICD-10-CM

## 2021-02-01 DIAGNOSIS — J12.82 PNEUMONIA DUE TO COVID-19 VIRUS: Primary | ICD-10-CM

## 2021-02-01 DIAGNOSIS — U07.1 PNEUMONIA DUE TO COVID-19 VIRUS: Primary | ICD-10-CM

## 2021-02-01 DIAGNOSIS — F51.04 PSYCHOPHYSIOLOGICAL INSOMNIA: ICD-10-CM

## 2021-02-01 DIAGNOSIS — R00.1 BRADYCARDIA: ICD-10-CM

## 2021-02-01 DIAGNOSIS — F34.1 DYSTHYMIA: ICD-10-CM

## 2021-02-01 DIAGNOSIS — J96.01 ACUTE RESPIRATORY FAILURE WITH HYPOXIA (HCC): ICD-10-CM

## 2021-02-01 PROCEDURE — 99214 OFFICE O/P EST MOD 30 MIN: CPT | Performed by: FAMILY MEDICINE

## 2021-02-01 PROCEDURE — G8482 FLU IMMUNIZE ORDER/ADMIN: HCPCS | Performed by: FAMILY MEDICINE

## 2021-02-01 PROCEDURE — G8420 CALC BMI NORM PARAMETERS: HCPCS | Performed by: FAMILY MEDICINE

## 2021-02-01 PROCEDURE — 1111F DSCHRG MED/CURRENT MED MERGE: CPT | Performed by: FAMILY MEDICINE

## 2021-02-01 PROCEDURE — 1036F TOBACCO NON-USER: CPT | Performed by: FAMILY MEDICINE

## 2021-02-01 PROCEDURE — G8427 DOCREV CUR MEDS BY ELIG CLIN: HCPCS | Performed by: FAMILY MEDICINE

## 2021-02-01 PROCEDURE — 4040F PNEUMOC VAC/ADMIN/RCVD: CPT | Performed by: FAMILY MEDICINE

## 2021-02-01 PROCEDURE — 1123F ACP DISCUSS/DSCN MKR DOCD: CPT | Performed by: FAMILY MEDICINE

## 2021-02-01 ASSESSMENT — ENCOUNTER SYMPTOMS
BLOOD IN STOOL: 0
ABDOMINAL PAIN: 0
VOICE CHANGE: 0
TROUBLE SWALLOWING: 0
COUGH: 0
DIARRHEA: 0
WHEEZING: 0
CONSTIPATION: 0
SHORTNESS OF BREATH: 0

## 2021-02-01 NOTE — PROGRESS NOTES
Post-Discharge Transitional Care Management Services or Hospital Follow Up      Dago Veronica   YOB: 1945    Date of Office Visit:  2/1/2021  Date of Hospital Admission: 12/16/20  Date of Hospital Discharge: 12/23/20  Readmission Risk Score(high >=14%. Medium >=10%):Readmission Risk Score: 22      Care management risk score Rising risk (score 2-5) and Complex Care (Scores >=6): 2     Non face to face  following discharge, date last encounter closed (first attempt may have been earlier): *No documented post hospital discharge outreach found in the last 14 days *No documented post hospital discharge outreach found in the last 14 days    Call initiated 2 business days of discharge: *No response recorded in the last 14 days     Patient Active Problem List   Diagnosis    Primary insomnia    Hyperlipidemia LDL goal <100    Chronic diarrhea    Irritable bowel syndrome with diarrhea    Essential hypertension    Pure hypercholesterolemia    Dysthymia    Depression with anxiety    COPD, mild ([address]    Adenomatous polyp of rectum    Anxiety    Acute respiratory failure with hypoxia [facility]    Pneumonia due to COVID-19 virus    Bradycardia       No Known Allergies    Medications listed as ordered at the time of discharge from hospital   Nahum Cruzg \"Neil\"   Home Medication Instructions STANLEY:    Printed on:02/01/21 2858   Medication Information                      albuterol sulfate HFA (VENTOLIN HFA) 108 (90 Base) MCG/ACT inhaler  Inhale 2 puffs into the lungs every 6 hours as needed for Wheezing or Shortness of Breath             amLODIPine (NORVASC) 10 MG tablet  Take 0.5 tablets by mouth daily             aspirin 81 MG tablet  Take 81 mg by mouth daily.              atenolol (TENORMIN) 100 MG tablet  Take 1 tablet by mouth daily             benzonatate (TESSALON) 200 MG capsule  TAKE ONE (1) CAPSULE BY MOUTH THREE (3) TIMES DAILY FOR 7 DAYS              diphenoxylate-atropine (LOMOTIL) Inhale 1 puff into the lungs daily 180 each 1    albuterol sulfate HFA (VENTOLIN HFA) 108 (90 Base) MCG/ACT inhaler Inhale 2 puffs into the lungs every 6 hours as needed for Wheezing or Shortness of Breath 3 Inhaler 1    losartan (COZAAR) 100 MG tablet Take 1 tablet by mouth daily 90 tablet 1    aspirin 81 MG tablet Take 81 mg by mouth daily. Medications patient taking as of now reconciled against medications ordered at time of hospital discharge: Yes    Chief Complaint   Patient presents with    Follow-Up from Hospital       HPI    Inpatient course: Discharge summary reviewed- see chart. Interval history/Current status: Patient was admitted at [facility] from December 16 to December 23/ 2020 due to increasing shortness of breath. He was diagnosed to have COVID-19 pneumonia with acute respiratory failure with hypoxia. Patient was treated with remdesivir and Decadron and then oxygen which was later titrated down and eventually off r discharged  Patient is back home and reported to be doing fairly well he denies shortness of breath. Denies fever or cough Has mild fatigue and weakness    Review of Systems   Constitutional: Positive for fatigue. Negative for activity change. HENT: Negative for trouble swallowing and voice change. Eyes: Negative for visual disturbance. Respiratory: Negative for cough, shortness of breath and wheezing. Cardiovascular: Negative for chest pain and leg swelling. Gastrointestinal: Negative for abdominal pain, blood in stool, constipation and diarrhea. Genitourinary: Negative for difficulty urinating, dysuria, frequency, hematuria and scrotal swelling. Musculoskeletal: Negative for arthralgias and myalgias. Skin: Negative for rash. Neurological: Negative for dizziness. Psychiatric/Behavioral: Negative for behavioral problems.        Vitals:    02/01/21 1507   BP: (!) 147/77   Pulse: 78   Resp: 18   SpO2: 97%   Weight: 166 lb 9.6 oz (75.6 kg)     Body Medical Decision Making: high complexity

## 2021-02-03 RX ORDER — HYDROCHLOROTHIAZIDE 25 MG/1
12.5 TABLET ORAL DAILY
Qty: 45 TABLET | Refills: 1 | Status: SHIPPED | OUTPATIENT
Start: 2021-02-03 | End: 2021-05-04 | Stop reason: SDUPTHER

## 2021-02-09 ENCOUNTER — TELEPHONE (OUTPATIENT)
Dept: PRIMARY CARE CLINIC | Age: 76
End: 2021-02-09

## 2021-02-09 RX ORDER — LOSARTAN POTASSIUM 100 MG/1
TABLET ORAL
Qty: 90 TABLET | Refills: 1 | Status: SHIPPED | OUTPATIENT
Start: 2021-02-09 | End: 2021-09-03

## 2021-02-09 NOTE — TELEPHONE ENCOUNTER
Patient states after getting the COVID vaccine he has symptoms and wants to know if he should get the second shot. He is schedule 3 weeks from Thursday.   Please advise

## 2021-03-05 DIAGNOSIS — F51.04 PSYCHOPHYSIOLOGICAL INSOMNIA: ICD-10-CM

## 2021-03-05 NOTE — TELEPHONE ENCOUNTER
Earline Alonso, what is next 3/24/21 Is pending medication going to be sent to pharmacy? Please call patient or advise.

## 2021-03-08 DIAGNOSIS — F51.04 PSYCHOPHYSIOLOGICAL INSOMNIA: ICD-10-CM

## 2021-03-08 RX ORDER — VENLAFAXINE HYDROCHLORIDE 150 MG/1
CAPSULE, EXTENDED RELEASE ORAL
Qty: 90 CAPSULE | Refills: 1 | Status: SHIPPED | OUTPATIENT
Start: 2021-03-08 | End: 2021-09-03

## 2021-03-08 NOTE — TELEPHONE ENCOUNTER
[facility] and cancelled the RX for [employer] Could you please send this to [facility].

## 2021-03-10 ENCOUNTER — TELEPHONE (OUTPATIENT)
Dept: ADMINISTRATIVE | Age: 76
End: 2021-03-10

## 2021-03-11 NOTE — TELEPHONE ENCOUNTER
Received APPROVAL for Estazolam 2MG tablets from 12/10/2020 through 03/10/2022. Approval letter attached. Please advise patient. Thank you.

## 2021-03-24 ENCOUNTER — OFFICE VISIT (OUTPATIENT)
Dept: PRIMARY CARE CLINIC | Age: 76
End: 2021-03-24
Payer: MEDICARE

## 2021-03-24 VITALS
SYSTOLIC BLOOD PRESSURE: 140 MMHG | TEMPERATURE: 97.5 F | DIASTOLIC BLOOD PRESSURE: 68 MMHG | HEART RATE: 78 BPM | WEIGHT: 163.2 LBS | BODY MASS INDEX: 22.76 KG/M2 | RESPIRATION RATE: 18 BRPM

## 2021-03-24 DIAGNOSIS — I10 ESSENTIAL HYPERTENSION: ICD-10-CM

## 2021-03-24 DIAGNOSIS — G89.29 CHRONIC PAIN OF LEFT KNEE: Primary | ICD-10-CM

## 2021-03-24 DIAGNOSIS — M25.562 CHRONIC PAIN OF LEFT KNEE: Primary | ICD-10-CM

## 2021-03-24 DIAGNOSIS — F34.1 DYSTHYMIA: ICD-10-CM

## 2021-03-24 DIAGNOSIS — F51.01 PRIMARY INSOMNIA: ICD-10-CM

## 2021-03-24 PROBLEM — U07.1 PNEUMONIA DUE TO COVID-19 VIRUS: Status: RESOLVED | Noted: 2020-12-17 | Resolved: 2021-03-24

## 2021-03-24 PROBLEM — J12.82 PNEUMONIA DUE TO COVID-19 VIRUS: Status: RESOLVED | Noted: 2020-12-17 | Resolved: 2021-03-24

## 2021-03-24 PROCEDURE — G8428 CUR MEDS NOT DOCUMENT: HCPCS | Performed by: FAMILY MEDICINE

## 2021-03-24 PROCEDURE — 1036F TOBACCO NON-USER: CPT | Performed by: FAMILY MEDICINE

## 2021-03-24 PROCEDURE — G8420 CALC BMI NORM PARAMETERS: HCPCS | Performed by: FAMILY MEDICINE

## 2021-03-24 PROCEDURE — G8482 FLU IMMUNIZE ORDER/ADMIN: HCPCS | Performed by: FAMILY MEDICINE

## 2021-03-24 PROCEDURE — 99214 OFFICE O/P EST MOD 30 MIN: CPT | Performed by: FAMILY MEDICINE

## 2021-03-24 PROCEDURE — 1123F ACP DISCUSS/DSCN MKR DOCD: CPT | Performed by: FAMILY MEDICINE

## 2021-03-24 PROCEDURE — 4040F PNEUMOC VAC/ADMIN/RCVD: CPT | Performed by: FAMILY MEDICINE

## 2021-03-24 ASSESSMENT — ENCOUNTER SYMPTOMS
CONSTIPATION: 0
TROUBLE SWALLOWING: 0
BLOOD IN STOOL: 0
SHORTNESS OF BREATH: 0
ABDOMINAL PAIN: 0
VOICE CHANGE: 0
DIARRHEA: 0

## 2021-03-24 NOTE — PROGRESS NOTES
3/24/2021     Abby Perez (:  1945) is a 66 y.o. male, here for evaluation of the following medical concerns:    HPI  Patient presented to the office for his regular follow-up. He has hypertension and blood pressure is borderline elevated today. He reduce the dose of hydrochlorothiazide from 25 mg to 12.5 mg daily due to urinary frequency. He also take losartan 100 mg daily, atenolol 100 mg daily and amlodipine 10 mg daily. Has insomnia and takes ProSom 2 to 4 mg at night as needed for sleep. He has mood disorder and takes Wellbutrin XL and Seroquel 25 mg at night. He also complained of left knee pain which has been going on for more than a year. Denies recent injury or heavy lifting. He denies chest pain or shortness of breath. Denies bowel disturbance. Review of Systems   Constitutional: Negative for activity change. HENT: Negative for trouble swallowing and voice change. Eyes: Negative for visual disturbance. Respiratory: Negative for shortness of breath. Cardiovascular: Negative for chest pain and leg swelling. Gastrointestinal: Negative for abdominal pain, blood in stool, constipation and diarrhea. Genitourinary: Negative for difficulty urinating, dysuria, frequency, hematuria and scrotal swelling. Musculoskeletal: Negative for arthralgias and myalgias. Skin: Negative for rash. Neurological: Negative for dizziness. Psychiatric/Behavioral: Negative for behavioral problems. Prior to Visit Medications    Medication Sig Taking?  Authorizing Provider   estazolam (PROSOM) 2 MG tablet Take 1-2 tabs nightly as needed for sleep  Madison Gerardo MD   venlafaxine (EFFEXOR XR) 150 MG extended release capsule TAKE ONE (1) CAPSULE BY MOUTH DAILY   Madison Gerardo MD   losartan (COZAAR) 100 MG tablet TAKE ONE (1) TABLET BY MOUTH DAILY   Madison Gerardo MD   hydroCHLOROthiazide (HYDRODIURIL) 25 MG tablet Take 0.5 tablets by mouth daily  Madison Gerardo MD famotidine (PEPCID) 20 MG tablet Take 1 tablet by mouth 2 times daily  Bernice Martel, APRN - CNP   pravastatin (PRAVACHOL) 40 MG tablet TAKE ONE (1) TABLET BY MOUTH DAILY   Akin Jones MD   QUEtiapine (SEROQUEL) 25 MG tablet TAKE ONE (1) TABLET BY MOUTH NIGHTLY   Akin Jones MD   amLODIPine (NORVASC) 10 MG tablet Take 0.5 tablets by mouth daily  Akin Jones MD   atenolol (TENORMIN) 100 MG tablet Take 1 tablet by mouth daily  Akin Jones MD   Fluticasone furoate-vilanterol (BREO ELLIPTA) 200-25 MCG/INH AEPB inhaler Inhale 1 puff into the lungs daily  Akin Jones MD   albuterol sulfate HFA (VENTOLIN HFA) 108 (90 Base) MCG/ACT inhaler Inhale 2 puffs into the lungs every 6 hours as needed for Wheezing or Shortness of Breath  Akin Jones MD   diphenoxylate-atropine (LOMOTIL) 2.5-0.025 MG per tablet Take 1 tablet by mouth 4 times daily as needed for Diarrhea for up to 60 days. Akin Jones MD   aspirin 81 MG tablet Take 81 mg by mouth daily. Historical Provider, MD        Social History     Tobacco Use    Smoking status: Former Smoker     Packs/day: 1.00     Years: 20.00     Pack years: 20.00     Types: Cigarettes     Quit date: 1988     Years since quittin.6    Smokeless tobacco: Never Used   Substance Use Topics    Alcohol use: No        Vitals:    21 1500 21 1515   BP: (!) 166/89 (!) 140/68   Pulse: 78    Resp: 18    Temp: 97.5 °F (36.4 °C)    TempSrc: Temporal    Weight: 163 lb 3.2 oz (74 kg)      Estimated body mass index is 22.76 kg/m² as calculated from the following:    Height as of 20: 5' 11\" (1.803 m). Weight as of this encounter: 163 lb 3.2 oz (74 kg). Physical Exam  Constitutional:       General: He is not in acute distress. Appearance: He is well-developed. HENT:      Head: Normocephalic. Eyes:      Conjunctiva/sclera: Conjunctivae normal.   Neck:      Musculoskeletal: Neck supple. Thyroid: No thyromegaly. Cardiovascular:      Rate and Rhythm: Normal rate and regular rhythm. Heart sounds: Normal heart sounds. No murmur. Pulmonary:      Effort: No respiratory distress. Breath sounds: Normal breath sounds. No wheezing or rales. Abdominal:      General: There is no distension. Palpations: Abdomen is soft. Musculoskeletal: Normal range of motion. Skin:     General: Skin is warm. Findings: No rash. Neurological:      Mental Status: He is alert and oriented to person, place, and time. Psychiatric:         Behavior: Behavior normal.         ASSESSMENT/PLAN:  1. Chronic pain of left knee  Advised to take Tylenol or NSAID of choice. Will order x-ray of the left knee. Also recommend to see orthopedic Dr. Chema Crenshaw  - XR KNEE LEFT (1-2 VIEWS); Future    2. Essential hypertension  His blood pressure is borderline elevated. He reduce the dose of hydrochlorothiazide from 25 mg to 12.5 mg daily due to urinary frequency. He does not want to bring it back to his usual dose of 25 mg daily and also hesitate to take another medication. Continue losartan 100 mg daily, atenolol 100 mg daily and amlodipine 10 mg daily    3. Dysthymia  He is doing fairly well on Effexor 150 mg daily plus Seroquel 25 mg daily    4. Primary insomnia  He takes ProSom 2 to 4 mg at night as needed for sleep. RTC in 3 mos.  Blood test next visit-    An electronic signature was used to authenticate this note.    --Yancy Turner MD on 3/24/2021 at 3:50 PM

## 2021-03-26 RX ORDER — AMLODIPINE BESYLATE 10 MG/1
5 TABLET ORAL DAILY
Qty: 45 TABLET | Refills: 1 | Status: SHIPPED | OUTPATIENT
Start: 2021-03-26 | End: 2021-08-10 | Stop reason: SDUPTHER

## 2021-04-01 ENCOUNTER — HOSPITAL ENCOUNTER (OUTPATIENT)
Dept: GENERAL RADIOLOGY | Age: 76
Discharge: HOME | End: 2021-04-01
Payer: MEDICARE

## 2021-04-01 ENCOUNTER — HOSPITAL ENCOUNTER (OUTPATIENT)
Age: 76
Discharge: HOME | End: 2021-04-01
Payer: MEDICARE

## 2021-04-01 DIAGNOSIS — M25.562 CHRONIC PAIN OF LEFT KNEE: ICD-10-CM

## 2021-04-01 DIAGNOSIS — G89.29 CHRONIC PAIN OF LEFT KNEE: ICD-10-CM

## 2021-04-01 PROCEDURE — 73560 X-RAY EXAM OF KNEE 1 OR 2: CPT

## 2021-04-05 ENCOUNTER — OFFICE VISIT (OUTPATIENT)
Dept: ORTHOPEDIC SURGERY | Age: 76
End: 2021-04-05
Payer: MEDICARE

## 2021-04-05 VITALS
WEIGHT: 160 LBS | TEMPERATURE: 97.2 F | DIASTOLIC BLOOD PRESSURE: 90 MMHG | HEART RATE: 71 BPM | BODY MASS INDEX: 22.4 KG/M2 | HEIGHT: 71 IN | SYSTOLIC BLOOD PRESSURE: 189 MMHG

## 2021-04-05 DIAGNOSIS — M17.12 PRIMARY OSTEOARTHRITIS OF LEFT KNEE: Primary | ICD-10-CM

## 2021-04-05 PROCEDURE — 1036F TOBACCO NON-USER: CPT | Performed by: ORTHOPAEDIC SURGERY

## 2021-04-05 PROCEDURE — G8427 DOCREV CUR MEDS BY ELIG CLIN: HCPCS | Performed by: ORTHOPAEDIC SURGERY

## 2021-04-05 PROCEDURE — 4040F PNEUMOC VAC/ADMIN/RCVD: CPT | Performed by: ORTHOPAEDIC SURGERY

## 2021-04-05 PROCEDURE — 1123F ACP DISCUSS/DSCN MKR DOCD: CPT | Performed by: ORTHOPAEDIC SURGERY

## 2021-04-05 PROCEDURE — 99203 OFFICE O/P NEW LOW 30 MIN: CPT | Performed by: ORTHOPAEDIC SURGERY

## 2021-04-05 PROCEDURE — G8420 CALC BMI NORM PARAMETERS: HCPCS | Performed by: ORTHOPAEDIC SURGERY

## 2021-04-05 NOTE — PROGRESS NOTES
[facility]  [facility]    Chief Complaint: Left knee pain    HPI:  Wili Aiken is a 66 y.o. who is here for assessment of left knee pain. Reports that his left knee will often times give out on him and he falls. He is taking naproxen which does help with the pain. He reports initial injury plan baseball in college many years ago. He has had intermittent knee pain over the years since that time. He wears a brace for his knee as well. He is active on a stationary bicycle as well as in yoga. He has not had any injections or surgery to the knee. He denies any hip or groin pain. Patient Active Problem List   Diagnosis    Primary insomnia    Hyperlipidemia LDL goal <100    Chronic diarrhea    Irritable bowel syndrome with diarrhea    Essential hypertension    Pure hypercholesterolemia    Dysthymia    Depression with anxiety    COPD, mild (HCC)    Adenomatous polyp of rectum    Anxiety    Acute respiratory failure with hypoxia (HCC)    Bradycardia       ROS:  Constitutional: denies fever, chills, weight loss  MSK: denies pain in other joints, muscle aches  Neurological: denies numbness, tingling, weakness    Exam:  Blood pressure (!) 189/90, pulse 71, temperature 97.2 °F (36.2 °C), temperature source Temporal, height 5' 11\" (1.803 m), weight 160 lb (72.6 kg). Appearance: sitting in exam room chair, appears to be in no acute distress, awake and alert  Resp: unlabored breathing on room air  Skin: warm, dry and intact with out erythema or significant increased temperature  Neuro: grossly intact both lower extremities. Intact sensation to light touch. Motor exam 4+ to 5/5 in all major motor groups. Left knee: Examination reveals that knee range of motion is 0 to 135 degrees. There is varus deformity, positive crepitus, positive joint line tenderness, positive antalgic gait. Neurologically, plantar flexion and dorsiflexion is intact.   Pulses palpable

## 2021-04-10 DIAGNOSIS — F51.04 PSYCHOPHYSIOLOGICAL INSOMNIA: ICD-10-CM

## 2021-04-13 DIAGNOSIS — F51.04 PSYCHOPHYSIOLOGICAL INSOMNIA: ICD-10-CM

## 2021-05-04 ENCOUNTER — OFFICE VISIT (OUTPATIENT)
Dept: PRIMARY CARE CLINIC | Age: 76
End: 2021-05-04
Payer: MEDICARE

## 2021-05-04 ENCOUNTER — TELEPHONE (OUTPATIENT)
Dept: PRIMARY CARE CLINIC | Age: 76
End: 2021-05-04

## 2021-05-04 VITALS
HEART RATE: 65 BPM | SYSTOLIC BLOOD PRESSURE: 150 MMHG | OXYGEN SATURATION: 98 % | DIASTOLIC BLOOD PRESSURE: 75 MMHG | RESPIRATION RATE: 18 BRPM | WEIGHT: 163 LBS | BODY MASS INDEX: 22.73 KG/M2

## 2021-05-04 DIAGNOSIS — R00.1 BRADYCARDIA: Primary | ICD-10-CM

## 2021-05-04 DIAGNOSIS — E78.5 HYPERLIPIDEMIA LDL GOAL <100: ICD-10-CM

## 2021-05-04 DIAGNOSIS — I10 ESSENTIAL HYPERTENSION: ICD-10-CM

## 2021-05-04 DIAGNOSIS — F41.8 DEPRESSION WITH ANXIETY: ICD-10-CM

## 2021-05-04 DIAGNOSIS — R73.09 ELEVATED GLUCOSE: ICD-10-CM

## 2021-05-04 DIAGNOSIS — J44.9 COPD, MILD (HCC): ICD-10-CM

## 2021-05-04 DIAGNOSIS — D69.6 THROMBOCYTOPENIA (HCC): ICD-10-CM

## 2021-05-04 DIAGNOSIS — I10 ESSENTIAL HYPERTENSION: Primary | ICD-10-CM

## 2021-05-04 DIAGNOSIS — Z11.59 NEED FOR HEPATITIS C SCREENING TEST: ICD-10-CM

## 2021-05-04 PROCEDURE — 1123F ACP DISCUSS/DSCN MKR DOCD: CPT | Performed by: FAMILY MEDICINE

## 2021-05-04 PROCEDURE — G8427 DOCREV CUR MEDS BY ELIG CLIN: HCPCS | Performed by: FAMILY MEDICINE

## 2021-05-04 PROCEDURE — G8420 CALC BMI NORM PARAMETERS: HCPCS | Performed by: FAMILY MEDICINE

## 2021-05-04 PROCEDURE — 3023F SPIROM DOC REV: CPT | Performed by: FAMILY MEDICINE

## 2021-05-04 PROCEDURE — 93000 ELECTROCARDIOGRAM COMPLETE: CPT | Performed by: FAMILY MEDICINE

## 2021-05-04 PROCEDURE — 4040F PNEUMOC VAC/ADMIN/RCVD: CPT | Performed by: FAMILY MEDICINE

## 2021-05-04 PROCEDURE — 1036F TOBACCO NON-USER: CPT | Performed by: FAMILY MEDICINE

## 2021-05-04 PROCEDURE — G8926 SPIRO NO PERF OR DOC: HCPCS | Performed by: FAMILY MEDICINE

## 2021-05-04 PROCEDURE — 99214 OFFICE O/P EST MOD 30 MIN: CPT | Performed by: FAMILY MEDICINE

## 2021-05-04 RX ORDER — HYDROCHLOROTHIAZIDE 25 MG/1
25 TABLET ORAL DAILY
Qty: 90 TABLET | Refills: 1
Start: 2021-05-04 | End: 2021-08-02 | Stop reason: SDUPTHER

## 2021-05-04 RX ORDER — ATENOLOL 50 MG/1
50 TABLET ORAL DAILY
Qty: 90 TABLET | Refills: 1
Start: 2021-05-04 | End: 2021-05-10 | Stop reason: ALTCHOICE

## 2021-05-04 ASSESSMENT — ENCOUNTER SYMPTOMS
ABDOMINAL PAIN: 0
CONSTIPATION: 0
DIARRHEA: 0
BLOOD IN STOOL: 0
TROUBLE SWALLOWING: 0
VOICE CHANGE: 0
SHORTNESS OF BREATH: 0

## 2021-05-04 NOTE — PROGRESS NOTES
2021     Alena Ponce (:  1945) is a 66 y.o. male, here for evaluation of the following medical concerns:    HPI  Patient presented to the office for checkup. Patient has been taking his pulse and oxygen following his COVID-19 pneumonia last 2020. He reported that his oxygen saturation is okay above 90% but he has multiple readings of low heart rate as low as 32, 36, 39 and 42. He complained of vague symptoms of lightheadedness. He also complained of general fatigue which is probably lingering effect of the COVID-19 pneumonia. He has hypertension and takes atenolol 100 mg daily losartan 100 mg daily and hydrochlorothiazide which he cut it down to 12.5 mg daily due to urinary frequency. He has anxiety depression and mood disorder takes Seroquel and Effexor. He has insomnia and takes of ProSom. He has mild COPD controlled with Breo and Ventolin inhaler. He denies chest pain or palpitation. He denies shortness of breath but she complains of general fatigue which he thinks may be related to the lingering effect of COVID-19 the pneumonia which he contracted last December. Review of Systems   Constitutional: Negative for activity change. HENT: Negative for trouble swallowing and voice change. Eyes: Negative for visual disturbance. Respiratory: Negative for shortness of breath. Cardiovascular: Negative for chest pain and leg swelling. Gastrointestinal: Negative for abdominal pain, blood in stool, constipation and diarrhea. Genitourinary: Negative for difficulty urinating, dysuria, frequency, hematuria and scrotal swelling. Musculoskeletal: Negative for arthralgias and myalgias. Skin: Negative for rash. Neurological: Positive for light-headedness. Negative for dizziness. Psychiatric/Behavioral: Negative for behavioral problems. Prior to Visit Medications    Medication Sig Taking?  Authorizing Provider   atenolol (TENORMIN) 50 MG tablet Take 1 tablet by mouth daily Yes Radha Darnell MD   hydroCHLOROthiazide (HYDRODIURIL) 25 MG tablet Take 1 tablet by mouth daily Yes Radha Darnell MD   amLODIPine (NORVASC) 10 MG tablet Take 0.5 tablets by mouth daily Yes Radha Darnell MD   losartan (COZAAR) 100 MG tablet TAKE ONE (1) TABLET BY MOUTH DAILY  Yes Radha Darnell MD   Fluticasone furoate-vilanterol (BREO ELLIPTA) 200-25 MCG/INH AEPB inhaler Inhale 1 puff into the lungs daily Yes Radha Darnell MD   albuterol sulfate HFA (VENTOLIN HFA) 108 (90 Base) MCG/ACT inhaler Inhale 2 puffs into the lungs every 6 hours as needed for Wheezing or Shortness of Breath Yes Radha Darnell MD   estazolam (PROSOM) 2 MG tablet Take 1-2 tabs nightly as needed for sleep  Radha Darnell MD   venlafaxine (EFFEXOR XR) 150 MG extended release capsule TAKE ONE (1) CAPSULE BY MOUTH DAILY   Radha Darnell MD   famotidine (PEPCID) 20 MG tablet Take 1 tablet by mouth 2 times daily  KLAUS Hazel - CNP   pravastatin (PRAVACHOL) 40 MG tablet TAKE ONE (1) TABLET BY MOUTH DAILY   Radha Darnell MD   QUEtiapine (SEROQUEL) 25 MG tablet TAKE ONE (1) TABLET BY MOUTH NIGHTLY   Radha Darnell MD   diphenoxylate-atropine (LOMOTIL) 2.5-0.025 MG per tablet Take 1 tablet by mouth 4 times daily as needed for Diarrhea for up to 60 days. Radha Darnell MD   aspirin 81 MG tablet Take 81 mg by mouth daily. Historical Provider, MD        Social History     Tobacco Use    Smoking status: Former Smoker     Packs/day: 1.00     Years: 20.00     Pack years: 20.00     Types: Cigarettes     Quit date: 1988     Years since quittin.7    Smokeless tobacco: Never Used   Substance Use Topics    Alcohol use: No        Vitals:    21 1449   BP: (!) 150/75   Pulse: 65   Resp: 18   SpO2: 98%   Weight: 163 lb (73.9 kg)     Estimated body mass index is 22.73 kg/m² as calculated from the following:    Height as of 21: 5' 11\" (1.803 m).     Weight as of this encounter: 163 lb (73.9 kg).    Physical Exam  Constitutional:       Appearance: He is well-developed. HENT:      Head: Normocephalic. Eyes:      Conjunctiva/sclera: Conjunctivae normal.      Pupils: Pupils are equal, round, and reactive to light. Neck:      Musculoskeletal: Normal range of motion and neck supple. Thyroid: No thyromegaly. Cardiovascular:      Rate and Rhythm: Normal rate and regular rhythm. Heart sounds: Normal heart sounds. No murmur. Pulmonary:      Effort: Pulmonary effort is normal.      Breath sounds: Normal breath sounds. Abdominal:      General: Bowel sounds are normal.      Palpations: Abdomen is soft. There is no mass. Genitourinary:     Penis: Normal.       Prostate: Normal.   Musculoskeletal: Normal range of motion. Skin:     General: Skin is warm. Findings: No rash. Neurological:      Mental Status: He is alert. Psychiatric:         Behavior: Behavior normal.         ASSESSMENT/PLAN:  1. Bradycardia  Pulse rate at home for several days has been fluctuating but it goes down to 32, 36 39 and 42 bpm.  Patient has vague symptoms of lightheadedness and general fatigue  In December 16, 2020 when he was in the hospital for Covid pneumonia he has an EKG reading of second-degree AV block with 2 : 1 AV conduction and right bundle branch block  Heart rate of 46. Today's EKG showed first-degree AV block and right bundle branch block HR 65  Will refer to electrophysiologist for further evaluation and management  - EKG [address] Krunal Gomez MD, Cardiology, [facility]    2. Essential hypertension  Will reduce atenolol from 100 mg to 50 mg daily. If bradycardia persists will stop atenolol. Will increase hydrochlorothiazide from 12.5 to 25 mg daily. Continue losartan 100 mg daily    3. COPD, mild (HCC)  Continue Breo and Ventolin inhaler. 4. Thrombocytopenia (HCC)  Stable. 5. Depression with anxiety  Stable. Continue current medication.   Effexor and Seroquel      RTC in 1 mo and PRN    An electronic signature was used to authenticate this note.    --Amy Julio MD on 5/4/2021 at 6:07 PM

## 2021-05-10 ENCOUNTER — OFFICE VISIT (OUTPATIENT)
Dept: CARDIOLOGY CLINIC | Age: 76
End: 2021-05-10
Payer: MEDICARE

## 2021-05-10 VITALS
SYSTOLIC BLOOD PRESSURE: 148 MMHG | HEART RATE: 44 BPM | HEIGHT: 71 IN | BODY MASS INDEX: 22.4 KG/M2 | OXYGEN SATURATION: 96 % | DIASTOLIC BLOOD PRESSURE: 70 MMHG | WEIGHT: 160 LBS

## 2021-05-10 DIAGNOSIS — R00.1 BRADYCARDIA: Primary | ICD-10-CM

## 2021-05-10 PROCEDURE — 4040F PNEUMOC VAC/ADMIN/RCVD: CPT | Performed by: INTERNAL MEDICINE

## 2021-05-10 PROCEDURE — 93242 EXT ECG>48HR<7D RECORDING: CPT | Performed by: INTERNAL MEDICINE

## 2021-05-10 PROCEDURE — 93000 ELECTROCARDIOGRAM COMPLETE: CPT | Performed by: INTERNAL MEDICINE

## 2021-05-10 PROCEDURE — G8427 DOCREV CUR MEDS BY ELIG CLIN: HCPCS | Performed by: INTERNAL MEDICINE

## 2021-05-10 PROCEDURE — 1123F ACP DISCUSS/DSCN MKR DOCD: CPT | Performed by: INTERNAL MEDICINE

## 2021-05-10 PROCEDURE — G8420 CALC BMI NORM PARAMETERS: HCPCS | Performed by: INTERNAL MEDICINE

## 2021-05-10 PROCEDURE — 1036F TOBACCO NON-USER: CPT | Performed by: INTERNAL MEDICINE

## 2021-05-10 PROCEDURE — 99204 OFFICE O/P NEW MOD 45 MIN: CPT | Performed by: INTERNAL MEDICINE

## 2021-05-10 RX ORDER — PRAVASTATIN SODIUM 40 MG
40 TABLET ORAL DAILY
Qty: 90 TABLET | Refills: 1 | Status: SHIPPED | OUTPATIENT
Start: 2021-05-10 | End: 2021-11-16

## 2021-05-10 RX ORDER — HYDRALAZINE HYDROCHLORIDE 10 MG/1
10 TABLET, FILM COATED ORAL 3 TIMES DAILY
Qty: 90 TABLET | Refills: 3 | Status: SHIPPED | OUTPATIENT
Start: 2021-05-10 | End: 2021-05-17

## 2021-05-10 NOTE — PROGRESS NOTES
Cardiac Electrophysiology Consultation   Date: 5/10/2021  Reason for Consultation: Bradycardia  Consult Requesting Physician:  Asaf Marroquin MD    Chief Complaint:   Chief Complaint   Patient presents with    New Patient     bradycardia      HPI: Andrea Townsend is a 66 y.o. patient with a history of COVID-19 pneumonia (Dec. 2020), anxiety, COPD, hyperlipidemia, HTN, IBS and depression. He was seen in office by his PCP Asaf Marroquin MD on 5/4/2021 and reported to him that he had multiple reading on his pulse oximeter showing HR as low at 32, 36 and 42. He reported vague symptoms of lightheadedness. Dr. Roc Mckeon cut his Atenolol from 100 mg daily to 50 mg daily. Interval History: Today, he presents to office for initial consultation for evaluation of bradycardia. He states that he researched his medications and found that atenolol was a beta block and he also looked into the CBD oil he was applying to his knee and he took CBD under the tongue to help him sleep and he found it also has effects of a beta blocker. He stopped the CBD oil on Friday 5/7/2021. He states he waited one day to take his atenolol later in the day and his heart rate was in the 60's. He states that he used to ride a bike several miles a day but when he started feeling unsteady her transitioned into riding a stationary bike and rode it 2-3 miles yesterday.       Past Medical History:   Diagnosis Date    Anxiety     COPD, mild ([address] 4/24/2017    Depression     History of blood transfusion     Hyperlipidemia     Hypertension     IBS (irritable bowel syndrome) 10/16/2013    Insomnia         Past Surgical History:   Procedure Laterality Date    APPENDECTOMY      COLONOSCOPY  03/19/2019    [place]    COLONOSCOPY N/A 3/19/2019    COLONOSCOPY WITH BIOPSY performed by Finesse Moctezuma MD at [address] COLONOSCOPY N/A 3/19/2019    COLONOSCOPY POLYPECTOMY SNARE/COLD BIOPSY performed by Finesse Moctezuma MD at [address] TONSILLECTOMY AND ADENOIDECTOMY         Allergies:  No Known Allergies    Medication:   Prior to Admission medications    Medication Sig Start Date End Date Taking? Authorizing Provider   atenolol (TENORMIN) 50 MG tablet Take 1 tablet by mouth daily 5/4/21  Yes My Slaughter MD   hydroCHLOROthiazide (HYDRODIURIL) 25 MG tablet Take 1 tablet by mouth daily 5/4/21  Yes My Slaughter MD   estazolam (PROSOM) 2 MG tablet Take 1-2 tabs nightly as needed for sleep 4/13/21 5/12/21 Yes My Slaughter MD   amLODIPine (NORVASC) 10 MG tablet Take 0.5 tablets by mouth daily 3/26/21  Yes My Slaughter MD   venlafaxine (EFFEXOR XR) 150 MG extended release capsule TAKE ONE (1) CAPSULE BY MOUTH DAILY  3/8/21  Yes My Slaughter MD   losartan (COZAAR) 100 MG tablet TAKE ONE (1) TABLET BY MOUTH DAILY  2/9/21  Yes My Slaughter MD   famotidine (PEPCID) 20 MG tablet Take 1 tablet by mouth 2 times daily 12/23/20  Yes KLUAS Goldberg - CNP   pravastatin (PRAVACHOL) 40 MG tablet TAKE ONE (1) TABLET BY MOUTH DAILY  11/2/20  Yes My Slaughter MD   QUEtiapine (SEROQUEL) 25 MG tablet TAKE ONE (1) TABLET BY MOUTH NIGHTLY  11/2/20  Yes My Slaughter MD   Fluticasone furoate-vilanterol (BREO ELLIPTA) 200-25 MCG/INH AEPB inhaler Inhale 1 puff into the lungs daily 10/5/20  Yes My Slaughter MD   albuterol sulfate HFA (VENTOLIN HFA) 108 (90 Base) MCG/ACT inhaler Inhale 2 puffs into the lungs every 6 hours as needed for Wheezing or Shortness of Breath 9/1/20  Yes My Slaughter MD   aspirin 81 MG tablet Take 81 mg by mouth daily. Yes Historical Provider, MD   diphenoxylate-atropine (LOMOTIL) 2.5-0.025 MG per tablet Take 1 tablet by mouth 4 times daily as needed for Diarrhea for up to 60 days. 4/20/20 6/19/20  My Slaughter MD       Social History:   reports that he quit smoking about 32 years ago. His smoking use included cigarettes. He has a 20.00 pack-year smoking history.  He has never used smokeless tobacco. He reports that he does not drink alcohol or use drugs. Family History:  family history includes Cancer in an other family member; Hypertension in his brother, brother, and sister; Stroke in his maternal grandfather. Reviewed. Denies family history of sudden cardiac death, arrhythmia, premature CAD    Review of System:    · General ROS: negative for - chills, fever   · Psychological ROS: negative for - anxiety or depression  · Ophthalmic ROS: negative for - eye pain or loss of vision  · ENT ROS: negative for - epistaxis, headaches, nasal discharge, sore throat   · Allergy and Immunology ROS: negative for - hives, nasal congestion   · Hematological and Lymphatic ROS: negative for - bleeding problems, blood clots, bruising or jaundice  · Endocrine ROS: negative for - skin changes, temperature intolerance or unexpected weight changes  · Respiratory ROS: negative for - cough, hemoptysis, pleuritic pain, SOB, sputum changes or wheezing  · Cardiovascular ROS: Per HPI. · Gastrointestinal ROS: negative for - abdominal pain, blood in stools, diarrhea, hematemesis, melena, nausea/vomiting or swallowing difficulty/pain  · Genito-Urinary ROS: negative for - dysuria or incontinence  · Musculoskeletal ROS: negative for - joint swelling or muscle pain  · Neurological ROS: negative for - confusion, dizziness, gait disturbance, headaches, numbness/tingling, seizures, speech problems, tremors, visual changes or weakness  · Dermatological ROS: negative for - rash    Physical Examination:  Vitals:    05/10/21 1501   BP: (!) 148/70   Pulse:    SpO2:        · Constitutional: Oriented. No distress. · Head: Normocephalic and atraumatic. · Mouth/Throat: Oropharynx is clear and moist.   · Eyes: Conjunctivae normal. EOM are normal.   · Neck: Normal range of motion. Neck supple. No rigidity. No JVD present. · Cardiovascular: Normal rate, regular rhythm, S1&S2 and intact distal pulses.    · Pulmonary/Chest: Bilateral respiratory sounds. No wheezes. No rhonchi. · Abdominal: Soft. Bowel sounds present. No distension, No tenderness. · Musculoskeletal: No tenderness. No edema    · Lymphadenopathy: Has no cervical adenopathy. · Neurological: Alert and oriented. Cranial nerve appears intact, No Gross deficit   · Skin: Skin is warm and dry. No rash noted. · Psychiatric: Has a normal mood, affect and behavior     Labs:  Reviewed. EC/10/2021 reviewed, sinus bradycardia with 2:1 AV block with v-rate of 37 bpm with QRS duration 140 ms. No pathologic Q waves, ventricular pre-excitation, or QT prolongation. Studies:   1. Event monitor:       2. Echo: 2019  Summary  Left ventricular cavity size is normal. Assymetric basal septal hypertrophy  EF 55-60%. Left atrium is of normal size. Mild mitral regurgitation. Normal right ventricular size and function. Mild tricuspid regurgitation  Trivial pulmonic regurgitation present. 3. Stress Echo:  10/9/2015  Summary  Normal (Negative) response to exercise. Baseline echocardiogram shows normal LV function with an ejection fraction of 60%. Following exercise there was uniform augmentation of all segments with appropriate hyperdynamic response to exercise. Normal; stress echo. 4. Cath:     I independently reviewed the ECG, MCOT, echocardiogram, stress test, and coronary angiography/PCI results and used them for my plan of care. Procedures:  1. Assessment/Plan:     Bradycardia  EKG showed Sinus rhythm with  2:1 AV block with v-rates 37 bpm.   Ambulating in our hallway revealed a HR maximum of 50 bpm, strongly suggesting chronotropic incompetence. Unfortunately, we did not have EKG monitoring during the ambulation. Instead we were only able to place a pulse oximeter. His level of exertion was limited by shortness of breath.   48 hour CAM patch to assess heart rate and rhythm with specific instructions from us to do physical activities, including the patient's norm of stationary bicycle. Instructed to not limit his activity in anyway during the time he wears the patch. In the meantime, we will taper his atenolol to 25mg daily x 3 days, then stop. Follow up on 5/17/2021. Thank you for allowing me to participate in the care of Thom Gordillo. All questions and concerns were addressed to the patient/family. Alternatives to my treatment were discussed. This note was scribed in the presence of Dr. Sharonda Cardona MD by Katharina Rosario RN. The scribe's documentation has been prepared under my direction and personally reviewed by me in its entirety. I confirm that the note above accurately reflects all work, physical examination, the discussion of treatments and procedures, and medical decision making performed by me.     Sharonda Cardona MD, MS, [facility] [place], [facility]  Cardiac Electrophysiology  [address]  [address] [facility], [address] [place], [place] [address]Rudi yeh [facility] [address]  [phone]

## 2021-05-13 DIAGNOSIS — I10 ESSENTIAL HYPERTENSION: ICD-10-CM

## 2021-05-13 DIAGNOSIS — Z11.59 NEED FOR HEPATITIS C SCREENING TEST: ICD-10-CM

## 2021-05-13 DIAGNOSIS — R73.09 ELEVATED GLUCOSE: ICD-10-CM

## 2021-05-13 DIAGNOSIS — E78.5 HYPERLIPIDEMIA LDL GOAL <100: ICD-10-CM

## 2021-05-13 LAB
A/G RATIO: 2.4 (ref 1.1–2.2)
ALBUMIN SERPL-MCNC: 4.4 G/DL (ref 3.4–5)
ALP BLD-CCNC: 92 U/L (ref 40–129)
ALT SERPL-CCNC: 24 U/L (ref 10–40)
ANION GAP SERPL CALCULATED.3IONS-SCNC: 14 MMOL/L (ref 3–16)
AST SERPL-CCNC: 27 U/L (ref 15–37)
BASOPHILS ABSOLUTE: 0.1 K/UL (ref 0–0.2)
BASOPHILS RELATIVE PERCENT: 0.9 %
BILIRUB SERPL-MCNC: 0.4 MG/DL (ref 0–1)
BUN BLDV-MCNC: 22 MG/DL (ref 7–20)
CALCIUM SERPL-MCNC: 9.2 MG/DL (ref 8.3–10.6)
CHLORIDE BLD-SCNC: 101 MMOL/L (ref 99–110)
CHOLESTEROL, FASTING: 130 MG/DL (ref 0–199)
CO2: 25 MMOL/L (ref 21–32)
CREAT SERPL-MCNC: 0.9 MG/DL (ref 0.8–1.3)
EOSINOPHILS ABSOLUTE: 0.3 K/UL (ref 0–0.6)
EOSINOPHILS RELATIVE PERCENT: 4.2 %
GFR AFRICAN AMERICAN: >60
GFR NON-AFRICAN AMERICAN: >60
GLOBULIN: 1.8 G/DL
GLUCOSE FASTING: 80 MG/DL (ref 70–99)
HCT VFR BLD CALC: 43.1 % (ref 40.5–52.5)
HDLC SERPL-MCNC: 49 MG/DL (ref 40–60)
HEMOGLOBIN: 14.7 G/DL (ref 13.5–17.5)
LDL CHOLESTEROL CALCULATED: 67 MG/DL
LYMPHOCYTES ABSOLUTE: 1.3 K/UL (ref 1–5.1)
LYMPHOCYTES RELATIVE PERCENT: 21.9 %
MCH RBC QN AUTO: 27.4 PG (ref 26–34)
MCHC RBC AUTO-ENTMCNC: 34.1 G/DL (ref 31–36)
MCV RBC AUTO: 80.5 FL (ref 80–100)
MONOCYTES ABSOLUTE: 0.6 K/UL (ref 0–1.3)
MONOCYTES RELATIVE PERCENT: 10 %
NEUTROPHILS ABSOLUTE: 3.8 K/UL (ref 1.7–7.7)
NEUTROPHILS RELATIVE PERCENT: 63 %
PDW BLD-RTO: 16.4 % (ref 12.4–15.4)
PLATELET # BLD: 191 K/UL (ref 135–450)
PMV BLD AUTO: 8.4 FL (ref 5–10.5)
POTASSIUM SERPL-SCNC: 3.6 MMOL/L (ref 3.5–5.1)
RBC # BLD: 5.35 M/UL (ref 4.2–5.9)
SODIUM BLD-SCNC: 140 MMOL/L (ref 136–145)
T4 FREE: 0.9 NG/DL (ref 0.9–1.8)
TOTAL PROTEIN: 6.2 G/DL (ref 6.4–8.2)
TRIGLYCERIDE, FASTING: 69 MG/DL (ref 0–150)
TSH SERPL DL<=0.05 MIU/L-ACNC: 2.91 UIU/ML (ref 0.27–4.2)
VLDLC SERPL CALC-MCNC: 14 MG/DL
WBC # BLD: 6.1 K/UL (ref 4–11)

## 2021-05-14 LAB
ESTIMATED AVERAGE GLUCOSE: 134.1 MG/DL
HBA1C MFR BLD: 6.3 %
HEPATITIS C ANTIBODY INTERPRETATION: NORMAL

## 2021-05-17 ENCOUNTER — OFFICE VISIT (OUTPATIENT)
Dept: CARDIOLOGY CLINIC | Age: 76
End: 2021-05-17
Payer: MEDICARE

## 2021-05-17 VITALS
OXYGEN SATURATION: 98 % | HEART RATE: 80 BPM | SYSTOLIC BLOOD PRESSURE: 170 MMHG | DIASTOLIC BLOOD PRESSURE: 80 MMHG | HEIGHT: 71 IN | WEIGHT: 162 LBS | BODY MASS INDEX: 22.68 KG/M2

## 2021-05-17 DIAGNOSIS — R00.1 BRADYCARDIA: Primary | ICD-10-CM

## 2021-05-17 DIAGNOSIS — I48.0 PAF (PAROXYSMAL ATRIAL FIBRILLATION) (HCC): ICD-10-CM

## 2021-05-17 DIAGNOSIS — I44.1 MOBITZ TYPE 2 SECOND DEGREE ATRIOVENTRICULAR BLOCK: ICD-10-CM

## 2021-05-17 PROCEDURE — 1123F ACP DISCUSS/DSCN MKR DOCD: CPT | Performed by: INTERNAL MEDICINE

## 2021-05-17 PROCEDURE — 1036F TOBACCO NON-USER: CPT | Performed by: INTERNAL MEDICINE

## 2021-05-17 PROCEDURE — G8420 CALC BMI NORM PARAMETERS: HCPCS | Performed by: INTERNAL MEDICINE

## 2021-05-17 PROCEDURE — 93000 ELECTROCARDIOGRAM COMPLETE: CPT | Performed by: INTERNAL MEDICINE

## 2021-05-17 PROCEDURE — 99215 OFFICE O/P EST HI 40 MIN: CPT | Performed by: INTERNAL MEDICINE

## 2021-05-17 PROCEDURE — 93244 EXT ECG>48HR<7D REV&INTERPJ: CPT | Performed by: INTERNAL MEDICINE

## 2021-05-17 PROCEDURE — 4040F PNEUMOC VAC/ADMIN/RCVD: CPT | Performed by: INTERNAL MEDICINE

## 2021-05-17 PROCEDURE — G8427 DOCREV CUR MEDS BY ELIG CLIN: HCPCS | Performed by: INTERNAL MEDICINE

## 2021-05-17 RX ORDER — HYDRALAZINE HYDROCHLORIDE 25 MG/1
25 TABLET, FILM COATED ORAL 3 TIMES DAILY
Qty: 180 TABLET | Refills: 3 | Status: SHIPPED | OUTPATIENT
Start: 2021-05-17 | End: 2021-07-19 | Stop reason: SDUPTHER

## 2021-05-17 NOTE — PROGRESS NOTES
Cardiac Electrophysiology Consultation   Date: 5/17/2021  Reason for Consultation: Bradycardia  Consult Requesting Physician:  Nadine Breaux MD    Chief Complaint:   Chief Complaint   Patient presents with    Follow-up     CAM patch results/ bradycardia      HPI: Emilie Ferro is a 66 y.o. patient with a history of COVID-19 pneumonia (Dec. 2020), anxiety, COPD, hyperlipidemia, HTN, IBS and depression. He was seen in office by his PCP Nadine Breaux MD on 5/4/2021 and reported to him that he had multiple reading on his pulse oximeter showing HR as low at 32, 36 and 42. He reported vague symptoms of lightheadedness. Dr. Gm Womack cut his Atenolol from 100 mg daily to 50 mg daily. He presented to the office on 5/21/2021 for initial consultation for evaluation of bradycardia. He stated that he researched his medications and found that atenolol was a beta block and he also looked into the CBD oil he was applying to his knee and he took CBD under the tongue to help him sleep and he found it also has effects of a beta blocker. He stopped the CBD oil on Friday 5/7/2021. He stated he waited one day to take his atenolol later in the day and his heart rate was in the 60's. He stated that he used to ride a bike several miles a day but when he started feeling unsteady her transitioned into riding a stationary bike and rode it 2-3 miles yesterday. Interval History: He states that he has rode his stationary bike going as fast as one mile every 4 minutes. On 5/12/2021 at 8 pm he states he could have been riding the stationary bike. Even with this activity, his maximum heart rate was 75 bpm, which is insufficient for his activities. He is compliant with all of his medications. He had stopped the atenolol during his last clinic visit with me. He denies F/C/N/V/abd pain/orthopnea/PND.         Past Medical History:   Diagnosis Date    Anxiety     COPD, mild ([facility] [address] 4/24/2017    Depression     History of blood transfusion     Hyperlipidemia     Hypertension     IBS (irritable bowel syndrome) 10/16/2013    Insomnia         Past Surgical History:   Procedure Laterality Date    APPENDECTOMY      COLONOSCOPY  03/19/2019    [place]    COLONOSCOPY N/A 3/19/2019    COLONOSCOPY WITH BIOPSY performed by Saloni Melchor MD at [address] COLONOSCOPY N/A 3/19/2019    COLONOSCOPY POLYPECTOMY SNARE/COLD BIOPSY performed by Saloni Melchor MD at [address]         Allergies:  No Known Allergies    Medication:   Prior to Admission medications    Medication Sig Start Date End Date Taking? Authorizing Provider   Fluticasone furoate-vilanterol (BREO ELLIPTA) 200-25 MCG/INH AEPB inhaler Inhale 1 puff into the lungs daily 5/17/21  Yes Chaitanya Alaniz MD   pravastatin (PRAVACHOL) 40 MG tablet Take 1 tablet by mouth daily 5/10/21  Yes Chaitanya Alaniz MD   hydrALAZINE (APRESOLINE) 10 MG tablet Take 1 tablet by mouth 3 times daily 5/10/21  Yes Dinorah Saldana MD   hydroCHLOROthiazide (HYDRODIURIL) 25 MG tablet Take 1 tablet by mouth daily 5/4/21  Yes Chaitanya Alaniz MD   amLODIPine (NORVASC) 10 MG tablet Take 0.5 tablets by mouth daily 3/26/21  Yes Chaitanya Alaniz MD   venlafaxine (EFFEXOR XR) 150 MG extended release capsule TAKE ONE (1) CAPSULE BY MOUTH DAILY  3/8/21  Yes Chaitanya Alaniz MD   losartan (COZAAR) 100 MG tablet TAKE ONE (1) TABLET BY MOUTH DAILY  2/9/21  Yes Chaitanya Alaniz MD   famotidine (PEPCID) 20 MG tablet Take 1 tablet by mouth 2 times daily 12/23/20  Yes KLAUS Du CNP   QUEtiapine (SEROQUEL) 25 MG tablet TAKE ONE (1) TABLET BY MOUTH NIGHTLY  11/2/20  Yes Chaitanya Alaniz MD   albuterol sulfate HFA (VENTOLIN HFA) 108 (90 Base) MCG/ACT inhaler Inhale 2 puffs into the lungs every 6 hours as needed for Wheezing or Shortness of Breath 9/1/20  Yes Chaitanya Alaniz MD   aspirin 81 MG tablet Take 81 mg by mouth daily.    Yes Historical Provider, MD diphenoxylate-atropine (LOMOTIL) 2.5-0.025 MG per tablet Take 1 tablet by mouth 4 times daily as needed for Diarrhea for up to 60 days. 4/20/20 6/19/20  Jenny Pacheco MD       Social History:   reports that he quit smoking about 32 years ago. His smoking use included cigarettes. He has a 20.00 pack-year smoking history. He has never used smokeless tobacco. He reports that he does not drink alcohol and does not use drugs. Family History:  family history includes Cancer in an other family member; Hypertension in his brother, brother, and sister; Stroke in his maternal grandfather. Reviewed. Denies family history of sudden cardiac death, arrhythmia, premature CAD    Review of System:    · General ROS: negative for - chills, fever   · Psychological ROS: negative for - anxiety or depression  · Ophthalmic ROS: negative for - eye pain or loss of vision  · ENT ROS: negative for - epistaxis, headaches, nasal discharge, sore throat   · Allergy and Immunology ROS: negative for - hives, nasal congestion   · Hematological and Lymphatic ROS: negative for - bleeding problems, blood clots, bruising or jaundice  · Endocrine ROS: negative for - skin changes, temperature intolerance or unexpected weight changes  · Respiratory ROS: negative for - cough, hemoptysis, pleuritic pain, SOB, sputum changes or wheezing  · Cardiovascular ROS: Per HPI.    · Gastrointestinal ROS: negative for - abdominal pain, blood in stools, diarrhea, hematemesis, melena, nausea/vomiting or swallowing difficulty/pain  · Genito-Urinary ROS: negative for - dysuria or incontinence  · Musculoskeletal ROS: negative for - joint swelling or muscle pain  · Neurological ROS: negative for - confusion, dizziness, gait disturbance, headaches, numbness/tingling, seizures, speech problems, tremors, visual changes or weakness  · Dermatological ROS: negative for - rash    Physical Examination:  Vitals:    05/17/21 0951   BP: (!) 170/80   Pulse:    SpO2: · Constitutional: Oriented. No distress. · Head: Normocephalic and atraumatic. · Mouth/Throat: Oropharynx is clear and moist.   · Eyes: Conjunctivae normal. EOM are normal.   · Neck: Normal range of motion. Neck supple. No rigidity. No JVD present. · Cardiovascular: Normal rate, regular rhythm, S1&S2 and intact distal pulses. · Pulmonary/Chest: Bilateral respiratory sounds. No wheezes. No rhonchi. · Abdominal: Soft. Bowel sounds present. No distension, No tenderness. · Musculoskeletal: No tenderness. No edema    · Lymphadenopathy: Has no cervical adenopathy. · Neurological: Alert and oriented. Cranial nerve appears intact, No Gross deficit   · Skin: Skin is warm and dry. No rash noted. · Psychiatric: Has a normal mood, affect and behavior     Labs:  Reviewed. EC2021 reviewed,SR first degree A-V block ,RBBB with left axis -bifascicular block. with v-rate of 76 bpm with QRS duration 136 ms. No pathologic Q waves, ventricular pre-excitation, or QT prolongation. Studies:   1. Event monitor:       2. Echo: 2019  Summary  Left ventricular cavity size is normal. Assymetric basal septal hypertrophy  EF 55-60%. Left atrium is of normal size. Mild mitral regurgitation. Normal right ventricular size and function. Mild tricuspid regurgitation  Trivial pulmonic regurgitation present. 3. Stress Echo:  10/9/2015  Summary  Normal (Negative) response to exercise. Baseline echocardiogram shows normal LV function with an ejection fraction of 60%. Following exercise there was uniform augmentation of all segments with appropriate hyperdynamic response to exercise. Normal; stress echo. 4. Cath:     I independently reviewed the ECG, MCOT, echocardiogram, stress test, and coronary angiography/PCI results and used them for my plan of care. Procedures:  1.      Assessment/Plan:     Bradycardia  Sick sinus syndrome    EKG today shows sinus rhythm, with first degree A-V block ,RBBB with left axis -bifascicular block. On 5/10/2021 he was seen in office and ambulated in our hallway revealed a HR maximum of 50 bpm, strongly suggesting chronotropic incompetence. Unfortunately, we did not have EKG monitoring during the ambulation. Instead we were only able to place a pulse oximeter. His level of exertion was limited by shortness of breath. 48 hour CAM patch was placed on 5/10/2021 to assess heart rate and rhythm with specific instructions from us to do physical activities, including the patient's norm of stationary bicycle. He was instructed to not limit his activity in anyway during the time he wears the patch. 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 The risks and benefits of a PPM implantation were discussed at length with the patient. 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    The risks including, but not limited to, the risks of vascular injury, bleeding, infection, device malfunction, lead dislodgement, radiation exposure, injury to cardiac and surrounding structures (including pneumothorax), stroke, myocardial infarction and death were discussed in detail. The patient was also counseled at length about the risks of shital Covid-19 in the marcelo-operative and post-operative states including the recovery window of their procedure.  The patient was made aware that shital Covid-19 after a surgical procedure may worsen their prognosis for recovering from the virus and lend to a higher morbidity and or mortality risk. The patient was given the option of postponing their procedure. All of the topics above were covered with the patient. A literature packet from our office was also given to the patient to provide supplementary information for the patient as it pertains to PPM implantation, lifestyle changes, and the benefits and risks of undergoing the implantation and living with a PPM.     I spent 40 minutes face to face with the patient, with greater than 50% of that time spent in counseling on the above. 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 The patient has opted to proceed with the PPM implantation, and we will proceed with scheduling a [employer] 2-chamber PPM implantation for 2nd degree AV block type II. Atrial fibrillation  First seen on CAM patch monitor 5/10/2021. He has a [id number] Score 3 (HTN, AGE). We will start on [address] after pacemaker implantation. We have asked him to stop his ASA as there is no indication for him to be on this medication. We will have him come back into the clinic after the Pacemaker implantation to further discuss treatment options for this atrial fibrillation. Follow up one week after implantation in the device clinic for site check and device interrogation and then 3 months with Vicenta Aguero CNP. Thank you for allowing me to participate in the care of Jamaal Marte. All questions and concerns were addressed to the patient/family. Alternatives to my treatment were discussed. This note was scribed in the presence of Dr. Lana Reina MD by Christy Monge RN. The scribe's documentation has been prepared under my direction and personally reviewed by me in its entirety.  I confirm that the note above accurately reflects all work, physical examination, the discussion of treatments and procedures, and medical decision making performed by me.     Mohan Brasher MD, MS, [facility] [place], [facility]  Cardiac Electrophysiology  [address]  [address] [facility], [address] [place] [address]  [phone]

## 2021-05-17 NOTE — PATIENT INSTRUCTIONS
before they need to be replaced. Follow-up care is a key part of your treatment and safety. Be sure to make and go to all appointments, and call your doctor if you are having problems. It's also a good idea to know your test results and keep a list of the medicines you take. Where can you learn more? Go to [email or web address] and sign in to your [employer] account. Enter S141 in the [employer] box to learn more about \"Learning About a Pacemaker. \"     If you do not have an account, please click on the \"Sign Up Now\" link. Current as of: August 31, 2020               Content Version: 12.8  © 2006-2021 [email or web address]. Care instructions adapted under license by [employer]. If you have questions about a medical condition or this instruction, always ask your healthcare professional. [place] [id number] any warranty or liability for your use of this information. Patient Education        Pacemaker Placement: Before Your Procedure  What is pacemaker placement? A pacemaker is a small, battery-powered device. It sends electrical signals to the heart. This keeps the heartbeat steady when you have bradycardia (a slow heart rate). Thin wires, called leads, carry the signals between the pacemaker and the heart. This device is also called a pacer. You will get medicine before the procedure. This helps you relax and helps prevent pain. The doctor will make a cut in the skin just below your collarbone. The cut may be on either side of your chest. The doctor will put the pacemaker leads through the cut. The leads go into a large blood vessel in the upper chest. Then the doctor will guide the leads through the blood vessel into the heart. The doctor will place the pacemaker under the skin of your chest. He or she will attach the leads to the pacemaker. Then the cut will be closed with stitches. The procedure usually takes about an hour.  You may need to spend the night in the hospital.  Pacemaker batteries usually last 5 to 15 years. Your doctor will talk to you about how often you will need to have your pacemaker and battery checked. You can likely return to many of your normal activities after your procedure. But to stay safe, you may need to make some changes in your normal routine. You may feel worried about having a pacemaker. This is common. You might feel better if you learn techniques to help you relax. And it can help if you learn about how the pacemaker helps your heart. Talk to your doctor about your questions and concerns. Follow-up care is a key part of your treatment and safety. Be sure to make and go to all appointments, and call your doctor if you are having problems. It's also a good idea to know your test results and keep a list of the medicines you take. How do you prepare for the procedure? Procedures can be stressful. This information will help you understand what you can expect. And it will help you safely prepare for your procedure. Preparing for the procedure    · Be sure you have someone to take you home. Anesthesia and pain medicine will make it unsafe for you to drive or get home on your own.     · Understand exactly what procedure is planned, along with the risks, benefits, and other options.     · If you take aspirin or some other blood thinner, be sure to talk to your doctor. He or she will tell you if you should stop taking it before your procedure. Make sure that you understand exactly what your doctor wants you to do.     · Tell your doctor ALL the medicines, vitamins, supplements, and herbal remedies you take. Some may increase the risk of problems during your procedure. Your doctor will tell you if you should stop taking any of them before the procedure and how soon to do it.     · Make sure your doctor and the hospital have a copy of your advance directive. If you don't have one, you may want to prepare one.  It lets others know your health care wishes. It's a good thing to have before any type of surgery or procedure. What happens on the day of the procedure?    · Follow the instructions exactly about when to stop eating and drinking. If you don't, your procedure may be canceled. If your doctor told you to take your medicines on the day of the procedure, take them with only a sip of water.     · Follow your doctor's instructions about when to bathe or shower before your procedure. Do not apply lotions, perfumes, deodorants, or nail polish.     · Take off all jewelry and piercings. And take out contact lenses, if you wear them. At the hospital or surgery center    · Bring a picture ID.     · You will be kept comfortable and safe by your anesthesia provider. You may get medicine that relaxes you or puts you in a light sleep. The area being worked on will be numb.     · The procedure will take about an hour. When should you call your doctor? · You have questions or concerns.     · You don't understand how to prepare for your procedure.     · You become ill before the procedure (such as fever, flu, or a cold).     · You need to reschedule or have changed your mind about having the procedure. Where can you learn more? Go to [email or web address]://[email or web address] [email or web address] and sign in to your [employer] account. Enter N495 in the [facility] box to learn more about \"Pacemaker Placement: Before Your Procedure. \"     If you do not have an account, please click on the \"Sign Up Now\" link. Current as of: August 31, 2020               Content Version: 12.8  © 2006-2021 Healthwise, Incorporated. Care instructions adapted under license by [facility] ([facility]). If you have questions about a medical condition or this instruction, always ask your healthcare professional. [place] [address] any warranty or liability for your use of this information.          Patient Education        Pacemaker Placement: What to Expect at Home  Your Recovery     Pacemaker placement is surgery to put a pacemaker in your chest. This surgery may be done if you have bradycardia (a slow heart rate). Your doctor made a cut (incision) in your chest. The doctor put the pacemaker leads through the cut, into a large blood vessel, then into the heart. The doctor put the pacemaker under the skin of your chest and attached the leads to it. Your chest may be sore where the doctor made the cut. You also may have a bruise and mild swelling. These symptoms usually get better in 1 to 2 weeks. You may feel a hard ridge along the incision. This usually gets softer in the months after surgery. You may be able to see or feel the outline of the pacemaker under your skin. You will probably be able to go back to work or your usual routine 1 to 2 weeks after surgery. Pacemaker batteries usually last 5 to 15 years. Your doctor will talk to you about how often you will need to have your pacemaker checked. You'll need to take steps to safely use electric devices. Some of these devices can stop your pacemaker from working right for a short time. Check with your doctor about what to avoid and what to keep a short distance away from your pacemaker. For example, you will need to stay away from things with strong magnetic and electrical fields. An example is an MRI machine (unless your pacemaker is safe for an MRI). You can use a cellphone and other wireless devices, but keep them at least 6 inches away from your pacemaker. Many household and office electronics don't affect a pacemaker. These include kitchen appliances and computers. This care sheet gives you a general idea about how long it will take for you to recover. But each person recovers at a different pace. Follow the steps below to get better as quickly as possible. How can you care for yourself at home? Activity    · Rest when you feel tired.     · Be active. Walking is a good choice.     · For 4 to 6 weeks:  ?  Avoid activities that strain your chest or upper arm muscles. This includes pushing a  or vacuum, or mopping floors. It also includes swimming, or swinging a golf club or tennis racquet. ? Do not raise your arm (the one on the side of your body where the pacemaker is located) above your shoulder. ? Allow your body to heal. Don't move quickly or lift anything heavy until you are feeling better.     · Many people are able to return to work within 1 to 2 weeks after surgery.     · Ask your doctor when it is okay for you to have sex. Diet    · You can eat your normal diet. If your stomach is upset, try bland, low-fat foods like plain rice, broiled chicken, toast, and yogurt. Medicines    · Your doctor will tell you if and when you can restart your medicines. He or she will also give you instructions about taking any new medicines.     · If you take aspirin or some other blood thinner, be sure to talk to your doctor. He or she will tell you if and when to start taking this medicine again. Make sure that you understand exactly what your doctor wants you to do.     · Be safe with medicines. Read and follow all instructions on the label. ? If the doctor gave you a prescription medicine for pain, take it as prescribed. ? If you are not taking a prescription pain medicine, ask your doctor if you can take an over-the-counter medicine. ? Do not take aspirin, ibuprofen (Advil, Motrin), naproxen (Aleve), or other nonsteroidal anti-inflammatory drugs (NSAIDs) unless your doctor says it is okay.     · If your doctor prescribed antibiotics, take them as directed. Do not stop taking them just because you feel better. You need to take the full course of antibiotics. Incision care    · If you have strips of tape on the incision, leave the tape on for a week or until it falls off.     · Keep the incision dry while it heals. Your doctor may recommend sponge baths for about 7 days, but do not get the incision wet.  Your closely for changes in your health, and be sure to contact your doctor if:    · You have any problems with your pacemaker. Where can you learn more? Go to [email or web address]://[email or web address].[email or web address] and sign in to your [employer] account. Enter G550 in the [facility] box to learn more about \"Pacemaker Placement: What to Expect at Home. \"     If you do not have an account, please click on the \"Sign Up Now\" link. Current as of: August 31, 2020               Content Version: 12.8  © 2006-2021 [email or web address]. Care instructions adapted under license by [facility]. If you have questions about a medical condition or this instruction, always ask your healthcare professional. [address] any warranty or liability for your use of this information. Patient Education        Atrial Fibrillation: Care Instructions  Your Care Instructions     Atrial fibrillation is an irregular and often fast heartbeat. Treating this condition is important for several reasons. It can cause blood clots, which can travel from your heart to your brain and cause a stroke. If you have a fast heartbeat, you may feel lightheaded, dizzy, and weak. An irregular heartbeat can also increase your risk for heart failure. Atrial fibrillation is often the result of another heart condition, such as high blood pressure or coronary artery disease. Making changes to improve your heart condition will help you stay healthy and active. Follow-up care is a key part of your treatment and safety. Be sure to make and go to all appointments, and call your doctor if you are having problems. It's also a good idea to know your test results and keep a list of the medicines you take. How can you care for yourself at home? Medicines    · Take your medicines exactly as prescribed. Call your doctor if you think you are having a problem with your medicine.  You will get more details on the specific medicines your doctor prescribes.     · If your doctor has given you a blood thinner to prevent a stroke, be sure you get instructions about how to take your medicine safely. Blood thinners can cause serious bleeding problems.     · Do not take any vitamins, over-the-counter drugs, or herbal products without talking to your doctor first.   Lifestyle changes    · Do not smoke. Smoking can increase your chance of a stroke and heart attack. If you need help quitting, talk to your doctor about stop-smoking programs and medicines. These can increase your chances of quitting for good.     · Eat a heart-healthy diet.     · Stay at a healthy weight. Lose weight if you need to.     · Limit alcohol to 2 drinks a day for men and 1 drink a day for women. Too much alcohol can cause health problems.     · Avoid colds and flu. Get a pneumococcal vaccine shot. If you have had one before, ask your doctor whether you need another dose. Get a flu shot every year. If you must be around people with colds or flu, wash your hands often. Activity    · If your doctor recommends it, get more exercise. Walking is a good choice. Bit by bit, increase the amount you walk every day. Try for at least 30 minutes on most days of the week. You also may want to swim, bike, or do other activities. Your doctor may suggest that you join a cardiac rehabilitation program so that you can have help increasing your physical activity safely.     · Start light exercise if your doctor says it is okay. Even a small amount will help you get stronger, have more energy, and manage stress. Walking is an easy way to get exercise. Start out by walking a little more than you did in the hospital. Gradually increase the amount you walk.     · When you exercise, watch for signs that your heart is working too hard. You are pushing too hard if you cannot talk while you are exercising.  If you become short of breath or dizzy or have chest pain, sit down and rest immediately.     · Check your pulse regularly. Place two fingers on the artery at the palm side of your wrist, in line with your thumb. If your heartbeat seems uneven or fast, talk to your doctor. When should you call for help? Call 911 anytime you think you may need emergency care. For example, call if:    · You have symptoms of a heart attack. These may include:  ? Chest pain or pressure, or a strange feeling in the chest.  ? Sweating. ? Shortness of breath. ? Nausea or vomiting. ? Pain, pressure, or a strange feeling in the back, neck, jaw, or upper belly or in one or both shoulders or arms. ? Lightheadedness or sudden weakness. ? A fast or irregular heartbeat. After you call 911, the  may tell you to chew 1 adult-strength or 2 to 4 low-dose aspirin. Wait for an ambulance. Do not try to drive yourself.     · You have symptoms of a stroke. These may include:  ? Sudden numbness, tingling, weakness, or loss of movement in your face, arm, or leg, especially on only one side of your body. ? Sudden vision changes. ? Sudden trouble speaking. ? Sudden confusion or trouble understanding simple statements. ? Sudden problems with walking or balance. ? A sudden, severe headache that is different from past headaches.     · You passed out (lost consciousness). Call your doctor now or seek immediate medical care if:    · You have new or increased shortness of breath.     · You feel dizzy or lightheaded, or you feel like you may faint.     · Your heart rate becomes irregular.     · You can feel your heart flutter in your chest or skip heartbeats. Tell your doctor if these symptoms are new or worse. Watch closely for changes in your health, and be sure to contact your doctor if you have any problems. Where can you learn more? Go to https://B2B-Centerpemendozaeb.L[employer] Labs. org and sign in to your [employer] account. Enter U020 in the [facility] box to learn more about \"Atrial Fibrillation: Care Instructions. \"     If you do not have an account, please click on the \"Sign Up Now\" link. Current as of: August 31, 2020               Content Version: 12.8  © 2006-2021 [employer]. Care instructions adapted under license by [facility] ([facility]). If you have questions about a medical condition or this instruction, always ask your healthcare professional. [address] any warranty or liability for your use of this information. Patient Education        Learning About Atrial Fibrillation  What is atrial fibrillation? Atrial fibrillation (say \"AY-tree-nasima yvd-nmwx-ZCZ-shun\") is a common type of irregular heartbeat (arrhythmia). Normally, the heart beats in a strong, steady rhythm. In atrial fibrillation, a problem with the heart's electrical system causes the two upper chambers of the heart (called the atria) to quiver, or fibrillate. Atrial fibrillation can be dangerous. This is because if the heartbeat isn't strong and steady, blood can collect, or pool, in the atria. And pooled blood is more likely to form clots. Clots can travel to the brain, block blood flow, and cause a stroke. Atrial fibrillation can also lead to heart failure. This condition also upsets the normal rhythm between the atria and the lower chambers of the heart. (These chambers are called the ventricles.) The ventricles may beat fast and without a regular rhythm. What are the symptoms? Some people feel symptoms when they have episodes of atrial fibrillation. But other people don't notice any symptoms. If you have symptoms, you may feel:  · A fluttering, racing, or pounding feeling in your chest called palpitations. · Weak or tired. · Dizzy or lightheaded. · Short of breath. · Chest pain. · Confused. You may notice signs of atrial fibrillation when you check your pulse. Your pulse may seem uneven or fast.  What can you expect when you have atrial fibrillation?   At first, spells of atrial fibrillation may come on suddenly and last a short time. They may go away on their own or with treatment. Over time, the spells may last longer and occur more often. They often don't go away on their own. How is it treated? Treatments can help you feel better and prevent future problems, especially stroke and heart failure. Your treatment will depend on the cause of your atrial fibrillation, your symptoms, and your risk for stroke. Types of treatment include:  · Heart rate treatment. Medicine may be used to slow your heart rate. Your heartbeat may still be irregular. But these medicines keep your heart from beating too fast. They may also help relieve symptoms. · Heart rhythm treatment. Different treatments may be used to try to stop atrial fibrillation and keep it from returning. They can also relieve symptoms. These treatments include medicine, electrical cardioversion to shock the heart back to a normal rhythm, a procedure called catheter ablation, and heart surgery. · Stroke prevention. You and your doctor can decide how to lower your risk. You may decide to take a blood-thinning medicine called an anticoagulant. What is a heart-healthy lifestyle for atrial fibrillation? You can live well and help manage atrial fibrillation by having a heart-healthy lifestyle. This lifestyle may help reduce how often you have episodes of atrial fibrillation. Don't smoke. Avoid secondhand smoke too. Quitting smoking is the best thing you can do for your heart. Be active. Talk to your doctor about what type and level of exercise is safe for you. Eat a heart-healthy diet. These foods include vegetables, fruits, nuts, beans, lean meat, fish, and whole grains. Limit sodium, alcohol, and sugar. Avoid alcohol if it triggers symptoms. Stay at a healthy weight. Lose weight if you need to. Losing weight can help relieve symptoms. Manage other health problems. These problems include diabetes, high blood pressure, and high cholesterol.  If you think you may have a problem with alcohol or drug use, talk to your doctor. Manage stress. Options like yoga, biofeedback, and meditation may help. Where can you learn more? Go to [email or web address] Medical [email or web address].[email or web address] org and sign in to your [employer] account. Enter L941 in the [employer] box to learn more about \"Learning About Atrial Fibrillation. \"     If you do not have an account, please click on the \"Sign Up Now\" link. Current as of: August 31, 2020               Content Version: 12.8  © 1676-3638 Healthwise, [employer]. Care instructions adapted under license by [facility] ([facility]). If you have questions about a medical condition or this instruction, always ask your healthcare professional. [address] any warranty or liability for your use of this information.

## 2021-05-19 DIAGNOSIS — F51.04 PSYCHOPHYSIOLOGICAL INSOMNIA: ICD-10-CM

## 2021-05-21 DIAGNOSIS — R00.1 BRADYCARDIA: ICD-10-CM

## 2021-05-21 DIAGNOSIS — I48.0 PAF (PAROXYSMAL ATRIAL FIBRILLATION) (HCC): ICD-10-CM

## 2021-05-21 DIAGNOSIS — I44.1 MOBITZ TYPE 2 SECOND DEGREE ATRIOVENTRICULAR BLOCK: ICD-10-CM

## 2021-05-21 LAB
HCT VFR BLD CALC: 43.2 % (ref 40.5–52.5)
HEMOGLOBIN: 14.5 G/DL (ref 13.5–17.5)
MCH RBC QN AUTO: 27.3 PG (ref 26–34)
MCHC RBC AUTO-ENTMCNC: 33.7 G/DL (ref 31–36)
MCV RBC AUTO: 81.1 FL (ref 80–100)
PDW BLD-RTO: 17.2 % (ref 12.4–15.4)
PLATELET # BLD: 234 K/UL (ref 135–450)
PMV BLD AUTO: 8.3 FL (ref 5–10.5)
RBC # BLD: 5.32 M/UL (ref 4.2–5.9)
WBC # BLD: 8.1 K/UL (ref 4–11)

## 2021-05-22 LAB
ANION GAP SERPL CALCULATED.3IONS-SCNC: 14 MMOL/L (ref 3–16)
BUN BLDV-MCNC: 25 MG/DL (ref 7–20)
CALCIUM SERPL-MCNC: 9.5 MG/DL (ref 8.3–10.6)
CHLORIDE BLD-SCNC: 103 MMOL/L (ref 99–110)
CO2: 23 MMOL/L (ref 21–32)
CREAT SERPL-MCNC: 0.8 MG/DL (ref 0.8–1.3)
GFR AFRICAN AMERICAN: >60
GFR NON-AFRICAN AMERICAN: >60
GLUCOSE BLD-MCNC: 103 MG/DL (ref 70–99)
POTASSIUM SERPL-SCNC: 4.3 MMOL/L (ref 3.5–5.1)
SODIUM BLD-SCNC: 140 MMOL/L (ref 136–145)

## 2021-05-24 ENCOUNTER — TELEPHONE (OUTPATIENT)
Dept: CARDIOLOGY CLINIC | Age: 76
End: 2021-05-24

## 2021-05-24 NOTE — TELEPHONE ENCOUNTER
Pt called stating that his BP is high and he is getting headaches with dizziness. His pulse was at 36 2 days ago and its slowly climbed to 117. His BP was 186/80 about 1pm today. No chest pain. Pt requests to speak to [facility].     Please give him a call at [phone]

## 2021-05-24 NOTE — TELEPHONE ENCOUNTER
Returned call to patient and he says that he takes all medication at the same time. I asked him to space medications out to try and control blood pressure better. He says that he is willing to try this. Elvis Ferguson he wanted to talk to you so I told him you would call him with any further recommendations.

## 2021-06-04 ENCOUNTER — HOSPITAL ENCOUNTER (OUTPATIENT)
Dept: GENERAL RADIOLOGY | Age: 76
Discharge: HOME | End: 2021-06-04
Payer: MEDICARE

## 2021-06-04 ENCOUNTER — HOSPITAL ENCOUNTER (OUTPATIENT)
Dept: CARDIAC CATH/INVASIVE PROCEDURES | Age: 76
Discharge: HOME | End: 2021-06-04
Payer: MEDICARE

## 2021-06-04 VITALS
HEIGHT: 71 IN | SYSTOLIC BLOOD PRESSURE: 157 MMHG | BODY MASS INDEX: 22.26 KG/M2 | DIASTOLIC BLOOD PRESSURE: 73 MMHG | OXYGEN SATURATION: 99 % | WEIGHT: 159 LBS | TEMPERATURE: 97.2 F | HEART RATE: 50 BPM | RESPIRATION RATE: 20 BRPM

## 2021-06-04 PROCEDURE — C1898 LEAD, PMKR, OTHER THAN TRANS: HCPCS

## 2021-06-04 PROCEDURE — 99153 MOD SED SAME PHYS/QHP EA: CPT | Performed by: FAMILY MEDICINE

## 2021-06-04 PROCEDURE — 71046 X-RAY EXAM CHEST 2 VIEWS: CPT

## 2021-06-04 PROCEDURE — 2500000003 HC RX 250 WO HCPCS

## 2021-06-04 PROCEDURE — C1785 PMKR, DUAL, RATE-RESP: HCPCS

## 2021-06-04 PROCEDURE — 99152 MOD SED SAME PHYS/QHP 5/>YRS: CPT | Performed by: FAMILY MEDICINE

## 2021-06-04 PROCEDURE — 33208 INSRT HEART PM ATRIAL & VENT: CPT | Performed by: FAMILY MEDICINE

## 2021-06-04 PROCEDURE — 6360000002 HC RX W HCPCS

## 2021-06-04 PROCEDURE — 33208 INSRT HEART PM ATRIAL & VENT: CPT | Performed by: INTERNAL MEDICINE

## 2021-06-04 PROCEDURE — C1894 INTRO/SHEATH, NON-LASER: HCPCS

## 2021-06-04 PROCEDURE — 93005 ELECTROCARDIOGRAM TRACING: CPT | Performed by: INTERNAL MEDICINE

## 2021-06-04 PROCEDURE — 99152 MOD SED SAME PHYS/QHP 5/>YRS: CPT | Performed by: INTERNAL MEDICINE

## 2021-06-04 RX ORDER — SODIUM CHLORIDE 0.9 % (FLUSH) 0.9 %
5-40 SYRINGE (ML) INJECTION EVERY 12 HOURS SCHEDULED
Status: DISCONTINUED | OUTPATIENT
Start: 2021-06-04 | End: 2021-06-05 | Stop reason: HOSPADM

## 2021-06-04 RX ORDER — SODIUM CHLORIDE 9 MG/ML
25 INJECTION, SOLUTION INTRAVENOUS PRN
Status: DISCONTINUED | OUTPATIENT
Start: 2021-06-04 | End: 2021-06-05 | Stop reason: HOSPADM

## 2021-06-04 RX ORDER — SODIUM CHLORIDE 0.9 % (FLUSH) 0.9 %
5-40 SYRINGE (ML) INJECTION PRN
Status: DISCONTINUED | OUTPATIENT
Start: 2021-06-04 | End: 2021-06-05 | Stop reason: HOSPADM

## 2021-06-04 RX ORDER — SODIUM CHLORIDE 9 MG/ML
INJECTION, SOLUTION INTRAVENOUS CONTINUOUS
Status: DISCONTINUED | OUTPATIENT
Start: 2021-06-04 | End: 2021-06-05 | Stop reason: HOSPADM

## 2021-06-04 NOTE — H&P
Cardiac Electrophysiology Consultation   Date: 6/4/2021  Reason for Consultation: Bradycardia  Consult Requesting Physician:  Loco Gruber MD     Chief Complaint:        Chief Complaint   Patient presents with    Follow-up       CAM patch results/ bradycardia      HPI: Michael Nguyen is a 66 y.o. patient with a history of COVID-19 pneumonia (Dec. 2020), anxiety, COPD, hyperlipidemia, HTN, IBS and depression. He was seen in office by his PCP Loco Gruber MD on 5/4/2021 and reported to him that he had multiple reading on his pulse oximeter showing HR as low at 32, 36 and 42. He reported vague symptoms of lightheadedness. Dr. Shabbir Bowers cut his Atenolol from 100 mg daily to 50 mg daily.      He presented to the office on 5/21/2021 for initial consultation for evaluation of bradycardia. He stated that he researched his medications and found that atenolol was a beta block and he also looked into the CBD oil he was applying to his knee and he took CBD under the tongue to help him sleep and he found it also has effects of a beta blocker. He stopped the CBD oil on Friday 5/7/2021. He stated he waited one day to take his atenolol later in the day and his heart rate was in the 60's. He stated that he used to ride a bike several miles a day but when he started feeling unsteady her transitioned into riding a stationary bike and rode it 2-3 miles yesterday.     Interval History: He states that he has rode his stationary bike going as fast as one mile every 4 minutes. On 5/12/2021 at 8 pm he states he could have been riding the stationary bike. Even with this activity, his maximum heart rate was 75 bpm, which is insufficient for his activities. He is compliant with all of his medications. He had stopped the atenolol during his last clinic visit with me. He denies F/C/N/V/abd pain/orthopnea/PND.            Past Medical History        Past Medical History:   Diagnosis Date    Anxiety      COPD, mild ([facility] [address] 4/24/2017    Depression      History of blood transfusion      Hyperlipidemia      Hypertension      IBS (irritable bowel syndrome) 10/16/2013    Insomnia              Past Surgical History         Past Surgical History:   Procedure Laterality Date    APPENDECTOMY        COLONOSCOPY   03/19/2019     [place]    COLONOSCOPY N/A 3/19/2019     COLONOSCOPY WITH BIOPSY performed by Fran Valdivia MD at [address] COLONOSCOPY N/A 3/19/2019     COLONOSCOPY POLYPECTOMY SNARE/COLD BIOPSY performed by Fran Valdivia MD at [address]                Allergies:  No Known Allergies     Medication:   Home Medications           Prior to Admission medications    Medication Sig Start Date End Date Taking?  Authorizing Provider   Fluticasone furoate-vilanterol (BREO ELLIPTA) 200-25 MCG/INH AEPB inhaler Inhale 1 puff into the lungs daily 5/17/21   Yes Nadine Breaux MD   pravastatin (PRAVACHOL) 40 MG tablet Take 1 tablet by mouth daily 5/10/21   Yes Nadine Breaux MD   hydrALAZINE (APRESOLINE) 10 MG tablet Take 1 tablet by mouth 3 times daily 5/10/21   Yes Mohan Brasher MD   hydroCHLOROthiazide (HYDRODIURIL) 25 MG tablet Take 1 tablet by mouth daily 5/4/21   Yes Nadine Breaux MD   amLODIPine (NORVASC) 10 MG tablet Take 0.5 tablets by mouth daily 3/26/21   Yes Nadine Breaux MD   venlafaxine (EFFEXOR XR) 150 MG extended release capsule TAKE ONE (1) CAPSULE BY MOUTH DAILY  3/8/21   Yes Nadine Breaux MD   losartan (COZAAR) 100 MG tablet TAKE ONE (1) TABLET BY MOUTH DAILY  2/9/21   Yes Nadine Breaux MD   famotidine (PEPCID) 20 MG tablet Take 1 tablet by mouth 2 times daily 12/23/20   Yes KLAUS Wright - CNP   QUEtiapine (SEROQUEL) 25 MG tablet TAKE ONE (1) TABLET BY MOUTH NIGHTLY  11/2/20   Yes Nadine Breaux MD   albuterol sulfate HFA (VENTOLIN HFA) 108 (90 Base) MCG/ACT inhaler Inhale 2 puffs into the lungs every 6 hours as needed for Wheezing or Shortness of Breath 9/1/20   Yes Angel Pinto MD   aspirin 81 MG tablet Take 81 mg by mouth daily.     Yes Historical Provider, MD   diphenoxylate-atropine (LOMOTIL) 2.5-0.025 MG per tablet Take 1 tablet by mouth 4 times daily as needed for Diarrhea for up to 60 days. 4/20/20 6/19/20   Angel Pinto MD            Social History:   reports that he quit smoking about 32 years ago. His smoking use included cigarettes. He has a 20.00 pack-year smoking history. He has never used smokeless tobacco. He reports that he does not drink alcohol and does not use drugs.         Family History:  family history includes Cancer in an other family member; Hypertension in his brother, brother, and sister; Stroke in his maternal grandfather. Reviewed. Denies family history of sudden cardiac death, arrhythmia, premature CAD     Review of System:     · General ROS: negative for - chills, fever   · Psychological ROS: negative for - anxiety or depression  · Ophthalmic ROS: negative for - eye pain or loss of vision  · ENT ROS: negative for - epistaxis, headaches, nasal discharge, sore throat   · Allergy and Immunology ROS: negative for - hives, nasal congestion   · Hematological and Lymphatic ROS: negative for - bleeding problems, blood clots, bruising or jaundice  · Endocrine ROS: negative for - skin changes, temperature intolerance or unexpected weight changes  · Respiratory ROS: negative for - cough, hemoptysis, pleuritic pain, SOB, sputum changes or wheezing  · Cardiovascular ROS: Per HPI.    · Gastrointestinal ROS: negative for - abdominal pain, blood in stools, diarrhea, hematemesis, melena, nausea/vomiting or swallowing difficulty/pain  · Genito-Urinary ROS: negative for - dysuria or incontinence  · Musculoskeletal ROS: negative for - joint swelling or muscle pain  · Neurological ROS: negative for - confusion, dizziness, gait disturbance, headaches, numbness/tingling, seizures, speech problems, tremors, visual changes or weakness  · Dermatological ROS: negative for - rash     Physical Examination:      Vitals:     21 0951   BP: (!) 170/80   Pulse:     SpO2:           · Constitutional: Oriented. No distress. · Head: Normocephalic and atraumatic. · Mouth/Throat: Oropharynx is clear and moist.   · Eyes: Conjunctivae normal. EOM are normal.   · Neck: Normal range of motion. Neck supple. No rigidity. No JVD present. · Cardiovascular: Normal rate, regular rhythm, S1&S2 and intact distal pulses. · Pulmonary/Chest: Bilateral respiratory sounds. No wheezes. No rhonchi. · Abdominal: Soft. Bowel sounds present. No distension, No tenderness. · Musculoskeletal: No tenderness. No edema    · Lymphadenopathy: Has no cervical adenopathy. · Neurological: Alert and oriented. Cranial nerve appears intact, No Gross deficit   · Skin: Skin is warm and dry. No rash noted. · Psychiatric: Has a normal mood, affect and behavior      Labs:  Reviewed.      EC2021 reviewed,SR first degree A-V block ,RBBB with left axis -bifascicular block. with v-rate of 76 bpm with QRS duration 136 ms. No pathologic Q waves, ventricular pre-excitation, or QT prolongation.     Studies:   1. Event monitor:         2. Echo: 2019  Summary  Left ventricular cavity size is normal. Assymetric basal septal hypertrophy  EF 55-60%. Left atrium is of normal size. Mild mitral regurgitation. Normal right ventricular size and function. Mild tricuspid regurgitation  Trivial pulmonic regurgitation present.     3. Stress Echo:  10/9/2015  Summary  Normal (Negative) response to exercise. Baseline echocardiogram shows normal LV function with an ejection fraction of 60%. Following exercise there was uniform augmentation of all segments with appropriate hyperdynamic response to exercise.   Normal; stress echo.     4. Cath:      I independently reviewed the ECG, MCOT, echocardiogram, stress test, and coronary angiography/PCI results and used them for my marcelo-operative and post-operative states including the recovery window of their procedure. The patient was made aware that shital Covid-19 after a surgical procedure may worsen their prognosis for recovering from the virus and lend to a higher morbidity and or mortality risk. The patient was given the option of postponing their procedure.      All of the topics above were covered with the patient. A literature packet from our office was also given to the patient to provide supplementary information for the patient as it pertains to PPM implantation, lifestyle changes, and the benefits and risks of undergoing the implantation and living with a PPM.      I spent 40 minutes face to face with the patient, with greater than 50% of that time spent in counseling on the above.     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 The patient has opted to proceed with the PPM implantation, and we will proceed with scheduling a Medtronic 2-chamber PPM implantation for 2nd degree AV block type II.      Atrial fibrillation  First seen on CAM patch monitor 5/10/2021. He has a [id number] Score 3 (HTN, AGE). We will start on [address] after pacemaker implantation. We have asked him to stop his ASA as there is no indication for him to be on this medication. We will have him come back into the clinic after the Pacemaker implantation to further discuss treatment options for this atrial fibrillation.     Follow up one week after implantation in the device clinic for site check and device interrogation and then 3 months with Adelina Dyson CNP. Thank you for allowing me to participate in the care of Rose Fang. All questions and concerns were addressed to the patient/family. Alternatives to my treatment were discussed.      I have reviewed the history and physical and examined the patient and find no relevant changes.  I have reviewed with the patient and/or family the risks and benefits to the proposed procedure. The patient was presented with the option of postponing the proposed procedure. The patient was also presented reasonable alternatives to the proposed care, treatment, and services.  The discussion I have had with the patient encompassed risks, benefits, and side effects related to the alternatives and the risks related to not receiving the proposed care, treatment and services.      Kimberlee Griffin MD, [address], [place] [address] Electrophysiology  [address], [address] [place] [address]  Rudi Zambrano [place] [address]  [phone]

## 2021-06-04 NOTE — PRE SEDATION
Sedation Pre-Procedure Note    Patient Name: Snuny Elizabeth   YOB: 1945  Room/Bed: Room/bed info not found  Medical Record Number: [id number]  Date: 6/4/2021   Time: 2:33 PM       Indication:  Symptomatic sick sinus syndrome    Consent: I have discussed with the patient and/or the patient representative the indication, alternatives, and the possible risks and/or complications of the planned procedure and the anesthesia methods. The patient and/or patient representative appear to understand and agree to proceed. Vital Signs:   Vitals:    06/04/21 1408   BP: (!) 157/73   Pulse: 50   Resp: 20   Temp: 97.2 °F (36.2 °C)   SpO2: 99%       Past Medical History:   has a past medical history of Anxiety, COPD, mild ([address] Depression, History of blood transfusion, Hyperlipidemia, Hypertension, IBS (irritable bowel syndrome), and Insomnia. Past Surgical History:   has a past surgical history that includes Appendectomy; Tonsillectomy and adenoidectomy; Colonoscopy (03/19/2019); Colonoscopy (N/A, 3/19/2019); and Colonoscopy (N/A, 3/19/2019). Medications:   Scheduled Meds:    sodium chloride flush  5-40 mL Intravenous 2 times per day     Continuous Infusions:    sodium chloride      sodium chloride       PRN Meds: sodium chloride flush, sodium chloride  Home Meds:   Prior to Admission medications    Medication Sig Start Date End Date Taking?  Authorizing Provider   estazolam (PROSOM) 2 MG tablet Take 1-2 tabs nightly as needed for sleep 5/19/21 6/17/21 Yes Lucio Shepherd MD   Fluticasone furoate-vilanterol (BREO ELLIPTA) 200-25 MCG/INH AEPB inhaler Inhale 1 puff into the lungs daily 5/17/21  Yes Lucio Shepherd MD   hydrALAZINE (APRESOLINE) 25 MG tablet Take 1 tablet by mouth 3 times daily 5/17/21  Yes Stephon Yo MD   pravastatin (PRAVACHOL) 40 MG tablet Take 1 tablet by mouth daily 5/10/21  Yes Lucio Shepherd MD   hydroCHLOROthiazide (HYDRODIURIL) 25 MG tablet Take 1 tablet by mouth daily 5/4/21  Yes Juanjose Baires MD   amLODIPine (NORVASC) 10 MG tablet Take 0.5 tablets by mouth daily 3/26/21  Yes Juanjose Baires MD   venlafaxine (EFFEXOR XR) 150 MG extended release capsule TAKE ONE (1) CAPSULE BY MOUTH DAILY  3/8/21  Yes Juanjose Baires MD   losartan (COZAAR) 100 MG tablet TAKE ONE (1) TABLET BY MOUTH DAILY  2/9/21  Yes Juanjose Baires MD   famotidine (PEPCID) 20 MG tablet Take 1 tablet by mouth 2 times daily 12/23/20  Yes KLAUS Spann - CNP   QUEtiapine (SEROQUEL) 25 MG tablet TAKE ONE (1) TABLET BY MOUTH NIGHTLY  11/2/20  Yes Juanjose Baires MD   albuterol sulfate HFA (VENTOLIN HFA) 108 (90 Base) MCG/ACT inhaler Inhale 2 puffs into the lungs every 6 hours as needed for Wheezing or Shortness of Breath 9/1/20  Yes Juanjose Baires MD   diphenoxylate-atropine (LOMOTIL) 2.5-0.025 MG per tablet Take 1 tablet by mouth 4 times daily as needed for Diarrhea for up to 60 days. 4/20/20 6/19/20  Juanjose Baires MD     Coumadin Use Last 7 Days:  no  Antiplatelet drug therapy use last 7 days: no  Other anticoagulant use last 7 days: no  Additional Medication Information:  n/a      Pre-Sedation Documentation and Exam:   I have personally completed a history, physical exam & review of systems for this patient (see notes).     Mallampati Airway Assessment:  Mallampati Class I - (soft palate, fauces, uvula & anterior/posterior tonsillar pillars are visible)    Prior History of Anesthesia Complications:   none    ASA Classification:  Class 2 - A normal healthy patient with mild systemic disease    Sedation/ Anesthesia Plan:   intravenous sedation    Medications Planned:   midazolam (Versed) intravenously and fentanyl intravenously    Patient is an appropriate candidate for plan of sedation: yes    Electronically signed by Linnette Phelps MD on 6/4/2021 at 2:33 PM

## 2021-06-04 NOTE — PROCEDURES
[address]     Electrophysiology Procedure Note       Date of Procedure: 6/4/2021  Patient's Name: Marylin Lisa  YOB: 1945   Medical Record Number: [id number]  Procedure Performed by: Stevan Grant MD    Procedures performed:  · Insertion of MRI compatible right ventricular pacing lead under fluoroscopy. · Insertion of MRI compatible right atrial lead under fluoroscopy  · Insertion of a MRI compatible [dual/single] chamber Pacemaker. · Electronic analysis of lead and device. · Anesthesia: Local and Monitored Anesthesia Care  · Level of sedation plan: Moderate sedation (conscious sedation) with intravenous Midazolam 4 mg and Fentanyl 200 mcg and Benadryl 25mg   · Sedation start time: 1501  · Sedation stop time: 1555  · Mallampati airway assessment class: I  · ASA class: 1  · (there were no independent trained observers who had no other duties involved in this procedure)    Indication of the procedure:       Marylin Lisa is a 66 y.o. male who is being referred for pacemaker implantation due to non-reversible bradycardia secondary to sick sinus syndrome as manifested by chronotropic incompetence with symptoms of dyspnea with exertion and easy fatigue. Details of procedure: The patient was brought to the electrophysiology laboratory in stable condition. The patient was in a fasting and non-sedated state. The risks, benefits and alternatives of the procedure were discussed with the patient. The risks including, but not limited to, the risks of vascular injury, bleeding, infection, device malfunction, lead dislodgement, radiation exposure, injury to cardiac and surrounding structures (including pneumothorax), stroke, myocardial infarction and death were discussed in detail. The patient was also counseled at length about the risks of shital Covid-19 in the marcelo-operative and post-operative states including the recovery window of their procedure.  The patient was made aware that shital Covid-19 after a surgical procedure may worsen their prognosis for recovering from the virus and lend to a higher morbidity and or mortality risk. The patient was given the option of postponing their procedure. The patient was also presented reasonable alternatives to the proposed care, treatment, and services. The discussion I have had with the patient encompassed risks, benefits, and side effects related to the alternatives and the risks related to not receiving the proposed care, treatment and services. The patient opted to proceed with the device implantation. Written informed consent was signed and placed in the chart. Prophylactic antibiotic using Ancef 1mg IV was given. The patient was prepped and draped in sterile fashion. A timeout protocol was completed to identify the patient and the procedure being performed. An independent trained observer assumed the sole responsibility of administering IV sedation medication - Versed, Fentanyl - at my direction and closely monitored the patient. The patient was monitored continuously with ECG, pulse oximetry, blood pressure monitoring, and direct observation. An incision was made in the left upper pectoral area in a transverse line roughly 1 cm from the clavicle after administration of lidocaine/bupivicaine combination. Using electrocautery and blunt dissection, a pocket was created. Central venous access into the left axillary vein was obtained using the modified Seldinger technique. After central venous access was obtained, a sheath was placed in the axillary vein. A right ventricular lead was advanced into the apical position under fluoroscopic guidance and using a series of curved and straight stylets. The lead was actively fixated. After confirming appropriate function and no diaphragmatic stimulation at maximum output, the sheath was split and removed.  The lead was secured to the underlying tissue using suture material.      A new sheath was advanced over a second previously placed wire into the vein. The atrial lead was advanced to the right atrial appendage and actively fixated under fluoroscopic guidance. After confirming appropriate function and no diaphragmatic stimulation at maximum output, the sheath was split and removed. The lead was secured to the underlying tissue using suture. The leads were then connected to the new pulse generator which was then placed into the pocket. Copious amount (> 200 cc) of saline flush was used to irrigate the pocket. The pocket was then closed using a 2-0 Vicryl subcutaneous layer and a subcuticular layer using 4-0 Vicryl. The skin was covered with Steri-Strips and pressure dressing. All sponge and needle counts were reported as correct at the end of the procedure. Specimen collected: none    Estimated blood loss: < 20 cc    The patient tolerated the procedure well and there were no complications. Patient was transported to the holding area in stable condition. Device and Leads information:            Impression:    Pre- and post-procedural diagnoses of non-reversible bradycardia secondary to sick sinus syndrome as manifested by chronotropic incompetence with symptoms of dyspnea with exertion and easy fatigue with successful implantation of a Medtronic, MRI-compatible, 2-chamber PPM.     Plan:     The patient will be observed and receive the usual post-implant care, including chest x-ray, intravenous antibiotic therapy, and interrogation of the device. From an EP perspective, if the interrogation and CXR are showing appropriate functioning, parameters and placement, the patient may be discharged from the hospital today with a Carelink device. Follow-up will be a 1-week wound check with our nurse in the [facility] and a 3-month follow-up with the EP NP.  The patient has known, new-onset of atrial fibrillation, and given [id number] score of at least 2, will start eliquis 5mg BID in 5 days' time for thromboembolic prophylaxis. He will follow-up in my clinic in the next 1-2 weeks to discuss treatment options for atrial fibrillation including atrial fibrillation ablation. Thank you for allowing us to participate in the care of your patient. If you have any questions, please do not hesitate to contact me.     Javid Benton MD, MS, [facility], [facility]  Cardiac Electrophysiology  [address]  [address] [facility], [address] [place] [address]  Rudi Perez [address]  [phone]

## 2021-06-05 LAB
EKG ATRIAL RATE: 64 BPM
EKG DIAGNOSIS: NORMAL
EKG P AXIS: 12 DEGREES
EKG P-R INTERVAL: 202 MS
EKG Q-T INTERVAL: 474 MS
EKG QRS DURATION: 128 MS
EKG QTC CALCULATION (BAZETT): 489 MS
EKG R AXIS: -67 DEGREES
EKG T AXIS: 57 DEGREES
EKG VENTRICULAR RATE: 64 BPM

## 2021-06-05 PROCEDURE — 93010 ELECTROCARDIOGRAM REPORT: CPT | Performed by: INTERNAL MEDICINE

## 2021-06-08 ASSESSMENT — PATIENT HEALTH QUESTIONNAIRE - PHQ9
SUM OF ALL RESPONSES TO PHQ QUESTIONS 1-9: 0
1. LITTLE INTEREST OR PLEASURE IN DOING THINGS: 0
SUM OF ALL RESPONSES TO PHQ QUESTIONS 1-9: 0
SUM OF ALL RESPONSES TO PHQ9 QUESTIONS 1 & 2: 0
SUM OF ALL RESPONSES TO PHQ QUESTIONS 1-9: 0
2. FEELING DOWN, DEPRESSED OR HOPELESS: 0

## 2021-06-08 ASSESSMENT — LIFESTYLE VARIABLES
HOW OFTEN DO YOU HAVE A DRINK CONTAINING ALCOHOL: NEVER
AUDIT-C TOTAL SCORE: INCOMPLETE
AUDIT TOTAL SCORE: INCOMPLETE
HOW OFTEN DO YOU HAVE A DRINK CONTAINING ALCOHOL: 0

## 2021-06-09 ENCOUNTER — OFFICE VISIT (OUTPATIENT)
Dept: PRIMARY CARE CLINIC | Age: 76
End: 2021-06-09
Payer: MEDICARE

## 2021-06-09 VITALS
BODY MASS INDEX: 24.99 KG/M2 | HEART RATE: 94 BPM | HEIGHT: 67 IN | DIASTOLIC BLOOD PRESSURE: 83 MMHG | WEIGHT: 159.2 LBS | SYSTOLIC BLOOD PRESSURE: 148 MMHG | OXYGEN SATURATION: 100 % | RESPIRATION RATE: 18 BRPM

## 2021-06-09 DIAGNOSIS — Z95.0 S/P PLACEMENT OF CARDIAC PACEMAKER: ICD-10-CM

## 2021-06-09 DIAGNOSIS — Z00.00 ROUTINE GENERAL MEDICAL EXAMINATION AT A HEALTH CARE FACILITY: Primary | ICD-10-CM

## 2021-06-09 DIAGNOSIS — I49.5 SSS (SICK SINUS SYNDROME) (HCC): ICD-10-CM

## 2021-06-09 DIAGNOSIS — I10 ESSENTIAL HYPERTENSION: ICD-10-CM

## 2021-06-09 PROCEDURE — 1123F ACP DISCUSS/DSCN MKR DOCD: CPT | Performed by: FAMILY MEDICINE

## 2021-06-09 PROCEDURE — G0439 PPPS, SUBSEQ VISIT: HCPCS | Performed by: FAMILY MEDICINE

## 2021-06-09 PROCEDURE — 4040F PNEUMOC VAC/ADMIN/RCVD: CPT | Performed by: FAMILY MEDICINE

## 2021-06-09 NOTE — PROGRESS NOTES
Medicare Annual Wellness Visit  Name: Glenda Murguia Date: 2021   MRN: <[id number]> Sex: Male   Age: 66 y.o. Ethnicity: Non-/Non    : 1945 Race: Guillermo Eng is here for Medicare AWV    Screenings for behavioral, psychosocial and functional/safety risks, and cognitive dysfunction are all negative except as indicated below. These results, as well as other patient data from the [address] form, are documented in Flowsheets linked to this Encounter. No Known Allergies      Prior to Visit Medications    Medication Sig Taking?  Authorizing Provider   apixaban (ELIQUIS) 5 MG TABS tablet Take 1 tablet by mouth 2 times daily  Kimberlee Griffin MD   estazolam (PROSOM) 2 MG tablet Take 1-2 tabs nightly as needed for sleep  Victorina Luke MD   Fluticasone furoate-vilanterol (BREO ELLIPTA) 200-25 MCG/INH AEPB inhaler Inhale 1 puff into the lungs daily  Victorina Luke MD   hydrALAZINE (APRESOLINE) 25 MG tablet Take 1 tablet by mouth 3 times daily  Kimberlee Griffin MD   pravastatin (PRAVACHOL) 40 MG tablet Take 1 tablet by mouth daily  Victorina Luke MD   hydroCHLOROthiazide (HYDRODIURIL) 25 MG tablet Take 1 tablet by mouth daily  Victorina Luke MD   amLODIPine (NORVASC) 10 MG tablet Take 0.5 tablets by mouth daily  Victorina Luke MD   venlafaxine (EFFEXOR XR) 150 MG extended release capsule TAKE ONE (1) CAPSULE BY MOUTH DAILY   Victorina Luke MD   losartan (COZAAR) 100 MG tablet TAKE ONE (1) TABLET BY MOUTH DAILY   Victorina Luke MD   famotidine (PEPCID) 20 MG tablet Take 1 tablet by mouth 2 times daily  KLAUS Roy - CNP   QUEtiapine (SEROQUEL) 25 MG tablet TAKE ONE (1) TABLET BY MOUTH NIGHTLY   Victorina Luke MD   albuterol sulfate HFA (VENTOLIN HFA) 108 (90 Base) MCG/ACT inhaler Inhale 2 puffs into the lungs every 6 hours as needed for Wheezing or Shortness of Breath  Victorina Luke MD   diphenoxylate-atropine (LOMOTIL) 2.5-0.025 MG per tablet Take 1 tablet by mouth 4 times daily as needed for Diarrhea for up to 60 days. Judy Miguel MD         Past Medical History:   Diagnosis Date    Anxiety     COPD, mild ([address] 4/24/2017    Depression     History of blood transfusion     Hyperlipidemia     Hypertension     IBS (irritable bowel syndrome) 10/16/2013    Insomnia        Past Surgical History:   Procedure Laterality Date    APPENDECTOMY      COLONOSCOPY  03/19/2019    Morris    COLONOSCOPY N/A 3/19/2019    COLONOSCOPY WITH BIOPSY performed by Mary Krishnan MD at [address] COLONOSCOPY N/A 3/19/2019    COLONOSCOPY POLYPECTOMY SNARE/COLD BIOPSY performed by Mary Krishnan MD at [address]           Family History   Problem Relation Age of Onset    Stroke Maternal Grandfather     Cancer Other         leukemia    Hypertension Sister     Hypertension Brother     Hypertension Brother        CareTeam (Including outside providers/suppliers regularly involved in providing care):   Patient Care Team:  Judy Miguel MD as PCP - Eliot Riggs MD as PCP - [facility] Empaneled Provider    Wt Readings from Last 3 Encounters:   06/09/21 159 lb 3.2 oz (72.2 kg)   06/04/21 159 lb (72.1 kg)   05/17/21 162 lb (73.5 kg)     Vitals:    06/09/21 1455   BP: (!) 148/83   Pulse: 94   Resp: 18   SpO2: 100%   Weight: 159 lb 3.2 oz (72.2 kg)   Height: 5' 7\" (1.702 m)     Body mass index is 24.93 kg/m². Based upon direct observation of the patient, evaluation of cognition reveals recent and remote memory intact.     General Appearance: alert and oriented to person, place and time, well developed and well- nourished, in no acute distress  Skin: warm and dry, no rash or erythema  Head: normocephalic and atraumatic  Eyes: pupils equal, round, and reactive to light, extraocular eye movements intact, conjunctivae normal  ENT: tympanic membrane, external ear and ear canal normal bilaterally, nose Health Maintenance Status  Immunization History   Administered Date(s) Administered    COVID-19, Moderna, PF, 100mcg/0.5mL 02/04/2021, 03/04/2021    Influenza 10/16/2013    Influenza Virus Vaccine 10/20/2014, 09/23/2015    Influenza, High Dose (Fluzone 65 yrs and older) 10/01/2016, 09/26/2017, 10/15/2018, 08/13/2020    Influenza, High-dose, [place], 65 yrs +, IM (Fluzone) 08/13/2020    Influenza, Quadv, IM, PF (6 mo and older Fluzone, Flulaval, Fluarix, and 3 yrs and older Afluria) 09/18/2019    Pneumococcal Conjugate 13-valent ([id number]) 10/01/2016    Pneumococcal Polysaccharide ([id number]) 07/12/2013, 08/13/2020    Td, unspecified formulation 10/16/2013    Tdap (Boostrix, Adacel) 05/06/2017, 06/13/2018    Zoster Live (Zostavax) 09/04/2013    Zoster Recombinant (Shingrix) 04/05/2018, 09/17/2018        Health Maintenance   Topic Date Due    Annual Wellness Visit (AWV)  Never done    Lipid screen  05/13/2022    Potassium monitoring  05/21/2022    Creatinine monitoring  05/21/2022    DTaP/Tdap/Td vaccine (3 - Td or Tdap) 06/13/2028    Flu vaccine  Completed    Shingles Vaccine  Completed    Pneumococcal 65+ years Vaccine  Completed    COVID-19 Vaccine  Completed    Hepatitis C screen  Completed    Hepatitis A vaccine  Aged Out    Hepatitis B vaccine  Aged Out    Hib vaccine  Aged Out    Meningococcal (ACWY) vaccine  Aged Out     Recommendations for [employer] Due: see orders and patient instructions/AVS.  . Recommended screening schedule for the next 5-10 years is provided to the patient in written form: see Patient Anabelle Ching was seen today for medicare awv. Diagnoses and all orders for this visit:    Routine general medical examination at a health care facility    Essential hypertension     Will increase Hydralazine to 50 mg in AM  and 25 mg at noon and  25 mg at night    SSS (sick sinus syndrome) (HCC) /S/P placement of cardiac pacemaker  Doing well post op.

## 2021-06-10 ENCOUNTER — NURSE ONLY (OUTPATIENT)
Dept: CARDIOLOGY CLINIC | Age: 76
End: 2021-06-10
Payer: MEDICARE

## 2021-06-10 ENCOUNTER — TELEPHONE (OUTPATIENT)
Dept: CARDIOLOGY CLINIC | Age: 76
End: 2021-06-10

## 2021-06-10 DIAGNOSIS — R00.1 BRADYCARDIA: ICD-10-CM

## 2021-06-10 DIAGNOSIS — Z95.0 CARDIAC PACEMAKER IN SITU: Primary | ICD-10-CM

## 2021-06-10 NOTE — TELEPHONE ENCOUNTER
Please contact patient and advise that Dr. Kalani Graham will defer to PCP for management of his BP and he is agreeable with increasing his hydralazine for added BP control.

## 2021-06-10 NOTE — TELEPHONE ENCOUNTER
Myranda Powers called in this morning stating that his PCP Dr. Loren Chow is wanting to put him on a higher  dose of hydralazine or put him back on atenolol. Myranda Powers wanted to discuss with Dr. Gabby Rivero. Myranda Powers can be reached at [phone].

## 2021-06-15 NOTE — PROGRESS NOTES
Pt seen in clinic today for cardiac device interrogation and site check 1 week post op. Site appears to be healing well, minimal swelling and bruising, consistent temp with surrounding are. Their device is a  MDT 2 chamber ppm.  Based on threshold, impedance, and intrinsic sensing tests run today, the device appears to be functioning normally. Remaining battery life is 10y8m  AP 11.29%                  89.82%      7.5% AT/AF burden - pt on AC  96 AF episodes      Pt was informed of findings today and general questions have been answered with regard to device.      Home monitoring hardware is transmitting    Results discussed with or to be reviewed by EP

## 2021-06-16 PROCEDURE — 93280 PM DEVICE PROGR EVAL DUAL: CPT | Performed by: INTERNAL MEDICINE

## 2021-06-16 NOTE — RESULT ENCOUNTER NOTE
Device interrogation reviewed. Patient has 7-9% burden of atrial fibrillation. Please ask patient to come into my office at next available time to discuss treatment options for atrial fibrillation including ablation if he is a candidate for such.

## 2021-06-18 DIAGNOSIS — F51.04 PSYCHOPHYSIOLOGICAL INSOMNIA: ICD-10-CM

## 2021-06-28 ENCOUNTER — TELEPHONE (OUTPATIENT)
Dept: CARDIOLOGY CLINIC | Age: 76
End: 2021-06-28

## 2021-06-28 NOTE — TELEPHONE ENCOUNTER
Called patient and scheduled him to see Dr Magan Morris to discuss ablation. Since he was scheduled with the incorrect provider. He says that his shortness of breath is worse with exertion. Says that his pulse is around 60 most of the time. I encouraged him to call with any increased symptoms.

## 2021-07-02 DIAGNOSIS — K52.9 CHRONIC DIARRHEA: ICD-10-CM

## 2021-07-06 ENCOUNTER — TELEPHONE (OUTPATIENT)
Dept: PRIMARY CARE CLINIC | Age: 76
End: 2021-07-06

## 2021-07-06 RX ORDER — QUETIAPINE FUMARATE 25 MG/1
25 TABLET, FILM COATED ORAL NIGHTLY
Qty: 90 TABLET | Refills: 1 | Status: SHIPPED | OUTPATIENT
Start: 2021-07-06 | End: 2021-11-22

## 2021-07-06 RX ORDER — ALBUTEROL SULFATE 90 UG/1
2 AEROSOL, METERED RESPIRATORY (INHALATION) EVERY 6 HOURS PRN
Qty: 3 INHALER | Refills: 1 | Status: SHIPPED | OUTPATIENT
Start: 2021-07-06

## 2021-07-06 RX ORDER — DIPHENOXYLATE HYDROCHLORIDE AND ATROPINE SULFATE 2.5; .025 MG/1; MG/1
1 TABLET ORAL 4 TIMES DAILY PRN
Qty: 120 TABLET | Refills: 1 | Status: SHIPPED | OUTPATIENT
Start: 2021-07-06 | End: 2022-03-14

## 2021-07-06 NOTE — TELEPHONE ENCOUNTER
Patient says that he needs new prescription for Ventolin inhaler. Pharmacy said that his old prescription is 3years old. Please call and advise.

## 2021-07-07 ENCOUNTER — NURSE ONLY (OUTPATIENT)
Dept: CARDIOLOGY CLINIC | Age: 76
End: 2021-07-07
Payer: MEDICARE

## 2021-07-07 DIAGNOSIS — I48.0 PAF (PAROXYSMAL ATRIAL FIBRILLATION) (HCC): ICD-10-CM

## 2021-07-07 DIAGNOSIS — Z95.0 CARDIAC PACEMAKER IN SITU: Primary | ICD-10-CM

## 2021-07-07 DIAGNOSIS — I44.1 MOBITZ TYPE 2 SECOND DEGREE ATRIOVENTRICULAR BLOCK: ICD-10-CM

## 2021-07-07 DIAGNOSIS — R00.1 BRADYCARDIA: ICD-10-CM

## 2021-07-07 PROCEDURE — 93294 REM INTERROG EVL PM/LDLS PM: CPT | Performed by: INTERNAL MEDICINE

## 2021-07-07 PROCEDURE — 93296 REM INTERROG EVL PM/IDS: CPT | Performed by: INTERNAL MEDICINE

## 2021-07-07 NOTE — LETTER
[address] [facility] [phone] [address] [facility] [phone]    Pacemaker/Defibrillator Clinic    07/07/21      [address] [facility]      Dear Edward Velázquez    This letter is to inform you that we received the transmission from your monitor at home that checks your implanted heart device. The next date your monitor will automatically transmit will be 10/6. If your report needs attention we will notify you. Your device and monitor are wireless and most transmit cellularly, but please periodically check your monitor is still plugged in to the electrical outlet. If you still use the telephone land line to send please ensure the connection to the phone jac is secure. This will help to ensure successful automatic transmissions in the future. Also, the monitor needs to be close to you while sleeping at night. Please be aware that the remote device transmission sites are periodically monitored only during regular business hours during which simultaneous in-office device clinics are being run. If your transmission requires attention, we will contact you as soon as possible. **PLEASE NOTE** that our [facility] policy and processes are changing to ensure a more seamless approach for all parties involved, allowing more time for our nurses to address patient issues and concerns. We will no longer be sending letters for NORMAL remote transmissions. You will be contacted by phone if your transmission requires attention (as previously done), and letters will only be sent regarding monitor disconnections or missed transmissions if you are unable to be reached by phone. Please do not be alarmed by this new process, as we will continue to contact you if your transmission report requires attention. This will be your final \"remote received\" letter.   From this point forward, the [facility] will be utilizing the no news is good news approach. As always, please feel free to contact your nurse with any questions or concerns. Thank you.     Wilberto

## 2021-07-19 ENCOUNTER — OFFICE VISIT (OUTPATIENT)
Dept: CARDIOLOGY CLINIC | Age: 76
End: 2021-07-19
Payer: MEDICARE

## 2021-07-19 VITALS
HEIGHT: 67 IN | WEIGHT: 160 LBS | BODY MASS INDEX: 25.11 KG/M2 | HEART RATE: 109 BPM | SYSTOLIC BLOOD PRESSURE: 142 MMHG | OXYGEN SATURATION: 98 % | DIASTOLIC BLOOD PRESSURE: 80 MMHG

## 2021-07-19 DIAGNOSIS — I49.5 SSS (SICK SINUS SYNDROME) (HCC): ICD-10-CM

## 2021-07-19 DIAGNOSIS — I49.5 SSS (SICK SINUS SYNDROME) (HCC): Primary | ICD-10-CM

## 2021-07-19 DIAGNOSIS — I48.0 PAF (PAROXYSMAL ATRIAL FIBRILLATION) (HCC): ICD-10-CM

## 2021-07-19 LAB
ALBUMIN SERPL-MCNC: 4.4 G/DL (ref 3.4–5)
ANION GAP SERPL CALCULATED.3IONS-SCNC: 13 MMOL/L (ref 3–16)
BUN BLDV-MCNC: 23 MG/DL (ref 7–20)
CALCIUM SERPL-MCNC: 9.4 MG/DL (ref 8.3–10.6)
CHLORIDE BLD-SCNC: 100 MMOL/L (ref 99–110)
CO2: 23 MMOL/L (ref 21–32)
CREAT SERPL-MCNC: 0.8 MG/DL (ref 0.8–1.3)
GFR AFRICAN AMERICAN: >60
GFR NON-AFRICAN AMERICAN: >60
GLUCOSE BLD-MCNC: 112 MG/DL (ref 70–99)
PHOSPHORUS: 2.7 MG/DL (ref 2.5–4.9)
POTASSIUM SERPL-SCNC: 4.2 MMOL/L (ref 3.5–5.1)
SODIUM BLD-SCNC: 136 MMOL/L (ref 136–145)

## 2021-07-19 PROCEDURE — 99215 OFFICE O/P EST HI 40 MIN: CPT | Performed by: INTERNAL MEDICINE

## 2021-07-19 PROCEDURE — G8417 CALC BMI ABV UP PARAM F/U: HCPCS | Performed by: INTERNAL MEDICINE

## 2021-07-19 PROCEDURE — 1123F ACP DISCUSS/DSCN MKR DOCD: CPT | Performed by: INTERNAL MEDICINE

## 2021-07-19 PROCEDURE — 93000 ELECTROCARDIOGRAM COMPLETE: CPT | Performed by: INTERNAL MEDICINE

## 2021-07-19 PROCEDURE — G8427 DOCREV CUR MEDS BY ELIG CLIN: HCPCS | Performed by: INTERNAL MEDICINE

## 2021-07-19 PROCEDURE — 1036F TOBACCO NON-USER: CPT | Performed by: INTERNAL MEDICINE

## 2021-07-19 PROCEDURE — 4040F PNEUMOC VAC/ADMIN/RCVD: CPT | Performed by: INTERNAL MEDICINE

## 2021-07-19 RX ORDER — HYDRALAZINE HYDROCHLORIDE 25 MG/1
25 TABLET, FILM COATED ORAL 3 TIMES DAILY
Qty: 270 TABLET | Refills: 3 | Status: SHIPPED | OUTPATIENT
Start: 2021-07-19 | End: 2021-11-24 | Stop reason: SDUPTHER

## 2021-07-19 NOTE — PATIENT INSTRUCTIONS
If you have any question regarding your atrial fibrillation ablation or would like to proceed with scheduling please contact Dr. Caio Ramirez Nurse Damaris Madrigal at [phone]. Atrial Fibrillation Ablation Pre procedure Instructions    As part of your pre procedure work up you will need to get a CT scan of your heart. This scan is completed in order to give Dr. Rodrigo Farias a map of the atrium (chamber of your heart) where he will be doing the ablation. CTA Pre procedure instructions      Date:_________________________________    Time:_________________________________    -Arrive 20 minutes prior to scheduled testing time  -Nothing to eat or drink for at least 4 hours prior to test   -You will need to get lab work 5-7 days prior to this test (Renal panel) to check your kidney function. You will be receiving intravenous dye for the procedure and the doctor needs to check to make sure your kidneys are functioning properly prior to the test.    Atrial Fibrillation Ablation Pre procedure Instructions    Date: 8/20/2021 Friday    Arrive at: 10:00 am    Procedure time: 11:30 am     The morning of your procedure you will park in the hospital parking lot and report directly to the cath lab to check in. At the information desk stay right and go all the way to the end of the kemp, this will take you directly to your check in desk for the cath lab. Pre-Procedure Instructions   1. You will need to fast (nothing to eat or drink) for at least 8 hours prior to procedure. 2. You will need to hold your Eliquis for 12 hours prior to the procedure. You may take your amLODIPine, Losartan, hydroCHLOROthiazide and hydralazine the morning of the procedure with sips of water. 3. If you are a diabetic you will need to hold all diabetic medications including, Metformin the morning of the procedure. If you take Lantus/Levemir only take ½ your normal dose the evening before. 4. Do not use any lotions, creams or perfume the morning of procedure. 5. You will need to complete pre-procedure lab work 5-7 days prior to your procedure. 6.  A COVID test will be completed upon arrival the day of your procedure. If you have been fully vaccinated for COVID 19 at least 14 days prior to your procedure bring documentation of your vaccination with you the day of your procedure and you will NOT be required to get a COVID test.  7. Please have a responsible adult to drive you home after procedure, you should go home same day, but there is always a possibility of an overnight stay. 8. Cath lab will provide you with all post procedure instructions  9. A 3 month follow up will be scheduled with Dr. Sanjuana Andersen Nurse practitioner Ivonne Faye, JAVIER post procedure      ________________________________________________________________________________________________    Patient Education        Learning About Atrial Fibrillation  What is atrial fibrillation? Atrial fibrillation (say \"AY-tree-nasima qix-bjgj-HZK-shun\") is a common type of irregular heartbeat (arrhythmia). Normally, the heart beats in a strong, steady rhythm. In atrial fibrillation, a problem with the heart's electrical system causes the two upper chambers of the heart (called the atria) to quiver, or fibrillate. Atrial fibrillation can be dangerous. This is because if the heartbeat isn't strong and steady, blood can collect, or pool, in the atria. And pooled blood is more likely to form clots. Clots can travel to the brain, block blood flow, and cause a stroke. Atrial fibrillation can also lead to heart failure. This condition also upsets the normal rhythm between the atria and the lower chambers of the heart. (These chambers are called the ventricles.) The ventricles may beat fast and without a regular rhythm. What are the symptoms? Some people feel symptoms when they have episodes of atrial fibrillation. But other people don't notice any symptoms.   If you have symptoms, you may feel:  · A fluttering, racing, or pounding feeling in your chest called palpitations. · Weak or tired. · Dizzy or lightheaded. · Short of breath. · Chest pain. · Confused. You may notice signs of atrial fibrillation when you check your pulse. Your pulse may seem uneven or fast.  What can you expect when you have atrial fibrillation? At first, spells of atrial fibrillation may come on suddenly and last a short time. They may go away on their own or with treatment. Over time, the spells may last longer and occur more often. They often don't go away on their own. How is it treated? Treatments can help you feel better and prevent future problems, especially stroke and heart failure. Your treatment will depend on the cause of your atrial fibrillation, your symptoms, and your risk for stroke. Types of treatment include:  · Heart rate treatment. Medicine may be used to slow your heart rate. Your heartbeat may still be irregular. But these medicines keep your heart from beating too fast. They may also help relieve symptoms. · Heart rhythm treatment. Different treatments may be used to try to stop atrial fibrillation and keep it from returning. They can also relieve symptoms. These treatments include medicine, electrical cardioversion to shock the heart back to a normal rhythm, a procedure called catheter ablation, and heart surgery. · Stroke prevention. You and your doctor can decide how to lower your risk. You may decide to take a blood-thinning medicine called an anticoagulant. What is a heart-healthy lifestyle for atrial fibrillation? You can live well and help manage atrial fibrillation by having a heart-healthy lifestyle. This lifestyle may help reduce how often you have episodes of atrial fibrillation. Don't smoke. Avoid secondhand smoke too. Quitting smoking is the best thing you can do for your heart. Be active. Talk to your doctor about what type and level of exercise is safe for you. Eat a heart-healthy diet. These foods include vegetables, fruits, nuts, beans, lean meat, fish, and whole grains. Limit sodium, alcohol, and sugar. Avoid alcohol if it triggers symptoms. Stay at a healthy weight. Lose weight if you need to. Losing weight can help relieve symptoms. Manage other health problems. These problems include diabetes, high blood pressure, and high cholesterol. If you think you may have a problem with alcohol or drug use, talk to your doctor. Manage stress. Options like yoga, biofeedback, and meditation may help. Where can you learn more? Go to https://Maltem ConsultingmanishaMetroFlats.com.Scopix. org and sign in to your [employer] account. Enter Y506 in the Flypaper box to learn more about \"Learning About Atrial Fibrillation. \"     If you do not have an account, please click on the \"Sign Up Now\" link. Current as of: August 31, 2020               Content Version: 12.9  © 8738-8289 [employer]. Care instructions adapted under license by [facility] ([facility]). If you have questions about a medical condition or this instruction, always ask your healthcare professional. [place] [address] any warranty or liability for your use of this information. Patient Education        Atrial Fibrillation: Care Instructions  Your Care Instructions     Atrial fibrillation is an irregular and often fast heartbeat. Treating this condition is important for several reasons. It can cause blood clots, which can travel from your heart to your brain and cause a stroke. If you have a fast heartbeat, you may feel lightheaded, dizzy, and weak. An irregular heartbeat can also increase your risk for heart failure. Atrial fibrillation is often the result of another heart condition, such as high blood pressure or coronary artery disease. Making changes to improve your heart condition will help you stay healthy and active. Follow-up care is a key part of your treatment and safety.  Be sure to make and go to all appointments, and call your doctor if you are having problems. It's also a good idea to know your test results and keep a list of the medicines you take. How can you care for yourself at home? Medicines    · Take your medicines exactly as prescribed. Call your doctor if you think you are having a problem with your medicine. You will get more details on the specific medicines your doctor prescribes.     · If your doctor has given you a blood thinner to prevent a stroke, be sure you get instructions about how to take your medicine safely. Blood thinners can cause serious bleeding problems.     · Do not take any vitamins, over-the-counter drugs, or herbal products without talking to your doctor first.   Lifestyle changes    · Do not smoke. Smoking can increase your chance of a stroke and heart attack. If you need help quitting, talk to your doctor about stop-smoking programs and medicines. These can increase your chances of quitting for good.     · Eat a heart-healthy diet.     · Stay at a healthy weight. Lose weight if you need to.     · Limit alcohol to 2 drinks a day for men and 1 drink a day for women. Too much alcohol can cause health problems.     · Avoid colds and flu. Get a pneumococcal vaccine shot. If you have had one before, ask your doctor whether you need another dose. Get a flu shot every year. If you must be around people with colds or flu, wash your hands often. Activity    · If your doctor recommends it, get more exercise. Walking is a good choice. Bit by bit, increase the amount you walk every day. Try for at least 30 minutes on most days of the week. You also may want to swim, bike, or do other activities. Your doctor may suggest that you join a cardiac rehabilitation program so that you can have help increasing your physical activity safely.     · Start light exercise if your doctor says it is okay. Even a small amount will help you get stronger, have more energy, and manage stress.  Walking is an easy way to get exercise. Start out by walking a little more than you did in the hospital. Gradually increase the amount you walk.     · When you exercise, watch for signs that your heart is working too hard. You are pushing too hard if you cannot talk while you are exercising. If you become short of breath or dizzy or have chest pain, sit down and rest immediately.     · Check your pulse regularly. Place two fingers on the artery at the palm side of your wrist, in line with your thumb. If your heartbeat seems uneven or fast, talk to your doctor. When should you call for help? Call 911 anytime you think you may need emergency care. For example, call if:    · You have symptoms of a heart attack. These may include:  ? Chest pain or pressure, or a strange feeling in the chest.  ? Sweating. ? Shortness of breath. ? Nausea or vomiting. ? Pain, pressure, or a strange feeling in the back, neck, jaw, or upper belly or in one or both shoulders or arms. ? Lightheadedness or sudden weakness. ? A fast or irregular heartbeat. After you call 911, the  may tell you to chew 1 adult-strength or 2 to 4 low-dose aspirin. Wait for an ambulance. Do not try to drive yourself.     · You have symptoms of a stroke. These may include:  ? Sudden numbness, tingling, weakness, or loss of movement in your face, arm, or leg, especially on only one side of your body. ? Sudden vision changes. ? Sudden trouble speaking. ? Sudden confusion or trouble understanding simple statements. ? Sudden problems with walking or balance. ? A sudden, severe headache that is different from past headaches.     · You passed out (lost consciousness). Call your doctor now or seek immediate medical care if:    · You have new or increased shortness of breath.     · You feel dizzy or lightheaded, or you feel like you may faint.     · Your heart rate becomes irregular.     · You can feel your heart flutter in your chest or skip heartbeats. Tell your doctor if these symptoms are new or worse. Watch closely for changes in your health, and be sure to contact your doctor if you have any problems. Where can you learn more? Go to https://Ramco Oil ServicesjohanncliffWorkforce Insight.Alcanzar Solar. org and sign in to your We[employer] account. Enter U020 in the [facility] box to learn more about \"Atrial Fibrillation: Care Instructions. \"     If you do not have an account, please click on the \"Sign Up Now\" link. Current as of: August 31, 2020               Content Version: 12.9  © 2006-2021 [employer]. Care instructions adapted under license by [facility] ([facility]). If you have questions about a medical condition or this instruction, always ask your healthcare professional. [address] any warranty or liability for your use of this information. Patient Education        Learning About Catheter Ablation for Heart Rhythm Problems  What is catheter ablation? Catheter ablation is a procedure that treats heart rhythm problems. These problems include atrial fibrillation, supraventricular tachycardia (SVT), atrial flutter, and ventricular tachycardia. Your heart should have a strong, steady beat. That beat is controlled by the heart's electrical system. Sometimes that system misfires. This causes a heartbeat that is too fast and isn't steady. Catheter ablation is a way to get into your heart and fix the problem. Ablation is not surgery. How is catheter ablation done? Your doctor inserts thin tubes called catheters into a blood vessel in your groin, arm, or neck. Then your doctor feeds them into the heart. Wires in the catheters help the doctor find the problem areas. Then the doctor uses the wires to send energy to destroy the tiny areas of heart tissue that are causing the problems. It may seem like a bad idea to destroy parts of your heart on purpose. But the areas that are destroyed are very tiny.  They should not affect your heart's and to find out how to fix it. It is also called an EP study. A catheter ablation procedure is sometimes done at the same time. This procedure destroys (ablates) small areas of your heart that are causing your heart rhythm problem. The doctor puts plastic tubes called catheters into blood vessels in your groin, arm, or neck. He or she then uses an X-ray machine to guide long wires through the tubes to your heart. The doctor can use these wires to record your heart's electrical signals. If the doctor thinks your problem can be fixed with ablation, he or she can use the wires to destroy a small part of your heart tissue. This is most often done with radio waves. You will probably be awake during the procedure. But you might be asleep. The doctor will give you medicines to help you feel relaxed and to numb the areas where the catheters go in. An EP study and ablation can take 2 to 6 hours. In rare cases, it can take longer. If you have an EP study only and you don't need more treatment, you may go home the same day. But if you also have ablation, you may stay overnight in the hospital. How long you stay in the hospital depends on the type of ablation you have. Do not exercise hard or lift anything heavy for a week. You may be able to go back to work and to your normal routine in 1 or 2 days. Follow-up care is a key part of your treatment and safety. Be sure to make and go to all appointments, and call your doctor if you are having problems. It's also a good idea to know your test results and keep a list of the medicines you take. How do you prepare for the procedure? Procedures can be stressful. This information will help you understand what you can expect. And it will help you safely prepare for your procedure. Preparing for the procedure    · Be sure you have someone to take you home.  Anesthesia and pain medicine will make it unsafe for you to drive or get home on your own.     · Understand exactly what procedure is planned, along with the risks, benefits, and other options.     · Tell your doctor ALL the medicines, vitamins, supplements, and herbal remedies you take. Some may increase the risk of problems during your procedure. Your doctor will tell you if you should stop taking any of them before the procedure and how soon to do it.     · If you take aspirin or some other blood thinner, ask your doctor if you should stop taking it before your procedure. Make sure that you understand exactly what your doctor wants you to do. These medicines increase the risk of bleeding.     · Make sure your doctor and the hospital have a copy of your advance directive. If you don't have one, you may want to prepare one. It lets others know your health care wishes. It's a good thing to have before any type of surgery or procedure. What happens on the day of the procedure? · Follow the instructions exactly about when to stop eating and drinking. If you don't, your procedure may be canceled. If your doctor told you to take your medicines on the day of the procedure, take them with only a sip of water.     · Take a bath or shower before you come in for your procedure. Do not apply lotions, perfumes, deodorants, or nail polish.     · Take off all jewelry and piercings. And take out contact lenses, if you wear them. At the hospital or surgery center   · Bring a picture ID.     · You will be kept comfortable and safe by your anesthesia provider. The anesthesia may make you sleep. Or it may just numb the area being worked on.     · This procedure can take 2 to 6 hours. In rare cases, it can take longer.     · After the procedure, pressure will be applied to the area where the catheter was put in your blood vessel. Then the area may be covered with a bandage or a compression device. This will prevent bleeding.     · Nurses will check your heart rate and blood pressure.  The nurse will also check the catheter site for bleeding.     · If the catheter was put in your groin, you will need to lie still and keep your leg straight for several hours. The nurse may put a weighted bag on your leg to keep it still.     · If the catheter was put in your arm, you may be able to sit up and get out of bed right away. But you will need to keep your arm still for at least 1 hour.     · You may have a bruise or a small lump where the catheter was put in your blood vessel. This is normal and will go away. When should you call your doctor? · You have questions or concerns.     · You don't understand how to prepare for your procedure.     · You become ill before the procedure (such as fever, flu, or a cold).     · You need to reschedule or have changed your mind about having the procedure. Where can you learn more? Go to https://MacroCure.Rocket Software. org and sign in to your Revenew account. Enter U842 in the [employer] box to learn more about \"Electrophysiology Study and Catheter Ablation: Before Your Procedure. \"     If you do not have an account, please click on the \"Sign Up Now\" link. Current as of: August 31, 2020               Content Version: 12.9  © 2006-2021 [employer]. Care instructions adapted under license by [facility] ([facility]). If you have questions about a medical condition or this instruction, always ask your healthcare professional. [place] [address] any warranty or liability for your use of this information. Patient Education        Electrophysiology Study and Catheter Ablation: What to Expect at [address] [place] [address] Drive had an electrophysiology study for a problem with your heartbeat. You may also have had a catheter ablation to try to correct the problem. You may have swelling, bruising, or a small lump around the site where the catheters went into your body. These should go away in 3 to 4 weeks. Do not exercise hard or lift anything heavy for a week.  You may be able to go back to work and to your normal routine in 1 or 2 days. This care sheet gives you a general idea about how long it will take for you to recover. But each person recovers at a different pace. Follow the steps below to get better as quickly as possible. How can you care for yourself at home? Activity    · For 1 week, do not lift anything that would make you strain. This may include heavy grocery bags and milk containers, a heavy briefcase or backpack, cat litter or dog food bags, a vacuum , or a child.     · For 1 week, do not exercise hard or do any activity that could strain your blood vessels or the site where the catheters went into your body.     · Ask your doctor when it is okay to have sex. Diet    · You can eat your normal diet. If your stomach is upset, try bland, low-fat foods like plain rice, broiled chicken, toast, and yogurt.     · Drink plenty of fluids (unless your doctor tells you not to). Medicines    · Your doctor will tell you if and when you can restart your medicines. He or she will also give you instructions about taking any new medicines.     · If you take aspirin or some other blood thinner, ask your doctor if and when to start taking it again. Make sure that you understand exactly what your doctor wants you to do.     · Ask your doctor if you can take acetaminophen (Tylenol) for pain. Do not take aspirin for 3 days, unless your doctor says it is okay.     · Check with your doctor before you take aspirin or anti-inflammatory medicines to reduce pain and swelling. These include ibuprofen (Advil, Motrin) and naproxen (Aleve).   · Make sure you know which heart medicines to continue and which ones to stop. Ask your doctor if you aren't sure. Catheter site care    · You can remove your bandages the day after the procedure.     · You may shower 24 to 48 hours after the procedure, if your doctor okays it. Pat the incision dry.     · Do not soak the catheter site until it is healed.  Don't take a bath for 1 week, or until your doctor tells you it is okay.     · Watch for bleeding from the site. A small amount of blood (up to the size of a quarter) on the bandage can be normal.     · If you are bleeding, lie down and press on the area for 15 minutes to try to make it stop. If the bleeding does not stop, call your doctor or seek immediate medical care. Follow-up care is a key part of your treatment and safety. Be sure to make and go to all appointments, and call your doctor if you are having problems. It's also a good idea to know your test results and keep a list of the medicines you take. When should you call for help? Call 911  anytime you think you may need emergency care. For example, call if:    · You passed out (lost consciousness).     · You have symptoms of a heart attack. These may include:  ? Chest pain or pressure, or a strange feeling in the chest.  ? Sweating. ? Shortness of breath. ? Nausea or vomiting. ? Pain, pressure, or a strange feeling in the back, neck, jaw, or upper belly, or in one or both shoulders or arms. ? Lightheadedness or sudden weakness. ? A fast or irregular heartbeat. After you call 911, the  may tell you to chew 1 adult-strength or 2 to 4 low-dose aspirin. Wait for an ambulance. Do not try to drive yourself.     · You have symptoms of a stroke. These may include:  ? Sudden numbness, tingling, weakness, or loss of movement in your face, arm, or leg, especially on only one side of your body. ? Sudden vision changes. ? Sudden trouble speaking. ? Sudden confusion or trouble understanding simple statements. ? Sudden problems with walking or balance. ? A sudden, severe headache that is different from past headaches.    Call your doctor now or seek immediate medical care if:    · You are bleeding from the area where the catheter was put in your blood vessel.     · You have a fast-growing, painful lump at the catheter site.     · You have signs of infection, such as:  ? Increased pain, swelling, warmth, or redness. ? Red streaks leading from the catheter site. ? Pus draining from the catheter site. ? A fever.     · Your leg, arm, or hand is painful, looks blue, or feels cold, numb, or tingly. Watch closely for any changes in your health, and be sure to contact your doctor if you have any problems. Where can you learn more? Go to https://Yoozonpe36Kr.Warwick [employer]. org and sign in to your Anafocus account. Enter [phone] in the Ykone box to learn more about \"Electrophysiology Study and Catheter Ablation: What to Expect at Home. \"     If you do not have an account, please click on the \"Sign Up Now\" link. Current as of: August 31, 2020               Content Version: 12.9  © 1713-2058 [employer], [employer]. Care instructions adapted under license by [facility] ([facility]). If you have questions about a medical condition or this instruction, always ask your healthcare professional. [address] any warranty or liability for your use of this information.

## 2021-07-20 DIAGNOSIS — F51.04 PSYCHOPHYSIOLOGICAL INSOMNIA: ICD-10-CM

## 2021-07-23 ENCOUNTER — TELEPHONE (OUTPATIENT)
Dept: CARDIOLOGY CLINIC | Age: 76
End: 2021-07-23

## 2021-07-23 NOTE — TELEPHONE ENCOUNTER
[facility] called in wanting to speak with Esequiel Moore to ask her if he can take [employer]. You can reach [facility] at [phone]    He said it is OK to leave .

## 2021-07-23 NOTE — TELEPHONE ENCOUNTER
I advised that [employer] and all  herbal supplements are regulated by the U.S. Food and Drug Administration (FDA), but not as strictly as prescription or over the counter medications. Regulations ensure that herbal supplements meet manufacturing standards but aren't a guarantee of effectiveness of the herbal supplement for this reason it would be recommended that he take the supplement.

## 2021-07-29 ENCOUNTER — HOSPITAL ENCOUNTER (OUTPATIENT)
Dept: CT IMAGING | Age: 76
Discharge: HOME | End: 2021-07-29
Payer: MEDICARE

## 2021-07-29 DIAGNOSIS — I48.0 PAF (PAROXYSMAL ATRIAL FIBRILLATION) (HCC): ICD-10-CM

## 2021-07-29 DIAGNOSIS — I49.5 SSS (SICK SINUS SYNDROME) (HCC): ICD-10-CM

## 2021-07-29 PROCEDURE — 6360000004 HC RX CONTRAST MEDICATION: Performed by: INTERNAL MEDICINE

## 2021-07-29 PROCEDURE — 75574 CT ANGIO HRT W/3D IMAGE: CPT

## 2021-07-29 RX ADMIN — IOPAMIDOL 75 ML: 755 INJECTION, SOLUTION INTRAVENOUS at 13:30

## 2021-08-02 RX ORDER — HYDROCHLOROTHIAZIDE 25 MG/1
25 TABLET ORAL DAILY
Qty: 90 TABLET | Refills: 1 | Status: SHIPPED | OUTPATIENT
Start: 2021-08-02 | End: 2021-08-10 | Stop reason: SDUPTHER

## 2021-08-10 ENCOUNTER — OFFICE VISIT (OUTPATIENT)
Dept: PRIMARY CARE CLINIC | Age: 76
End: 2021-08-10
Payer: MEDICARE

## 2021-08-10 VITALS
TEMPERATURE: 97.8 F | BODY MASS INDEX: 25.44 KG/M2 | OXYGEN SATURATION: 98 % | HEART RATE: 106 BPM | SYSTOLIC BLOOD PRESSURE: 130 MMHG | WEIGHT: 162.4 LBS | RESPIRATION RATE: 18 BRPM | DIASTOLIC BLOOD PRESSURE: 66 MMHG

## 2021-08-10 DIAGNOSIS — F51.01 PRIMARY INSOMNIA: ICD-10-CM

## 2021-08-10 DIAGNOSIS — I48.0 PAF (PAROXYSMAL ATRIAL FIBRILLATION) (HCC): ICD-10-CM

## 2021-08-10 DIAGNOSIS — I49.5 SICK SINUS SYNDROME (HCC): ICD-10-CM

## 2021-08-10 DIAGNOSIS — E78.5 HYPERLIPIDEMIA LDL GOAL <100: ICD-10-CM

## 2021-08-10 DIAGNOSIS — K58.0 IRRITABLE BOWEL SYNDROME WITH DIARRHEA: ICD-10-CM

## 2021-08-10 DIAGNOSIS — Z91.81 AT HIGH RISK FOR FALLS: ICD-10-CM

## 2021-08-10 DIAGNOSIS — I10 ESSENTIAL HYPERTENSION: Primary | ICD-10-CM

## 2021-08-10 DIAGNOSIS — F41.8 DEPRESSION WITH ANXIETY: ICD-10-CM

## 2021-08-10 PROCEDURE — G8427 DOCREV CUR MEDS BY ELIG CLIN: HCPCS | Performed by: FAMILY MEDICINE

## 2021-08-10 PROCEDURE — 1123F ACP DISCUSS/DSCN MKR DOCD: CPT | Performed by: FAMILY MEDICINE

## 2021-08-10 PROCEDURE — 4040F PNEUMOC VAC/ADMIN/RCVD: CPT | Performed by: FAMILY MEDICINE

## 2021-08-10 PROCEDURE — 99214 OFFICE O/P EST MOD 30 MIN: CPT | Performed by: FAMILY MEDICINE

## 2021-08-10 PROCEDURE — 1036F TOBACCO NON-USER: CPT | Performed by: FAMILY MEDICINE

## 2021-08-10 PROCEDURE — G8417 CALC BMI ABV UP PARAM F/U: HCPCS | Performed by: FAMILY MEDICINE

## 2021-08-10 RX ORDER — AMLODIPINE BESYLATE 5 MG/1
5 TABLET ORAL DAILY
Qty: 90 TABLET | Refills: 1
Start: 2021-08-10 | End: 2021-11-24 | Stop reason: SDUPTHER

## 2021-08-10 RX ORDER — HYDROCHLOROTHIAZIDE 12.5 MG/1
12.5 TABLET ORAL DAILY
Qty: 90 TABLET | Refills: 1
Start: 2021-08-10 | End: 2021-11-24

## 2021-08-10 ASSESSMENT — ENCOUNTER SYMPTOMS
CONSTIPATION: 0
DIARRHEA: 0
SHORTNESS OF BREATH: 0
VOICE CHANGE: 0
BLOOD IN STOOL: 0
TROUBLE SWALLOWING: 0
ABDOMINAL PAIN: 0

## 2021-08-10 NOTE — PROGRESS NOTES
8/10/2021     Lizbet Villa (:  1945) is a 64 y.o. male, here for evaluation of the following medical concerns:    HPI  Patient presented to the office for his regular follow-up. She has hypertension controlled with current medication which include amlodipine, hydrochlorothiazide, losartan and hydralazine. He has hyperlipidemia controlled with Pravachol. He is tolerating statin. He has IBS and controlled with the Lomotil, high-fiber diet plus Metamucil. She has insomnia and doing fairly well and ProSom 2 mg at night. She has anxiety with history of depression doing fairly well on Effexor 150 mg daily plus Seroquel. She has a recent pacemaker placement for sick sinus syndrome incidentally was also found to have paroxysmal A. fib and is now on Eliquis 5 mg twice a day and was told that he will be scheduled for catheter ablation. Patient overall is doing fairly well. Denies chest pain or shortness of breath. Denies bowel or urinary disturbance. Review of Systems   Constitutional: Negative for activity change. HENT: Negative for trouble swallowing and voice change. Eyes: Negative for visual disturbance. Respiratory: Negative for shortness of breath. Cardiovascular: Negative for chest pain and leg swelling. Gastrointestinal: Negative for abdominal pain, blood in stool, constipation and diarrhea. Genitourinary: Negative for difficulty urinating, dysuria, frequency, hematuria and scrotal swelling. Musculoskeletal: Negative for arthralgias and myalgias. Skin: Negative for rash. Neurological: Negative for dizziness. Psychiatric/Behavioral: Negative for behavioral problems. Prior to Visit Medications    Medication Sig Taking?  Authorizing Provider   hydroCHLOROthiazide (HYDRODIURIL) 25 MG tablet Take 1 tablet by mouth daily  Patient taking differently: Take 12.5 mg by mouth daily  Yes Jhoana Hamilton MD   estazolam (PROSOM) 2 MG tablet TAKE 1 TO 2 TABLETS BY MOUTH EVERY NIGHT AS NEEDED FOR SLEEP Yes Helder Mckeon MD   hydrALAZINE (APRESOLINE) 25 MG tablet Take 1 tablet by mouth 3 times daily Yes Harjeet Prabhakar MD   diphenoxylate-atropine (LOMOTIL) 2.5-0.025 MG per tablet Take 1 tablet by mouth 4 times daily as needed for Diarrhea for up to 60 days. Yes Helder Mckeon MD   QUEtiapine (SEROQUEL) 25 MG tablet Take 1 tablet by mouth nightly Yes Helder Mckeon MD   albuterol sulfate HFA (VENTOLIN HFA) 108 (90 Base) MCG/ACT inhaler Inhale 2 puffs into the lungs every 6 hours as needed for Wheezing or Shortness of Breath Yes Helder Mckeon MD   apixaban (ELIQUIS) 5 MG TABS tablet Take 1 tablet by mouth 2 times daily Yes Harjeet Prabhakar MD   Fluticasone furoate-vilanterol (BREO ELLIPTA) 200-25 MCG/INH AEPB inhaler Inhale 1 puff into the lungs daily Yes Helder Mckeon MD   pravastatin (PRAVACHOL) 40 MG tablet Take 1 tablet by mouth daily Yes Helder Mckeon MD   amLODIPine (NORVASC) 10 MG tablet Take 0.5 tablets by mouth daily Yes Helder Mckeon MD   venlafaxine (EFFEXOR XR) 150 MG extended release capsule TAKE ONE (1) CAPSULE BY MOUTH DAILY  Yes Helder Mckeon MD   losartan (COZAAR) 100 MG tablet TAKE ONE (1) TABLET BY MOUTH DAILY  Yes Helder Mckeon MD   famotidine (PEPCID) 20 MG tablet Take 1 tablet by mouth 2 times daily Yes Blade Enamorado, APRN - CNP        Social History     Tobacco Use    Smoking status: Former Smoker     Packs/day: 1.00     Years: 20.00     Pack years: 20.00     Types: Cigarettes     Quit date: 1988     Years since quittin.9    Smokeless tobacco: Never Used   Substance Use Topics    Alcohol use: No        Vitals:    08/10/21 1526   BP: 130/66   Pulse: 106   Resp: 18   Temp: 97.8 °F (36.6 °C)   TempSrc: Temporal   SpO2: 98%   Weight: 162 lb 6.4 oz (73.7 kg)     Estimated body mass index is 25.44 kg/m² as calculated from the following:    Height as of 7/19/21: 5' 7\" (1.702 m).     Weight as of this encounter: 162 lb 6.4 oz (73.7 kg).    Physical Exam  Constitutional:       General: He is not in acute distress. Appearance: He is well-developed. HENT:      Head: Normocephalic. Eyes:      Conjunctiva/sclera: Conjunctivae normal.   Neck:      Thyroid: No thyromegaly. Cardiovascular:      Rate and Rhythm: Normal rate and regular rhythm. Heart sounds: Normal heart sounds. No murmur heard. Pulmonary:      Effort: No respiratory distress. Breath sounds: Normal breath sounds. No wheezing or rales. Abdominal:      General: There is no distension. Palpations: Abdomen is soft. Musculoskeletal:         General: Normal range of motion. Cervical back: Neck supple. Skin:     General: Skin is warm. Findings: No rash. Neurological:      Mental Status: He is alert and oriented to person, place, and time. Psychiatric:         Behavior: Behavior normal.         ASSESSMENT/PLAN:  1. Essential hypertension  Fairly controlled. Continue losartan 100 mg daily, amlodipine 5 mg daily, hydrochlorothiazide 12.5 mg daily and hydralazine 25 mg 3 times a day    2. Hyperlipidemia LDL goal <100  Controlled. Continue Pravachol 40 mg daily    3. Irritable bowel syndrome with diarrhea  Continue high-fiber diet, Metamucil and Lomotil as needed. 4. Primary insomnia  Stable. Continue ProSom 2 mg at night    5. Depression with anxiety  Stable. Continue Effexor 150 mg daily and Seroquel 25 mg at night    6. Sick sinus syndrome [facility])  S/p pacemaker placement. 7. PAF (paroxysmal atrial fibrillation) ([facility] [address]  He is now on Eliquis 5 mg twice a day. He is scheduled to have a catheter ablation    8. At high risk for falls        RTC in 3 mos    An electronic signature was used to authenticate this note.    --Tong Buckner MD on 8/10/2021 at 5:51 PMOn the basis of positive falls risk screening, assessment and plan is as follows: patient declines any further evaluation/treatment for increased falls risk.

## 2021-08-13 ENCOUNTER — TELEPHONE (OUTPATIENT)
Dept: CARDIOLOGY CLINIC | Age: 76
End: 2021-08-13

## 2021-08-13 NOTE — TELEPHONE ENCOUNTER
I called Valdez Regan to remind him to get his pre procedure labs drawn prior to his procedure next Friday 8/13. He stated his going to get the drawn this afternoon.

## 2021-08-19 NOTE — PRE-PROCEDURE INSTRUCTIONS
Called patient about procedure in cath lab, left message,  instructed to arrive at  1000 for procedure at 1130. Patient should be NPO after midnight, but can take morning medication with sips of water. Instructed to have a responsible adult be with patient to take them home and stay with them afterwards.

## 2021-08-20 ENCOUNTER — HOSPITAL ENCOUNTER (OUTPATIENT)
Dept: CARDIAC CATH/INVASIVE PROCEDURES | Age: 76
Discharge: HOME | End: 2021-08-20
Payer: MEDICARE

## 2021-08-20 ENCOUNTER — ANESTHESIA EVENT (OUTPATIENT)
Dept: CARDIAC CATH/INVASIVE PROCEDURES | Age: 76
End: 2021-08-20

## 2021-08-20 ENCOUNTER — ANESTHESIA (OUTPATIENT)
Dept: CARDIAC CATH/INVASIVE PROCEDURES | Age: 76
End: 2021-08-20

## 2021-08-20 VITALS
HEART RATE: 77 BPM | OXYGEN SATURATION: 99 % | WEIGHT: 159 LBS | SYSTOLIC BLOOD PRESSURE: 149 MMHG | DIASTOLIC BLOOD PRESSURE: 97 MMHG | BODY MASS INDEX: 24.96 KG/M2 | RESPIRATION RATE: 16 BRPM | TEMPERATURE: 96.9 F | HEIGHT: 67 IN

## 2021-08-20 VITALS
RESPIRATION RATE: 6 BRPM | SYSTOLIC BLOOD PRESSURE: 154 MMHG | DIASTOLIC BLOOD PRESSURE: 92 MMHG | TEMPERATURE: 95.7 F | OXYGEN SATURATION: 100 %

## 2021-08-20 DIAGNOSIS — I48.92 LEFT ATRIAL FLUTTER BY ELECTROCARDIOGRAM (HCC): ICD-10-CM

## 2021-08-20 LAB
ABO/RH: NORMAL
ANTIBODY SCREEN: NORMAL
POC ACT LR: 233 SEC
POC ACT LR: 318 SEC

## 2021-08-20 PROCEDURE — 93657 TX L/R ATRIAL FIB ADDL: CPT | Performed by: INTERNAL MEDICINE

## 2021-08-20 PROCEDURE — C1893 INTRO/SHEATH, FIXED,NON-PEEL: HCPCS

## 2021-08-20 PROCEDURE — 93656 COMPRE EP EVAL ABLTJ ATR FIB: CPT | Performed by: INTERNAL MEDICINE

## 2021-08-20 PROCEDURE — 93657 TX L/R ATRIAL FIB ADDL: CPT | Performed by: FAMILY MEDICINE

## 2021-08-20 PROCEDURE — 93325 DOPPLER ECHO COLOR FLOW MAPG: CPT | Performed by: FAMILY MEDICINE

## 2021-08-20 PROCEDURE — 6370000000 HC RX 637 (ALT 250 FOR IP)

## 2021-08-20 PROCEDURE — 93623 PRGRMD STIMJ&PACG IV RX NFS: CPT | Performed by: FAMILY MEDICINE

## 2021-08-20 PROCEDURE — 2500000003 HC RX 250 WO HCPCS

## 2021-08-20 PROCEDURE — 2580000003 HC RX 258: Performed by: INTERNAL MEDICINE

## 2021-08-20 PROCEDURE — 93005 ELECTROCARDIOGRAM TRACING: CPT | Performed by: INTERNAL MEDICINE

## 2021-08-20 PROCEDURE — C1732 CATH, EP, DIAG/ABL, 3D/VECT: HCPCS

## 2021-08-20 PROCEDURE — 2720000010 HC SURG SUPPLY STERILE

## 2021-08-20 PROCEDURE — 86900 BLOOD TYPING SEROLOGIC ABO: CPT

## 2021-08-20 PROCEDURE — 93613 INTRACARDIAC EPHYS 3D MAPG: CPT | Performed by: INTERNAL MEDICINE

## 2021-08-20 PROCEDURE — 93613 INTRACARDIAC EPHYS 3D MAPG: CPT | Performed by: FAMILY MEDICINE

## 2021-08-20 PROCEDURE — 93971 EXTREMITY STUDY: CPT

## 2021-08-20 PROCEDURE — 85347 COAGULATION TIME ACTIVATED: CPT

## 2021-08-20 PROCEDURE — 93622 COMP EP EVAL L VENTR PAC&REC: CPT | Performed by: INTERNAL MEDICINE

## 2021-08-20 PROCEDURE — 2580000003 HC RX 258: Performed by: NURSE ANESTHETIST, CERTIFIED REGISTERED

## 2021-08-20 PROCEDURE — 86850 RBC ANTIBODY SCREEN: CPT

## 2021-08-20 PROCEDURE — 6360000002 HC RX W HCPCS

## 2021-08-20 PROCEDURE — 2580000003 HC RX 258

## 2021-08-20 PROCEDURE — 2500000003 HC RX 250 WO HCPCS: Performed by: NURSE ANESTHETIST, CERTIFIED REGISTERED

## 2021-08-20 PROCEDURE — 7100000001 HC PACU RECOVERY - ADDTL 15 MIN

## 2021-08-20 PROCEDURE — 3700000000 HC ANESTHESIA ATTENDED CARE

## 2021-08-20 PROCEDURE — 2709999900 HC NON-CHARGEABLE SUPPLY

## 2021-08-20 PROCEDURE — 86901 BLOOD TYPING SEROLOGIC RH(D): CPT

## 2021-08-20 PROCEDURE — 93656 COMPRE EP EVAL ABLTJ ATR FIB: CPT | Performed by: FAMILY MEDICINE

## 2021-08-20 PROCEDURE — 93655 ICAR CATH ABLTJ DSCRT ARRHYT: CPT | Performed by: FAMILY MEDICINE

## 2021-08-20 PROCEDURE — 7100000000 HC PACU RECOVERY - FIRST 15 MIN

## 2021-08-20 PROCEDURE — 93655 ICAR CATH ABLTJ DSCRT ARRHYT: CPT | Performed by: INTERNAL MEDICINE

## 2021-08-20 PROCEDURE — 3700000001 HC ADD 15 MINUTES (ANESTHESIA)

## 2021-08-20 PROCEDURE — C1759 CATH, INTRA ECHOCARDIOGRAPHY: HCPCS

## 2021-08-20 PROCEDURE — 93622 COMP EP EVAL L VENTR PAC&REC: CPT | Performed by: FAMILY MEDICINE

## 2021-08-20 PROCEDURE — 93320 DOPPLER ECHO COMPLETE: CPT | Performed by: FAMILY MEDICINE

## 2021-08-20 PROCEDURE — 93662 INTRACARDIAC ECG (ICE): CPT | Performed by: INTERNAL MEDICINE

## 2021-08-20 PROCEDURE — 6360000002 HC RX W HCPCS: Performed by: NURSE ANESTHETIST, CERTIFIED REGISTERED

## 2021-08-20 PROCEDURE — 93623 PRGRMD STIMJ&PACG IV RX NFS: CPT | Performed by: INTERNAL MEDICINE

## 2021-08-20 PROCEDURE — C1730 CATH, EP, 19 OR FEW ELECT: HCPCS

## 2021-08-20 PROCEDURE — C1894 INTRO/SHEATH, NON-LASER: HCPCS

## 2021-08-20 PROCEDURE — 93312 ECHO TRANSESOPHAGEAL: CPT | Performed by: FAMILY MEDICINE

## 2021-08-20 PROCEDURE — 93662 INTRACARDIAC ECG (ICE): CPT | Performed by: FAMILY MEDICINE

## 2021-08-20 RX ORDER — 0.9 % SODIUM CHLORIDE 0.9 %
1000 INTRAVENOUS SOLUTION INTRAVENOUS
Status: ACTIVE | OUTPATIENT
Start: 2021-08-20 | End: 2021-08-20

## 2021-08-20 RX ORDER — HYDRALAZINE HYDROCHLORIDE 25 MG/1
25 TABLET, FILM COATED ORAL 3 TIMES DAILY
Status: DISCONTINUED | OUTPATIENT
Start: 2021-08-20 | End: 2021-08-21 | Stop reason: HOSPADM

## 2021-08-20 RX ORDER — ACETAMINOPHEN 325 MG/1
650 TABLET ORAL EVERY 4 HOURS PRN
Status: DISCONTINUED | OUTPATIENT
Start: 2021-08-20 | End: 2021-08-21 | Stop reason: HOSPADM

## 2021-08-20 RX ORDER — SODIUM CHLORIDE 0.9 % (FLUSH) 0.9 %
5-40 SYRINGE (ML) INJECTION EVERY 12 HOURS SCHEDULED
Status: DISCONTINUED | OUTPATIENT
Start: 2021-08-20 | End: 2021-08-21 | Stop reason: HOSPADM

## 2021-08-20 RX ORDER — METOPROLOL SUCCINATE 25 MG/1
12.5 TABLET, EXTENDED RELEASE ORAL DAILY
Qty: 15 TABLET | Refills: 3 | Status: SHIPPED | OUTPATIENT
Start: 2021-08-20 | End: 2022-01-18 | Stop reason: ALTCHOICE

## 2021-08-20 RX ORDER — HEPARIN SODIUM 10000 [USP'U]/100ML
INJECTION, SOLUTION INTRAVENOUS CONTINUOUS PRN
Status: DISCONTINUED | OUTPATIENT
Start: 2021-08-20 | End: 2021-08-20 | Stop reason: SDUPTHER

## 2021-08-20 RX ORDER — BUDESONIDE AND FORMOTEROL FUMARATE DIHYDRATE 160; 4.5 UG/1; UG/1
2 AEROSOL RESPIRATORY (INHALATION) 2 TIMES DAILY
Status: DISCONTINUED | OUTPATIENT
Start: 2021-08-20 | End: 2021-08-21 | Stop reason: HOSPADM

## 2021-08-20 RX ORDER — PROTAMINE SULFATE 10 MG/ML
30 INJECTION, SOLUTION INTRAVENOUS
Status: ACTIVE | OUTPATIENT
Start: 2021-08-20 | End: 2021-08-20

## 2021-08-20 RX ORDER — DOBUTAMINE HYDROCHLORIDE 200 MG/100ML
INJECTION INTRAVENOUS CONTINUOUS PRN
Status: DISCONTINUED | OUTPATIENT
Start: 2021-08-20 | End: 2021-08-20 | Stop reason: SDUPTHER

## 2021-08-20 RX ORDER — LOSARTAN POTASSIUM 100 MG/1
100 TABLET ORAL DAILY
Status: DISCONTINUED | OUTPATIENT
Start: 2021-08-21 | End: 2021-08-21 | Stop reason: HOSPADM

## 2021-08-20 RX ORDER — AMLODIPINE BESYLATE 5 MG/1
5 TABLET ORAL DAILY
Status: DISCONTINUED | OUTPATIENT
Start: 2021-08-21 | End: 2021-08-21 | Stop reason: HOSPADM

## 2021-08-20 RX ORDER — FLECAINIDE ACETATE 50 MG/1
50 TABLET ORAL 2 TIMES DAILY
Qty: 60 TABLET | Refills: 3 | Status: SHIPPED | OUTPATIENT
Start: 2021-08-20 | End: 2022-01-18 | Stop reason: ALTCHOICE

## 2021-08-20 RX ORDER — LIDOCAINE HYDROCHLORIDE 10 MG/ML
INJECTION, SOLUTION EPIDURAL; INFILTRATION; INTRACAUDAL; PERINEURAL PRN
Status: DISCONTINUED | OUTPATIENT
Start: 2021-08-20 | End: 2021-08-20 | Stop reason: SDUPTHER

## 2021-08-20 RX ORDER — SODIUM CHLORIDE 9 MG/ML
25 INJECTION, SOLUTION INTRAVENOUS PRN
Status: DISCONTINUED | OUTPATIENT
Start: 2021-08-20 | End: 2021-08-21 | Stop reason: HOSPADM

## 2021-08-20 RX ORDER — SODIUM CHLORIDE 9 MG/ML
INJECTION, SOLUTION INTRAVENOUS CONTINUOUS
Status: DISCONTINUED | OUTPATIENT
Start: 2021-08-20 | End: 2021-08-20

## 2021-08-20 RX ORDER — PROTAMINE SULFATE 10 MG/ML
INJECTION, SOLUTION INTRAVENOUS PRN
Status: DISCONTINUED | OUTPATIENT
Start: 2021-08-20 | End: 2021-08-20 | Stop reason: SDUPTHER

## 2021-08-20 RX ORDER — SODIUM CHLORIDE 9 MG/ML
INJECTION, SOLUTION INTRAVENOUS CONTINUOUS PRN
Status: DISCONTINUED | OUTPATIENT
Start: 2021-08-20 | End: 2021-08-20 | Stop reason: SDUPTHER

## 2021-08-20 RX ORDER — FENTANYL CITRATE 50 UG/ML
INJECTION, SOLUTION INTRAMUSCULAR; INTRAVENOUS PRN
Status: DISCONTINUED | OUTPATIENT
Start: 2021-08-20 | End: 2021-08-20 | Stop reason: SDUPTHER

## 2021-08-20 RX ORDER — METOPROLOL SUCCINATE 25 MG/1
12.5 TABLET, EXTENDED RELEASE ORAL DAILY
Status: DISCONTINUED | OUTPATIENT
Start: 2021-08-20 | End: 2021-08-21 | Stop reason: HOSPADM

## 2021-08-20 RX ORDER — DEXAMETHASONE SODIUM PHOSPHATE 4 MG/ML
INJECTION, SOLUTION INTRA-ARTICULAR; INTRALESIONAL; INTRAMUSCULAR; INTRAVENOUS; SOFT TISSUE PRN
Status: DISCONTINUED | OUTPATIENT
Start: 2021-08-20 | End: 2021-08-20 | Stop reason: SDUPTHER

## 2021-08-20 RX ORDER — SODIUM CHLORIDE 9 MG/ML
INJECTION INTRAVENOUS PRN
Status: DISCONTINUED | OUTPATIENT
Start: 2021-08-20 | End: 2021-08-20 | Stop reason: SDUPTHER

## 2021-08-20 RX ORDER — FLUTICASONE FUROATE AND VILANTEROL 200; 25 UG/1; UG/1
1 POWDER RESPIRATORY (INHALATION) DAILY
Status: DISCONTINUED | OUTPATIENT
Start: 2021-08-20 | End: 2021-08-20 | Stop reason: SDUPTHER

## 2021-08-20 RX ORDER — QUETIAPINE FUMARATE 25 MG/1
25 TABLET, FILM COATED ORAL NIGHTLY
Status: DISCONTINUED | OUTPATIENT
Start: 2021-08-20 | End: 2021-08-21 | Stop reason: HOSPADM

## 2021-08-20 RX ORDER — PROPOFOL 10 MG/ML
INJECTION, EMULSION INTRAVENOUS PRN
Status: DISCONTINUED | OUTPATIENT
Start: 2021-08-20 | End: 2021-08-20 | Stop reason: SDUPTHER

## 2021-08-20 RX ORDER — PHENYLEPHRINE HCL IN 0.9% NACL 1 MG/10 ML
SYRINGE (ML) INTRAVENOUS PRN
Status: DISCONTINUED | OUTPATIENT
Start: 2021-08-20 | End: 2021-08-20 | Stop reason: SDUPTHER

## 2021-08-20 RX ORDER — FLECAINIDE ACETATE 100 MG/1
50 TABLET ORAL EVERY 12 HOURS SCHEDULED
Status: DISCONTINUED | OUTPATIENT
Start: 2021-08-20 | End: 2021-08-21 | Stop reason: HOSPADM

## 2021-08-20 RX ORDER — HEPARIN SODIUM 1000 [USP'U]/ML
INJECTION, SOLUTION INTRAVENOUS; SUBCUTANEOUS PRN
Status: DISCONTINUED | OUTPATIENT
Start: 2021-08-20 | End: 2021-08-20 | Stop reason: SDUPTHER

## 2021-08-20 RX ORDER — VENLAFAXINE HYDROCHLORIDE 75 MG/1
150 CAPSULE, EXTENDED RELEASE ORAL
Status: DISCONTINUED | OUTPATIENT
Start: 2021-08-21 | End: 2021-08-21 | Stop reason: HOSPADM

## 2021-08-20 RX ORDER — LIDOCAINE HYDROCHLORIDE AND EPINEPHRINE BITARTRATE 20; .01 MG/ML; MG/ML
15 INJECTION, SOLUTION SUBCUTANEOUS SEE ADMIN INSTRUCTIONS
Status: DISCONTINUED | OUTPATIENT
Start: 2021-08-20 | End: 2021-08-21 | Stop reason: HOSPADM

## 2021-08-20 RX ORDER — ALBUTEROL SULFATE 90 UG/1
2 AEROSOL, METERED RESPIRATORY (INHALATION) EVERY 6 HOURS PRN
Status: DISCONTINUED | OUTPATIENT
Start: 2021-08-20 | End: 2021-08-21 | Stop reason: HOSPADM

## 2021-08-20 RX ORDER — SODIUM CHLORIDE 0.9 % (FLUSH) 0.9 %
5-40 SYRINGE (ML) INJECTION PRN
Status: DISCONTINUED | OUTPATIENT
Start: 2021-08-20 | End: 2021-08-21 | Stop reason: HOSPADM

## 2021-08-20 RX ORDER — HYDROCHLOROTHIAZIDE 25 MG/1
12.5 TABLET ORAL DAILY
Status: DISCONTINUED | OUTPATIENT
Start: 2021-08-21 | End: 2021-08-21 | Stop reason: HOSPADM

## 2021-08-20 RX ORDER — MIDAZOLAM HYDROCHLORIDE 1 MG/ML
INJECTION INTRAMUSCULAR; INTRAVENOUS PRN
Status: DISCONTINUED | OUTPATIENT
Start: 2021-08-20 | End: 2021-08-20 | Stop reason: SDUPTHER

## 2021-08-20 RX ORDER — PRAVASTATIN SODIUM 40 MG
40 TABLET ORAL NIGHTLY
Status: DISCONTINUED | OUTPATIENT
Start: 2021-08-20 | End: 2021-08-21 | Stop reason: HOSPADM

## 2021-08-20 RX ORDER — KETAMINE HCL IN NACL, ISO-OSM 100MG/10ML
SYRINGE (ML) INJECTION PRN
Status: DISCONTINUED | OUTPATIENT
Start: 2021-08-20 | End: 2021-08-20 | Stop reason: SDUPTHER

## 2021-08-20 RX ORDER — VECURONIUM BROMIDE 1 MG/ML
INJECTION, POWDER, LYOPHILIZED, FOR SOLUTION INTRAVENOUS PRN
Status: DISCONTINUED | OUTPATIENT
Start: 2021-08-20 | End: 2021-08-20 | Stop reason: SDUPTHER

## 2021-08-20 RX ORDER — FUROSEMIDE 10 MG/ML
40 INJECTION INTRAMUSCULAR; INTRAVENOUS ONCE
Status: DISCONTINUED | OUTPATIENT
Start: 2021-08-20 | End: 2021-08-21 | Stop reason: HOSPADM

## 2021-08-20 RX ORDER — GLYCOPYRROLATE 0.2 MG/ML
INJECTION INTRAMUSCULAR; INTRAVENOUS PRN
Status: DISCONTINUED | OUTPATIENT
Start: 2021-08-20 | End: 2021-08-20 | Stop reason: SDUPTHER

## 2021-08-20 RX ORDER — FAMOTIDINE 20 MG/1
20 TABLET, FILM COATED ORAL 2 TIMES DAILY
Status: DISCONTINUED | OUTPATIENT
Start: 2021-08-20 | End: 2021-08-21 | Stop reason: HOSPADM

## 2021-08-20 RX ORDER — SUCCINYLCHOLINE/SOD CL,ISO/PF 200MG/10ML
SYRINGE (ML) INTRAVENOUS PRN
Status: DISCONTINUED | OUTPATIENT
Start: 2021-08-20 | End: 2021-08-20 | Stop reason: SDUPTHER

## 2021-08-20 RX ORDER — ONDANSETRON 2 MG/ML
INJECTION INTRAMUSCULAR; INTRAVENOUS PRN
Status: DISCONTINUED | OUTPATIENT
Start: 2021-08-20 | End: 2021-08-20 | Stop reason: SDUPTHER

## 2021-08-20 RX ADMIN — DOBUTAMINE HYDROCHLORIDE 10 MCG/KG/MIN: 200 INJECTION INTRAVENOUS at 13:51

## 2021-08-20 RX ADMIN — SODIUM CHLORIDE: 9 INJECTION, SOLUTION INTRAVENOUS at 12:19

## 2021-08-20 RX ADMIN — HEPARIN SODIUM 3500 UNITS: 1000 INJECTION INTRAVENOUS; SUBCUTANEOUS at 13:06

## 2021-08-20 RX ADMIN — PROTAMINE SULFATE 50 MG: 10 INJECTION, SOLUTION INTRAVENOUS at 14:31

## 2021-08-20 RX ADMIN — VECURONIUM BROMIDE 2 MG: 1 INJECTION, POWDER, LYOPHILIZED, FOR SOLUTION INTRAVENOUS at 13:43

## 2021-08-20 RX ADMIN — Medication 120 MG: at 12:26

## 2021-08-20 RX ADMIN — DEXAMETHASONE SODIUM PHOSPHATE 8 MG: 4 INJECTION, SOLUTION INTRAMUSCULAR; INTRAVENOUS at 12:31

## 2021-08-20 RX ADMIN — FENTANYL CITRATE 100 MCG: 50 INJECTION INTRAMUSCULAR; INTRAVENOUS at 12:21

## 2021-08-20 RX ADMIN — LIDOCAINE HYDROCHLORIDE 50 MG: 10 INJECTION, SOLUTION EPIDURAL; INFILTRATION; INTRACAUDAL; PERINEURAL at 12:26

## 2021-08-20 RX ADMIN — MIDAZOLAM 2 MG: 1 INJECTION INTRAMUSCULAR; INTRAVENOUS at 12:20

## 2021-08-20 RX ADMIN — MIDAZOLAM 2 MG: 1 INJECTION INTRAMUSCULAR; INTRAVENOUS at 14:28

## 2021-08-20 RX ADMIN — GLYCOPYRROLATE 0.2 MG: 0.2 INJECTION, SOLUTION INTRAMUSCULAR; INTRAVENOUS at 12:31

## 2021-08-20 RX ADMIN — SODIUM CHLORIDE: 9 INJECTION, SOLUTION INTRAVENOUS at 12:31

## 2021-08-20 RX ADMIN — SUGAMMADEX 200 MG: 100 INJECTION, SOLUTION INTRAVENOUS at 13:59

## 2021-08-20 RX ADMIN — ONDANSETRON 4 MG: 2 INJECTION INTRAMUSCULAR; INTRAVENOUS at 12:31

## 2021-08-20 RX ADMIN — VECURONIUM BROMIDE 10 MG: 1 INJECTION, POWDER, LYOPHILIZED, FOR SOLUTION INTRAVENOUS at 12:29

## 2021-08-20 RX ADMIN — Medication 30 MG: at 12:26

## 2021-08-20 RX ADMIN — SODIUM CHLORIDE 10 ML: 9 INJECTION INTRAMUSCULAR; INTRAVENOUS; SUBCUTANEOUS at 12:29

## 2021-08-20 RX ADMIN — HEPARIN SODIUM 3500 UNITS: 1000 INJECTION INTRAVENOUS; SUBCUTANEOUS at 13:03

## 2021-08-20 RX ADMIN — PROPOFOL 70 MG: 10 INJECTION, EMULSION INTRAVENOUS at 12:26

## 2021-08-20 RX ADMIN — PROTAMINE SULFATE 150 MG: 10 INJECTION, SOLUTION INTRAVENOUS at 13:59

## 2021-08-20 RX ADMIN — HEPARIN SODIUM 2000 UNITS: 1000 INJECTION INTRAVENOUS; SUBCUTANEOUS at 13:25

## 2021-08-20 RX ADMIN — HEPARIN SODIUM 8000 UNITS/HR: 10000 INJECTION, SOLUTION INTRAVENOUS at 13:25

## 2021-08-20 RX ADMIN — Medication 200 MCG: at 13:03

## 2021-08-20 ASSESSMENT — PULMONARY FUNCTION TESTS
PIF_VALUE: 13
PIF_VALUE: 14
PIF_VALUE: 13
PIF_VALUE: 1
PIF_VALUE: 13
PIF_VALUE: 14
PIF_VALUE: 36
PIF_VALUE: 13
PIF_VALUE: 0
PIF_VALUE: 14
PIF_VALUE: 12
PIF_VALUE: 13
PIF_VALUE: 12
PIF_VALUE: 13
PIF_VALUE: 13
PIF_VALUE: 14
PIF_VALUE: 13
PIF_VALUE: 12
PIF_VALUE: 13
PIF_VALUE: 13
PIF_VALUE: 14
PIF_VALUE: 13
PIF_VALUE: 13
PIF_VALUE: 14
PIF_VALUE: 13
PIF_VALUE: 13
PIF_VALUE: 12
PIF_VALUE: 13
PIF_VALUE: 12
PIF_VALUE: 13
PIF_VALUE: 1
PIF_VALUE: 13
PIF_VALUE: 1
PIF_VALUE: 14
PIF_VALUE: 13
PIF_VALUE: 5
PIF_VALUE: 13
PIF_VALUE: 13
PIF_VALUE: 1
PIF_VALUE: 13
PIF_VALUE: 14
PIF_VALUE: 12
PIF_VALUE: 13
PIF_VALUE: 13
PIF_VALUE: 14
PIF_VALUE: 14
PIF_VALUE: 13
PIF_VALUE: 12
PIF_VALUE: 13
PIF_VALUE: 13
PIF_VALUE: 5
PIF_VALUE: 13
PIF_VALUE: 13
PIF_VALUE: 12
PIF_VALUE: 13
PIF_VALUE: 12
PIF_VALUE: 13
PIF_VALUE: 12
PIF_VALUE: 13
PIF_VALUE: 12
PIF_VALUE: 13
PIF_VALUE: 14
PIF_VALUE: 12
PIF_VALUE: 13
PIF_VALUE: 1
PIF_VALUE: 13
PIF_VALUE: 12
PIF_VALUE: 13
PIF_VALUE: 13
PIF_VALUE: 14
PIF_VALUE: 13
PIF_VALUE: 1
PIF_VALUE: 13
PIF_VALUE: 1
PIF_VALUE: 13
PIF_VALUE: 24
PIF_VALUE: 13
PIF_VALUE: 13
PIF_VALUE: 12
PIF_VALUE: 0
PIF_VALUE: 13
PIF_VALUE: 13
PIF_VALUE: 14
PIF_VALUE: 0
PIF_VALUE: 13
PIF_VALUE: 13
PIF_VALUE: 14
PIF_VALUE: 13

## 2021-08-20 ASSESSMENT — LIFESTYLE VARIABLES: SMOKING_STATUS: 0

## 2021-08-20 ASSESSMENT — COPD QUESTIONNAIRES: CAT_SEVERITY: MILD

## 2021-08-20 ASSESSMENT — ENCOUNTER SYMPTOMS: SHORTNESS OF BREATH: 0

## 2021-08-20 NOTE — ANESTHESIA POSTPROCEDURE EVALUATION
Department of Anesthesiology  Postprocedure Note    Patient: Rosi Mcdaniel  MRN: [id number]  YOB: 1945  Date of evaluation: 8/20/2021  Time:  6:04 PM     Procedure Summary     Date: 08/20/21 Room / Location: [facility] Cath Lab    Anesthesia Start: 1220 Anesthesia Stop: 6766    Procedure: WST DEB AFIB ABLATION W ANES Diagnosis:     Scheduled Providers:  Responsible Provider: Lázaro Garcia MD    Anesthesia Type: General ASA Status: 3          Anesthesia Type: General    Terry Phase I: Terry Score: 10    Terry Phase II:      Last vitals: Reviewed and per EMR flowsheets.        Anesthesia Post Evaluation    Patient location during evaluation: PACU  Patient participation: complete - patient participated  Level of consciousness: awake and alert  Airway patency: patent  Nausea & Vomiting: no nausea and no vomiting  Complications: no  Cardiovascular status: hemodynamically stable  Respiratory status: acceptable  Hydration status: stable

## 2021-08-20 NOTE — PROCEDURES
[facility]     Electrophysiology Procedure Note       Date of Procedure: 8/20/2021  Patient's Name: Jeancarlos Rosario  YOB: 1945   Medical Record Number: [id number]  Referring Physician: Monica Brown MD  Procedure Performed by: Stephany Nur MD    Procedure performed:  · Electrophysiology study with radiofrequency ablation of atrial fibrillation and pulmonary vein isolation   · Ablation of roof-dependent left atrial flutter with CL 247ms with straight up-down/near proximal-distal activation  · Additional ablation with creation of a roof line (left atrial flutter)  · Additional ablation of complex fractionated atrial electrograms (for atrial fibrillation)  · 3-D electroanatomical mapping of the left atrium    · Transseptal puncture through an intact septum x 2 under intracardiac ultrasound guidance without fluoroscopic guidance   · Intracardiac echocardiography  · Left ventricular pacing and recording  · Drug infusion with an attempt to induce atrial tachydysrhythmia  · Transesophageal echocardiogram  · Anesthesia: General anesthesia provided by the Anesthesia service    Indications for procedure:    Jeancarlos Rosario is a 64 y.o. male who has a history of paroxysmal atrial fibrillation who is symptomatic with symptoms of dyspnea with minimal exertion and fatigue who has failed antiarrhythmic therapy in the past is now here for an ablation for paroxysmal atrial fibrillation. Details of Procedure: The risks, benefits and alternatives of the ablation procedure were discussed with the patient. The risks including, but not limited to, the risks of bleeding, infection, radiation exposure, injury to vascular, cardiac and surrounding structures (including pneumothorax), stroke, cardiac perforation, tamponade, need for emergent open heart surgery, need for pacemaker implantation, esophageal injury and fistula, myocardial infarction and death were discussed in detail.  The patient was to ablation. Two transeptal punctures were performed under intracardiac echocardiogram, pressure monitoring, and without fluoroscopic guidance. Patient received a bolus of heparin shortly after each transseptal puncture followed by continuous monitoring of the ACT every 15-30 minutes, and additional boluses of heparin during the procedure to keep the ACT between 300-400 sec. We placed both SLO sheaths inside the left atrium. Also an esophageal temperature probe ([employer] Temperature Probe 12Fr) that was tied with sutures to an accompanying [employer] quadripolar 6 [place] 5-5-5 electrode spacing catheter was advanced into the esophagus for real-time mapping of the esophagus and careful monitoring of the esophageal temperature during ablation. Using the Penta ray cathter and Carto navigation system a three dimensional electro anatomical mapping of the left atrium, in addition to right and left sided pulmonary vein anatomy was created. During sinus rhythm, we found that all four pulmonary veins (left superior, left inferior, right superior, and right inferior) had PV fascicles, the triggers for the atrial fibrillation. With rapid atrial pacing at 200 ms, we were able to easily induce atrial fibrillation which vacillated with left atrial flutter with straight up-down/near proximal-distal activation, most likely utilizing the roof of the LA. We then proceeded with a pulmonary vein isolation via ablation. Wide area circumferential ablations for the left sided pulmonary veins were performed which resulted in electrical isolation of these pulmonary veins and eradication of the PV fascicles. As we started this ablation set, the patient's atrial fibrillation and left atrial flutter terminated to sinus rhythm for the remainder of the procedure. Similarly, wide area circumferential ablations for the right sided pulmonary veins were performed which resulted in electrical isolation of these pulmonary veins and eradication of the PV fascicles. After isolating the pulmonary veins, given that the patient manifested roof-dependent left atrial flutter, we proceeded with a linear ablation across the roof of the LA. We ablated a linear line along the roof of the left atrium. We then evaluated the left atrium for complex fractionated atrial electrogram, a separate mechanism that would initiate and/or perpetuate atrial fibrillation apart from the pulmonary vein fascicles and antral ablations. We found 2 sites on the septal aspect of the LA and ablated these foci. After ablation was complete, catheters were placed in the left and right atrium, His-position, right ventricle, and left ventricle for pacing and recording. Arrhythmia was attempted to be induced by rapid atrial and ventricular pacing, and there was no induction of atrial tachydysrhythmia. Maximum output (20mA) pacing in the L antrum and R antrum were also performed and showed dissociation from the rest of the left atrium. This was checked circumferentially around the antrums and verified many times. We evaluated the roof line and found that there was complete, bidirectional block. Adenosine bolus of 18mg was also given for each of the 4 pulmonary veins to assess for acute re-connection and to attempt to induce atrial fibrillation. We had adequate adenosine effect (AV blocks of > 3 seconds) and there were no pulmonary fascicles seen in any of the veins nor were there any atrial tachydysrhythmia induced. We then administered dobutamine infusion up to 10 mcg/kg/min in order to achieve at least a 20% increase in heart rate from the basal heart rate to induce any atrial tachydysrhythmia, and there were no atrial tachydysrhythmia induced. We then performed an EP study and programmed stimulation using our CS catheter and ablation catheter to assess the cardiac conduction system and to attempt to induce atrial tachydysrhythmia.  The ablation catheter were moved from the left atrium to the left ventricular apex and His bundle position. His bundle potentials was recorded and pacing and recordings were performed from right atrium, coronary sinus and LV apex with the following results:     Sinus cycle length was 765 msec  HI interval was 226 msec  QRS duration was 80 msec  QT interval was 361 msec  AH interval was 80 msec  HV interval was 56 msec  Pacing from left atrium, 1:1 conduction over AV node with (AV wenckebach) was 410  msec  Pacing from left atrium, AV franchesca ERP was 600/540 msec   Pacing from LV apex, 1:1 retrograde conduction over AV node (VA wenckebach) was attempted but showed VA dissociation. Then both the ablation, Pentarray, and CS catheters were removed from the body, and all 3 sheaths were removed from the right femoral vein with a figure-of-eight suture that was placed to provide hemostasis while awaiting the downtrending of the ACT. Protamine 150mg IV x 1 was administered to partially reverse the IV heparin that was administered during the atrial fibrillation ablation procedure. Under intracardiac ultrasound guidance, we evaluated for pericardial effusion, and there was no evidence of such. Specimen collected: none    Estimated blood loss: < 50 cc    The patient tolerated the procedure well and there were no complications. Patient was extubated and transferred to the floor in stable condition.      Conclusion:     - Pre- and post-procedure diagnoses were paroxysmal atrial fibrillation and roof-dependent left atrial flutter with CL 247ms with straight up-down/near proximal-distal activation   - Pulmonary vein isolations using wide area circumferential radiofrequency ablation   - Additional ablation with creation of roof line (for left atrial flutter)  - Ablation of complex fractionated atrial electrogram ablations in the left atrium (for atrial fibrillation)    Plan:   The patient will be monitored and receive the usual post ablation care. If there are no complications, the patient will be discharged from the hospital either later today or tomorrow with a new prescription for Flecainide 50mg po BID, a new prescription for Toprol XL 12.5mg po daily, and pre-admission Eliquis 5mg po BID. The patient will follow-up with the EP NP in 3 month's time. The patient's Medtronic 2-ch PPM will be interrogated as an outpatient on a routine basis. Thank you for allowing us to participate in the care of your patient. If you have any questions or concerns, please do not hesitate to contact me.     Jose Lu MD, MS, [facility] [place], [facility]  Cardiac Electrophysiology  [address]  [address] [facility] [place], [address] [place] [address] Rudi Toure [address]  [phone]

## 2021-08-20 NOTE — ANESTHESIA PRE PROCEDURE
Department of Anesthesiology  Preprocedure Note       Name:  Tatiana Haro   Age:  64 y.o.  :  1945                                          MRN:  [id number]         Date:  2021      Surgeon: * No surgeons listed *    Procedure: WST DEB AFIB ABLATION W ANES    Medications prior to admission:   Prior to Admission medications    Medication Sig Start Date End Date Taking? Authorizing Provider   amLODIPine (NORVASC) 5 MG tablet Take 1 tablet by mouth daily 8/10/21   Mercedes Sheppard MD   hydroCHLOROthiazide (HYDRODIURIL) 12.5 MG tablet Take 1 tablet by mouth daily 8/10/21   Mercedes Sheppard MD   estazolam (PROSOM) 2 MG tablet TAKE 1 TO 2 TABLETS BY MOUTH EVERY NIGHT AS NEEDED FOR SLEEP 21  Mercedes Sheppard MD   hydrALAZINE (APRESOLINE) 25 MG tablet Take 1 tablet by mouth 3 times daily 21   Aleyda Lloyd MD   diphenoxylate-atropine (LOMOTIL) 2.5-0.025 MG per tablet Take 1 tablet by mouth 4 times daily as needed for Diarrhea for up to 60 days.  21  Mercedes Sheppard MD   QUEtiapine (SEROQUEL) 25 MG tablet Take 1 tablet by mouth nightly 21   Mercedes Sheppard MD   albuterol sulfate HFA (VENTOLIN HFA) 108 (90 Base) MCG/ACT inhaler Inhale 2 puffs into the lungs every 6 hours as needed for Wheezing or Shortness of Breath 21   Mercedes Sheppard MD   apixaban (ELIQUIS) 5 MG TABS tablet Take 1 tablet by mouth 2 times daily 21   Aleyda Lloyd MD   Fluticasone furoate-vilanterol (BREO ELLIPTA) 200-25 MCG/INH AEPB inhaler Inhale 1 puff into the lungs daily 21   Mercedes Sheppard MD   pravastatin (PRAVACHOL) 40 MG tablet Take 1 tablet by mouth daily 5/10/21   Mercedes Sheppard MD   venlafaxine (EFFEXOR XR) 150 MG extended release capsule TAKE ONE (1) CAPSULE BY MOUTH DAILY  3/8/21   Mercedes Sheppard MD   losartan (COZAAR) 100 MG tablet TAKE ONE (1) TABLET BY MOUTH DAILY  21   Mercedes Sheppard MD   famotidine (PEPCID) 20 MG tablet Take 1 tablet by mouth 2 times daily 12/23/20   Willo Denver, APRN - CNP       Current medications:    Current Outpatient Medications   Medication Sig Dispense Refill    amLODIPine (NORVASC) 5 MG tablet Take 1 tablet by mouth daily 90 tablet 1    hydroCHLOROthiazide (HYDRODIURIL) 12.5 MG tablet Take 1 tablet by mouth daily 90 tablet 1    estazolam (PROSOM) 2 MG tablet TAKE 1 TO 2 TABLETS BY MOUTH EVERY NIGHT AS NEEDED FOR SLEEP 60 tablet 0    hydrALAZINE (APRESOLINE) 25 MG tablet Take 1 tablet by mouth 3 times daily 270 tablet 3    diphenoxylate-atropine (LOMOTIL) 2.5-0.025 MG per tablet Take 1 tablet by mouth 4 times daily as needed for Diarrhea for up to 60 days. 120 tablet 1    QUEtiapine (SEROQUEL) 25 MG tablet Take 1 tablet by mouth nightly 90 tablet 1    albuterol sulfate HFA (VENTOLIN HFA) 108 (90 Base) MCG/ACT inhaler Inhale 2 puffs into the lungs every 6 hours as needed for Wheezing or Shortness of Breath 3 Inhaler 1    apixaban (ELIQUIS) 5 MG TABS tablet Take 1 tablet by mouth 2 times daily 180 tablet 1    Fluticasone furoate-vilanterol (BREO ELLIPTA) 200-25 MCG/INH AEPB inhaler Inhale 1 puff into the lungs daily 90 each 1    pravastatin (PRAVACHOL) 40 MG tablet Take 1 tablet by mouth daily 90 tablet 1    venlafaxine (EFFEXOR XR) 150 MG extended release capsule TAKE ONE (1) CAPSULE BY MOUTH DAILY  90 capsule 1    losartan (COZAAR) 100 MG tablet TAKE ONE (1) TABLET BY MOUTH DAILY  90 tablet 1    famotidine (PEPCID) 20 MG tablet Take 1 tablet by mouth 2 times daily 60 tablet 3     No current facility-administered medications for this visit.      Facility-Administered Medications Ordered in Other Visits   Medication Dose Route Frequency Provider Last Rate Last Admin    0.9 % sodium chloride infusion   Intravenous Continuous Marely Toro MD        sodium chloride flush 0.9 % injection 5-40 mL  5-40 mL Intravenous 2 times per day Marely Toro MD        sodium chloride flush 0.9 % injection 5-40 mL  5-40 mL Intravenous PRN Abraham Feldman MD        0.9 % sodium chloride infusion  25 mL Intravenous PRN Abraham Feldman MD           Allergies:  No Known Allergies    Problem List:    Patient Active Problem List   Diagnosis Code    Primary insomnia F51.01    Hyperlipidemia LDL goal <100 E78.5    Chronic diarrhea K52.9    Irritable bowel syndrome with diarrhea K58.0    Essential hypertension I10    Pure hypercholesterolemia E78.00    Dysthymia F34.1    Depression with anxiety F41.8    COPD, 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    Adenomatous polyp of rectum D12.8    Anxiety F41.9    Acute respiratory failure with hypoxia (HCC) J96.01    Bradycardia R00.1    Thrombocytopenia (HCC) D69.6    PAF (paroxysmal atrial fibrillation) (HCC) I48.0    Sick sinus syndrome (HCC) I49.5       Past Medical History:        Diagnosis Date    Anxiety     COPD, mild ([address] 2017    Depression     History of blood transfusion     Hyperlipidemia     Hypertension     IBS (irritable bowel syndrome) 10/16/2013    Insomnia        Past Surgical History:        Procedure Laterality Date    APPENDECTOMY      COLONOSCOPY  2019    [place]    COLONOSCOPY N/A 3/19/2019    COLONOSCOPY WITH BIOPSY performed by Genny Brennan MD at [address] COLONOSCOPY N/A 3/19/2019    COLONOSCOPY POLYPECTOMY SNARE/COLD BIOPSY performed by Genny Brennan MD at [facility]         Social History:    Social History     Tobacco Use    Smoking status: Former Smoker     Packs/day: 1.00     Years: 20.00     Pack years: 20.00     Types: Cigarettes     Quit date: 1988     Years since quittin.0    Smokeless tobacco: Never Used   Substance Use Topics    Alcohol use: No                                Counseling given: Not Answered      Vital Signs (Current): There were no vitals filed for this visit.                                            BP Readings from Last 3 Encounters:   21 (!) 168/98   08/10/21 130/66 07/19/21 (!) 142/80       NPO Status:   clears 0700                                                                               BMI:   Wt Readings from Last 3 Encounters:   08/20/21 159 lb (72.1 kg)   08/10/21 162 lb 6.4 oz (73.7 kg)   07/19/21 160 lb (72.6 kg)     There is no height or weight on file to calculate BMI.    CBC:   Lab Results   Component Value Date    WBC 7.2 08/13/2021    RBC 4.42 08/13/2021    HGB 12.6 08/13/2021    HCT 37.8 08/13/2021    MCV 85.6 08/13/2021    RDW 14.6 08/13/2021     08/13/2021       CMP:   Lab Results   Component Value Date     08/13/2021    K 4.1 08/13/2021    K 2.6 12/16/2020     08/13/2021    CO2 26 08/13/2021    BUN 21 08/13/2021    CREATININE 0.8 08/13/2021    GFRAA >60 08/13/2021    GFRAA >60 01/07/2013    AGRATIO 2.4 05/13/2021    LABGLOM >60 08/13/2021    GLUCOSE 100 08/13/2021    PROT 6.2 05/13/2021    PROT 7.0 01/07/2013    CALCIUM 9.5 08/13/2021    BILITOT 0.4 05/13/2021    ALKPHOS 92 05/13/2021    AST 27 05/13/2021    ALT 24 05/13/2021       POC Tests: No results for input(s): POCGLU, POCNA, POCK, POCCL, POCBUN, POCHEMO, POCHCT in the last 72 hours.     Coags: No results found for: PROTIME, INR, APTT    HCG (If Applicable): No results found for: PREGTESTUR, PREGSERUM, HCG, HCGQUANT     ABGs: No results found for: PHART, PO2ART, GXR5GQZ, UNF2RRL, BEART, O2RIQTOG     Type & Screen (If Applicable):  No results found for: LABABO, [address]    Anesthesia Evaluation  Patient summary reviewed no history of anesthetic complications:   Airway: Mallampati: II  TM distance: >3 FB   Neck ROM: full  Mouth opening: > = 3 FB Dental:      Comment: Missing teeth    Pulmonary:   (+) COPD: mild,      (-) asthma, shortness of breath, recent URI, sleep apnea and not a current smoker                           Cardiovascular:    (+) hypertension:, dysrhythmias: atrial fibrillation, hyperlipidemia      ECG reviewed      Echocardiogram reviewed         Beta Blocker:  Dose within 24 Hrs         Neuro/Psych:   (+) psychiatric history:depression/anxiety    (-) seizures, neuromuscular disease, TIA and CVA           GI/Hepatic/Renal:   (+) renal disease:,      (-) GERD, PUD and hepatitis       Endo/Other:        (-) diabetes mellitus, hypothyroidism, hyperthyroidism, blood dyscrasia               Abdominal:             Vascular: Other Findings:               Anesthesia Plan      general     ASA 3     (I discussed with the patient the risks and benefits of PIV, general anesthesia, IV Narcotics, PACU. All questions were answered the patient agrees with the plan.)  Induction: intravenous. Anesthetic plan and risks discussed with patient. Plan discussed with CRNA. This pre-anesthesia assessment may be used as a history and physical.    DOS STAFF ADDENDUM:    Pt seen and examined, chart reviewed (including anesthesia, drug and allergy history). No interval changes to history and physical examination. Anesthetic plan, risks, benefits, alternatives, and personnel involved discussed with patient. Patient verbalized an understanding and agrees to proceed.       Jacob Mejia MD  August 20, 2021  11:29 AM      Jacob Mejia MD   8/20/2021

## 2021-08-20 NOTE — H&P
pain/pressure, dyspnea at rest, PND, orthopnea, palpitations, lightheadedness, weight changes, changes in LE edema, and syncope.     Past Medical History        Past Medical History:   Diagnosis Date    Anxiety      COPD, mild ([address] 4/24/2017    Depression      History of blood transfusion      Hyperlipidemia      Hypertension      IBS (irritable bowel syndrome) 10/16/2013    Insomnia              Past Surgical History         Past Surgical History:   Procedure Laterality Date    APPENDECTOMY        COLONOSCOPY   03/19/2019     [place]    COLONOSCOPY N/A 3/19/2019     COLONOSCOPY WITH BIOPSY performed by Sharlotte Apley, MD at [address] COLONOSCOPY N/A 3/19/2019     COLONOSCOPY POLYPECTOMY SNARE/COLD BIOPSY performed by Sharlotte Apley, MD at [address]                Allergies:  No Known Allergies     Medication:   Home Medications           Prior to Admission medications    Medication Sig Start Date End Date Taking? Authorizing Provider   diphenoxylate-atropine (LOMOTIL) 2.5-0.025 MG per tablet Take 1 tablet by mouth 4 times daily as needed for Diarrhea for up to 60 days.  7/6/21 9/4/21 Yes Mckay Soliz MD   QUEtiapine (SEROQUEL) 25 MG tablet Take 1 tablet by mouth nightly 7/6/21   Yes Mckay Soliz MD   albuterol sulfate HFA (VENTOLIN HFA) 108 (90 Base) MCG/ACT inhaler Inhale 2 puffs into the lungs every 6 hours as needed for Wheezing or Shortness of Breath 7/6/21   Yes Mckay Soliz MD   estazolam (PROSOM) 2 MG tablet TAKE 1 TO 2 TABLETS BY MOUTH EVERY NIGHT AS NEEDED FOR SLEEP 6/21/21 7/21/21 Yes Mckay Soliz MD   apixaban (ELIQUIS) 5 MG TABS tablet Take 1 tablet by mouth 2 times daily 6/9/21   Yes Skye Ochoa MD   Fluticasone furoate-vilanterol (BREO ELLIPTA) 200-25 MCG/INH AEPB inhaler Inhale 1 puff into the lungs daily 5/17/21   Yes Mckay Soliz MD   hydrALAZINE (APRESOLINE) 25 MG tablet Take 1 tablet by mouth 3 times daily 5/17/21   Yes Man Troy MD   pravastatin (PRAVACHOL) 40 MG tablet Take 1 tablet by mouth daily 5/10/21   Yes Alice Hernandez MD   hydroCHLOROthiazide (HYDRODIURIL) 25 MG tablet Take 1 tablet by mouth daily 5/4/21   Yes Alice Hernandez MD   amLODIPine (NORVASC) 10 MG tablet Take 0.5 tablets by mouth daily 3/26/21   Yes Alice Hernandez MD   venlafaxine (EFFEXOR XR) 150 MG extended release capsule TAKE ONE (1) CAPSULE BY MOUTH DAILY  3/8/21   Yes Alice Hernandez MD   losartan (COZAAR) 100 MG tablet TAKE ONE (1) TABLET BY MOUTH DAILY  2/9/21   Yes Alice Hernandez MD   famotidine (PEPCID) 20 MG tablet Take 1 tablet by mouth 2 times daily 12/23/20   Yes KLAUS Eaton - CNP            Social History:   reports that he quit smoking about 32 years ago. His smoking use included cigarettes. He has a 20.00 pack-year smoking history. He has never used smokeless tobacco. He reports that he does not drink alcohol and does not use drugs.         Family History:  family history includes Cancer in an other family member; Hypertension in his brother, brother, and sister; Stroke in his maternal grandfather. Reviewed. Denies family history of sudden cardiac death, arrhythmia, premature CAD     Review of System:     · General ROS: negative for - chills, fever   · Psychological ROS: negative for - anxiety or depression  · Ophthalmic ROS: negative for - eye pain or loss of vision  · ENT ROS: negative for - epistaxis, headaches, nasal discharge, sore throat   · Allergy and Immunology ROS: negative for - hives, nasal congestion   · Hematological and Lymphatic ROS: negative for - bleeding problems, blood clots, bruising or jaundice  · Endocrine ROS: negative for - skin changes, temperature intolerance or unexpected weight changes  · Respiratory ROS: negative for - cough, hemoptysis, pleuritic pain, SOB, sputum changes or wheezing  · Cardiovascular ROS: Per HPI.    · Gastrointestinal ROS: negative for - abdominal pain, blood in stools, diarrhea, hematemesis, melena, nausea/vomiting or swallowing difficulty/pain  · Genito-Urinary ROS: negative for - dysuria or incontinence  · Musculoskeletal ROS: negative for - joint swelling or muscle pain  · Neurological ROS: negative for - confusion, dizziness, gait disturbance, headaches, numbness/tingling, seizures, speech problems, tremors, visual changes or weakness  · Dermatological ROS: negative for - rash     Physical Examination:      Vitals:     21 1344   BP: (!) 142/80   Pulse: 109   SpO2: 98%         · Constitutional: Oriented. No distress. · Head: Normocephalic and atraumatic. · Mouth/Throat: Oropharynx is clear and moist.   · Eyes: Conjunctivae normal. EOM are normal.   · Neck: Normal range of motion. Neck supple. No rigidity. No JVD present. · Cardiovascular: Normal rate, regular rhythm, S1&S2 and intact distal pulses. · Pulmonary/Chest: Bilateral respiratory sounds. No wheezes. No rhonchi. · Abdominal: Soft. Bowel sounds present. No distension, No tenderness. · Musculoskeletal: No tenderness. No edema    · Lymphadenopathy: Has no cervical adenopathy. · Neurological: Alert and oriented. Cranial nerve appears intact, No Gross deficit   · Skin: Skin is warm and dry. No rash noted. · Psychiatric: Has a normal mood, affect and behavior      Labs:  Reviewed.      EC2021 reviewed, electronic ventricular pacemaker with underlying sinus rhythm that is tracked     1. Event monitor:   none     2. Echo: 2019  Summary  Left ventricular cavity size is normal. Assymetric basal septal hypertrophy  EF 55-60%. Left atrium is of normal size. Mild mitral regurgitation. Normal right ventricular size and function. Mild tricuspid regurgitation  Trivial pulmonic regurgitation present.     3. Stress Echo:  10/9/2015  Summary  Normal (Negative) response to exercise. Baseline echocardiogram shows normal LV function with an ejection fraction of 60%.    Following exercise there was uniform augmentation of all segments with appropriate hyperdynamic response to exercise. Normal; stress echo.     4. Cath:   None     I independently reviewed the ECG, MCOT, echocardiogram, stress test, and coronary angiography/PCI results and used them for my plan of care. Procedures:  1. Implantation of [employer]onic dual chamber pacemaker on 6/4/2021     Assessment/Plan:         2nd degree AV Block type II  EKG today shows SR  S/p implantation of Medtronic dual chamber pacemaker on 6/4/2021 for the diagnosis of sinus with 2nd degree AV block type II.      On 5/10/2021 he was seen in office and ambulated in our hallway revealed a HR maximum of 50 bpm, strongly suggesting chronotropic incompetence. Unfortunately, we did not have EKG monitoring during the ambulation. Instead we were only able to place a pulse oximeter. His level of exertion was limited by shortness of breath. 48 hour CAM patch was placed on 5/10/2021 to assess heart rate and rhythm with specific instructions from us to do physical activities, including the patient's norm of stationary bicycle. He was instructed to not limit his activity in anyway during the time he wears the patch. 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     Atrial fibrillation  First seen on CAM patch monitor 5/10/2021. He has a [id number] Score 3 (HTN, AGE). On Eliquis 5 mg BID for  thromboembolic risk reduction. Tolerating well no signs or symptoms of abnormal bruising or bleeding.     We educated the patient that atrial fibrillation is a worsening and progressive disease, with more frequent episodes that will ensue. Subsequent episodes usually become more sustained to the extent that many individuals would then develop persistent atrial fibrillation.  Once persistence is reached, permanent atrial fibrillation procedure may worsen their prognosis for recovering from the virus and lend to a higher morbidity and or mortality risk. The patient was given the option of postponing their procedure. The patient was also presented reasonable alternatives to the proposed care, treatment, and services. The discussion I have had with the patient encompassed risks, benefits, and side effects related to the alternatives and the risks related to not receiving the proposed care, treatment and services.      I spent 40 minutes face to face with the patient, with greater than 50% of that time spent in counseling on the above.     The patient opted to proceed with the atrial fibrillation ablation. We will schedule for a radiofrequency ablation with [employer] Navigation system with a DEB procedure immediately prior to this ablation. A cardiac CTA will be ordered for pulmonary vein mapping prior to this procedure. We will hold Eliquis  for 12 hours prior to procedure. We will order BMP, CBC, PT/INR, and Type & Screen prior to the procedure.      Follow up 3 months after the procedure with Gabby Castaneda CNP.     Thank you for allowing me to participate in the care of Mira Oneal. All questions and concerns were addressed to the patient/family. Alternatives to my treatment were discussed.      I have reviewed the history and physical and examined the patient and find no relevant changes. I have reviewed with the patient and/or family the risks and benefits to the proposed procedure. The patient was presented with the option of postponing the proposed procedure. The patient was also presented reasonable alternatives to the proposed care, treatment, and services.  The discussion I have had with the patient encompassed risks, benefits, and side effects related to the alternatives and the risks related to not receiving the proposed care, treatment and services.   Agustin Cabrera MD, MS, [facility], [address] [place] [address] Alicia  [facility] [address] , [address] [facility]  Rudi Zambrano [facility] [address]  [phone]

## 2021-08-21 DIAGNOSIS — F51.04 PSYCHOPHYSIOLOGICAL INSOMNIA: ICD-10-CM

## 2021-08-21 LAB
EKG ATRIAL RATE: 77 BPM
EKG DIAGNOSIS: NORMAL
EKG P AXIS: 53 DEGREES
EKG P-R INTERVAL: 204 MS
EKG Q-T INTERVAL: 450 MS
EKG QRS DURATION: 144 MS
EKG QTC CALCULATION (BAZETT): 509 MS
EKG R AXIS: -48 DEGREES
EKG T AXIS: -4 DEGREES
EKG VENTRICULAR RATE: 77 BPM

## 2021-08-21 PROCEDURE — 93010 ELECTROCARDIOGRAM REPORT: CPT | Performed by: INTERNAL MEDICINE

## 2021-09-03 RX ORDER — LOSARTAN POTASSIUM 100 MG/1
100 TABLET ORAL DAILY
Qty: 90 TABLET | Refills: 1 | Status: SHIPPED | OUTPATIENT
Start: 2021-09-03 | End: 2022-03-07

## 2021-09-03 RX ORDER — VENLAFAXINE HYDROCHLORIDE 150 MG/1
150 CAPSULE, EXTENDED RELEASE ORAL DAILY
Qty: 90 CAPSULE | Refills: 1 | Status: SHIPPED | OUTPATIENT
Start: 2021-09-03 | End: 2022-03-07

## 2021-09-21 DIAGNOSIS — F51.04 PSYCHOPHYSIOLOGICAL INSOMNIA: ICD-10-CM

## 2021-09-23 ENCOUNTER — TELEPHONE (OUTPATIENT)
Dept: PHARMACY | Age: 76
End: 2021-09-23

## 2021-09-23 ENCOUNTER — TELEPHONE (OUTPATIENT)
Dept: CARDIOLOGY CLINIC | Age: 76
End: 2021-09-23

## 2021-09-23 DIAGNOSIS — I48.0 PAF (PAROXYSMAL ATRIAL FIBRILLATION) (HCC): Primary | ICD-10-CM

## 2021-09-23 RX ORDER — WARFARIN SODIUM 5 MG/1
5 TABLET ORAL DAILY
Qty: 30 TABLET | Refills: 3 | Status: SHIPPED | OUTPATIENT
Start: 2021-10-07 | End: 2022-04-06

## 2021-09-23 NOTE — TELEPHONE ENCOUNTER
Rosanne Soni is calling in wanting to be prescribed a cheaper medication than Eliquis.       Rosanne Soni can be reached at [phone]

## 2021-09-23 NOTE — TELEPHONE ENCOUNTER
New referral for warfarin therapy. Cannot afford Eliquis. Will finish Eliquis 10/10. Scheduled for 10/15 in [facility].      Roena Gosselin, PharmD, [address] [place] [address]  Anticoagulation Service  [phone]

## 2021-09-23 NOTE — TELEPHONE ENCOUNTER
Eliquis is not affordable for patient and he states the his income is to high to qualify for patient assistance. Changed to warfarin. Referral placed to [address] [place] [address].      Instructed patient to call if he does not hear from the [address] [place] [address] by Monday Afternoon 9/27/2021

## 2021-09-28 NOTE — TELEPHONE ENCOUNTER
Spoke with patient he has an appointment scheduled at the [facility] in [facility] on 10/15/2021.

## 2021-10-06 ENCOUNTER — NURSE ONLY (OUTPATIENT)
Dept: CARDIOLOGY CLINIC | Age: 76
End: 2021-10-06
Payer: MEDICARE

## 2021-10-06 DIAGNOSIS — Z95.0 PACEMAKER: ICD-10-CM

## 2021-10-06 DIAGNOSIS — I49.5 SICK SINUS SYNDROME (HCC): ICD-10-CM

## 2021-10-07 PROCEDURE — 93296 REM INTERROG EVL PM/IDS: CPT | Performed by: INTERNAL MEDICINE

## 2021-10-07 PROCEDURE — 93294 REM INTERROG EVL PM/LDLS PM: CPT | Performed by: INTERNAL MEDICINE

## 2021-10-07 NOTE — PROGRESS NOTES
We received remote transmission from patient's dual chamber pacemaker monitor at home. Transmission shows normal sensing and pacing function. FFRW oversensing noted in Atrial blanking on Presenting EGM. Hx AT/AF, AF ABL 08.20.21/WSW. AT/AF burden 3.0% w available EGMs illustrating AT, competitive Ap and intermittent FFRW oversensing. Pt on warfarin, flecainide, Toprol XL. EP physician will review. See interrogation under cardiology tab in the [address] [place] [address] [address] field for more details.

## 2021-10-12 ENCOUNTER — TELEPHONE (OUTPATIENT)
Dept: ORTHOPEDIC SURGERY | Age: 76
End: 2021-10-12

## 2021-10-12 NOTE — TELEPHONE ENCOUNTER
Dear PCP Provider: [facility]) has partnered with [facility] to utilize predictive analytics to improve the care management for patients with arthritic knee pain. Utilizing claims data and patient visit features, we have developed an algorithmic risk score to determine which patient's quality of life may be most impacted to their knee pain. The selection criteria for this  program are: 1) risk score greater than .85; 2) existing [address] [place] [address] Floor patient; and 3) seen for knee pain in the past 3 years. Based upon the predictive analytics risk score  program, your patient has a risk score of greater than . 85 (i.e. 85% probability in having their quality of life impacted by their knee pain). To assist with care management of your patient, a navigator will be contacting them to understand their knee pain status and to educate on the [address] Pain Program, including scheduling an appointment with a joint specialist or their PCP. If patient is already established with a [facility] provider, we will follow up with patient. If you feel this patient not appropriate to be contacted given other medical reasons, please reply back to the navigator within 7 days with reason why not to be contacted. Otherwise, the navigator will follow up with you on the details of the patient encounter after the call.  Thank you for your support for this program.

## 2021-10-15 ENCOUNTER — TELEPHONE (OUTPATIENT)
Dept: CARDIOLOGY CLINIC | Age: 76
End: 2021-10-15

## 2021-10-15 ENCOUNTER — ANTI-COAG VISIT (OUTPATIENT)
Dept: PHARMACY | Age: 76
End: 2021-10-15
Payer: MEDICARE

## 2021-10-15 ENCOUNTER — ANTI-COAG VISIT (OUTPATIENT)
Dept: PHARMACY | Age: 76
End: 2021-10-15

## 2021-10-15 DIAGNOSIS — I48.0 PAF (PAROXYSMAL ATRIAL FIBRILLATION) (HCC): Primary | ICD-10-CM

## 2021-10-15 DIAGNOSIS — I48.0 PAF (PAROXYSMAL ATRIAL FIBRILLATION) (HCC): ICD-10-CM

## 2021-10-15 LAB
INR BLD: >14.27 (ref 0.88–1.12)
INTERNATIONAL NORMALIZATION RATIO, POC: >8
PROTHROMBIN TIME: >170 SEC (ref 9.9–12.7)

## 2021-10-15 PROCEDURE — 85610 PROTHROMBIN TIME: CPT

## 2021-10-15 PROCEDURE — 99211 OFF/OP EST MAY X REQ PHY/QHP: CPT

## 2021-10-15 NOTE — PROGRESS NOTES
Mr. Geena Gonzalez is a 64 y.o.  male. Mr. Geena Gonzalez had an INR test today. Results were reviewed and appropriate warfarin management was completed. This visit was performed as: An in person visit. Protocols were followed with precautions to reduce the spread of COVID-19. Patient verifies current dosing regimen: Yes     Warfarin medication reviewed and updated on the patient 's home medication list: No   All other medications reviewed and updated on the patient 's home medication list: No     Lab Results   Component Value Date    INR >14.27 () 10/15/2021    INR >8.0 10/15/2021           Anticoagulation Summary  As of 10/15/2021    INR goal:  2.0-3.0   TTR:  --   INR used for dosing:  >14.27 (10/15/2021)   Warfarin maintenance plan:  5 mg (5 mg x 1) every day   Weekly warfarin total:  35 mg   Plan last modified:  Daphne Patches, RP (9/23/2021)   Next INR check:  10/19/2021   Target end date: Indefinite    Indications    PAF (paroxysmal atrial fibrillation) ([facility] [address] [I48.0]             Anticoagulation Episode Summary     INR check location:      Preferred lab:      Send INR reminders to:  [facility]    Comments:  EPIC      Anticoagulation Care Providers     Provider Role Specialty Phone number    Darrin Stafford MD Referring Electrophysiology [phone]          Warfarin assessment / plan:   First time seeing Mr. Camacho. He was started on warfarin on 10-5-21 and continued with the Eliquis. INR today > 8.0 . Sent him to the lab to get a peripheral INR which as of this time is not reported. F/U with Mariajose THAYER at Dr. Virginia Delgadillo office. Informed them he is stopping the Eliquis and holding the warfarin until I see him on 10-19-21. Instructed patient to go to the emergency room with any bruising or bleeding issues. Gave him a copy of the booklets: Understanding Your Coumadin Therapy and \"A heart-to -heart about AFIB and stroke risk\" to read.   Will give patient a full teaching on 10-19-21. Description    Stop Eliquis  Hold warfarin until INR appointment on 10-19-21        Call [phone] with signs or symptoms of bleeding or ANY medication changes (including over-the-counter medications or herbal supplements). If significant bleeding occurs or if you fall and hit your head, please seek immediate medical attention. Keep the number of servings of vitamin K containing foods (dark green, leafy vegetables) the same each week. Please call if this changes. Limit alcohol intake. Please call if this changes. Immunization History   Administered Date(s) Administered    COVID-19, Moderna, PF, 100mcg/0.5mL 02/04/2021, 03/04/2021    Influenza 10/16/2013    Influenza Virus Vaccine 10/20/2014, 09/23/2015    Influenza, High Dose (Fluzone 65 yrs and older) 10/01/2016, 09/26/2017, 10/15/2018, 08/13/2020    Influenza, High-dose, [place], 65 yrs +, IM (Fluzone) 08/13/2020    Influenza, Quadv, IM, PF (6 mo and older Fluzone, Flulaval, Fluarix, and 3 yrs and older Afluria) 09/18/2019    Pneumococcal Conjugate 13-valent ([id number]) 10/01/2016    Pneumococcal Polysaccharide ([id number]) 07/12/2013, 08/13/2020    Td, unspecified formulation 10/16/2013    Tdap (Boostrix, Adacel) 05/06/2017, 06/13/2018    Zoster Live (Zostavax) 09/04/2013    Zoster Recombinant (Shingrix) 04/05/2018, 09/17/2018             Reviewed AVS with patient / caregiver.       CLINICAL PHARMACY CONSULT: MED RECONCILIATION/REVIEW ADDENDUM    For Pharmacy Admin Tracking Only     Intervention Detail: Dose Adjustment: 1, reason: Therapy Optimization   Total # of Interventions Recommended: 2   Total # of Interventions Accepted: 2   Time Spent (min): 15

## 2021-10-15 NOTE — TELEPHONE ENCOUNTER
Spoke with China Franco, [address] [place] [address] at anti-coag clinic regarding patient's INR of >8.0. Patient has been taking both Eliquis and Coumadin since 10/5/21. Landen instructed patient to hold all [address] until he returns to the [address] [place] [address] on Tuesday 10/19/21.

## 2021-10-19 ENCOUNTER — ANTI-COAG VISIT (OUTPATIENT)
Dept: PHARMACY | Age: 76
End: 2021-10-19
Payer: MEDICARE

## 2021-10-19 ENCOUNTER — TELEPHONE (OUTPATIENT)
Dept: ORTHOPEDIC SURGERY | Age: 76
End: 2021-10-19

## 2021-10-19 DIAGNOSIS — I48.0 PAF (PAROXYSMAL ATRIAL FIBRILLATION) (HCC): Primary | ICD-10-CM

## 2021-10-19 LAB — INTERNATIONAL NORMALIZATION RATIO, POC: 2.2

## 2021-10-19 PROCEDURE — 99203 OFFICE O/P NEW LOW 30 MIN: CPT

## 2021-10-19 PROCEDURE — 85610 PROTHROMBIN TIME: CPT

## 2021-10-19 NOTE — PROGRESS NOTES
Mr. Dante Brar is new to our clinic, referred by Dr. Juan Nobles. A coumadin booklet \"Understanding Your Coumadin Therapy\" and \"A Herat-to-heart about A-FIb\" was provided and extensive education was given including but not limited to the following:     Indication and duration for therapy:  PAF-indefinite   Previous dosing instructions: 5 mg daily   Tablet size and color: 5 mg/ salmon    Dose timing: bedtime   INR goal: 2-3   Importance of INR monitoring   Negative for NSAIDs/Tylenol for pain   Negative for alcohol   Negative for tobacco products   Positive for MVI (liquid) (with vitamin K)   Negative oral nutrition supplements   Positive for herbal medications   Negative for marijuana   Positive for CBD oil topically   Call with any medication changes including OTCs (starting, stopping, dose changes) and especially oral steroids and antibiotics   Vitamin K interaction and the importance of a consistent diet with regards to Vitamin K :eats about one portion weekly   Signs and symptoms of bleeding and clotting: infrequent left nose bleed   Falls prevention and follow up   After hours clinic contact    Patient presents to the clinic for warfarin education and INR. He was seen last Friday for initial visit. At that time his INR was > 8.0, a peripheral INR was resulted as > 14.27. He was held from 10-15 thru 10-18-21. MD notified. He has been taking his Eliquis twice a day + warfarin 5 mg daily since 10-5-21 (per patient). Today INR = 2.2. He will be taking his warfarin at dinnertime or bedtime. He brought in a list of his home medications. He has a lot of herbal medications that he takes, but does not know all the strengths. He will be seen at [facility] clinic on 10-22-21 while his wife is getting an MRI, also at [facility] determining maintenance dosing. He is to see me during the week of 10-26-21 at [facility] and he is to bring all his supplements with him. Warfarin dosing:  Take warfarin 5 mg on 10-19 and 10-21-21  Take warfarin 2.5 mg on 10-20-21  INR at [facility] on 10-22-21  Take warfarin at bedtime. Next INR: 10-22-21      Patient medication profile reviewed/corrected with patient. In process. Takes 9 herbal supplements, strengths need to be determined. Warfarin-Medication Drug Profile Interactions: Ginko-biloba, melatonin, echinacea, Tylenol. After Visit Summary (AVS) reviewed with patient.       CLINICAL PHARMACY CONSULT: MED RECONCILIATION/REVIEW ADDENDUM      For Pharmacy Admin Tracking Only     Intervention Detail: Dose Adjustment: 1, reason: Therapy Optimization   Total # of Interventions Recommended: 16   Total # of Interventions Accepted: 16   Time Spent (min): 45

## 2021-10-19 NOTE — TELEPHONE ENCOUNTER
Patient is established with Dr. Isabella Owusu and is currently utilizing a brace to help decrease symptoms of knee pain and it has provided significant relief. Patient can give the office a call if his pain begins to increase and would like a follow up scheduled with Dr. Isabella Owusu.

## 2021-10-20 DIAGNOSIS — F51.04 PSYCHOPHYSIOLOGICAL INSOMNIA: ICD-10-CM

## 2021-10-20 NOTE — TELEPHONE ENCOUNTER
Medication:   Requested Prescriptions     Pending Prescriptions Disp Refills    estazolam (PROSOM) 2 MG tablet [Pharmacy Med Name: ESTAZOLAM 2MG TABLETS] 60 tablet      Sig: TAKE 1 TO 2 TABLETS BY MOUTH EVERY NIGHT AS NEEDED FOR SLEEP        Last Filled:      Patient Phone Number: [phone] (home)     Last appt: 8/10/2021   Next appt: 11/22/2021    Last OARRS: No flowsheet data found.

## 2021-10-22 ENCOUNTER — ANTI-COAG VISIT (OUTPATIENT)
Dept: PHARMACY | Age: 76
End: 2021-10-22
Payer: MEDICARE

## 2021-10-22 DIAGNOSIS — I48.0 PAF (PAROXYSMAL ATRIAL FIBRILLATION) (HCC): Primary | ICD-10-CM

## 2021-10-22 LAB — INR BLD: 2.6

## 2021-10-22 PROCEDURE — 99211 OFF/OP EST MAY X REQ PHY/QHP: CPT

## 2021-10-22 PROCEDURE — 85610 PROTHROMBIN TIME: CPT

## 2021-10-22 NOTE — PROGRESS NOTES
Mr. Ana Mccord is a 64 y.o.  male. Mr. Ana Mccord had an INR test today. Results were reviewed and appropriate warfarin management was completed. This visit was performed as: An in person visit. Protocols were followed with precautions to reduce the spread of COVID-19. Patient verifies current dosing regimen: Yes     Warfarin medication reviewed and updated on the patient 's home medication list: Yes   All other medications reviewed and updated on the patient 's home medication list: No: No Changes      Lab Results   Component Value Date    INR 2.60 10/22/2021    INR 2.2 10/19/2021    INR >14.27 ([place]) 10/15/2021       Patient Findings     Negatives:  Signs/symptoms of bleeding, Missed doses, Change in medications, Change in diet/appetite, Bruising          Anticoagulation Summary  As of 10/22/2021    INR goal:  2.0-3.0   TTR:  100.0 % (2 d)   INR used for dosin.60 (10/22/2021)   Warfarin maintenance plan:  2.5 mg (5 mg x 0.5) every day   Weekly warfarin total:  17.5 mg   Plan last modified:  Peyton Feng [facility] (10/22/2021)   Next INR check:  10/26/2021   Target end date: Indefinite    Indications    [facility]F [facility]oxysmal [facility] fibrillation) [facility] [I48.0]             Anticoagulation Episode Summary     INR check location:      Preferred lab:      Send INR reminders to:  [facility]    Comments:  EPIC      Anticoagulation Care Providers     Provider Role Specialty Phone number    Luciano Murphy MD Referring Electrophysiology [phone]          Warfarin assessment / plan:     Patient appears well with no complaints. Reports no signs of bleeding, bruising, or missed doses. Warfarin was held on 10/15, 10/16, 10/17, and 10/18 due to elevated INR. Of note Mr. Merline Prost started his warfarin sooner than instructed on 10/7/21. He was instructed to finish his Eliquis and then start warfarin. There was confusion.   He therefore was not seen for an INR in a timely manner and his INR was elevated on 10/15/21. He was taking warfarin and Eliquis at the same time. He then held warfarin and his INR responded appropriately. Today's INR is in range at  2.6, but only after 3 doses of warfarin. Will hold warfarin dose today and decrease weekly dose. Will recheck INR on 10/26. Description    Hold warfarin today ONLY 10/22/21  THEN NEW DOSE: Warfarin 2.5 mg daily    Take warfarin at bedtime. Call [phone] with signs or symptoms of bleeding or ANY medication changes (including over-the-counter medications or herbal supplements). Especially if you begin oral steroids and/or antibiotics. If significant bleeding occurs please seek immediate medical attention. Keep the number of servings and portion size of vitamin K containing foods (dark green, leafy vegetables) the same each week. Vegetables high in vitamin K : broccoli, spinach, leaf lettuce, khurram lettuce, kale, collards, asparagus, brussel sprouts. Please call if you routine diet changes. Limit alcohol intake. Please call if this changes. Please arrive 15 minutes prior to your visit. Allow 72 hours for warfarin refills.        Immunization History   Administered Date(s) Administered    COVID-19, Moderna, PF, 100mcg/0.5mL 02/04/2021, 03/04/2021    Influenza 10/16/2013    Influenza Virus Vaccine 10/20/2014, 09/23/2015    Influenza, High Dose (Fluzone 65 yrs and older) 10/01/2016, 09/26/2017, 10/15/2018, 08/13/2020    Influenza, High-dose, Ghassan Mcdonald, 65 yrs +, IM (Fluzone) 08/13/2020    Influenza, Quadv, IM, PF (6 mo and older Fluzone, Flulaval, Fluarix, and 3 yrs and older Afluria) 09/18/2019    Pneumococcal Conjugate 13-valent ([id number]) 10/01/2016    Pneumococcal Polysaccharide ([id number]) 07/12/2013, 08/13/2020    Td, unspecified formulation 10/16/2013    Tdap (Boostrix, Adacel) 05/06/2017, 06/13/2018    Zoster Live (Zostavax) 09/04/2013    Zoster Recombinant (Shingrix) 04/05/2018, 09/17/2018       Reviewed AVS with patient / caregiver.       CLINICAL PHARMACY CONSULT: MED RECONCILIATION/REVIEW ADDENDUM    For Pharmacy Admin Tracking Only     Intervention Detail: Dose Adjustment: 1, reason: Therapy Optimization   Total # of Interventions Recommended: 1   Total # of Interventions Accepted: 1   Time Spent (min): 20

## 2021-10-26 ENCOUNTER — ANTI-COAG VISIT (OUTPATIENT)
Dept: PHARMACY | Age: 76
End: 2021-10-26
Payer: MEDICARE

## 2021-10-26 DIAGNOSIS — I48.0 PAF (PAROXYSMAL ATRIAL FIBRILLATION) (HCC): Primary | ICD-10-CM

## 2021-10-26 LAB — INTERNATIONAL NORMALIZATION RATIO, POC: 2.2

## 2021-10-26 PROCEDURE — 99211 OFF/OP EST MAY X REQ PHY/QHP: CPT

## 2021-10-26 PROCEDURE — 85610 PROTHROMBIN TIME: CPT

## 2021-10-26 RX ORDER — GUAIFENESIN/EPHEDRINE HCL 200-12.5MG
3 TABLET ORAL NIGHTLY
COMMUNITY
End: 2022-09-20

## 2021-10-26 RX ORDER — ACETYLCYSTEINE 600 MG
2 CAPSULE ORAL DAILY
COMMUNITY
End: 2022-01-21

## 2021-10-26 RX ORDER — CALCIUM CARB/VITAMIN D3/VIT K1 500-500-40
30 TABLET,CHEWABLE ORAL DAILY
COMMUNITY

## 2021-10-26 RX ORDER — CALCIUM/MAGNESIUM/ZINC 333-133 MG
2 TABLET ORAL DAILY
COMMUNITY
End: 2022-04-20

## 2021-10-26 RX ORDER — MELATONIN 10 MG
30 CAPSULE ORAL NIGHTLY
COMMUNITY
End: 2022-04-20

## 2021-10-26 RX ORDER — TRYPTOPHAN 500 MG
3000 TABLET ORAL NIGHTLY
COMMUNITY
End: 2022-09-20

## 2021-10-26 RX ORDER — CHOLECALCIFEROL (VITAMIN D3) 125 MCG
600 CAPSULE ORAL NIGHTLY
COMMUNITY
End: 2022-09-20

## 2021-10-26 NOTE — PROGRESS NOTES
Mr. Ronel Domingo is a 64 y.o.  male. Mr. Ronel Domingo had an INR test today. Results were reviewed and appropriate warfarin management was completed. This visit was performed as: An in person visit. Protocols were followed with precautions to reduce the spread of COVID-19. Patient verifies current dosing regimen: Yes     Warfarin medication reviewed and updated on the patient 's home medication list: Yes   All other medications reviewed and updated on the patient 's home medication list: No new medications     Lab Results   Component Value Date    INR 2.2 10/26/2021    INR 2.60 10/22/2021    INR 2.2 10/19/2021       Patient Findings     Positives:  Change in medications    Negatives:  Signs/symptoms of thrombosis, Signs/symptoms of bleeding, Change in health, Missed doses, Change in diet/appetite, Bruising    Comments:  Reviewed 12 home medications and added to home medication record. Anticoagulation Summary  As of 10/26/2021    INR goal:  2.0-3.0   TTR:  100.0 % (1 wk)   INR used for dosin.2 (10/26/2021)   Warfarin maintenance plan:  2.5 mg (5 mg x 0.5) every day   Weekly warfarin total:  17.5 mg   Plan last modified:  Gerson Goss, [facility] (10/22/2021)   Next INR check:  2021   Target end date: Indefinite    Indications    PAF [facility]oxysmal [facility] fibrillation) [facility] [I48.0]             Anticoagulation Episode Summary     INR check location:      Preferred lab:      Send INR reminders to:  [facility]    Comments:  EPIC      Anticoagulation Care Providers     Provider Role Specialty Phone number    Shu Van MD Referring Electrophysiology [phone]          Warfarin assessment / plan:   Patient appears well. No complaints regarding warfarin therapy. Added 12 new OTC/herbal medication to patient's list of home medications. He takes these daily, some do affect the INR/bleeding risk.   Since he takes these every day will monitor INR a little more closly over the next few weeks. No change to weekly warfarin dosing. Description    CONTINUE: Warfarin 2.5 mg daily    Take warfarin at bedtime. Call [phone] with signs or symptoms of bleeding or ANY medication changes (including over-the-counter medications or herbal supplements). Especially if you begin oral steroids and/or antibiotics. If significant bleeding occurs please seek immediate medical attention. Keep the number of servings and portion size of vitamin K containing foods (dark green, leafy vegetables) the same each week. Vegetables high in vitamin K : broccoli, spinach, leaf lettuce, khurram lettuce, kale, collards, asparagus, brussel sprouts. Please call if you routine diet changes. Limit alcohol intake. Please call if this changes. Please arrive 15 minutes prior to your visit. Allow 72 hours for warfarin refills. Immunization History   Administered Date(s) Administered    COVID-19, Moderna, PF, 100mcg/0.5mL 02/04/2021, 03/04/2021    Influenza 10/16/2013    Influenza Virus Vaccine 10/20/2014, 09/23/2015    Influenza, High Dose (Fluzone 65 yrs and older) 10/01/2016, 09/26/2017, 10/15/2018, 08/13/2020    Influenza, High-dose, Gema Bauer, 65 yrs +, IM (Fluzone) 08/13/2020    Influenza, Quadv, IM, PF (6 mo and older Fluzone, Flulaval, Fluarix, and 3 yrs and older Afluria) 09/18/2019    Influenza, Quadv, adjuvanted, 65 yrs +, IM, PF (Fluad) 10/23/2021    Pneumococcal Conjugate 13-valent (Dfyevfq77) 10/01/2016    Pneumococcal Polysaccharide ([id number]) 07/12/2013, 08/13/2020    Td, unspecified formulation 10/16/2013    Tdap (Boostrix, Adacel) 05/06/2017, 06/13/2018    Zoster Live (Zostavax) 09/04/2013    Zoster Recombinant (Shingrix) 04/05/2018, 09/17/2018             Reviewed AVS with patient / caregiver.       CLINICAL PHARMACY CONSULT: MED RECONCILIATION/REVIEW ADDENDUM    For Pharmacy Admin Tracking Only     Intervention Detail:    Total # of Interventions Recommended: 12   Total # of Interventions Accepted: 12   Time Spent (min): 30

## 2021-11-02 ENCOUNTER — ANTI-COAG VISIT (OUTPATIENT)
Dept: PHARMACY | Age: 76
End: 2021-11-02
Payer: MEDICARE

## 2021-11-02 DIAGNOSIS — I48.0 PAF (PAROXYSMAL ATRIAL FIBRILLATION) (HCC): Primary | ICD-10-CM

## 2021-11-02 LAB — INTERNATIONAL NORMALIZATION RATIO, POC: 1.7

## 2021-11-02 PROCEDURE — 99211 OFF/OP EST MAY X REQ PHY/QHP: CPT

## 2021-11-02 PROCEDURE — 85610 PROTHROMBIN TIME: CPT

## 2021-11-02 NOTE — PROGRESS NOTES
with signs or symptoms of bleeding or ANY medication changes (including over-the-counter medications or herbal supplements). Especially if you begin oral steroids and/or antibiotics. If significant bleeding occurs please seek immediate medical attention. Keep the number of servings and portion size of vitamin K containing foods (dark green, leafy vegetables) the same each week. Vegetables high in vitamin K : broccoli, spinach, leaf lettuce, khurram lettuce, kale, collards, asparagus, brussel sprouts. Please call if you routine diet changes. Limit alcohol intake. Please call if this changes. Please arrive 15 minutes prior to your visit. Allow 72 hours for warfarin refills. Immunization History   Administered Date(s) Administered    COVID-19, Moderna, Primary or Immunocompromised, PF, 100mcg/0.5mL 02/04/2021, 03/04/2021    Influenza 10/16/2013    Influenza Virus Vaccine 10/20/2014, 09/23/2015    Influenza, High Dose (Fluzone 65 yrs and older) 10/01/2016, 09/26/2017, 10/15/2018, 08/13/2020    Influenza, High-dose, Lenny Churn, 65 yrs +, IM (Fluzone) 08/13/2020    Influenza, Quadv, IM, PF (6 mo and older Fluzone, Flulaval, Fluarix, and 3 yrs and older Afluria) 09/18/2019    Influenza, Quadv, adjuvanted, 65 yrs +, IM, PF (Fluad) 10/23/2021    Pneumococcal Conjugate 13-valent ([id number]) 10/01/2016    Pneumococcal Polysaccharide (Gxjjjdryy24) 07/12/2013, 08/13/2020    Td, unspecified formulation 10/16/2013    Tdap (Boostrix, Adacel) 05/06/2017, 06/13/2018    Zoster Live (Zostavax) 09/04/2013    Zoster Recombinant (Shingrix) 04/05/2018, 09/17/2018             Reviewed AVS with patient / caregiver.       CLINICAL PHARMACY CONSULT: MED RECONCILIATION/REVIEW ADDENDUM    For Pharmacy Admin Tracking Only     Intervention Detail: Dose Adjustment: 1, reason: Therapy Optimization   Total # of Interventions Recommended: 2   Total # of Interventions Accepted: 2   Time Spent (min): 15

## 2021-11-16 ENCOUNTER — ANTI-COAG VISIT (OUTPATIENT)
Dept: PHARMACY | Age: 76
End: 2021-11-16
Payer: MEDICARE

## 2021-11-16 DIAGNOSIS — I48.0 PAF (PAROXYSMAL ATRIAL FIBRILLATION) (HCC): Primary | ICD-10-CM

## 2021-11-16 LAB — INTERNATIONAL NORMALIZATION RATIO, POC: 1.9

## 2021-11-16 PROCEDURE — 99211 OFF/OP EST MAY X REQ PHY/QHP: CPT

## 2021-11-16 PROCEDURE — 85610 PROTHROMBIN TIME: CPT

## 2021-11-16 RX ORDER — PRAVASTATIN SODIUM 40 MG
40 TABLET ORAL DAILY
Qty: 90 TABLET | Refills: 1 | Status: SHIPPED | OUTPATIENT
Start: 2021-11-16 | End: 2022-02-19

## 2021-11-16 NOTE — PROGRESS NOTES
Pilo Laws is a 64 y.o. here for warfarin management. Chichi Villegas had an INR test today. Results were reviewed and appropriate warfarin management was completed. This visit was performed as: An in person visit. Protocols were followed with precautions to reduce the spread of COVID-19. Patient verifies current dosing regimen: Yes     Warfarin medication reviewed and updated on the patient 's home medication list: Yes   All other medications reviewed and updated on the patient 's home medication list: No new medications     Lab Results   Component Value Date    INR 1.9 2021    INR 1.7 2021    INR 2.2 10/26/2021       Patient Findings     Negatives:  Signs/symptoms of thrombosis, Signs/symptoms of bleeding, Change in health, Missed doses, Change in medications, Change in diet/appetite, Bruising          Anticoagulation Summary  As of 2021    INR goal:  2.0-3.0   TTR:  35.1 % (4 wk)   INR used for dosin.9 (2021)   Warfarin maintenance plan:  5 mg (5 mg x 1) every Mon, Thu; 2.5 mg (5 mg x 0.5) all other days   Weekly warfarin total:  22.5 mg   Plan last modified:  Cassius Vee RPH (2021)   Next INR check:  2021   Priority:  Maintenance   Target end date: Indefinite    Indications    PAF (paroxysmal atrial fibrillation) ([facility] [I48.0]             Anticoagulation Episode Summary     INR check location:  Anticoagulation Clinic    Preferred lab:      Send INR reminders to:  [facility]AFF    Comments:  EPIC      Anticoagulation Care Providers     Provider Role Specialty Phone number    Juan Nobles MD Referring Electrophysiology [phone]          Warfarin assessment / plan:   Patient appears well. No complaints regarding warfarin therapy. Increased weekly warfarin dosing by 12% (2.5 mg/week). Description    NEW DOSE: Warfarin 2.5 mg daily except 5 mg on  and Thursday. Take warfarin at bedtime.      Call [phone] with signs or symptoms of bleeding or ANY medication changes (including over-the-counter medications or herbal supplements). Especially if you begin oral steroids and/or antibiotics. If significant bleeding occurs please seek immediate medical attention. Keep the number of servings and portion size of vitamin K containing foods (dark green, leafy vegetables) the same each week. Vegetables high in vitamin K : broccoli, spinach, leaf lettuce, khurram lettuce, kale, collards, asparagus, brussel sprouts. Please call if you routine diet changes. Limit alcohol intake. Please call if this changes. Please arrive 15 minutes prior to your visit. Allow 72 hours for warfarin refills. Immunization History   Administered Date(s) Administered    COVID-19, Moderna, Primary or Immunocompromised, PF, 100mcg/0.5mL 02/04/2021, 03/04/2021    Influenza 10/16/2013    Influenza Virus Vaccine 10/20/2014, 09/23/2015    Influenza, High Dose (Fluzone 65 yrs and older) 10/01/2016, 09/26/2017, 10/15/2018, 08/13/2020    Influenza, High-dose, [facility], 65 yrs +, IM (Fluzone) 08/13/2020    Influenza, Quadv, IM, PF (6 mo and older Fluzone, Flulaval, Fluarix, and 3 yrs and older Afluria) 09/18/2019    Influenza, Quadv, adjuvanted, 65 yrs +, IM, PF (Fluad) 10/23/2021    Pneumococcal Conjugate 13-valent (Yirxvvq12) 10/01/2016    Pneumococcal Polysaccharide ([id number]) 07/12/2013, 08/13/2020    Td, unspecified formulation 10/16/2013    Tdap (Boostrix, Adacel) 05/06/2017, 06/13/2018    Zoster Live (Zostavax) 09/04/2013    Zoster Recombinant (Shingrix) 04/05/2018, 09/17/2018             Reviewed AVS with patient / caregiver.       CLINICAL PHARMACY CONSULT: MED RECONCILIATION/REVIEW ADDENDUM    For Pharmacy Admin Tracking Only     Intervention Detail: Dose Adjustment: 1, reason: Therapy Optimization   Total # of Interventions Recommended: 1   Total # of Interventions Accepted: 1   Time Spent (min): 15

## 2021-11-17 DIAGNOSIS — F51.04 PSYCHOPHYSIOLOGICAL INSOMNIA: ICD-10-CM

## 2021-11-22 RX ORDER — QUETIAPINE FUMARATE 25 MG/1
25 TABLET, FILM COATED ORAL NIGHTLY
Qty: 90 TABLET | Refills: 1 | Status: SHIPPED | OUTPATIENT
Start: 2021-11-22 | End: 2022-01-21 | Stop reason: SDUPTHER

## 2021-11-24 ENCOUNTER — OFFICE VISIT (OUTPATIENT)
Dept: PRIMARY CARE CLINIC | Age: 76
End: 2021-11-24
Payer: MEDICARE

## 2021-11-24 VITALS
SYSTOLIC BLOOD PRESSURE: 151 MMHG | OXYGEN SATURATION: 97 % | WEIGHT: 166.4 LBS | RESPIRATION RATE: 18 BRPM | HEART RATE: 82 BPM | BODY MASS INDEX: 26.06 KG/M2 | DIASTOLIC BLOOD PRESSURE: 82 MMHG

## 2021-11-24 DIAGNOSIS — I10 ESSENTIAL HYPERTENSION: Primary | ICD-10-CM

## 2021-11-24 DIAGNOSIS — I48.0 PAF (PAROXYSMAL ATRIAL FIBRILLATION) (HCC): ICD-10-CM

## 2021-11-24 DIAGNOSIS — E78.5 HYPERLIPIDEMIA LDL GOAL <100: ICD-10-CM

## 2021-11-24 DIAGNOSIS — F41.8 DEPRESSION WITH ANXIETY: ICD-10-CM

## 2021-11-24 DIAGNOSIS — F51.01 PRIMARY INSOMNIA: ICD-10-CM

## 2021-11-24 DIAGNOSIS — I49.5 SICK SINUS SYNDROME (HCC): ICD-10-CM

## 2021-11-24 PROCEDURE — 99214 OFFICE O/P EST MOD 30 MIN: CPT | Performed by: FAMILY MEDICINE

## 2021-11-24 PROCEDURE — G8417 CALC BMI ABV UP PARAM F/U: HCPCS | Performed by: FAMILY MEDICINE

## 2021-11-24 PROCEDURE — 1036F TOBACCO NON-USER: CPT | Performed by: FAMILY MEDICINE

## 2021-11-24 PROCEDURE — 4040F PNEUMOC VAC/ADMIN/RCVD: CPT | Performed by: FAMILY MEDICINE

## 2021-11-24 PROCEDURE — G8427 DOCREV CUR MEDS BY ELIG CLIN: HCPCS | Performed by: FAMILY MEDICINE

## 2021-11-24 PROCEDURE — 1123F ACP DISCUSS/DSCN MKR DOCD: CPT | Performed by: FAMILY MEDICINE

## 2021-11-24 PROCEDURE — G8484 FLU IMMUNIZE NO ADMIN: HCPCS | Performed by: FAMILY MEDICINE

## 2021-11-24 RX ORDER — HYDRALAZINE HYDROCHLORIDE 50 MG/1
50 TABLET, FILM COATED ORAL 2 TIMES DAILY
Qty: 60 TABLET | Refills: 3 | Status: SHIPPED | OUTPATIENT
Start: 2021-11-24 | End: 2022-01-21 | Stop reason: SDUPTHER

## 2021-11-24 RX ORDER — AMLODIPINE BESYLATE 5 MG/1
5 TABLET ORAL DAILY
Qty: 90 TABLET | Refills: 1 | Status: SHIPPED | OUTPATIENT
Start: 2021-11-24 | End: 2022-01-21 | Stop reason: SDUPTHER

## 2021-11-24 SDOH — ECONOMIC STABILITY: FOOD INSECURITY: WITHIN THE PAST 12 MONTHS, THE FOOD YOU BOUGHT JUST DIDN'T LAST AND YOU DIDN'T HAVE MONEY TO GET MORE.: NEVER TRUE

## 2021-11-24 SDOH — ECONOMIC STABILITY: FOOD INSECURITY: WITHIN THE PAST 12 MONTHS, YOU WORRIED THAT YOUR FOOD WOULD RUN OUT BEFORE YOU GOT MONEY TO BUY MORE.: NEVER TRUE

## 2021-11-24 ASSESSMENT — ENCOUNTER SYMPTOMS
SHORTNESS OF BREATH: 0
VOICE CHANGE: 0
ABDOMINAL PAIN: 0
DIARRHEA: 0
TROUBLE SWALLOWING: 0
BLOOD IN STOOL: 0
CONSTIPATION: 0

## 2021-11-24 ASSESSMENT — SOCIAL DETERMINANTS OF HEALTH (SDOH): HOW HARD IS IT FOR YOU TO PAY FOR THE VERY BASICS LIKE FOOD, HOUSING, MEDICAL CARE, AND HEATING?: NOT HARD AT ALL

## 2021-11-30 ENCOUNTER — ANTI-COAG VISIT (OUTPATIENT)
Dept: PHARMACY | Age: 76
End: 2021-11-30
Payer: MEDICARE

## 2021-11-30 DIAGNOSIS — I48.0 PAF (PAROXYSMAL ATRIAL FIBRILLATION) (HCC): Primary | ICD-10-CM

## 2021-11-30 LAB — INTERNATIONAL NORMALIZATION RATIO, POC: 2.2

## 2021-11-30 PROCEDURE — 85610 PROTHROMBIN TIME: CPT

## 2021-11-30 PROCEDURE — 99211 OFF/OP EST MAY X REQ PHY/QHP: CPT

## 2021-11-30 NOTE — PROGRESS NOTES
Jair Muñoz is a 64 y.o. here for warfarin management. Lizy Vigil had an INR test today. Results were reviewed and appropriate warfarin management was completed. This visit was performed as: An in person visit. Protocols were followed with precautions to reduce the spread of COVID-19. Patient verifies current dosing regimen: Yes     Warfarin medication reviewed and updated on the patient 's home medication list: Yes   All other medications reviewed and updated on the patient 's home medication list: No new medications     Lab Results   Component Value Date    INR 2.2 2021    INR 1.9 2021    INR 1.7 2021       Patient Findings     Negatives:  Signs/symptoms of thrombosis, Signs/symptoms of bleeding, Change in health, Missed doses, Change in medications, Change in diet/appetite, Bruising          Anticoagulation Summary  As of 2021    INR goal:  2.0-3.0   TTR:  45.6 % (1.4 mo)   INR used for dosin.2 (2021)   Warfarin maintenance plan:  5 mg (5 mg x 1) every Mon, Thu; 2.5 mg (5 mg x 0.5) all other days   Weekly warfarin total:  22.5 mg   Plan last modified:  Isak Galvez RPH (2021)   Next INR check:  2021   Priority:  Maintenance   Target end date: Indefinite    Indications    [facility] [I48.0]             Anticoagulation Episode Summary     INR check location:  Anticoagulation Clinic    Preferred lab:      Send INR reminders to:  [facility]    Comments:  EPIC      Anticoagulation Care Providers     Provider Role Specialty Phone number    Jasmin Gomez MD Referring Electrophysiology [phone]          Warfarin assessment / plan:   Patient appears well. No complaints regarding warfarin therapy. No change to weekly warfarin dosing. Description    CONTINUE: Warfarin 2.5 mg daily except 5 mg on  and Thursday. Take warfarin at bedtime.      Call [phone] with signs or symptoms of bleeding or ANY medication changes (including over-the-counter medications or herbal supplements). Especially if you begin oral steroids and/or antibiotics. If significant bleeding occurs please seek immediate medical attention. Keep the number of servings and portion size of vitamin K containing foods (dark green, leafy vegetables) the same each week. Vegetables high in vitamin K : broccoli, spinach, leaf lettuce, khurram lettuce, kale, collards, asparagus, brussel sprouts. Please call if you routine diet changes. Limit alcohol intake. Please call if this changes. Please arrive 15 minutes prior to your visit. Allow 72 hours for warfarin refills. Immunization History   Administered Date(s) Administered    COVID-19, Moderna, Primary or Immunocompromised, PF, 100mcg/0.5mL 02/04/2021, 03/04/2021    Influenza 10/16/2013    Influenza Virus Vaccine 10/20/2014, 09/23/2015    Influenza, High Dose (Fluzone 65 yrs and older) 10/01/2016, 09/26/2017, 10/15/2018, 08/13/2020    Influenza, High-dose, Marisol Skill, 65 yrs +, IM (Fluzone) 08/13/2020    Influenza, Quadv, IM, PF (6 mo and older Fluzone, Flulaval, Fluarix, and 3 yrs and older Afluria) 09/18/2019    Influenza, Quadv, adjuvanted, 65 yrs +, IM, PF (Fluad) 10/23/2021    Pneumococcal Conjugate 13-valent ([id number]) 10/01/2016    Pneumococcal Polysaccharide ([id number]) 07/12/2013, 08/13/2020    Td, unspecified formulation 10/16/2013    Tdap (Boostrix, Adacel) 05/06/2017, 06/13/2018    Zoster Live (Zostavax) 09/04/2013    Zoster Recombinant (Shingrix) 04/05/2018, 09/17/2018             Reviewed AVS with patient / caregiver.       CLINICAL PHARMACY CONSULT: MED RECONCILIATION/REVIEW ADDENDUM    For Pharmacy Admin Tracking Only     Intervention Detail:    Total # of Interventions Recommended: 0   Total # of Interventions Accepted: 0   Time Spent (min): 15

## 2021-12-06 NOTE — PROGRESS NOTES
[facility]   Electrophysiology      Date: 12/7/2021    Primary Cardiologist: Denise Johnson MD  PCP: Malinda Alvarez MD     Chief Complaint:   Chief Complaint   Patient presents with    Follow-up     AFIB - DEVICE CHECK -SOB     History of Present Illness:    I saw Geena Gonzalez in the office for electrophysiology follow up today. He is a 64 y.o. 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 2020), anxiety, COPD, hyperlipidemia, HTN, IBS and depression. He underwent atrial fibrillation (PVI, CAFE) and left atrial flutter (roof line) ablation on 8/20/21 with Dr. Jing Wilkinson. He was started on flecainide 50mg BID and Toprol 12.5mg QD. He presents today for procedure follow up. He has been feeling fairly well since his ablation. He has noticed recently more dyspnea on exertion which he thinks may be related to his COPD. His HCTZ was stopped by his PCP due to urinary frequency. BP has been around 140/80 at home per his report. Denies any chest pain or palpitations. No edema. No bleeding issues. Allergies:  No Known Allergies  Home Medications:  Prior to Visit Medications    Medication Sig Taking?  Authorizing Provider   amLODIPine (NORVASC) 5 MG tablet Take 1 tablet by mouth daily Yes Maegan Dougherty MD   hydrALAZINE (APRESOLINE) 50 MG tablet Take 1 tablet by mouth 2 times daily Yes Maegan Dougherty MD   QUEtiapine (SEROQUEL) 25 MG tablet Take 1 tablet by mouth nightly Yes Maegan Dougherty MD   estazolam (PROSOM) 2 MG tablet TAKE 1 TO 2 TABLETS BY MOUTH EVERY NIGHT AS NEEDED FOR SLEEP Yes Maegan Dougherty MD   pravastatin (PRAVACHOL) 40 MG tablet Take 1 tablet by mouth daily Yes Maegan Dougherty MD   Acetylcysteine (NAC) 600 MG CAPS Take 2 capsules by mouth daily Yes Historical Provider, MD   Omega-3 Fatty Acids (FISH OIL) 1200 MG CPDR Take 2 capsules by mouth daily Yes Historical Provider, MD   Echinacea 400 MG CAPS Take 2 capsules by mouth daily Takes once cap daily during summer. Yes Historical Provider, MD   melatonin 10 MG CAPS capsule Take 30 mg by mouth nightly Yes Historical Provider, MD   5-Hydroxytryptophan (5-HTP) 100 MG CAPS Take 3 capsules by mouth nightly Yes Historical Provider, MD   GAMMA AMINOBUTYRIC ACID PO Take 1,500 mg by mouth nightly Yes Historical Provider, MD   L-Tryptophan 500 MG TABS Take 3,000 mg by mouth nightly Yes Historical Provider, MD   L-Theanine 200 MG CAPS Take 600 mg by mouth nightly Yes Historical Provider, MD   vitamin D (CHOLECALCIFEROL) 125 MCG (5000 UT) CAPS capsule Take 5,000 Units by mouth daily Yes Historical Provider, MD   GUAIFENESIN PO Take 600 mg by mouth daily Yes Historical Provider, MD   Ginkgo Biloba 120 MG CAPS Take 240 mg by mouth daily Yes Historical Provider, MD   Multiple Vitamins-Minerals (MULTIVITAMIN) LIQD Take 30 mLs by mouth daily Has 80 mcg of vitamin K -2  Has 50 mg of ginsing root Yes Historical Provider, MD   Fluticasone furoate-vilanterol (BREO ELLIPTA) 200-25 MCG/INH AEPB inhaler Inhale 1 puff into the lungs daily Yes David Steward MD   warfarin (COUMADIN) 5 MG tablet Take 1 tablet by mouth daily Or as directed by the [address] [place] [address]. Patient taking differently: Take 2.5 mg by mouth daily Except 5 mg on Mondays and Thursdays or as directed by the [address] [place] [address].   # [id number]  Goal = 2 - 3 Yes Richard Ribera MD   venlafaxine (EFFEXOR XR) 150 MG extended release capsule Take 1 capsule by mouth daily Yes David Steward MD   losartan (COZAAR) 100 MG tablet Take 1 tablet by mouth daily Yes David Steward MD   flecainide (TAMBOCOR) 50 MG tablet Take 1 tablet by mouth 2 times daily Yes KLAUS Kimball CNP   metoprolol succinate (TOPROL XL) 25 MG extended release tablet Take 0.5 tablets by mouth daily Yes KLAUS Kimball CNP   albuterol sulfate HFA (VENTOLIN HFA) 108 (90 Base) MCG/ACT inhaler Inhale 2 puffs into the lungs every 6 hours as needed for Wheezing or Shortness of Breath Yes Dorinda Ochoa MD   famotidine (PEPCID) 20 MG tablet Take 1 tablet by mouth 2 times daily Yes KLAUS Chris CNP   diphenoxylate-atropine (LOMOTIL) 2.5-0.025 MG per tablet Take 1 tablet by mouth 4 times daily as needed for Diarrhea for up to 60 days. Dorinda Ochoa MD        Past Medical History:  Past Medical History:   Diagnosis Date    Anxiety     COPD, mild ([address] 4/24/2017    Depression     History of blood transfusion     Hyperlipidemia     Hypertension     IBS (irritable bowel syndrome) 10/16/2013    Insomnia        Past Surgical History:   Past Surgical History:   Procedure Laterality Date    APPENDECTOMY      COLONOSCOPY  03/19/2019    [place]    COLONOSCOPY N/A 3/19/2019    COLONOSCOPY WITH BIOPSY performed by Antonia Martinez MD at [address] COLONOSCOPY N/A 3/19/2019    COLONOSCOPY POLYPECTOMY SNARE/COLD BIOPSY performed by Antonia Martinez MD at [address] History:   reports that he quit smoking about 33 years ago. His smoking use included cigarettes. He has a 20.00 pack-year smoking history. He has never used smokeless tobacco. He reports that he does not drink alcohol and does not use drugs. Family History:      Problem Relation Age of Onset    Stroke Maternal Grandfather     Cancer Other         leukemia    Hypertension Sister     Hypertension Brother     Hypertension Brother        Review of Systems   Constitutional: Negative for chills, fatigue, fever and unexpected weight change. HENT: Negative for congestion, hearing loss, sinus pressure, sore throat and trouble swallowing. Respiratory: Positive for shortness of breath (NOEL). Negative for cough and wheezing. Cardiovascular: Negative for chest pain, palpitations and leg swelling.    Gastrointestinal: Negative for abdominal pain, blood in stool, constipation, diarrhea, nausea and vomiting. Genitourinary: Negative for hematuria. Musculoskeletal: Negative for arthralgias, back pain, gait problem and myalgias. Skin: Negative for color change, rash and wound. Neurological: Negative for dizziness, seizures, syncope, speech difficulty, weakness and light-headedness. Hematological: Does not bruise/bleed easily. Physical Examination:  Vitals:    12/07/21 1425   BP: (!) 142/80   Pulse:    SpO2:       Wt Readings from Last 3 Encounters:   12/07/21 171 lb (77.6 kg)   11/24/21 166 lb 6.4 oz (75.5 kg)   08/20/21 159 lb (72.1 kg)       Physical Exam  Vitals reviewed. Constitutional:       General: He is not in acute distress. Appearance: Normal appearance. HENT:      Head: Normocephalic and atraumatic. Nose: Nose normal.      Mouth/Throat:      Mouth: Mucous membranes are moist.   Eyes:      Conjunctiva/sclera: Conjunctivae normal.      Pupils: Pupils are equal, round, and reactive to light. Cardiovascular:      Rate and Rhythm: Normal rate and regular rhythm. Heart sounds: No murmur heard. No friction rub. No gallop. Pulmonary:      Effort: No respiratory distress. Breath sounds: No wheezing, rhonchi or rales. Abdominal:      General: Abdomen is flat. Bowel sounds are normal.      Palpations: Abdomen is soft. Musculoskeletal:         General: Normal range of motion. Right lower leg: No edema. Left lower leg: No edema. Skin:     General: Skin is warm and dry. Findings: No bruising. Neurological:      General: No focal deficit present. Mental Status: He is alert and oriented to person, place, and time. Motor: No weakness.    Psychiatric:         Mood and Affect: Mood normal.         Behavior: Behavior normal.          Pertinent labs, diagnostic, device, and imaging results reviewed as a part of this visit    LABS    CBC:   Lab Results   Component Value Date    WBC 7.2 08/13/2021    HGB 12.6 (L) 2021    HCT 37.8 (L) 2021    MCV 85.6 2021     2021     BMP:   Lab Results   Component Value Date    CREATININE 0.8 2021    BUN 21 (H) 2021     2021    K 4.1 2021     2021    CO2 26 2021     Estimated Creatinine Clearance: 71 mL/min (based on SCr of 0.8 mg/dL). No results found for: BNP    Thyroid:   Lab Results   Component Value Date    TSH 2.91 2021     Lipid Panel:   Lab Results   Component Value Date    CHOL 138 2020    HDL 49 2021    HDL 49 2011    TRIG 108 2020     LFTs:  Lab Results   Component Value Date    ALT 24 2021    AST 27 2021    ALKPHOS 92 2021    BILITOT 0.4 2021     Coags:   Lab Results   Component Value Date    PROTIME >170.0 (H) 10/15/2021    INR 2.2 2021       EC2021   V-paced at 89 BPM.     Echo: 19   Left ventricular cavity size is normal. Assymetric basal septal hypertrophy   EF 55-60%. Left atrium is of normal size. Mild mitral regurgitation. Normal right ventricular size and function. Mild tricuspid regurgitation   Trivial pulmonic regurgitation present. Stress echo: 10/9/15   Normal (Negative) response to exercise. Baseline echocardiogram shows normal LV function with an ejection fraction   of 60%. Following exercise there was uniform augmentation of all segments   with appropriate hyperdynamic response to exercise. Normal; stress echo. EP Procedures:  1. Medtronic dual chamber PPM, 21, Dr. Rachel Cardenas  2. Atrial fibrillation (PVI, CAFE) and left atrial flutter (roof line) ablation, 21, Dr. Elliott March interrogation: 2021   Normal device function. Battery life 12.1 years  A paced 9.8%  V paced 82.3%  Multiple episodes of AT/AF, look like possible fair field R oversensing. Will have Dr. Rachel Cardenas review.      Assessment & Plan:    Paroxysmal atrial fibrillation   - first seen on CAM patch in May 2021   - s/p PVI,

## 2021-12-07 ENCOUNTER — OFFICE VISIT (OUTPATIENT)
Dept: CARDIOLOGY CLINIC | Age: 76
End: 2021-12-07
Payer: MEDICARE

## 2021-12-07 ENCOUNTER — NURSE ONLY (OUTPATIENT)
Dept: CARDIOLOGY CLINIC | Age: 76
End: 2021-12-07
Payer: MEDICARE

## 2021-12-07 VITALS
WEIGHT: 171 LBS | OXYGEN SATURATION: 97 % | BODY MASS INDEX: 26.84 KG/M2 | HEIGHT: 67 IN | DIASTOLIC BLOOD PRESSURE: 80 MMHG | HEART RATE: 94 BPM | SYSTOLIC BLOOD PRESSURE: 142 MMHG

## 2021-12-07 DIAGNOSIS — I44.1 MOBITZ TYPE 2 SECOND DEGREE ATRIOVENTRICULAR BLOCK: ICD-10-CM

## 2021-12-07 DIAGNOSIS — I48.92 LEFT ATRIAL FLUTTER BY ELECTROCARDIOGRAM (HCC): ICD-10-CM

## 2021-12-07 DIAGNOSIS — I48.0 PAF (PAROXYSMAL ATRIAL FIBRILLATION) (HCC): Primary | ICD-10-CM

## 2021-12-07 DIAGNOSIS — Z95.0 CARDIAC PACEMAKER IN SITU: ICD-10-CM

## 2021-12-07 DIAGNOSIS — Z95.0 PACEMAKER: ICD-10-CM

## 2021-12-07 DIAGNOSIS — R00.1 BRADYCARDIA: ICD-10-CM

## 2021-12-07 DIAGNOSIS — I10 ESSENTIAL HYPERTENSION: ICD-10-CM

## 2021-12-07 DIAGNOSIS — I49.5 SICK SINUS SYNDROME (HCC): ICD-10-CM

## 2021-12-07 DIAGNOSIS — I48.0 PAF (PAROXYSMAL ATRIAL FIBRILLATION) (HCC): ICD-10-CM

## 2021-12-07 PROCEDURE — G8417 CALC BMI ABV UP PARAM F/U: HCPCS | Performed by: NURSE PRACTITIONER

## 2021-12-07 PROCEDURE — 93280 PM DEVICE PROGR EVAL DUAL: CPT | Performed by: INTERNAL MEDICINE

## 2021-12-07 PROCEDURE — G8427 DOCREV CUR MEDS BY ELIG CLIN: HCPCS | Performed by: NURSE PRACTITIONER

## 2021-12-07 PROCEDURE — 1123F ACP DISCUSS/DSCN MKR DOCD: CPT | Performed by: NURSE PRACTITIONER

## 2021-12-07 PROCEDURE — 4040F PNEUMOC VAC/ADMIN/RCVD: CPT | Performed by: NURSE PRACTITIONER

## 2021-12-07 PROCEDURE — 1036F TOBACCO NON-USER: CPT | Performed by: NURSE PRACTITIONER

## 2021-12-07 PROCEDURE — G8484 FLU IMMUNIZE NO ADMIN: HCPCS | Performed by: NURSE PRACTITIONER

## 2021-12-07 PROCEDURE — 99214 OFFICE O/P EST MOD 30 MIN: CPT | Performed by: NURSE PRACTITIONER

## 2021-12-07 ASSESSMENT — ENCOUNTER SYMPTOMS
NAUSEA: 0
SINUS PRESSURE: 0
DIARRHEA: 0
BLOOD IN STOOL: 0
COUGH: 0
VOMITING: 0
TROUBLE SWALLOWING: 0
SHORTNESS OF BREATH: 1
SORE THROAT: 0
ABDOMINAL PAIN: 0
WHEEZING: 0
BACK PAIN: 0
CONSTIPATION: 0
COLOR CHANGE: 0

## 2021-12-07 NOTE — PROGRESS NOTES
Pt seen in clinic today for cardiac device interrogation 3mo post ablation  Their device is a  MDT 2 chamber ppm.  Based on threshold, impedance, and intrinsic sensing tests run today, the device appears to be functioning normally. Remaining battery life is 12y2m  AP 9.81%                  82.28%      4.6% AT/AF burden - pt on [facility]  EGM's available for AT/AF do not appear to be true AF but rather Far R with PAC's. Partial blanking change to partial+ to avoid future misdiagnosis. results discussed with JAVIER Canchola. Pt was informed of findings today and general questions have been answered with regard to device. Home monitoring hardware is transmitting    Results discussed with or to be reviewed by EP    Pt to see JAVIER Canchola in clinic today.

## 2021-12-08 ENCOUNTER — TELEPHONE (OUTPATIENT)
Dept: NON INVASIVE DIAGNOSTICS | Age: 76
End: 2021-12-08

## 2021-12-08 NOTE — TELEPHONE ENCOUNTER
Reviewed device interrogation with Dr. Gema Granados. Appears to have some AT and also some far field R oversensing, no definite atrial fibrillation or flutter. Spoke with pt's wife, will stop Toprol and flecainide. Repeat OV and device check in 1 month for A fib burden while off AADs.

## 2021-12-15 DIAGNOSIS — F51.04 PSYCHOPHYSIOLOGICAL INSOMNIA: ICD-10-CM

## 2022-01-05 ENCOUNTER — NURSE ONLY (OUTPATIENT)
Dept: CARDIOLOGY CLINIC | Age: 77
End: 2022-01-05
Payer: MEDICARE

## 2022-01-05 DIAGNOSIS — Z95.0 PACEMAKER: ICD-10-CM

## 2022-01-05 DIAGNOSIS — I49.5 SICK SINUS SYNDROME (HCC): ICD-10-CM

## 2022-01-05 NOTE — PROGRESS NOTES
We received remote transmission from patient's dual chamber pacemaker monitor at home. Transmission shows normal sensing and pacing function. No new arrhythmias/events recorded. Ap 1.4%   99.1% (MVP On)  Echo 02.2019 showed EF of 55-60%. EP physician will review. See interrogation under cardiology tab in the [address] [place] [address] [address] field for more details. Will continue to monitor remotely.

## 2022-01-06 PROCEDURE — 93294 REM INTERROG EVL PM/LDLS PM: CPT | Performed by: INTERNAL MEDICINE

## 2022-01-06 PROCEDURE — 93296 REM INTERROG EVL PM/IDS: CPT | Performed by: INTERNAL MEDICINE

## 2022-01-12 NOTE — PROGRESS NOTES
[address]   Electrophysiology      Date: 1/18/2022    Primary Cardiologist: Glory Amaro MD  PCP: Ted Matute MD     Chief Complaint:   Chief Complaint   Patient presents with    Follow-up     afib - device check     History of Present Illness:    I saw Leandra Gutierrez in the office for electrophysiology follow up today. He is a 64 y.o. 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 2020), anxiety, COPD, hyperlipidemia, HTN, IBS and depression. He underwent atrial fibrillation (PVI, CAFE) and left atrial flutter (roof line) ablation on 8/20/21 with Dr. Seda Rooney. His flecainide and Toprol were stopped once he was 3 months out from his ablation. He presents today for device check and follow up. He has been doing well since his last visit. He will have occasional SOB but attributes that to his COPD. Denies any palpitations or chest pain. No syncope. No edema. No bleeding issues. Allergies:  No Known Allergies  Home Medications:  Prior to Visit Medications    Medication Sig Taking? Authorizing Provider   estazolam (PROSOM) 2 MG tablet TAKE 1 TO 2 TABLETS BY MOUTH EVERY NIGHT AS NEEDED FOR SLEEP Yes Carrie Singer MD   amLODIPine (NORVASC) 5 MG tablet Take 1 tablet by mouth daily Yes Carrie Singer MD   hydrALAZINE (APRESOLINE) 50 MG tablet Take 1 tablet by mouth 2 times daily Yes Carrie Singer MD   QUEtiapine (SEROQUEL) 25 MG tablet Take 1 tablet by mouth nightly Yes Carrie Singer MD   pravastatin (PRAVACHOL) 40 MG tablet Take 1 tablet by mouth daily Yes Carrie Singer MD   Acetylcysteine (NAC) 600 MG CAPS Take 2 capsules by mouth daily Yes Historical Provider, MD   Omega-3 Fatty Acids (FISH OIL) 1200 MG CPDR Take 2 capsules by mouth daily Yes Historical Provider, MD   Echinacea 400 MG CAPS Take 2 capsules by mouth daily Takes once cap daily during summer.  Yes Historical Provider, MD   melatonin 10 MG CAPS capsule Take 30 mg by mouth nightly Yes Historical Provider, MD   5-Hydroxytryptophan (5-HTP) 100 MG CAPS Take 3 capsules by mouth nightly Yes Historical Provider, MD   GAMMA AMINOBUTYRIC ACID PO Take 1,500 mg by mouth nightly Yes Historical Provider, MD   L-Tryptophan 500 MG TABS Take 3,000 mg by mouth nightly Yes Historical Provider, MD   L-Theanine 200 MG CAPS Take 600 mg by mouth nightly Yes Historical Provider, MD   vitamin D (CHOLECALCIFEROL) 125 MCG (5000 UT) CAPS capsule Take 5,000 Units by mouth daily Yes Historical Provider, MD   GUAIFENESIN PO Take 600 mg by mouth daily Yes Historical Provider, MD   Ginkgo Biloba 120 MG CAPS Take 240 mg by mouth daily Yes Historical Provider, MD   Multiple Vitamins-Minerals (MULTIVITAMIN) LIQD Take 30 mLs by mouth daily Has 80 mcg of vitamin K -2  Has 50 mg of ginsing root Yes Historical Provider, MD   Fluticasone furoate-vilanterol (BREO ELLIPTA) 200-25 MCG/INH AEPB inhaler Inhale 1 puff into the lungs daily Yes Oscar Gerber MD   warfarin (COUMADIN) 5 MG tablet Take 1 tablet by mouth daily Or as directed by the [address] [place] [address]. Patient taking differently: Take 2.5 mg by mouth daily Except 5 mg on Mondays and Thursdays or as directed by the [address] [place] [address].   # [id number]  Goal = 2 - 3 Yes Erlinda Hernandez MD   venlafaxine (EFFEXOR XR) 150 MG extended release capsule Take 1 capsule by mouth daily Yes Oscar Gerber MD   losartan (COZAAR) 100 MG tablet Take 1 tablet by mouth daily Yes Oscar Gerber MD   flecainide (TAMBOCOR) 50 MG tablet Take 1 tablet by mouth 2 times daily Yes KLAUS Kimball CNP   metoprolol succinate (TOPROL XL) 25 MG extended release tablet Take 0.5 tablets by mouth daily Yes KLAUS Kimball CNP   albuterol sulfate HFA (VENTOLIN HFA) 108 (90 Base) MCG/ACT inhaler Inhale 2 puffs into the lungs every 6 hours as needed for Wheezing or Shortness of Breath Yes Elif Russo MD Vikas   famotidine (PEPCID) 20 MG tablet Take 1 tablet by mouth 2 times daily Yes KLAUS Neves - JAVIER   diphenoxylate-atropine (LOMOTIL) 2.5-0.025 MG per tablet Take 1 tablet by mouth 4 times daily as needed for Diarrhea for up to 60 days. Nanette Tena MD        Past Medical History:  Past Medical History:   Diagnosis Date    Anxiety     COPD, mild ([address] 4/24/2017    Depression     History of blood transfusion     Hyperlipidemia     Hypertension     IBS (irritable bowel syndrome) 10/16/2013    Insomnia        Past Surgical History:   Past Surgical History:   Procedure Laterality Date    APPENDECTOMY      COLONOSCOPY  03/19/2019    [place]    COLONOSCOPY N/A 3/19/2019    COLONOSCOPY WITH BIOPSY performed by Yu Calderon MD at [address] COLONOSCOPY N/A 3/19/2019    COLONOSCOPY POLYPECTOMY SNARE/COLD BIOPSY performed by Yu Calderon MD at [address] History:   reports that he quit smoking about 33 years ago. His smoking use included cigarettes. He has a 20.00 pack-year smoking history. He has never used smokeless tobacco. He reports that he does not drink alcohol and does not use drugs. Family History:      Problem Relation Age of Onset    Stroke Maternal Grandfather     Cancer Other         leukemia    Hypertension Sister     Hypertension Brother     Hypertension Brother        Review of Systems   Constitutional: Negative for chills, fatigue, fever and unexpected weight change. HENT: Negative for congestion, hearing loss, sinus pressure, sore throat and trouble swallowing. Respiratory: Positive for shortness of breath (NOEL). Negative for cough and wheezing. Cardiovascular: Negative for chest pain, palpitations and leg swelling. Gastrointestinal: Negative for abdominal pain, blood in stool, constipation, diarrhea, nausea and vomiting. Genitourinary: Negative for hematuria.    Musculoskeletal: Negative for arthralgias, back pain, gait problem and myalgias. Skin: Negative for color change, rash and wound. Neurological: Negative for dizziness, seizures, syncope, speech difficulty, weakness and light-headedness. Hematological: Does not bruise/bleed easily. Physical Examination:  Vitals:    01/18/22 1533   BP: (!) 150/78   Pulse: 110   SpO2: 98%      Wt Readings from Last 3 Encounters:   01/18/22 166 lb (75.3 kg)   12/07/21 171 lb (77.6 kg)   11/24/21 166 lb 6.4 oz (75.5 kg)       Physical Exam  Vitals reviewed. Constitutional:       General: He is not in acute distress. Appearance: Normal appearance. HENT:      Head: Normocephalic and atraumatic. Nose: Nose normal.      Mouth/Throat:      Mouth: Mucous membranes are moist.   Eyes:      Conjunctiva/sclera: Conjunctivae normal.      Pupils: Pupils are equal, round, and reactive to light. Cardiovascular:      Rate and Rhythm: Normal rate and regular rhythm. Heart sounds: No murmur heard. No friction rub. No gallop. Pulmonary:      Effort: No respiratory distress. Breath sounds: No wheezing, rhonchi or rales. Abdominal:      General: Abdomen is flat. Bowel sounds are normal.      Palpations: Abdomen is soft. Musculoskeletal:         General: Normal range of motion. Right lower leg: No edema. Left lower leg: No edema. Skin:     General: Skin is warm and dry. Findings: No bruising. Neurological:      General: No focal deficit present. Mental Status: He is alert and oriented to person, place, and time. Motor: No weakness.    Psychiatric:         Mood and Affect: Mood normal.         Behavior: Behavior normal.          Pertinent labs, diagnostic, device, and imaging results reviewed as a part of this visit    LABS    CBC:   Lab Results   Component Value Date    WBC 7.2 08/13/2021    HGB 12.6 (L) 08/13/2021    HCT 37.8 (L) 08/13/2021    MCV 85.6 08/13/2021     08/13/2021     BMP:   Lab Results   Component Value Date    CREATININE 0.8 2021    BUN 21 (H) 2021     2021    K 4.1 2021     2021    CO2 26 2021     CrCl cannot be calculated (Patient's most recent lab result is older than the maximum 120 days allowed. ). No results found for: BNP    Thyroid:   Lab Results   Component Value Date    TSH 2.91 2021     Lipid Panel:   Lab Results   Component Value Date    CHOL 138 2020    HDL 49 2021    HDL 49 2011    TRIG 108 2020     LFTs:  Lab Results   Component Value Date    ALT 24 2021    AST 27 2021    ALKPHOS 92 2021    BILITOT 0.4 2021     Coags:   Lab Results   Component Value Date    PROTIME >170.0 (H) 10/15/2021    INR 2.2 2021       EC2022   A-sensed V-paced at 100 BPM.    Echo: 19   Left ventricular cavity size is normal. Assymetric basal septal hypertrophy   EF 55-60%. Left atrium is of normal size. Mild mitral regurgitation. Normal right ventricular size and function. Mild tricuspid regurgitation   Trivial pulmonic regurgitation present. Stress echo: 10/9/15   Normal (Negative) response to exercise. Baseline echocardiogram shows normal LV function with an ejection fraction   of 60%. Following exercise there was uniform augmentation of all segments   with appropriate hyperdynamic response to exercise. Normal; stress echo. EP Procedures:  1. Medtronic dual chamber PPM, 21, Dr. Alonzo Bernal  2. Atrial fibrillation (PVI, CAFE) and left atrial flutter (roof line) ablation, 21, Dr. Robinson Daniels interrogation: 2022   Normal device function. Battery life 12 years  A paced 1.4%  V paced 98.1%  No episodes since last visit.     Assessment & Plan:    Paroxysmal atrial fibrillation   - first seen on CAM patch in May 2021   - s/p PVI, CAFE RFA on 21    - CHADS2-VASc 3 (age, HTN) on Eliquis 5mg BID    - device interrogation today with no A fib    - continue to monitor for symptoms of atrial fibrillation/flutter, if symptomatic should send remote device check    Left atrial flutter   - seen during EP study s/p roof line ablation   - see above    Second degree AV block type II   - seen on CAM patch in May 2021, also likely some chronotropic incompetence as highest HR was 76 BPM   - s/p Medtronic dual chamber PPM implanted 6/4/21   - device interrogation as above, device with normal function   - continue with routine follow up with device clinic    Essential HTN   - controlled    - managed by PCP    Thank you for allowing to us to participate in the care of Nitesh Smith. Follow up with KLAUS Mandujano in 1 year.     KLAUS Mandujano  [facility], [address] [place] [address]  Phone: [phone]  Fax: [phone]    Electronically signed by KLAUS Lagunas - CNP on 1/18/2022 at 3:55 PM

## 2022-01-14 DIAGNOSIS — F51.04 PSYCHOPHYSIOLOGICAL INSOMNIA: ICD-10-CM

## 2022-01-18 ENCOUNTER — OFFICE VISIT (OUTPATIENT)
Dept: CARDIOLOGY CLINIC | Age: 77
End: 2022-01-18
Payer: MEDICARE

## 2022-01-18 ENCOUNTER — NURSE ONLY (OUTPATIENT)
Dept: CARDIOLOGY CLINIC | Age: 77
End: 2022-01-18
Payer: MEDICARE

## 2022-01-18 VITALS
DIASTOLIC BLOOD PRESSURE: 78 MMHG | SYSTOLIC BLOOD PRESSURE: 150 MMHG | WEIGHT: 166 LBS | HEART RATE: 110 BPM | OXYGEN SATURATION: 98 % | BODY MASS INDEX: 26.06 KG/M2 | HEIGHT: 67 IN

## 2022-01-18 DIAGNOSIS — I49.5 SICK SINUS SYNDROME (HCC): ICD-10-CM

## 2022-01-18 DIAGNOSIS — I48.0 PAF (PAROXYSMAL ATRIAL FIBRILLATION) (HCC): Primary | ICD-10-CM

## 2022-01-18 DIAGNOSIS — I48.0 PAF (PAROXYSMAL ATRIAL FIBRILLATION) (HCC): ICD-10-CM

## 2022-01-18 DIAGNOSIS — I44.1 MOBITZ TYPE 2 SECOND DEGREE ATRIOVENTRICULAR BLOCK: ICD-10-CM

## 2022-01-18 DIAGNOSIS — R00.1 BRADYCARDIA: ICD-10-CM

## 2022-01-18 DIAGNOSIS — I48.92 LEFT ATRIAL FLUTTER BY ELECTROCARDIOGRAM (HCC): ICD-10-CM

## 2022-01-18 DIAGNOSIS — Z95.0 CARDIAC PACEMAKER IN SITU: ICD-10-CM

## 2022-01-18 DIAGNOSIS — I10 ESSENTIAL HYPERTENSION: ICD-10-CM

## 2022-01-18 DIAGNOSIS — Z95.0 PACEMAKER: ICD-10-CM

## 2022-01-18 PROCEDURE — 4040F PNEUMOC VAC/ADMIN/RCVD: CPT | Performed by: NURSE PRACTITIONER

## 2022-01-18 PROCEDURE — 1123F ACP DISCUSS/DSCN MKR DOCD: CPT | Performed by: NURSE PRACTITIONER

## 2022-01-18 PROCEDURE — G8484 FLU IMMUNIZE NO ADMIN: HCPCS | Performed by: NURSE PRACTITIONER

## 2022-01-18 PROCEDURE — G8427 DOCREV CUR MEDS BY ELIG CLIN: HCPCS | Performed by: NURSE PRACTITIONER

## 2022-01-18 PROCEDURE — G8417 CALC BMI ABV UP PARAM F/U: HCPCS | Performed by: NURSE PRACTITIONER

## 2022-01-18 PROCEDURE — 99214 OFFICE O/P EST MOD 30 MIN: CPT | Performed by: NURSE PRACTITIONER

## 2022-01-18 PROCEDURE — 1036F TOBACCO NON-USER: CPT | Performed by: NURSE PRACTITIONER

## 2022-01-18 ASSESSMENT — ENCOUNTER SYMPTOMS
TROUBLE SWALLOWING: 0
COLOR CHANGE: 0
SHORTNESS OF BREATH: 1
ABDOMINAL PAIN: 0
DIARRHEA: 0
CONSTIPATION: 0
NAUSEA: 0
WHEEZING: 0
SORE THROAT: 0
SINUS PRESSURE: 0
VOMITING: 0
COUGH: 0
BLOOD IN STOOL: 0
BACK PAIN: 0

## 2022-01-19 PROCEDURE — 93280 PM DEVICE PROGR EVAL DUAL: CPT | Performed by: INTERNAL MEDICINE

## 2022-01-19 NOTE — PROGRESS NOTES
Pt seen in clinic today for cardiac device interrogation post ablation  Their device is a  MDT 2 chamber ppm.  Based on threshold, impedance, and intrinsic sensing tests run today, the device appears to be functioning normally. Remaining battery life is 11y10m  AP 1.41%                  98.14%      0% AT/AF burden - pt on AC  partial+ blanking appears to have avoid misdx of far R as at/af    Pt presenting in CHB @30bpm today, AAIR->DDDR changed to DDDR to conserve battery life. Changes discussed with JAVIER Canchola. Pt was informed of findings today and general questions have been answered with regard to device. Home monitoring hardware is transmitting    Results discussed with or to be reviewed by EP    Pt to see JAVIER Canchola in clinic today.

## 2022-01-21 ENCOUNTER — OFFICE VISIT (OUTPATIENT)
Dept: PRIMARY CARE CLINIC | Age: 77
End: 2022-01-21
Payer: MEDICARE

## 2022-01-21 VITALS
BODY MASS INDEX: 26.47 KG/M2 | HEART RATE: 98 BPM | SYSTOLIC BLOOD PRESSURE: 142 MMHG | WEIGHT: 169 LBS | RESPIRATION RATE: 18 BRPM | OXYGEN SATURATION: 96 % | DIASTOLIC BLOOD PRESSURE: 75 MMHG

## 2022-01-21 DIAGNOSIS — J44.9 COPD, MILD (HCC): ICD-10-CM

## 2022-01-21 DIAGNOSIS — F51.01 PRIMARY INSOMNIA: ICD-10-CM

## 2022-01-21 DIAGNOSIS — I10 ESSENTIAL HYPERTENSION: ICD-10-CM

## 2022-01-21 DIAGNOSIS — F41.8 DEPRESSION WITH ANXIETY: ICD-10-CM

## 2022-01-21 DIAGNOSIS — I48.0 PAF (PAROXYSMAL ATRIAL FIBRILLATION) (HCC): ICD-10-CM

## 2022-01-21 DIAGNOSIS — K58.0 IRRITABLE BOWEL SYNDROME WITH DIARRHEA: ICD-10-CM

## 2022-01-21 DIAGNOSIS — E78.5 HYPERLIPIDEMIA LDL GOAL <100: ICD-10-CM

## 2022-01-21 PROBLEM — R00.1 BRADYCARDIA: Status: RESOLVED | Noted: 2020-12-18 | Resolved: 2022-01-21

## 2022-01-21 PROBLEM — D69.6 THROMBOCYTOPENIA (HCC): Status: RESOLVED | Noted: 2021-05-04 | Resolved: 2022-01-21

## 2022-01-21 PROBLEM — J96.01 ACUTE RESPIRATORY FAILURE WITH HYPOXIA (HCC): Status: RESOLVED | Noted: 2020-12-16 | Resolved: 2022-01-21

## 2022-01-21 PROCEDURE — G8484 FLU IMMUNIZE NO ADMIN: HCPCS | Performed by: FAMILY MEDICINE

## 2022-01-21 PROCEDURE — G8427 DOCREV CUR MEDS BY ELIG CLIN: HCPCS | Performed by: FAMILY MEDICINE

## 2022-01-21 PROCEDURE — G8417 CALC BMI ABV UP PARAM F/U: HCPCS | Performed by: FAMILY MEDICINE

## 2022-01-21 PROCEDURE — 1123F ACP DISCUSS/DSCN MKR DOCD: CPT | Performed by: FAMILY MEDICINE

## 2022-01-21 PROCEDURE — 99214 OFFICE O/P EST MOD 30 MIN: CPT | Performed by: FAMILY MEDICINE

## 2022-01-21 PROCEDURE — 3023F SPIROM DOC REV: CPT | Performed by: FAMILY MEDICINE

## 2022-01-21 PROCEDURE — 1036F TOBACCO NON-USER: CPT | Performed by: FAMILY MEDICINE

## 2022-01-21 PROCEDURE — 4040F PNEUMOC VAC/ADMIN/RCVD: CPT | Performed by: FAMILY MEDICINE

## 2022-01-21 RX ORDER — AMLODIPINE BESYLATE 5 MG/1
5 TABLET ORAL DAILY
Qty: 90 TABLET | Refills: 1 | Status: SHIPPED | OUTPATIENT
Start: 2022-01-21 | End: 2022-04-20

## 2022-01-21 RX ORDER — HYDRALAZINE HYDROCHLORIDE 50 MG/1
50 TABLET, FILM COATED ORAL 2 TIMES DAILY
Qty: 180 TABLET | Refills: 1 | Status: SHIPPED | OUTPATIENT
Start: 2022-01-21 | End: 2022-04-08 | Stop reason: SDUPTHER

## 2022-01-21 RX ORDER — QUETIAPINE FUMARATE 25 MG/1
25 TABLET, FILM COATED ORAL NIGHTLY
Qty: 90 TABLET | Refills: 1 | Status: SHIPPED | OUTPATIENT
Start: 2022-01-21 | End: 2022-04-26 | Stop reason: SDUPTHER

## 2022-01-21 ASSESSMENT — ENCOUNTER SYMPTOMS
VOICE CHANGE: 0
BLOOD IN STOOL: 0
SHORTNESS OF BREATH: 0
CONSTIPATION: 0
TROUBLE SWALLOWING: 0
ABDOMINAL PAIN: 0
DIARRHEA: 0

## 2022-01-21 NOTE — PROGRESS NOTES
2022     Estephanie Nevarez (:  1945) is a 64 y.o. male, here for evaluation of the following medical concerns:    HPI  Patient presented to the office for regular follow-up. He has hypertension still poorly controlled. Apparently still taking 2.5 mg of amlodipine instead of the 5 mg. He also take hydralazine 50 mg twice daily and losartan 100 mg daily. He is no longer on metoprolol. He has hyperlipidemia reported to be compliant with Pravachol 40 mg daily and has no side effect from medication. He has anxiety depression doing fairly well on Effexor plus Seroquel. He has IBS controlled with Metamucil and as needed Lomotil. He has insomnia controlled with ProSom. He has a mild COPD and takes Breo and albuterol inhaler overall patient doing fairly well denies chest pain or palpitation denies  urinary disturbance    Review of Systems   Constitutional: Negative for activity change. HENT: Negative for trouble swallowing and voice change. Eyes: Negative for visual disturbance. Respiratory: Negative for shortness of breath. Cardiovascular: Negative for chest pain and leg swelling. Gastrointestinal: Negative for abdominal pain, blood in stool, constipation and diarrhea. Genitourinary: Negative for difficulty urinating, dysuria, frequency, hematuria and scrotal swelling. Musculoskeletal: Negative for arthralgias and myalgias. Skin: Negative for rash. Neurological: Negative for dizziness. Psychiatric/Behavioral: Negative for behavioral problems. Prior to Visit Medications    Medication Sig Taking?  Authorizing Provider   QUEtiapine (SEROQUEL) 25 MG tablet Take 1 tablet by mouth nightly Yes Scherry Lefort, MD   amLODIPine (NORVASC) 5 MG tablet Take 1 tablet by mouth daily Yes Scherry Lefort, MD   hydrALAZINE (APRESOLINE) 50 MG tablet Take 1 tablet by mouth 2 times daily Yes Scherry Lefort, MD   estazolam (PROSOM) 2 MG tablet TAKE 1 TO 2 TABLETS BY MOUTH EVERY NIGHT AS Yosi Traylor MD   pravastatin (PRAVACHOL) 40 MG tablet Take 1 tablet by mouth daily  Ha Azar MD   Omega-3 Fatty Acids (FISH OIL) 1200 MG CPDR Take 2 capsules by mouth daily  Historical Provider, MD   Echinacea 400 MG CAPS Take 2 capsules by mouth daily Takes once cap daily during summer. Historical Provider, MD   melatonin 10 MG CAPS capsule Take 30 mg by mouth nightly  Historical Provider, MD   5-Hydroxytryptophan (5-HTP) 100 MG CAPS Take 3 capsules by mouth nightly  Historical Provider, MD   GAMMA AMINOBUTYRIC ACID PO Take 1,500 mg by mouth nightly  Historical Provider, MD   L-Tryptophan 500 MG TABS Take 3,000 mg by mouth nightly  Historical Provider, MD   L-Theanine 200 MG CAPS Take 600 mg by mouth nightly  Historical Provider, MD   vitamin D (CHOLECALCIFEROL) 125 MCG (5000 UT) CAPS capsule Take 5,000 Units by mouth daily  Historical Provider, MD   GUAIFENESIN PO Take 600 mg by mouth daily  Historical Provider, MD   Multiple Vitamins-Minerals (MULTIVITAMIN) LIQD Take 30 mLs by mouth daily Has 80 mcg of vitamin K -2  Has 50 mg of ginsing root  Historical Provider, MD   Fluticasone furoate-vilanterol (BREO ELLIPTA) 200-25 MCG/INH AEPB inhaler Inhale 1 puff into the lungs daily  Ha Azar MD   warfarin (COUMADIN) 5 MG tablet Take 1 tablet by mouth daily Or as directed by the [address] [place] [address]. Patient taking differently: Take 2.5 mg by mouth daily Except 5 mg on Mondays and Thursdays or as directed by the [address] [place] [address]. # [id number]  Goal = 2 - 3  Cintia Vale MD   venlafaxine (EFFEXOR XR) 150 MG extended release capsule Take 1 capsule by mouth daily  Ha Azar MD   losartan (COZAAR) 100 MG tablet Take 1 tablet by mouth daily  Ha Azar MD   diphenoxylate-atropine (LOMOTIL) 2.5-0.025 MG per tablet Take 1 tablet by mouth 4 times daily as needed for Diarrhea for up to 60 days.   Ha Azar MD   albuterol sulfate HFA (VENTOLIN HFA) 108 (90 Base) MCG/ACT inhaler Inhale 2 puffs into the lungs every 6 hours as needed for Wheezing or Shortness of Breath  Candi De Los Santos MD   famotidine (PEPCID) 20 MG tablet Take 1 tablet by mouth 2 times daily  Wally Espinosa APRN - CNP        Social History     Tobacco Use    Smoking status: Former Smoker     Packs/day: 1.00     Years: 20.00     Pack years: 20.00     Types: Cigarettes     Quit date: 1988     Years since quittin.4    Smokeless tobacco: Never Used   Substance Use Topics    Alcohol use: No        Vitals:    22 1406   BP: (!) 142/75   Pulse: 98   Resp: 18   SpO2: 96%   Weight: 169 lb (76.7 kg)     Estimated body mass index is 26.47 kg/m² as calculated from the following:    Height as of 22: 5' 7\" (1.702 m). Weight as of this encounter: 169 lb (76.7 kg). Physical Exam  Constitutional:       General: He is not in acute distress. Appearance: He is well-developed. HENT:      Head: Normocephalic. Eyes:      Conjunctiva/sclera: Conjunctivae normal.   Neck:      Thyroid: No thyromegaly. Cardiovascular:      Rate and Rhythm: Normal rate and regular rhythm. Heart sounds: Normal heart sounds. No murmur heard. Pulmonary:      Effort: No respiratory distress. Breath sounds: Normal breath sounds. No wheezing or rales. Abdominal:      General: There is no distension. Palpations: Abdomen is soft. Musculoskeletal:         General: Normal range of motion. Cervical back: Neck supple. Skin:     General: Skin is warm. Findings: No rash. Neurological:      Mental Status: He is alert and oriented to person, place, and time. Psychiatric:         Behavior: Behavior normal.         ASSESSMENT/PLAN:  1. Essential hypertension  Blood pressure still elevated due to poor compliance. Apparently  still taking the amlodipine 2.5 mg instead of the 5 mg tablet. Restart amlodipine 5 mg daily.   Continue hydralazine 50 mg twice a day and losartan 100 mg daily. If blood pressure is still elevated consider restarting beta-blocker d/t to tachycardia    2. Hyperlipidemia LDL goal <100  Controlled. Continue Pravachol 40 mg daily. Will check lipid panel next blood draw. 3. Depression with anxiety  Stable. Continue Effexor and Seroquel 25 mg at night    4. Irritable bowel syndrome with diarrhea  He takes Metamucil and as needed Lomotil    5. Primary insomnia  He takes ProSom as needed. 6. PAF (paroxysmal atrial fibrillation) ([facility]  S/P Ablation 8/20/21. He is off Flecainide. He is Warfarin ( cannot afford Eliquis)    7.  COPD, mild ([address]  Continue  PRN albuterol  and Breo      RTC in 2 mos    An electronic signature was used to authenticate this note.    --Christian Guzmán MD on 1/21/2022 at 3:39 PM

## 2022-01-25 ENCOUNTER — ANTI-COAG VISIT (OUTPATIENT)
Dept: PHARMACY | Age: 77
End: 2022-01-25
Payer: MEDICARE

## 2022-01-25 DIAGNOSIS — I48.0 PAF (PAROXYSMAL ATRIAL FIBRILLATION) (HCC): Primary | ICD-10-CM

## 2022-01-25 LAB — INTERNATIONAL NORMALIZATION RATIO, POC: 1.9

## 2022-01-25 PROCEDURE — 99211 OFF/OP EST MAY X REQ PHY/QHP: CPT

## 2022-01-25 PROCEDURE — 85610 PROTHROMBIN TIME: CPT

## 2022-01-25 NOTE — PROGRESS NOTES
Ana Boo is a 64 y.o. here for warfarin management. Edward Chicas had an INR test today. Results were reviewed and appropriate warfarin management was completed. This visit was performed as: An in person visit. Protocols were followed with precautions to reduce the spread of COVID-19. Patient verifies current dosing regimen: Yes     Warfarin medication reviewed and updated on the patient 's home medication list: Yes   All other medications reviewed and updated on the patient 's home medication list: No new medications     Lab Results   Component Value Date    INR 1.9 2022    INR 2.2 2021    INR 1.9 2021       Patient Findings     Negatives:  Signs/symptoms of thrombosis, Signs/symptoms of bleeding, Change in health, Missed doses, Change in medications, Change in diet/appetite, Bruising          Anticoagulation Summary  As of 2022    INR goal:  2.0-3.0   TTR:  57.6 % (3.3 mo)   INR used for dosin.9 (2022)   Warfarin maintenance plan:  5 mg (5 mg x 1) every Mon, Wed, Fri; 2.5 mg (5 mg x 0.5) all other days   Weekly warfarin total:  25 mg   Plan last modified:  H[facility] (2022)   Next INR check:  2/15/2022   Priority:  Maintenance   Target end date: Indefinite    Indications    PAF [facility]oxys[facility] fibrillation) ([facility] [I48.0]             Anticoagulation Episode Summary     INR check location:  Anticoagulation Clinic    Preferred lab:      Send INR reminders to:  [facility]    Comments:  EPIC      Anticoagulation Care Providers     Provider Role Specialty Phone number    Kanu Valdovinos MD Referring Electrophysiology [phone]          Warfarin assessment / plan:   Patient appears well. No complaints regarding warfarin therapy. Does not eat vitamin K greens consistently. INRs have been at the lower end of goal range of lower. Made a 11%  (2.5 mg/week) increase in weekly warfarin dosing. He states his pulse as be around 95. Description    NEW DOSE: Warfarin 2.5 mg daily except 5 mg on Mondays, Wednesdays and Fridays. .    Take warfarin at bedtime. Call [phone] with signs or symptoms of bleeding or ANY medication changes (including over-the-counter medications or herbal supplements). Especially if you begin oral steroids and/or antibiotics. If significant bleeding occurs please seek immediate medical attention. Keep the number of servings and portion size of vitamin K containing foods (dark green, leafy vegetables) the same each week. Vegetables high in vitamin K : broccoli, spinach, leaf lettuce, khurram lettuce, kale, collards, asparagus, brussel sprouts. Please call if you routine diet changes. Limit alcohol intake. Please call if this changes. Please arrive 15 minutes prior to your visit. Allow 72 hours for warfarin refills. Immunization History   Administered Date(s) Administered    COVID-19, Moderna, Booster, PF, 50mcg/0.25ml 11/21/2021    COVID-19, Moderna, Primary or Immunocompromised, PF, 100mcg/0.5mL 02/04/2021, 03/04/2021    Influenza 10/16/2013    Influenza Virus Vaccine 10/20/2014, 09/23/2015    Influenza, High Dose (Fluzone 65 yrs and older) 10/01/2016, 09/26/2017, 10/15/2018, 08/13/2020    Influenza, High-dose, [place], 65 yrs +, IM (Fluzone) 08/13/2020    Influenza, Quadv, IM, PF (6 mo and older Fluzone, Flulaval, Fluarix, and 3 yrs and older Afluria) 09/18/2019    Influenza, Quadv, adjuvanted, 65 yrs +, IM, PF (Fluad) 10/23/2021    Pneumococcal Conjugate 13-valent ([id number]) 10/01/2016    Pneumococcal Polysaccharide ([id number]) 07/12/2013, 08/13/2020    Td, unspecified formulation 10/16/2013    Tdap (Boostrix, Adacel) 05/06/2017, 06/13/2018    Zoster Live (Zostavax) 09/04/2013    Zoster Recombinant (Shingrix) 04/05/2018, 09/17/2018             Reviewed AVS with patient / caregiver.       CLINICAL PHARMACY CONSULT: MED RECONCILIATION/REVIEW ADDENDUM    For Pharmacy Admin Tracking Only     Intervention Detail: Dose Adjustment: 1, reason: Therapy Optimization   Total # of Interventions Recommended: 1   Total # of Interventions Accepted: 1   Time Spent (min): 15

## 2022-02-10 DIAGNOSIS — F51.04 PSYCHOPHYSIOLOGICAL INSOMNIA: ICD-10-CM

## 2022-02-15 ENCOUNTER — ANTI-COAG VISIT (OUTPATIENT)
Dept: PHARMACY | Age: 77
End: 2022-02-15
Payer: MEDICARE

## 2022-02-15 DIAGNOSIS — I48.0 PAF (PAROXYSMAL ATRIAL FIBRILLATION) (HCC): Primary | ICD-10-CM

## 2022-02-15 LAB — INTERNATIONAL NORMALIZATION RATIO, POC: 2.6

## 2022-02-15 PROCEDURE — 99211 OFF/OP EST MAY X REQ PHY/QHP: CPT

## 2022-02-15 PROCEDURE — 85610 PROTHROMBIN TIME: CPT

## 2022-02-15 NOTE — PROGRESS NOTES
to monitor. No change to weekly warfarin dosing. Description    CONTINUE: Warfarin 2.5 mg daily except 5 mg on Mondays, Wednesdays and Fridays. .    Take warfarin at bedtime. Call [phone] with signs or symptoms of bleeding or ANY medication changes (including over-the-counter medications or herbal supplements). Especially if you begin oral steroids and/or antibiotics. If significant bleeding occurs please seek immediate medical attention. Keep the number of servings and portion size of vitamin K containing foods (dark green, leafy vegetables) the same each week. Vegetables high in vitamin K : broccoli, spinach, leaf lettuce, khurram lettuce, kale, collards, asparagus, brussel sprouts. Please call if you routine diet changes. Limit alcohol intake. Please call if this changes. Please arrive 15 minutes prior to your visit. Allow 72 hours for warfarin refills. Immunization History   Administered Date(s) Administered    COVID-19, Moderna, Booster, PF, 50mcg/0.25ml 11/21/2021    COVID-19, Moderna, Primary or Immunocompromised, PF, 100mcg/0.5mL 02/04/2021, 03/04/2021    Influenza 10/16/2013    Influenza Virus Vaccine 10/20/2014, 09/23/2015    Influenza, High Dose (Fluzone 65 yrs and older) 10/01/2016, 09/26/2017, 10/15/2018, 08/13/2020    Influenza, High-dose, Radha Pedrozaest, 65 yrs +, IM (Fluzone) 08/13/2020    Influenza, Quadv, IM, PF (6 mo and older Fluzone, Flulaval, Fluarix, and 3 yrs and older Afluria) 09/18/2019    Influenza, Quadv, adjuvanted, 65 yrs +, IM, PF (Fluad) 10/23/2021    Pneumococcal Conjugate 13-valent ([id number]) 10/01/2016    Pneumococcal Polysaccharide ([id number]) 07/12/2013, 08/13/2020    Td, unspecified formulation 10/16/2013    Tdap (Boostrix, Adacel) 05/06/2017, 06/13/2018    Zoster Live (Zostavax) 09/04/2013    Zoster Recombinant (Shingrix) 04/05/2018, 09/17/2018             Reviewed AVS with patient / caregiver.     Billing Points:  0 billing points this visit       CLINICAL PHARMACY CONSULT: MED RECONCILIATION/REVIEW ADDENDUM    For Pharmacy Admin Tracking Only     Intervention Detail:    Total # of Interventions Recommended: 0   Total # of Interventions Accepted: 0   Time Spent (min): 15

## 2022-02-19 RX ORDER — PRAVASTATIN SODIUM 40 MG
40 TABLET ORAL DAILY
Qty: 90 TABLET | Refills: 1 | Status: SHIPPED | OUTPATIENT
Start: 2022-02-19

## 2022-02-22 ENCOUNTER — TELEPHONE (OUTPATIENT)
Dept: PHARMACY | Age: 77
End: 2022-02-22

## 2022-02-22 NOTE — TELEPHONE ENCOUNTER
Patient called, started omeprazole on 2/22/22 for 2 weeks. Anticipate a moderate interaction with Warfarin. Last INR 2.6. I expect his INR might increase closer to 3.0 Advised patient to continue his same warfarin dose and follow up 3/15 as planned. I advised to call with any signs or symptoms of bleeding. He normally sees Bonifacio and was asking for him. I will run this by Bonifacio and see if he has any different recommendations with knowing this patient's history a little better.      Neema Walton, PharmD    For Pharmacy Admin Tracking Only     Intervention Detail:    Total # of Interventions Recommended: 0   Total # of Interventions Accepted: 0   Time Spent (min): 5

## 2022-02-22 NOTE — TELEPHONE ENCOUNTER
Attempted to reach Mr. Natasha Larios - no answer. Left message. I got his message from Katelynn at the [facility]. He has begun omeprazole. Instructed him to continue his warfarin with no changes. See him at his next appointment. Gali Orellana [facility], [address] [facility]  Anticoagulation Clinic  [address] [place] [address] 24  [phone]

## 2022-03-07 RX ORDER — VENLAFAXINE HYDROCHLORIDE 150 MG/1
150 CAPSULE, EXTENDED RELEASE ORAL DAILY
Qty: 90 CAPSULE | Refills: 1 | Status: SHIPPED | OUTPATIENT
Start: 2022-03-07

## 2022-03-07 RX ORDER — LOSARTAN POTASSIUM 100 MG/1
100 TABLET ORAL DAILY
Qty: 90 TABLET | Refills: 1 | Status: SHIPPED | OUTPATIENT
Start: 2022-03-07 | End: 2022-06-10

## 2022-03-08 DIAGNOSIS — F51.04 PSYCHOPHYSIOLOGICAL INSOMNIA: ICD-10-CM

## 2022-03-11 ENCOUNTER — TELEPHONE (OUTPATIENT)
Dept: PRIMARY CARE CLINIC | Age: 77
End: 2022-03-11

## 2022-03-11 NOTE — TELEPHONE ENCOUNTER
----- Message from Julio Cesar Leon sent at 3/10/2022 12:38 PM EST -----  Subject: Message to Provider    QUESTIONS  Information for Provider? Patient wanting an update on his Rx for   estazolam. Please call asap.  ---------------------------------------------------------------------------  --------------  CALL BACK INFO  What is the best way for the office to contact you? OK to leave message on   voicemail  Preferred Call Back Phone Number?  [phone]  ---------------------------------------------------------------------------  --------------  SCRIPT ANSWERS  undefined

## 2022-03-13 DIAGNOSIS — K52.9 CHRONIC DIARRHEA: ICD-10-CM

## 2022-03-14 RX ORDER — DIPHENOXYLATE HYDROCHLORIDE AND ATROPINE SULFATE 2.5; .025 MG/1; MG/1
1 TABLET ORAL 4 TIMES DAILY PRN
Qty: 120 TABLET | Refills: 0 | Status: SHIPPED | OUTPATIENT
Start: 2022-03-14 | End: 2022-08-17

## 2022-03-15 ENCOUNTER — ANTI-COAG VISIT (OUTPATIENT)
Dept: PHARMACY | Age: 77
End: 2022-03-15
Payer: MEDICARE

## 2022-03-15 DIAGNOSIS — I48.0 PAF (PAROXYSMAL ATRIAL FIBRILLATION) (HCC): Primary | ICD-10-CM

## 2022-03-15 LAB — INTERNATIONAL NORMALIZATION RATIO, POC: 3.2

## 2022-03-15 PROCEDURE — 99211 OFF/OP EST MAY X REQ PHY/QHP: CPT

## 2022-03-15 PROCEDURE — 85610 PROTHROMBIN TIME: CPT

## 2022-03-15 NOTE — PROGRESS NOTES
Ramin Henry is a 64 y.o. here for warfarin management. Lydia Sharpe had an INR test today. Results were reviewed and appropriate warfarin management was completed. This visit was performed as: An in person visit. Protocols were followed with precautions to reduce the spread of COVID-19. Patient verifies current dosing regimen: Yes     Warfarin medication reviewed and updated on the patient 's home medication list: Yes   All other medications reviewed and updated on the patient 's home medication list: No new medications     Lab Results   Component Value Date    INR 3.2 03/15/2022    INR 2.6 02/15/2022    INR 1.9 01/25/2022       Patient Findings     Negatives:  Signs/symptoms of bleeding, Change in health, Missed doses, Change in medications, Change in diet/appetite, Bruising          Anticoagulation Summary  As of 3/15/2022    INR goal:  2.0-3.0   TTR:  63.4 % (4.9 mo)   INR used for dosing:  3.2 (3/15/2022)   Warfarin maintenance plan:  5 mg (5 mg x 1) every Mon, Wed, Fri; 2.5 mg (5 mg x 0.5) all other days   Weekly warfarin total:  25 mg   Plan last modified:  Nicolas Urias RPH (1/25/2022)   Next INR check:  4/5/2022   Priority:  Maintenance   Target end date: Indefinite    Indications    PAF [facility]oxysmal atrial fibrillation) [facility] [I48.0]             Anticoagulation Episode Summary     INR check location:  Anticoagulation Clinic    Preferred lab:      Send INR reminders to:  [facility]    Comments:  EPIC      Anticoagulation Care Providers     Provider Role Specialty Phone number    Opal Rivers MD Referring Electrophysiology [phone]          Warfarin assessment / plan:   Patient appears well. No complaints regarding warfarin therapy. Instructed him to have one portion of vitamin K greens per week. No change to weekly warfarin dosing. Description    Hold warfarin on 3-15-22  CONTINUE: Warfarin 2.5 mg daily except 5 mg on Mondays, Wednesdays and Fridays. .  Have one portion of vitamin K greens weekly    Take warfarin at bedtime. Call [phone] with signs or symptoms of bleeding or ANY medication changes (including over-the-counter medications or herbal supplements). Especially if you begin oral steroids and/or antibiotics. If significant bleeding occurs please seek immediate medical attention. Keep the number of servings and portion size of vitamin K containing foods (dark green, leafy vegetables) the same each week. Vegetables high in vitamin K : broccoli, spinach, leaf lettuce, khurram lettuce, kale, collards, asparagus, brussel sprouts. Please call if you routine diet changes. Limit alcohol intake. Please call if this changes. Please arrive 15 minutes prior to your visit. Allow 72 hours for warfarin refills. Immunization History   Administered Date(s) Administered    COVID-19, Moderna, Booster, PF, 50mcg/0.25ml 11/21/2021    COVID-19, Moderna, Primary or Immunocompromised, PF, 100mcg/0.5mL 02/04/2021, 03/04/2021    Influenza 10/16/2013    Influenza Virus Vaccine 10/20/2014, 09/23/2015    Influenza, High Dose (Fluzone 65 yrs and older) 10/01/2016, 09/26/2017, 10/15/2018, 08/13/2020    Influenza, High-dose, Aleda Grave, 65 yrs +, IM (Fluzone) 08/13/2020    Influenza, Quadv, IM, PF (6 mo and older Fluzone, Flulaval, Fluarix, and 3 yrs and older Afluria) 09/18/2019    Influenza, Quadv, adjuvanted, 65 yrs +, IM, PF (Fluad) 10/23/2021    Pneumococcal Conjugate 13-valent ([id number]) 10/01/2016    Pneumococcal Polysaccharide ([id number]) 07/12/2013, 08/13/2020    Td, unspecified formulation 10/16/2013    Tdap (Boostrix, Adacel) 05/06/2017, 06/13/2018    Zoster Live (Zostavax) 09/04/2013    Zoster Recombinant (Shingrix) 04/05/2018, 09/17/2018           Orders Placed This Encounter   Procedures    POCT INR     This external order was created through the results console.         No orders of the defined types were placed in this encounter. Reviewed AVS with patient / caregiver.     Billing Points:  Adjust dosage and/or reconcile meds (fill pill box) </= 5 medications - 2 points       CLINICAL PHARMACY CONSULT: MED RECONCILIATION/REVIEW ADDENDUM    For Pharmacy Admin Tracking Only     Intervention Detail:    Total # of Interventions Recommended: 0   Total # of Interventions Accepted: 0   Time Spent (min): 15

## 2022-03-16 ENCOUNTER — OFFICE VISIT (OUTPATIENT)
Dept: PRIMARY CARE CLINIC | Age: 77
End: 2022-03-16
Payer: MEDICARE

## 2022-03-16 ENCOUNTER — NURSE TRIAGE (OUTPATIENT)
Dept: OTHER | Facility: CLINIC | Age: 77
End: 2022-03-16

## 2022-03-16 VITALS
DIASTOLIC BLOOD PRESSURE: 66 MMHG | RESPIRATION RATE: 20 BRPM | OXYGEN SATURATION: 97 % | TEMPERATURE: 98.1 F | WEIGHT: 164.4 LBS | BODY MASS INDEX: 25.75 KG/M2 | HEART RATE: 105 BPM | SYSTOLIC BLOOD PRESSURE: 137 MMHG

## 2022-03-16 DIAGNOSIS — R00.0 TACHYCARDIA WITH HYPERTENSION: ICD-10-CM

## 2022-03-16 DIAGNOSIS — I10 ESSENTIAL HYPERTENSION: ICD-10-CM

## 2022-03-16 DIAGNOSIS — R06.09 DYSPNEA ON EXERTION: Primary | ICD-10-CM

## 2022-03-16 DIAGNOSIS — I49.5 SICK SINUS SYNDROME (HCC): ICD-10-CM

## 2022-03-16 DIAGNOSIS — I10 TACHYCARDIA WITH HYPERTENSION: ICD-10-CM

## 2022-03-16 DIAGNOSIS — I48.0 PAF (PAROXYSMAL ATRIAL FIBRILLATION) (HCC): ICD-10-CM

## 2022-03-16 PROCEDURE — 99214 OFFICE O/P EST MOD 30 MIN: CPT | Performed by: FAMILY MEDICINE

## 2022-03-16 PROCEDURE — 1123F ACP DISCUSS/DSCN MKR DOCD: CPT | Performed by: FAMILY MEDICINE

## 2022-03-16 PROCEDURE — G8428 CUR MEDS NOT DOCUMENT: HCPCS | Performed by: FAMILY MEDICINE

## 2022-03-16 PROCEDURE — G8484 FLU IMMUNIZE NO ADMIN: HCPCS | Performed by: FAMILY MEDICINE

## 2022-03-16 PROCEDURE — 4040F PNEUMOC VAC/ADMIN/RCVD: CPT | Performed by: FAMILY MEDICINE

## 2022-03-16 PROCEDURE — 1036F TOBACCO NON-USER: CPT | Performed by: FAMILY MEDICINE

## 2022-03-16 PROCEDURE — G8417 CALC BMI ABV UP PARAM F/U: HCPCS | Performed by: FAMILY MEDICINE

## 2022-03-16 RX ORDER — ATENOLOL 50 MG/1
50 TABLET ORAL DAILY
Qty: 90 TABLET | Refills: 1 | Status: SHIPPED | OUTPATIENT
Start: 2022-03-16 | End: 2022-04-20 | Stop reason: ALTCHOICE

## 2022-03-16 ASSESSMENT — ENCOUNTER SYMPTOMS
DIARRHEA: 0
CONSTIPATION: 0
WHEEZING: 0
ABDOMINAL PAIN: 0
BLOOD IN STOOL: 0
TROUBLE SWALLOWING: 0
SHORTNESS OF BREATH: 1
VOICE CHANGE: 0

## 2022-03-16 NOTE — PROGRESS NOTES
Fridays or as directed by the [address] [place] [address]. # [id number]  Goal = 2 - 3 Yes Bernardino La MD   diphenoxylate-atropine (LOMOTIL) 2.5-0.025 MG per tablet Take 1 tablet by mouth 4 times daily as needed for Diarrhea for up to 30 days. Tessie Caraballo MD   estazolam (PROSOM) 2 MG tablet TAKE 1 TO 2 TABLETS BY MOUTH EVERY NIGHT AS NEEDED FOR SLEEP  Tessie Caraballo MD   venlafaxine (EFFEXOR XR) 150 MG extended release capsule Take 1 capsule by mouth daily  Tessie Caraballo MD   losartan (COZAAR) 100 MG tablet Take 1 tablet by mouth daily  Tessie Caraballo MD   pravastatin (PRAVACHOL) 40 MG tablet Take 1 tablet by mouth daily  Tessie Caraballo MD   Coenzyme Q10 400 MG CAPS Take 400 mg by mouth daily  Historical Provider, MD   QUEtiapine (SEROQUEL) 25 MG tablet Take 1 tablet by mouth nightly  Tessie Caraballo MD   amLODIPine (NORVASC) 5 MG tablet Take 1 tablet by mouth daily  Tessie Caraballo MD   hydrALAZINE (APRESOLINE) 50 MG tablet Take 1 tablet by mouth 2 times daily  Tessie Caraballo MD   Omega-3 Fatty Acids (FISH OIL) 1200 MG CPDR Take 2 capsules by mouth daily  Historical Provider, MD   Echinacea 400 MG CAPS Take 2 capsules by mouth daily Takes once cap daily during summer.   Historical Provider, MD   melatonin 10 MG CAPS capsule Take 30 mg by mouth nightly  Historical Provider, MD   5-Hydroxytryptophan (5-HTP) 100 MG CAPS Take 3 capsules by mouth nightly  Historical Provider, MD   GAMMA AMINOBUTYRIC ACID PO Take 1,500 mg by mouth nightly  Historical Provider, MD   L-Tryptophan 500 MG TABS Take 3,000 mg by mouth nightly  Historical Provider, MD   L-Theanine 200 MG CAPS Take 600 mg by mouth nightly  Historical Provider, MD   vitamin D (CHOLECALCIFEROL) 125 MCG (5000 UT) CAPS capsule Take 5,000 Units by mouth daily  Historical Provider, MD   GUAIFENESIN PO Take 600 mg by mouth daily  Historical Provider, MD   Multiple Vitamins-Minerals (MULTIVITAMIN) LIQD Take 30 mLs by mouth daily Has 80 mcg of vitamin K -2  Has 50 mg of ginsing root  Historical Provider, MD   Fluticasone furoate-vilanterol (BREO ELLIPTA) 200-25 MCG/INH AEPB inhaler Inhale 1 puff into the lungs daily  Castillo Pack MD   albuterol sulfate HFA (VENTOLIN HFA) 108 (90 Base) MCG/ACT inhaler Inhale 2 puffs into the lungs every 6 hours as needed for Wheezing or Shortness of Breath  Castillo Pack MD   famotidine (PEPCID) 20 MG tablet Take 1 tablet by mouth 2 times daily  Yulia Sic, APRN - CNP        Social History     Tobacco Use    Smoking status: Former Smoker     Packs/day: 1.00     Years: 20.00     Pack years: 20.00     Types: Cigarettes     Quit date: 1988     Years since quittin.5    Smokeless tobacco: Never Used   Substance Use Topics    Alcohol use: No        Vitals:    22 1637   BP: 137/66   Pulse: 105   Resp: 20   Temp: 98.1 °F (36.7 °C)   TempSrc: Temporal   SpO2: 97%   Weight: 164 lb 6.4 oz (74.6 kg)     Estimated body mass index is 25.75 kg/m² as calculated from the following:    Height as of 22: 5' 7\" (1.702 m). Weight as of this encounter: 164 lb 6.4 oz (74.6 kg). Physical Exam  Constitutional:       Appearance: He is well-developed. HENT:      Head: Normocephalic. Eyes:      Conjunctiva/sclera: Conjunctivae normal.      Pupils: Pupils are equal, round, and reactive to light. Neck:      Thyroid: No thyromegaly. Cardiovascular:      Rate and Rhythm: Normal rate and regular rhythm. Heart sounds: Normal heart sounds. No murmur heard. Pulmonary:      Effort: Pulmonary effort is normal.      Breath sounds: Normal breath sounds. Abdominal:      General: Bowel sounds are normal.      Palpations: Abdomen is soft. There is no mass. Genitourinary:     Penis: Normal.       Prostate: Normal.   Musculoskeletal:         General: Normal range of motion. Cervical back: Normal range of motion and neck supple. Skin:     General: Skin is warm. Findings: No rash. Neurological:      Mental Status: He is alert. Psychiatric:         Behavior: Behavior normal.         ASSESSMENT/PLAN:  1. Dyspnea on exertion  Will order echo to reevaluate LV function. Advised to follow-up with cardiology  - ECHO Complete 2D W Doppler W Color    2. Essential hypertension /. Tachycardia with hypertension  Will restart atenolol at 50 mg daily continue losartan 100 mg daily, amlodipine 5 mg daily and hydralazine 50 mg twice daily. - atenolol (TENORMIN) 50 MG tablet; Take 1 tablet by mouth daily  Dispense: 90 tablet; Refill: 1    4. PAF (paroxysmal atrial fibrillation) ([facility]  S/p ablation 8/20/21. He is off flecainide. He is on warfarin. Watch for recurrence of AF    5.  Sick sinus syndrome [facility])  S/p pacemaker placement 6/4/21      RTC in April     An electronic signature was used to authenticate this note.    --Megan Myles MD on 3/16/2022 at 6:36 PM

## 2022-03-16 NOTE — TELEPHONE ENCOUNTER
Received call from [facility]  ARSALAN at [facility] with Red Flag Complaint. Subjective: Caller states \"has been having high blood pressure - 180/90 -115. Feeling unsteady and experiencing fatigue\"     Current Symptoms: HTN, weakness, unsteady    Onset: 3 months ago; worsening    Associated Symptoms: NA    Pain Severity: denies pain    Temperature: denies fever    What has been tried: taking bp meds    LMP: NA Pregnant: NA    Recommended disposition: See in Office Today    Care advice provided, patient verbalizes understanding; denies any other questions or concerns; instructed to call back for any new or worsening symptoms. Patient/Caller agrees with recommended disposition; writer provided warm transfer to Jorge Luis Co at [facility] for appointment scheduling     Attention Provider: Thank you for allowing me to participate in the care of your patient. The patient was connected to triage in response to information provided to the ECC/PSC. Please do not respond through this encounter as the response is not directed to a shared pool.         Reason for Disposition   Systolic BP >= 943 OR Diastolic >= 922    Protocols used: BLOOD PRESSURE - HIGH-ADULT-[place]

## 2022-03-21 ENCOUNTER — HOSPITAL ENCOUNTER (EMERGENCY)
Age: 77
Discharge: HOME | End: 2022-03-21
Attending: EMERGENCY MEDICINE
Payer: MEDICARE

## 2022-03-21 ENCOUNTER — APPOINTMENT (OUTPATIENT)
Dept: GENERAL RADIOLOGY | Age: 77
End: 2022-03-21
Payer: MEDICARE

## 2022-03-21 VITALS
TEMPERATURE: 98.2 F | DIASTOLIC BLOOD PRESSURE: 92 MMHG | OXYGEN SATURATION: 95 % | SYSTOLIC BLOOD PRESSURE: 164 MMHG | RESPIRATION RATE: 18 BRPM | WEIGHT: 158.73 LBS | BODY MASS INDEX: 24.06 KG/M2 | HEIGHT: 68 IN | HEART RATE: 88 BPM

## 2022-03-21 DIAGNOSIS — I50.9 CONGESTIVE HEART FAILURE, UNSPECIFIED HF CHRONICITY, UNSPECIFIED HEART FAILURE TYPE (HCC): Primary | ICD-10-CM

## 2022-03-21 LAB
A/G RATIO: 1.5 (ref 1.1–2.2)
ALBUMIN SERPL-MCNC: 4 G/DL (ref 3.4–5)
ALP BLD-CCNC: 147 U/L (ref 40–129)
ALT SERPL-CCNC: 42 U/L (ref 10–40)
ANION GAP SERPL CALCULATED.3IONS-SCNC: 15 MMOL/L (ref 3–16)
ANISOCYTOSIS: ABNORMAL
AST SERPL-CCNC: 34 U/L (ref 15–37)
BASOPHILS ABSOLUTE: 0.1 K/UL (ref 0–0.2)
BASOPHILS RELATIVE PERCENT: 1 %
BILIRUB SERPL-MCNC: 0.4 MG/DL (ref 0–1)
BUN BLDV-MCNC: 23 MG/DL (ref 7–20)
CALCIUM SERPL-MCNC: 8.9 MG/DL (ref 8.3–10.6)
CHLORIDE BLD-SCNC: 104 MMOL/L (ref 99–110)
CO2: 20 MMOL/L (ref 21–32)
CREAT SERPL-MCNC: 0.7 MG/DL (ref 0.8–1.3)
EOSINOPHILS ABSOLUTE: 0 K/UL (ref 0–0.6)
EOSINOPHILS RELATIVE PERCENT: 0 %
GFR AFRICAN AMERICAN: >60
GFR NON-AFRICAN AMERICAN: >60
GLUCOSE BLD-MCNC: 133 MG/DL (ref 70–99)
HCT VFR BLD CALC: 29.8 % (ref 40.5–52.5)
HEMOGLOBIN: 8.9 G/DL (ref 13.5–17.5)
LYMPHOCYTES ABSOLUTE: 0.8 K/UL (ref 1–5.1)
LYMPHOCYTES RELATIVE PERCENT: 10 %
MAGNESIUM: 1.9 MG/DL (ref 1.8–2.4)
MCH RBC QN AUTO: 19.8 PG (ref 26–34)
MCHC RBC AUTO-ENTMCNC: 30 G/DL (ref 31–36)
MCV RBC AUTO: 66.2 FL (ref 80–100)
MONOCYTES ABSOLUTE: 0.6 K/UL (ref 0–1.3)
MONOCYTES RELATIVE PERCENT: 7 %
NEUTROPHILS ABSOLUTE: 6.7 K/UL (ref 1.7–7.7)
NEUTROPHILS RELATIVE PERCENT: 82 %
OVALOCYTES: ABNORMAL
PDW BLD-RTO: 16.6 % (ref 12.4–15.4)
PLATELET # BLD: 406 K/UL (ref 135–450)
PLATELET SLIDE REVIEW: ADEQUATE
PMV BLD AUTO: 7.5 FL (ref 5–10.5)
POIKILOCYTES: ABNORMAL
POTASSIUM REFLEX MAGNESIUM: 3.5 MMOL/L (ref 3.5–5.1)
PRO-BNP: 2303 PG/ML (ref 0–449)
RBC # BLD: 4.49 M/UL (ref 4.2–5.9)
SCHISTOCYTES: ABNORMAL
SLIDE REVIEW: ABNORMAL
SODIUM BLD-SCNC: 139 MMOL/L (ref 136–145)
TEAR DROP CELLS: ABNORMAL
TOTAL PROTEIN: 6.6 G/DL (ref 6.4–8.2)
TROPONIN: <0.01 NG/ML
WBC # BLD: 8.2 K/UL (ref 4–11)

## 2022-03-21 PROCEDURE — 83880 ASSAY OF NATRIURETIC PEPTIDE: CPT

## 2022-03-21 PROCEDURE — 6370000000 HC RX 637 (ALT 250 FOR IP): Performed by: EMERGENCY MEDICINE

## 2022-03-21 PROCEDURE — 84484 ASSAY OF TROPONIN QUANT: CPT

## 2022-03-21 PROCEDURE — 71046 X-RAY EXAM CHEST 2 VIEWS: CPT

## 2022-03-21 PROCEDURE — 36415 COLL VENOUS BLD VENIPUNCTURE: CPT

## 2022-03-21 PROCEDURE — 99284 EMERGENCY DEPT VISIT MOD MDM: CPT

## 2022-03-21 PROCEDURE — 93005 ELECTROCARDIOGRAM TRACING: CPT

## 2022-03-21 PROCEDURE — 85025 COMPLETE CBC W/AUTO DIFF WBC: CPT

## 2022-03-21 PROCEDURE — 80053 COMPREHEN METABOLIC PANEL: CPT

## 2022-03-21 PROCEDURE — 83735 ASSAY OF MAGNESIUM: CPT

## 2022-03-21 RX ORDER — FUROSEMIDE 40 MG/1
40 TABLET ORAL ONCE
Status: COMPLETED | OUTPATIENT
Start: 2022-03-21 | End: 2022-03-21

## 2022-03-21 RX ORDER — FUROSEMIDE 40 MG/1
40 TABLET ORAL DAILY
Qty: 10 TABLET | Refills: 0 | Status: SHIPPED | OUTPATIENT
Start: 2022-03-21 | End: 2022-04-08 | Stop reason: SDUPTHER

## 2022-03-21 RX ORDER — POTASSIUM CHLORIDE 20 MEQ/1
20 TABLET, EXTENDED RELEASE ORAL DAILY
Qty: 10 TABLET | Refills: 0 | Status: SHIPPED | OUTPATIENT
Start: 2022-03-21 | End: 2022-04-08 | Stop reason: SDUPTHER

## 2022-03-21 RX ADMIN — FUROSEMIDE 40 MG: 40 TABLET ORAL at 20:29

## 2022-03-21 ASSESSMENT — PAIN SCALES - GENERAL
PAINLEVEL_OUTOF10: 0
PAINLEVEL_OUTOF10: 0

## 2022-03-21 ASSESSMENT — PAIN - FUNCTIONAL ASSESSMENT: PAIN_FUNCTIONAL_ASSESSMENT: 0-10

## 2022-03-21 NOTE — ED NOTES
Patient returned from radiology and EKG done at that time. Patient is awake, alert, oriented, respiration with audible gurgling heard on expiration. Patient denies pain at present. Patient's wife at bedside. Side rails up for patient safety and call light near. Both deny further needs at present.       Jona Moctezuma RN  03/21/22 1911

## 2022-03-22 LAB
EKG ATRIAL RATE: 74 BPM
EKG DIAGNOSIS: NORMAL
EKG P AXIS: 39 DEGREES
EKG P-R INTERVAL: 204 MS
EKG Q-T INTERVAL: 432 MS
EKG QRS DURATION: 124 MS
EKG QTC CALCULATION (BAZETT): 479 MS
EKG R AXIS: -43 DEGREES
EKG T AXIS: -49 DEGREES
EKG VENTRICULAR RATE: 74 BPM

## 2022-03-22 PROCEDURE — 93010 ELECTROCARDIOGRAM REPORT: CPT | Performed by: INTERNAL MEDICINE

## 2022-03-22 NOTE — ED NOTES
Dr. Daniela Chong in room to discuss test results and discharge plan of care with patient. Patient continues to deny pain.       Roberta Varner RN  03/21/22 4518

## 2022-03-22 NOTE — ED PROVIDER NOTES
 Hypertension Brother     Hypertension Brother      Social History     Socioeconomic History    Marital status:      Spouse name: Not on file    Number of children: Not on file    Years of education: Not on file    Highest education level: Not on file   Occupational History    Not on file   Tobacco Use    Smoking status: Former Smoker     Packs/day: 1.00     Years: 20.00     Pack years: 20.00     Types: Cigarettes     Quit date: 1988     Years since quittin.6    Smokeless tobacco: Never Used   Vaping Use    Vaping Use: Never used   Substance and Sexual Activity    Alcohol use: No    Drug use: No    Sexual activity: Not Currently   Other Topics Concern    Not on file   Social History Narrative    Caffeine:        SODA - 0          TEA - 0        COFFEE - 3 cups a day     Social Determinants of Health     Financial Resource Strain: Low Risk     Difficulty of Paying Living Expenses: Not hard at all   Food Insecurity: No Food Insecurity    Worried About Running Out of Food in the Last Year: Never true    Belinda of Food in the Last Year: Never true   Transportation Needs:     Lack of Transportation (Medical): Not on file    Lack of Transportation (Non-Medical):  Not on file   Physical Activity:     Days of Exercise per Week: Not on file    Minutes of Exercise per Session: Not on file   Stress:     Feeling of Stress : Not on file   Social Connections:     Frequency of Communication with Friends and Family: Not on file    Frequency of Social Gatherings with Friends and Family: Not on file    Attends Anabaptist Services: Not on file    Active Member of Clubs or Organizations: Not on file    Attends Club or Organization Meetings: Not on file    Marital Status: Not on file   Intimate Partner Violence:     Fear of Current or Ex-Partner: Not on file    Emotionally Abused: Not on file    Physically Abused: Not on file    Sexually Abused: Not on file   Housing Stability:     Unable to Pay for Housing in the Last Year: Not on file    Number of Places Lived in the Last Year: Not on file    Unstable Housing in the Last Year: Not on file     Current Facility-Administered Medications   Medication Dose Route Frequency Provider Last Rate Last Admin    furosemide (LASIX) tablet 40 mg  40 mg Oral Once Claude Gunn MD         Current Outpatient Medications   Medication Sig Dispense Refill    furosemide (LASIX) 40 MG tablet Take 1 tablet by mouth daily for 10 days 10 tablet 0    potassium chloride (KLOR-CON M) 20 MEQ extended release tablet Take 1 tablet by mouth daily 10 tablet 0    atenolol (TENORMIN) 50 MG tablet Take 1 tablet by mouth daily 90 tablet 1    diphenoxylate-atropine (LOMOTIL) 2.5-0.025 MG per tablet Take 1 tablet by mouth 4 times daily as needed for Diarrhea for up to 30 days. 120 tablet 0    estazolam (PROSOM) 2 MG tablet TAKE 1 TO 2 TABLETS BY MOUTH EVERY NIGHT AS NEEDED FOR SLEEP 60 tablet 0    venlafaxine (EFFEXOR XR) 150 MG extended release capsule Take 1 capsule by mouth daily 90 capsule 1    losartan (COZAAR) 100 MG tablet Take 1 tablet by mouth daily 90 tablet 1    pravastatin (PRAVACHOL) 40 MG tablet Take 1 tablet by mouth daily 90 tablet 1    Coenzyme Q10 400 MG CAPS Take 400 mg by mouth daily      QUEtiapine (SEROQUEL) 25 MG tablet Take 1 tablet by mouth nightly 90 tablet 1    amLODIPine (NORVASC) 5 MG tablet Take 1 tablet by mouth daily 90 tablet 1    hydrALAZINE (APRESOLINE) 50 MG tablet Take 1 tablet by mouth 2 times daily 180 tablet 1    Omega-3 Fatty Acids (FISH OIL) 1200 MG CPDR Take 2 capsules by mouth daily      Echinacea 400 MG CAPS Take 2 capsules by mouth daily Takes once cap daily during summer.       melatonin 10 MG CAPS capsule Take 30 mg by mouth nightly      5-Hydroxytryptophan (5-HTP) 100 MG CAPS Take 3 capsules by mouth nightly      GAMMA AMINOBUTYRIC ACID PO Take 1,500 mg by mouth nightly      L-Tryptophan 500 MG TABS Take 3,000 mg by mouth nightly      L-Theanine 200 MG CAPS Take 600 mg by mouth nightly      vitamin D (CHOLECALCIFEROL) 125 MCG (5000 UT) CAPS capsule Take 5,000 Units by mouth daily      GUAIFENESIN PO Take 600 mg by mouth daily      Multiple Vitamins-Minerals (MULTIVITAMIN) LIQD Take 30 mLs by mouth daily Has 80 mcg of vitamin K -2  Has 50 mg of ginsing root      Fluticasone furoate-vilanterol (BREO ELLIPTA) 200-25 MCG/INH AEPB inhaler Inhale 1 puff into the lungs daily 180 each 1    warfarin (COUMADIN) 5 MG tablet Take 1 tablet by mouth daily Or as directed by the [address] [place] [address]. (Patient taking differently: Take 2.5 mg by mouth daily Except 5 mg on Mondays, Wednesdays and Fridays or as directed by the [address] [place] [address]. # [id number]  Goal = 2 - 3) 30 tablet 3    albuterol sulfate HFA (VENTOLIN HFA) 108 (90 Base) MCG/ACT inhaler Inhale 2 puffs into the lungs every 6 hours as needed for Wheezing or Shortness of Breath 3 Inhaler 1    famotidine (PEPCID) 20 MG tablet Take 1 tablet by mouth 2 times daily 60 tablet 3     No Known Allergies    REVIEW OF SYSTEMS  Positives and pertinent negatives as per HPI. Ten other systems were reviewed and are negative. Nursing notes pertaining to ROS were reviewed. PHYSICAL EXAM   BP (!) 149/75   Pulse 84   Temp 99 °F (37.2 °C) (Oral)   Resp 18   Ht 5' 8\" (1.727 m)   Wt 158 lb 11.7 oz (72 kg)   SpO2 94%   BMI 24.14 kg/m²   General: Alert and oriented x 3, NAD. No increased work of breathing or accessory muscle use. Non-ill appearing. Appropriate and interactive  Eyes: PERRL, no scleral icterus, injection or exudate. EOMI. HENT: Atraumatic. Oral pharynx is clear, moist, no enanthem. No tonsillar hypertropy or exudate. Nasal mucous membranes are clear. TM's are clear without evidence of otitis media. Neck:  No Lymphadenopathy. Non-tender to palpation. Normal ROM. No JVD. No thyromegaly. No Mass.     PULMONARY: Lungs with bilateral lower lobe rales but no rhonchi. Good air movement bilaterally. CV: Regular rate and rhythm without murmurs, rubs or gallops. ABD: Soft, non-tender, non-distended, normal bowel sounds, no hepatosplenomegaly, no masses. No peritoneal signs, rebound or guarding. Back:  No CVAT, no rash. EXT: No cyanosis or clubbing. No rash. CR < 2 seconds. No tenderness to palpation. 1 bilateral lower extremity edema. +2 pulses in upper/lower extremities bilaterally. Skin is warm and dry. No calf tenderness or calf warmth. Negative Homans' sign. PSYCH: normal affect      RADIOLOGY    XR CHEST (2 VW)   Final Result   Findings favoring pulmonary edema with pulmonary vascular indistinctness and   small right pleural effusion. LABS  I have reviewed all labs for this visit.    Results for orders placed or performed during the hospital encounter of 03/21/22   CBC with Auto Differential   Result Value Ref Range    WBC 8.2 4.0 - 11.0 K/uL    RBC 4.49 4.20 - 5.90 M/uL    Hemoglobin 8.9 (L) 13.5 - 17.5 g/dL    Hematocrit 29.8 (L) 40.5 - 52.5 %    MCV 66.2 (L) 80.0 - 100.0 fL    MCH 19.8 (L) 26.0 - 34.0 pg    MCHC 30.0 (L) 31.0 - 36.0 g/dL    RDW 16.6 (H) 12.4 - 15.4 %    Platelets 069 940 - 718 K/uL    MPV 7.5 5.0 - 10.5 fL    PLATELET SLIDE REVIEW Adequate     SLIDE REVIEW see below     Neutrophils % 82.0 %    Lymphocytes % 10.0 %    Monocytes % 7.0 %    Eosinophils % 0.0 %    Basophils % 1.0 %    Neutrophils Absolute 6.7 1.7 - 7.7 K/uL    Lymphocytes Absolute 0.8 (L) 1.0 - 5.1 K/uL    Monocytes Absolute 0.6 0.0 - 1.3 K/uL    Eosinophils Absolute 0.0 0.0 - 0.6 K/uL    Basophils Absolute 0.1 0.0 - 0.2 K/uL    Anisocytosis 2+ (A)     Poikilocytes 2+ (A)     Schistocytes Occasional (A)     Ovalocytes 1+ (A)     Tear Drop Cells 1+ (A)    Comprehensive Metabolic Panel w/ Reflex to MG   Result Value Ref Range    Sodium 139 136 - 145 mmol/L    Potassium reflex Magnesium 3.5 3.5 - 5.1 mmol/L Chloride 104 99 - 110 mmol/L    CO2 20 (L) 21 - 32 mmol/L    Anion Gap 15 3 - 16    Glucose 133 (H) 70 - 99 mg/dL    BUN 23 (H) 7 - 20 mg/dL    CREATININE 0.7 (L) 0.8 - 1.3 mg/dL    GFR Non-African American >60 >60    GFR African American >60 >60    Calcium 8.9 8.3 - 10.6 mg/dL    Total Protein 6.6 6.4 - 8.2 g/dL    Albumin 4.0 3.4 - 5.0 g/dL    Albumin/Globulin Ratio 1.5 1.1 - 2.2    Total Bilirubin 0.4 0.0 - 1.0 mg/dL    Alkaline Phosphatase 147 (H) 40 - 129 U/L    ALT 42 (H) 10 - 40 U/L    AST 34 15 - 37 U/L   Troponin   Result Value Ref Range    Troponin <0.01 <0.01 ng/mL   Brain Natriuretic Peptide   Result Value Ref Range    Pro-BNP 2,303 (H) 0 - 449 pg/mL   Magnesium   Result Value Ref Range    Magnesium 1.90 1.80 - 2.40 mg/dL         ED COURSE/MDM  Congestive heart failure: Patient does have an elevated BNP, lung findings consistent with pulmonary edema on chest x-ray is consistent with pulmonary edema. Patient does have an echocardiogram scheduled for 4 days. Patient has a normal troponin and EKG reveals a paced rhythm that is unchanged compared to his EKG from August 2021. Patient is not hypoxic or tachycardic and patient was advised to discontinue his hydrochlorothiazide for now and will be started on Lasix 40 mg p.o. daily x10 days and will be placed on supplemental potassium as well. Follow-up with echocardiogram and with cardiologist within 1 week with instructions to return to the emergency room for worsening symptoms of dyspnea. Hypertension:  Patient was hypertensive during the ER visit today. There are no signs of hypertensive emergency or evidence of end organ damage by history or physical exam.  These findings were discussed with the patient and advised to follow up with primary care physician to further assess and treat hypertension in the outpatient setting.       The patient's blood pressure was found to be elevated according to CMS/Medicare and the Affordable Care Act/ObamaCare criteria. Elevated blood pressure could occur because of pain or anxiety or other reasons and does not mean that they need to have their blood pressure treated or medications otherwise adjusted. However, this could also be a sign that they will need to have their blood pressure treated or medications changed. The patient was instructed to take a list of recent blood pressure readings to their next visit with their personal physician. Patient was given scripts for the following medications. I counseled patient how to take these medications. New Prescriptions    FUROSEMIDE (LASIX) 40 MG TABLET    Take 1 tablet by mouth daily for 10 days    POTASSIUM CHLORIDE (KLOR-CON M) 20 MEQ EXTENDED RELEASE TABLET    Take 1 tablet by mouth daily         CLINICAL IMPRESSION  1. Congestive heart failure, unspecified HF chronicity, unspecified heart failure type (HCC)        Blood pressure (!) 149/75, pulse 84, temperature 99 °F (37.2 °C), temperature source Oral, resp. rate 18, height 5' 8\" (1.727 m), weight 158 lb 11.7 oz (72 kg), SpO2 94 %.       Follow-up with:  Suze Mcleod, [address]  [phone]    In 1 week            Claudio Travis MD  03/21/22 2029

## 2022-03-22 NOTE — ED NOTES
Patient up to bathroom with slow steady gait. Discharge instructions reviewed with patient and his wife. Both verbalized understanding and deny further questions. Patient assisted to get dressed while his wife went to get car. Patient assisted to wheelchair for discharge.       Julian Lynn RN  03/21/22 7712

## 2022-03-22 NOTE — ED NOTES
Discharged to private car by wheelchair. Written discharge instructions provided to patient. Prescriptions x 2 sent electronically to patient's pharmacy.      Patricia Cabrera RN  03/21/22 [id number]

## 2022-03-25 ENCOUNTER — HOSPITAL ENCOUNTER (OUTPATIENT)
Dept: NON INVASIVE DIAGNOSTICS | Age: 77
Discharge: HOME | End: 2022-03-25
Payer: MEDICARE

## 2022-03-25 LAB
LV EF: 35 %
LVEF MODALITY: NORMAL

## 2022-03-25 PROCEDURE — 93306 TTE W/DOPPLER COMPLETE: CPT

## 2022-04-05 ENCOUNTER — ANTI-COAG VISIT (OUTPATIENT)
Dept: PHARMACY | Age: 77
End: 2022-04-05
Payer: MEDICARE

## 2022-04-05 VITALS — DIASTOLIC BLOOD PRESSURE: 61 MMHG | HEART RATE: 87 BPM | SYSTOLIC BLOOD PRESSURE: 128 MMHG | OXYGEN SATURATION: 99 %

## 2022-04-05 DIAGNOSIS — I48.0 PAF (PAROXYSMAL ATRIAL FIBRILLATION) (HCC): Primary | ICD-10-CM

## 2022-04-05 LAB — INTERNATIONAL NORMALIZATION RATIO, POC: 2

## 2022-04-05 PROCEDURE — 85610 PROTHROMBIN TIME: CPT

## 2022-04-05 PROCEDURE — 99211 OFF/OP EST MAY X REQ PHY/QHP: CPT

## 2022-04-05 NOTE — PROGRESS NOTES
Johanne Pace is a 64 y.o. here for warfarin management. Zelalem Moura had an INR test today. Results were reviewed and appropriate warfarin management was completed. This visit was performed as: An in person visit. Protocols were followed with precautions to reduce the spread of COVID-19. Patient verifies current dosing regimen: Yes     Warfarin medication reviewed and updated on the patient 's home medication list: Yes   All other medications reviewed and updated on the patient 's home medication list: No new medications     Lab Results   Component Value Date    INR 2.0 2022    INR 3.2 03/15/2022    INR 2.6 02/15/2022       Patient Findings     Positives:  Change in health, Emergency department visit    Negatives:  Signs/symptoms of thrombosis, Signs/symptoms of bleeding, Missed doses, Change in medications, Change in diet/appetite, Bruising    Comments:  ED visit due to COPD          Anticoagulation Summary  As of 2022    INR goal:  2.0-3.0   TTR:  65.9 % (5.6 mo)   INR used for dosin.0 (2022)   Warfarin maintenance plan:  5 mg (5 mg x 1) every Mon, Wed, Fri; 2.5 mg (5 mg x 0.5) all other days   Weekly warfarin total:  25 mg   Plan last modified:  [facility] (2022)   Next INR check:  5/3/2022   Priority:  Maintenance   Target end date: Indefinite    Indications    PAF (paroxysmal atrial fibrillation) ([facility] [address] [I48.0]             Anticoagulation Episode Summary     INR check location:  Anticoagulation Clinic    Preferred lab:      Send INR reminders to:  [facility]    Comments:  EPIC      Anticoagulation Care Providers     Provider Role Specialty Phone number    Dolan Goltz, MD Referring Electrophysiology [phone]          Warfarin assessment / plan:   Patient appears well. No complaints regarding warfarin therapy. He was in the ED at [facility] on 3-21-22 for exacerbation of COPD. Labored breathing walking back to the clinic office. Suggested using a walker. He states they did an ECHO on 3-25-22. He does not know the results. No change to weekly warfarin dosing. Description    CONTINUE: Warfarin 2.5 mg daily except 5 mg on Mondays, Wednesdays and Fridays. .  Have one portion of vitamin K greens weekly    Take warfarin at bedtime. Call [phone] with signs or symptoms of bleeding or ANY medication changes (including over-the-counter medications or herbal supplements). Especially if you begin oral steroids and/or antibiotics. If significant bleeding occurs please seek immediate medical attention. Keep the number of servings and portion size of vitamin K containing foods (dark green, leafy vegetables) the same each week. Vegetables high in vitamin K : broccoli, spinach, leaf lettuce, khurram lettuce, kale, collards, asparagus, brussel sprouts. Please call if you routine diet changes. Limit alcohol intake. Please call if this changes. Please arrive 15 minutes prior to your visit. Allow 72 hours for warfarin refills.          Immunization History   Administered Date(s) Administered    COVID-19, Moderna, Booster, PF, 50mcg/0.25ml 11/21/2021    COVID-19, Moderna, Primary or Immunocompromised, PF, 100mcg/0.5mL 02/04/2021, 03/04/2021    Influenza 10/16/2013    Influenza Virus Vaccine 10/20/2014, 09/23/2015    Influenza, High Dose (Fluzone 65 yrs and older) 10/01/2016, 09/26/2017, 10/15/2018, 08/13/2020    Influenza, High-dose, Manuel Simpson, 65 yrs +, IM (Fluzone) 08/13/2020    Influenza, Quadv, IM, PF (6 mo and older Fluzone, Flulaval, Fluarix, and 3 yrs and older Afluria) 09/18/2019    Influenza, Quadv, adjuvanted, 65 yrs +, IM, PF (Fluad) 10/23/2021    Pneumococcal Conjugate 13-valent ([id number]) 10/01/2016    Pneumococcal Polysaccharide ([id number]) 07/12/2013, 08/13/2020    Td, unspecified formulation 10/16/2013    Tdap (Boostrix, Adacel) 05/06/2017, 06/13/2018    Zoster Live (Zostavax) 09/04/2013    Zoster Recombinant (Shingrix) 04/05/2018, 09/17/2018           Orders Placed This Encounter   Procedures    POCT INR     This external order was created through the results console. No orders of the defined types were placed in this encounter. Reviewed AVS with patient / caregiver.     Billing Points:  0 billing points this visit       CLINICAL PHARMACY CONSULT: MED RECONCILIATION/REVIEW ADDENDUM    For Pharmacy Admin Tracking Only     Intervention Detail:    Total # of Interventions Recommended: 1   Total # of Interventions Accepted: 1   Time Spent (min): 15

## 2022-04-06 ENCOUNTER — NURSE ONLY (OUTPATIENT)
Dept: CARDIOLOGY CLINIC | Age: 77
End: 2022-04-06
Payer: MEDICARE

## 2022-04-06 DIAGNOSIS — I49.5 SICK SINUS SYNDROME (HCC): ICD-10-CM

## 2022-04-06 DIAGNOSIS — F51.04 PSYCHOPHYSIOLOGICAL INSOMNIA: ICD-10-CM

## 2022-04-06 DIAGNOSIS — Z95.0 PACEMAKER: ICD-10-CM

## 2022-04-06 RX ORDER — WARFARIN SODIUM 5 MG/1
TABLET ORAL
Qty: 30 TABLET | Refills: 3 | Status: SHIPPED | OUTPATIENT
Start: 2022-04-06 | End: 2022-04-22 | Stop reason: ALTCHOICE

## 2022-04-06 NOTE — TELEPHONE ENCOUNTER
Last OV: 1/18/22  Next OV: 1 yr f/u 1/2023  Last refill:10/7/21  Most recent Labs:  3/21/22  Last EKG (if needed):3/21/22

## 2022-04-07 PROCEDURE — 93296 REM INTERROG EVL PM/IDS: CPT | Performed by: INTERNAL MEDICINE

## 2022-04-07 PROCEDURE — 93294 REM INTERROG EVL PM/LDLS PM: CPT | Performed by: INTERNAL MEDICINE

## 2022-04-07 NOTE — PROGRESS NOTES
We received remote transmission from patient's dual chamber pacemaker monitor at home. Transmission shows normal sensing and pacing function. No new arrhythmias/events recorded. Ap 0.5%   99.1% (MVP off)  Echo 02.2019 showed EF of 55-60%. EP physician will review. See interrogation under cardiology tab in the [address] [place] [address] [address] field for more details. Will continue to monitor remotely.

## 2022-04-08 ENCOUNTER — OFFICE VISIT (OUTPATIENT)
Dept: PRIMARY CARE CLINIC | Age: 77
End: 2022-04-08
Payer: MEDICARE

## 2022-04-08 VITALS
SYSTOLIC BLOOD PRESSURE: 128 MMHG | HEART RATE: 84 BPM | WEIGHT: 157.8 LBS | BODY MASS INDEX: 23.99 KG/M2 | DIASTOLIC BLOOD PRESSURE: 58 MMHG | OXYGEN SATURATION: 97 % | TEMPERATURE: 97.2 F | RESPIRATION RATE: 20 BRPM

## 2022-04-08 DIAGNOSIS — I48.0 PAF (PAROXYSMAL ATRIAL FIBRILLATION) (HCC): ICD-10-CM

## 2022-04-08 DIAGNOSIS — E78.5 HYPERLIPIDEMIA LDL GOAL <100: ICD-10-CM

## 2022-04-08 DIAGNOSIS — I10 ESSENTIAL HYPERTENSION: ICD-10-CM

## 2022-04-08 DIAGNOSIS — I49.5 SICK SINUS SYNDROME (HCC): ICD-10-CM

## 2022-04-08 DIAGNOSIS — F34.1 DYSTHYMIA: ICD-10-CM

## 2022-04-08 DIAGNOSIS — I50.22 CHRONIC SYSTOLIC CHF (CONGESTIVE HEART FAILURE) (HCC): ICD-10-CM

## 2022-04-08 DIAGNOSIS — R73.03 PREDIABETES: ICD-10-CM

## 2022-04-08 PROCEDURE — 4040F PNEUMOC VAC/ADMIN/RCVD: CPT | Performed by: FAMILY MEDICINE

## 2022-04-08 PROCEDURE — G8420 CALC BMI NORM PARAMETERS: HCPCS | Performed by: FAMILY MEDICINE

## 2022-04-08 PROCEDURE — 99214 OFFICE O/P EST MOD 30 MIN: CPT | Performed by: FAMILY MEDICINE

## 2022-04-08 PROCEDURE — G8427 DOCREV CUR MEDS BY ELIG CLIN: HCPCS | Performed by: FAMILY MEDICINE

## 2022-04-08 PROCEDURE — 1123F ACP DISCUSS/DSCN MKR DOCD: CPT | Performed by: FAMILY MEDICINE

## 2022-04-08 PROCEDURE — 1036F TOBACCO NON-USER: CPT | Performed by: FAMILY MEDICINE

## 2022-04-08 RX ORDER — HYDRALAZINE HYDROCHLORIDE 50 MG/1
50 TABLET, FILM COATED ORAL 2 TIMES DAILY
Qty: 180 TABLET | Refills: 1 | Status: SHIPPED | OUTPATIENT
Start: 2022-04-08 | End: 2022-04-20

## 2022-04-08 RX ORDER — POTASSIUM CHLORIDE 20 MEQ/1
20 TABLET, EXTENDED RELEASE ORAL DAILY
Qty: 90 TABLET | Refills: 1 | Status: SHIPPED | OUTPATIENT
Start: 2022-04-08 | End: 2022-04-20 | Stop reason: ALTCHOICE

## 2022-04-08 RX ORDER — FUROSEMIDE 40 MG/1
40 TABLET ORAL DAILY
Qty: 90 TABLET | Refills: 0 | Status: SHIPPED | OUTPATIENT
Start: 2022-04-08 | End: 2022-07-15

## 2022-04-08 ASSESSMENT — ENCOUNTER SYMPTOMS
BLOOD IN STOOL: 0
TROUBLE SWALLOWING: 0
ABDOMINAL PAIN: 0
CONSTIPATION: 0
CHEST TIGHTNESS: 0
SHORTNESS OF BREATH: 1
DIARRHEA: 0
VOICE CHANGE: 0

## 2022-04-08 NOTE — PROGRESS NOTES
2022     Anshu Yu (:  1945) is a 64 y.o. male, here for evaluation of the following medical concerns:    HPI  Patient is here for follow-up. Last visit she complained of being fatigued and short of breath with minimal activity. His blood pressure was reported to be running high at 180/90 and heart rate fast at 112  To 115 per minute. He was restarted on atenolol 50 mg daily and echocardiogram was ordered with revealed the reduced LV systolic function with ejection fraction at 35% and grade 1 diastolic dysfunction. He had up at the urgent care and was started on Lasix 40 mg daily for 10 days at which time patient is no longer taking. Since he also has hypertension and takes losartan 100 mg daily, amlodipine 5 mg daily and hydralazine 50 mg twice daily. He has A. fib status post ablation 2021. He is on warfarin and no longer on Eliquis due to high co-pay. He has sick sinus syndrome status post pacemaker placement 2021. Patient reported that his breathing is somewhat better after he was started on Lasix but was wondering if he needs to continue with it. Denies chest pain denies palpitation. Heart rate today is down to 84/min with a blood pressure of 128/58. He denies weight gain or leg edema. Review of Systems   Constitutional: Positive for fatigue. Negative for activity change. HENT: Negative for trouble swallowing and voice change. Eyes: Negative for visual disturbance. Respiratory: Positive for shortness of breath. Negative for chest tightness. Cardiovascular: Negative for chest pain, palpitations and leg swelling. Gastrointestinal: Negative for abdominal pain, blood in stool, constipation and diarrhea. Genitourinary: Negative for difficulty urinating, dysuria, frequency, hematuria and scrotal swelling. Musculoskeletal: Negative for arthralgias and myalgias. Skin: Negative for rash. Neurological: Negative for dizziness. Psychiatric/Behavioral: Negative for behavioral problems. Prior to Visit Medications    Medication Sig Taking? Authorizing Provider   furosemide (LASIX) 40 MG tablet Take 1 tablet by mouth daily Yes Shira Palomino MD   hydrALAZINE (APRESOLINE) 50 MG tablet Take 1 tablet by mouth 2 times daily Yes Shira Palomino MD   potassium chloride (KLOR-CON M) 20 MEQ extended release tablet Take 1 tablet by mouth daily Yes Shira Palomino MD   estazolam (PROSOM) 2 MG tablet TAKE 1 TO 2 TABLETS BY MOUTH EVERY NIGHT AS NEEDED FOR SLEEP  Shira Paolmino MD   warfarin (COUMADIN) 5 MG tablet TAKE ONE (1) TABLET BY MOUTH DAILY OR AS DIRECTED BY THE [address]. Alejandro Garza MD   NONFORMULARY Apply topically daily CBD oil  Historical Provider, MD   atenolol (TENORMIN) 50 MG tablet Take 1 tablet by mouth daily  Shira Palomino MD   diphenoxylate-atropine (LOMOTIL) 2.5-0.025 MG per tablet Take 1 tablet by mouth 4 times daily as needed for Diarrhea for up to 30 days. Shira Palomino MD   venlafaxine (EFFEXOR XR) 150 MG extended release capsule Take 1 capsule by mouth daily  Shira Palomino MD   losartan (COZAAR) 100 MG tablet Take 1 tablet by mouth daily  Shira Palomino MD   pravastatin (PRAVACHOL) 40 MG tablet Take 1 tablet by mouth daily  Shira Palomino MD   Coenzyme Q10 400 MG CAPS Take 400 mg by mouth daily  Historical Provider, MD   QUEtiapine (SEROQUEL) 25 MG tablet Take 1 tablet by mouth nightly  Shira Palomino MD   amLODIPine (NORVASC) 5 MG tablet Take 1 tablet by mouth daily  Shira Palomino MD   Omega-3 Fatty Acids (FISH OIL) 1200 MG CPDR Take 2 capsules by mouth daily  Historical Provider, MD   Echinacea 400 MG CAPS Take 2 capsules by mouth daily Takes once cap daily during summer.   Historical Provider, MD   melatonin 10 MG CAPS capsule Take 30 mg by mouth nightly  Historical Provider, MD   5-Hydroxytryptophan (5-HTP) 100 MG CAPS Take 3 capsules by mouth nightly  Historical rhythm. Heart sounds: Normal heart sounds. No murmur heard. Pulmonary:      Effort: Pulmonary effort is normal. No respiratory distress. Breath sounds: Normal breath sounds. No wheezing or rales. Abdominal:      General: Bowel sounds are normal. There is no distension. Palpations: Abdomen is soft. There is no mass. Genitourinary:     Penis: Normal.       Prostate: Normal.   Musculoskeletal:         General: Normal range of motion. Cervical back: Normal range of motion and neck supple. Skin:     General: Skin is warm. Findings: No rash. Neurological:      Mental Status: He is alert and oriented to person, place, and time. Psychiatric:         Behavior: Behavior normal.         ASSESSMENT/PLAN:  1. Chronic systolic CHF (congestive heart failure) ([facility])  Echocardiogram 3/25/2022 showed REDUCED ejection fraction at 35% with grade 1 diastolic dysfunction  Echocardiogram in  2/25/2019 showed normal LV size with ejection fraction of 55 to 60%. Patient advised to continue Lasix 40 mg daily plus KCl, losartan 100 mg daily and the atenolol 50 mg daily. He is told to set up an appointment with his cardiologist Dr. Afshan Reyna and to discuss other possible causes of congestive heart failure including underlying coronary artery disease    2. Essential hypertension  Blood pressure is better since restarting on atenolol 50 mg daily. He also takes losartan 100 mg daily and hydralazine 50 mg twice daily  - CBC with Auto Differential; Future    3. Hyperlipidemia LDL goal <100  Controlled. Continue Pravachol 40 mg daily. Will order lipid panel.  - T4, Free; Future  - TSH; Future  - Lipid, Fasting; Future    4. PAF (paroxysmal atrial fibrillation) ([facility])  Controlled ventricular rate with atenolol 50 mg daily. He is now on Coumadin  And no longer on Eliquis because of high co-pay    5. Sick sinus syndrome [facility])  S/p pacemaker placement 6/4/2021    6.  Dysthymia  Continue Effexor 150 mg daily plus Seroquel 25 mg at night    7. Prediabetes  - Comprehensive Metabolic Panel, Fasting;  Future  - Hemoglobin A1C; Future      RTC in 2 mos and PRN    An electronic signature was used to authenticate this note.    --Jerome Murdock MD on 4/8/2022 at 6:26 PM

## 2022-04-11 ENCOUNTER — TELEPHONE (OUTPATIENT)
Dept: CARDIOLOGY CLINIC | Age: 77
End: 2022-04-11

## 2022-04-11 NOTE — TELEPHONE ENCOUNTER
Lupe Duckworth called in and states that he saw his PCP and they recommend he get in right away to see Dr. Shana Bustos due to recent congestive heart failure.     He can be reached back at [phone]

## 2022-04-11 NOTE — TELEPHONE ENCOUNTER
Spoke with patient and scheduled NP appointment with Dr. Wilmer Palomo at the [facility] office for 4/20/22. He v/u.

## 2022-04-20 ENCOUNTER — OFFICE VISIT (OUTPATIENT)
Dept: CARDIOLOGY CLINIC | Age: 77
End: 2022-04-20
Payer: MEDICARE

## 2022-04-20 VITALS
SYSTOLIC BLOOD PRESSURE: 124 MMHG | HEIGHT: 68 IN | DIASTOLIC BLOOD PRESSURE: 76 MMHG | HEART RATE: 73 BPM | OXYGEN SATURATION: 99 % | WEIGHT: 158 LBS | BODY MASS INDEX: 23.95 KG/M2

## 2022-04-20 DIAGNOSIS — I48.0 PAF (PAROXYSMAL ATRIAL FIBRILLATION) (HCC): ICD-10-CM

## 2022-04-20 DIAGNOSIS — E78.5 HYPERLIPIDEMIA LDL GOAL <100: ICD-10-CM

## 2022-04-20 DIAGNOSIS — R06.02 SHORTNESS OF BREATH: Primary | ICD-10-CM

## 2022-04-20 DIAGNOSIS — I42.0 DILATED CARDIOMYOPATHY (HCC): ICD-10-CM

## 2022-04-20 DIAGNOSIS — I10 ESSENTIAL HYPERTENSION: ICD-10-CM

## 2022-04-20 DIAGNOSIS — I49.5 SICK SINUS SYNDROME (HCC): ICD-10-CM

## 2022-04-20 DIAGNOSIS — Z01.810 PREOP CARDIOVASCULAR EXAM: ICD-10-CM

## 2022-04-20 PROBLEM — I50.20 SYSTOLIC HEART FAILURE (HCC): Status: ACTIVE | Noted: 2022-04-20

## 2022-04-20 LAB
ANION GAP SERPL CALCULATED.3IONS-SCNC: 14 MMOL/L (ref 3–16)
ANISOCYTOSIS: ABNORMAL
BASOPHILS ABSOLUTE: 0.1 K/UL (ref 0–0.2)
BASOPHILS RELATIVE PERCENT: 0.7 %
BUN BLDV-MCNC: 28 MG/DL (ref 7–20)
CALCIUM SERPL-MCNC: 9.1 MG/DL (ref 8.3–10.6)
CHLORIDE BLD-SCNC: 104 MMOL/L (ref 99–110)
CO2: 24 MMOL/L (ref 21–32)
CREAT SERPL-MCNC: 0.8 MG/DL (ref 0.8–1.3)
EOSINOPHILS ABSOLUTE: 0.6 K/UL (ref 0–0.6)
EOSINOPHILS RELATIVE PERCENT: 5.8 %
GFR AFRICAN AMERICAN: >60
GFR NON-AFRICAN AMERICAN: >60
GLUCOSE BLD-MCNC: 100 MG/DL (ref 70–99)
HCT VFR BLD CALC: 31.8 % (ref 40.5–52.5)
HEMATOLOGY PATH CONSULT: YES
HEMOGLOBIN: 9.7 G/DL (ref 13.5–17.5)
LYMPHOCYTES ABSOLUTE: 1.2 K/UL (ref 1–5.1)
LYMPHOCYTES RELATIVE PERCENT: 12.5 %
MACROCYTES: ABNORMAL
MCH RBC QN AUTO: 19.9 PG (ref 26–34)
MCHC RBC AUTO-ENTMCNC: 30.4 G/DL (ref 31–36)
MCV RBC AUTO: 65.6 FL (ref 80–100)
MICROCYTES: ABNORMAL
MONOCYTES ABSOLUTE: 1.1 K/UL (ref 0–1.3)
MONOCYTES RELATIVE PERCENT: 10.8 %
NEUTROPHILS ABSOLUTE: 7 K/UL (ref 1.7–7.7)
NEUTROPHILS RELATIVE PERCENT: 70.2 %
OVALOCYTES: ABNORMAL
PDW BLD-RTO: 18.2 % (ref 12.4–15.4)
PLATELET # BLD: 325 K/UL (ref 135–450)
PMV BLD AUTO: 7.4 FL (ref 5–10.5)
POTASSIUM SERPL-SCNC: 4.5 MMOL/L (ref 3.5–5.1)
RBC # BLD: 4.85 M/UL (ref 4.2–5.9)
SCHISTOCYTES: ABNORMAL
SLIDE REVIEW: ABNORMAL
SODIUM BLD-SCNC: 142 MMOL/L (ref 136–145)
WBC # BLD: 9.9 K/UL (ref 4–11)

## 2022-04-20 PROCEDURE — 1123F ACP DISCUSS/DSCN MKR DOCD: CPT | Performed by: INTERNAL MEDICINE

## 2022-04-20 PROCEDURE — G8427 DOCREV CUR MEDS BY ELIG CLIN: HCPCS | Performed by: INTERNAL MEDICINE

## 2022-04-20 PROCEDURE — 1036F TOBACCO NON-USER: CPT | Performed by: INTERNAL MEDICINE

## 2022-04-20 PROCEDURE — G8420 CALC BMI NORM PARAMETERS: HCPCS | Performed by: INTERNAL MEDICINE

## 2022-04-20 PROCEDURE — 99214 OFFICE O/P EST MOD 30 MIN: CPT | Performed by: INTERNAL MEDICINE

## 2022-04-20 PROCEDURE — 93000 ELECTROCARDIOGRAM COMPLETE: CPT | Performed by: INTERNAL MEDICINE

## 2022-04-20 PROCEDURE — 36415 COLL VENOUS BLD VENIPUNCTURE: CPT | Performed by: INTERNAL MEDICINE

## 2022-04-20 PROCEDURE — 4040F PNEUMOC VAC/ADMIN/RCVD: CPT | Performed by: INTERNAL MEDICINE

## 2022-04-20 RX ORDER — METOPROLOL SUCCINATE 50 MG/1
50 TABLET, EXTENDED RELEASE ORAL DAILY
Qty: 30 TABLET | Refills: 3 | Status: SHIPPED | OUTPATIENT
Start: 2022-04-20 | End: 2022-08-16

## 2022-04-20 RX ORDER — SPIRONOLACTONE 25 MG/1
25 TABLET ORAL DAILY
Qty: 90 TABLET | Refills: 1 | Status: SHIPPED | OUTPATIENT
Start: 2022-04-20 | End: 2022-08-16

## 2022-04-20 NOTE — PATIENT INSTRUCTIONS
Patient Education        Left Heart Catheterization: About This Test  What is it? Left heart catheterization is a test to check the left side of your heart. Your doctor might check the structure of your heart, the motion of your heart, orthe blood pressure inside the chambers. Why is this test done? This test gives information about how your heart is working. It can:   Check blood flow and blood pressure in the chambers of the heart.  Check the pumping action of the heart.  Find out if a heart defect is present and how severe it is.  Find out how well the heart valves work. How is the test done?  You may get medicine to help you relax.  You will get a shot to numb the skin where the catheter goes in.  A thin tube called a catheter is put into a blood vessel in your groin or wrist. The doctor moves the catheter through the blood vessel into your heart.  Dye may be injected into your heart. Your doctor can watch on special monitors as the dye moves in your heart. The dye helps your doctor see blood flow in your heart.  If a heart defect is found, cardiac catheterization sometimes is used to repair it during the test.   After the procedure, pressure may be applied for a short time to the area where the catheter was put into your blood vessel. This will help prevent bleeding. A small device may also be used to close the blood vessel. You may have a bandage or compression device on the catheter site.  You will stay in a room for at least a few hours to make sure the catheter site starts to heal.   If the catheter was placed in your groin, you may lie in bed for up to a few hours. If the catheter was put in your wrist, you will need to keep your arm still for at least 1 hour. How long does it take? The test may take about 1 hour. But you need time to get ready for the test andtime to recover. This can take a few hours.   What happens after the test?   You may or may not need to stay in the hospital overnight. You will get more instructions for what to do at home.  Drink plenty of fluids for several hours after the test. If you have kidney, heart, or liver disease and have to limit fluids, talk with your doctor before you increase the amount of fluids you drink. Follow-up care is a key part of your treatment and safety. Be sure to make and go to all appointments, and call your doctor if you are having problems. It's also a good idea to know your test results and keep alist of the medicines you take. Where can you learn more? Go to https://InSamplepeCityIN.Cebix. org and sign in to your Affresol account. Enter W306 in the Ph[employer] box to learn more about \"Left Heart Catheterization: About This Test.\"     If you do not have an account, please click on the \"Sign Up Now\" link. Current as of: January 10, 2022               Content Version: 13.2  © 2006-2022 [employer]. Care instructions adapted under license by [facility] ([facility]). If you have questions about a medical condition or this instruction, always ask your healthcare professional. [place] [address] any warranty or liability for your use of this information. Patient Education        Dilated Cardiomyopathy: Care Instructions  Your Care Instructions     Dilated cardiomyopathy is a condition that weakens your heart muscle and causes it to stretch, or dilate. When your heart muscle is weak, it can't pump out blood as well as it should. More blood stays in your heart after each heartbeat. As more blood fills and stays in the heart, the heart musclestretches even more and gets even weaker. Many things can cause dilated cardiomyopathy. It can be caused by another disease or condition. Some people have a family history of dilatedcardiomyopathy. For some people, the cause is not known. You may not have any symptoms at first. Or you may have symptoms, such as feeling very tired or weak.  If your heart gets weaker, you may develop heart failure. Heart failure means that your heart muscle doesn't pump as much blood as your body needs. If this happens, you will feel other symptoms such asshortness of breath or trouble breathing when you lie down. The goal of treatment is to slow the disease and help you feel better. You may also have treatment for the cause of the cardiomyopathy. You will probably take a few medicines. If your doctor thinks it will help your heart and prevent problems, you may get a device such as a pacemaker. Self-care is another important part of your treatment. It includes the things you can do every dayto feel better and stay as healthy as possible. Follow-up care is a key part of your treatment and safety. Be sure to make and go to all appointments, and call your doctor if you are having problems. It's also a good idea to know your test results and keep alist of the medicines you take. How can you care for yourself at home? Medicines     Be safe with medicines. Take your medicines exactly as prescribed. Call your doctor if you think you are having a problem with your medicine. You may be taking some of the following medicines:  ? Angiotensin-converting enzyme (ACE) inhibitors or angiotensin II receptor blockers (ARBs). These make it easier for blood to flow. ? Diuretics. These help remove excess fluid from the body. ? Beta-blockers. These slow the heart rate and can help the heart fill with blood more completely. Heart-healthy lifestyle     Be active. Exercise regularly, but don't exercise too hard. If you aren't already active, your doctor may want you to start exercising. But don't start until you have talked with your doctor to make an exercise program that is safe for you.      Do not smoke. Smoking can make a heart condition worse. If you need help quitting, talk to your doctor about stop-smoking programs and medicines. These can increase your chances of quitting for good.    Eat a heart-healthy diet.      Stay at a healthy weight. Lose weight if you need to.      If your doctor recommends it, limit sodium. This helps keep fluid from building up in your body. It may help you feel better.      Manage other health problems. These include diabetes, high blood pressure, and high cholesterol. If you think you may have a problem with alcohol or drug use, talk to your doctor. Weight monitoring     Weigh yourself without clothing at the same time each day. Record your weight. Call your doctor if you have a sudden weight gain, such as more than 2 to 3 pounds in a day or 5 pounds in a week. (Your doctor may suggest a different range of weight gain.) A sudden weight gain may mean that your condition is getting worse. When should you call for help? Call 911 anytime you think you may need emergency care. For example, call if:     You have symptoms of sudden heart failure. These may include:  ? Severe trouble breathing. ? A fast or irregular heartbeat. ? Coughing up pink, foamy mucus. ? Passing out. Call your doctor now or seek immediate medical care if:     You have new or changed symptoms of heart failure, such as:  ? New or increased shortness of breath. ? New or worse swelling in your legs, ankles, or feet. ? Sudden weight gain, such as more than 2 to 3 pounds in a day or 5 pounds in a week. (Your doctor may suggest a different range of weight gain.)  ? Feeling dizzy or lightheaded or like you may faint. ? Feeling so tired or weak that you cannot do your usual activities. ? Not sleeping well. Shortness of breath wakes you at night. You need extra pillows to prop yourself up to breathe easier. Watch closely for changes in your health, and be sure to contact your doctor ifyou have any problems. Where can you learn more? Go to https://cheric.[employer] Digital. org and sign in to your JobSlot account.  Enter B164 in the Letyano box to learn more about \"Dilated Cardiomyopathy: Care Instructions. \"     If you do not have an account, please click on the \"Sign Up Now\" link. Current as of: January 10, 2022               Content Version: 13.2  © 2006-2022 Healthwise, Incorporated. Care instructions adapted under license by [facility] ([facility]). If you have questions about a medical condition or this instruction, always ask your healthcare professional. [place] [address] any warranty or liability for your use of this information. Scheduled for Left heart catheterization with Dr. Yanci Aguilar on Thursday 4/28/22 at 12 PM with an arrival of 1030 AM.     The morning of your procedure you will park in the hospital parking lot and report directly to the cath lab to check in.     Pre-Procedure Instructions   1. You will need to fast for at least 8 hours prior to procedure. No caffeine, gum or mints the morning of procedure. 2. You will need to hold your anticoagulation, WARFARIN for 5 days prior. 3. Hold your diuretic, FUROSEMIDE the morning of procedure. 4. Hold all diabetic medications including, Metformin. If you take Lantus/Levemir only take ½ your normal dose the evening before. All other medications can be taken in the morning with sips of water. 5. You will need to take 325 mg aspirin the morning of. If you are currently taking 81 mg please take 4 tablets that morning. 6. Do not use any lotions, creams or perfume the morning of procedure. 7. Pre-procedure lab work will need to be completed 5-7 days prior to procedure. 8. Please have a responsible adult to drive you home after procedure. We advise you have someone stay with you for the first 24 hours for precautionary measures. Depending on your procedure you may require an overnight stay. 9. Cath lab will provide you with all post procedure instructions. If you have any questions regarding the procedure itself or medications, please call [phone] and ask to speak with a nurse.     1. Change from atenolol to Toprol XL 50 mg daily. 2. Stop potassium and amlodipine, change to spironolactone 25 mg daily which will help manage blood pressure and is indicated for management of cardiomyopathy. 3. Stop hydralazine and if BP becomes elevated may resume.

## 2022-04-20 NOTE — PROGRESS NOTES
[facility]  Cardiology Consult Note      Roberto Ley  1945, 64 y.o.    CC: \" I've been short of breath. \"               Terese Reid MD:      HPI:   This is a 64 y.o. 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 Arthur Arshad, atrial fibrillation s/p afib and left atrial flutter ablation on 8/20/21 with Dr. Arthur Arshad, COVID-19 pneumonia (12/2020), COPD, hyperlipidemia, hypertension, anxiety and depression. He is referred by Dr. Arthur Arshad for evaluation of CHF. Today, he is accompanied by his wife. He states he has been very active all his life. Quit smoking 35 yrs ago and quit drinking 37 years ago. States he only felt fatigue prior to pacemaker placement. Approximately 1 month ago he felt ill, heard rattling in his chest and felt flu like symptoms. He presented to the ED in [facility] and [facility] some mention of congestive heart failure. \" Echo was completed and showed EF of 35%. He states his breathing has improved since starting Lasix. He feels as though his activity level has also improved. He participates in Yoga daily and rides a stationary bike for 2 miles everyday. He has remained compliant with medical therapy.        Past Medical History:   Diagnosis Date    Anxiety     COPD, mild ([address] 4/24/2017    Depression     History of blood transfusion     Hyperlipidemia     Hypertension     IBS (irritable bowel syndrome) 10/16/2013    Insomnia     PAF (paroxysmal atrial fibrillation) ([address] 8/10/2021    Sick sinus syndrome ([address] 8/10/2021      Past Surgical History:   Procedure Laterality Date    ABLATION OF DYSRHYTHMIC FOCUS      APPENDECTOMY      CARDIAC SURGERY      pacemaker    COLONOSCOPY  03/19/2019    [place]    COLONOSCOPY N/A 3/19/2019    COLONOSCOPY WITH BIOPSY performed by Lupillo Eddy MD at [address] COLONOSCOPY N/A 3/19/2019    COLONOSCOPY POLYPECTOMY SNARE/COLD BIOPSY performed by Lupillo Eddy MD at [facility] ENDOSCOPY    PACEMAKER INSERTION      TONSILLECTOMY AND ADENOIDECTOMY        Family History   Problem Relation Age of Onset    Stroke Maternal Grandfather     Cancer Other         leukemia    Hypertension Sister     Hypertension Brother     Hypertension Brother       Social History     Tobacco Use    Smoking status: Former Smoker     Packs/day: 1.00     Years: 20.00     Pack years: 20.00     Types: Cigarettes     Quit date: 1988     Years since quittin.6    Smokeless tobacco: Never Used   Vaping Use    Vaping Use: Never used   Substance Use Topics    Alcohol use: No    Drug use: No     No Known Allergies      Review of Systems -   Constitutional: Negative for weight gain/loss; malaise, fever  Respiratory: Negative for Asthma;  cough and hemoptysis  Cardiovascular: Negative for palpitations,dizziness   Gastrointestinal: Negative for abd.pain; constipation/diarrhea;    Genitourinary: Negative for stones; hematuria; frequency hesitancy  Integumentt: Negative for rash or pruritis  Hematologic/lymphatic: Negative for blood dyscrasia; leukemia/lymphoma  Musculoskeletal: Negative for Connective tissue disease  Neurological:  Negative for Seizure   Behavioral/Psych:Negative for Bipolar disorder, Schizophrenia; Dementia  Endocrine: negative for thyroid, parathyroid disease    Physical Examination:    /76   Pulse 73   Ht 5' 8\" (1.727 m)   Wt 158 lb (71.7 kg)   SpO2 99%   BMI 24.02 kg/m²    HEENT:  Face: Atraumatic, Conjunctiva: Pink; non icteric,  Mucous Memb:  Moist, No thyromegaly or Lymphadenopathy  Respiratory:  Resp Assessment: normal, Resp Auscultation: clear   Cardiovascular: Auscultation: nl S1 & S2, Palpation:  Nl PMI;  No heaves or thrills, JVP:  normal  Abdomen: Soft, non-tender, Normal bowel sounds,  No organomegaly  Extremities: No Cyanosis or Clubbing  Neurological: Oriented to time, place, and person, Non-anxious  Psychiatric: Normal mood and affect  Skin: Warm and dry,  No rash seen     Outpatient Medications Marked as Taking for the 4/20/22 encounter (Office Visit) with Janet Monzon MD   Medication Sig Dispense Refill    furosemide (LASIX) 40 MG tablet Take 1 tablet by mouth daily 90 tablet 0    hydrALAZINE (APRESOLINE) 50 MG tablet Take 1 tablet by mouth 2 times daily 180 tablet 1    potassium chloride (KLOR-CON M) 20 MEQ extended release tablet Take 1 tablet by mouth daily 90 tablet 1    estazolam (PROSOM) 2 MG tablet TAKE 1 TO 2 TABLETS BY MOUTH EVERY NIGHT AS NEEDED FOR SLEEP 60 tablet 0    warfarin (COUMADIN) 5 MG tablet TAKE ONE (1) TABLET BY MOUTH DAILY OR AS DIRECTED BY THE [facility]. 30 tablet 3    NONFORMULARY Apply topically daily CBD oil      atenolol (TENORMIN) 50 MG tablet Take 1 tablet by mouth daily 90 tablet 1    venlafaxine (EFFEXOR XR) 150 MG extended release capsule Take 1 capsule by mouth daily 90 capsule 1    losartan (COZAAR) 100 MG tablet Take 1 tablet by mouth daily 90 tablet 1    pravastatin (PRAVACHOL) 40 MG tablet Take 1 tablet by mouth daily 90 tablet 1    Coenzyme Q10 400 MG CAPS Take 400 mg by mouth daily      QUEtiapine (SEROQUEL) 25 MG tablet Take 1 tablet by mouth nightly 90 tablet 1    amLODIPine (NORVASC) 5 MG tablet Take 1 tablet by mouth daily 90 tablet 1    Omega-3 Fatty Acids (FISH OIL) 1200 MG CPDR Take 2 capsules by mouth daily      Echinacea 400 MG CAPS Take 2 capsules by mouth daily Takes once cap daily during summer.       melatonin 10 MG CAPS capsule Take 30 mg by mouth nightly      5-Hydroxytryptophan (5-HTP) 100 MG CAPS Take 3 capsules by mouth nightly      GAMMA AMINOBUTYRIC ACID PO Take 1,500 mg by mouth nightly      L-Tryptophan 500 MG TABS Take 3,000 mg by mouth nightly      L-Theanine 200 MG CAPS Take 600 mg by mouth nightly      vitamin D (CHOLECALCIFEROL) 125 MCG (5000 UT) CAPS capsule Take 5,000 Units by mouth daily      Multiple Vitamins-Minerals (MULTIVITAMIN) LIQD Take 30 mLs by mouth daily Has 80 mcg of vitamin K -2  Has 50 mg of ginsing root      Fluticasone furoate-vilanterol (BREO ELLIPTA) 200-25 MCG/INH AEPB inhaler Inhale 1 puff into the lungs daily 180 each 1    albuterol sulfate HFA (VENTOLIN HFA) 108 (90 Base) MCG/ACT inhaler Inhale 2 puffs into the lungs every 6 hours as needed for Wheezing or Shortness of Breath 3 Inhaler 1    famotidine (PEPCID) 20 MG tablet Take 1 tablet by mouth 2 times daily 60 tablet 3       Labs:   No results for input(s): WBC, HGB, HCT, PLT in the last 72 hours. No results for input(s): NA, K, CO2, BUN, CREATININE, LABGLOM in the last 72 hours. No results for input(s): BNP in the last 72 hours. Lab Results   Component Value Date    HDL 49 2021    HDL 49 2011    LDLCALC 67 2021    TRIG 108 2020       EK22, Sinus rhythm with V pacing    ECHO: 3/25/22   -Overall left ventricular systolic function is moderately depressed .   -Ejection fraction is visually estimated to be 35%. E/e'= 10.7   -There is global hypokinesis. - Grade I diastolic dysfunction with normal LV filling pressures. -Mild thickening of leaflets of mitral valve. -Mild to moderate mitral regurgitation.   -Dilated left atrium with a volume of 92.7 ml.   -The aortic valve appears sclerotic but opens well. -Mild aortic regurgitation.   -Right ventricular systolic function is normal . TAPSE= 2.61 RV S'= 13.5   -Pacer / ICD wire is visualized in the right ventricle. -Moderate tricuspid regurgitation.   -Systolic pulmonary artery pressure (SPAP) estimated at 38 mmHg (RA pressure   3 mmHg). -IVC size is dilated (>2.1 cm) but collapses > 50% with respiration   consistent with elevated RA pressure (8 mmHg).      Venous doppler 21          Localized swelling and possible hematoma in right groin region    Displacement of the common femoral vein but it remains patent    Nonvisualization of the sapheno-femoral junction    No evidence of right femoral artery aneurysm           ASSESSMENT AND PLAN:    Shortness of breath/Cardiomyopathy  Echo shows new cardiomyopathy, EF of 35% (3/2022)  Will need a LHC to r/o CAD  Change atenolol to Toprol XL 50 mg daily  Discontinue potassium and amlodipine   Begin spironolactone 25 mg daily   Continue losartan and furosemide   Labs prior to [facility]; BMP, CBC     PAF (paroxysmal atrial fibrillation) (HCC)  S/p afib and left atrial flutter ablation with Dr. Shelley Arreguin 8/2021  [id number] Score for Atrial Fibrillation Stroke Risk at least 4 (CHF, HTN, AGE)  Anticoagulated with Warfarin managed [facility]   Discussed switching to DOAC, Eliquis 5 mg bid. May consider switching at f/u. Essential hypertension  Today's BP is controlled   Stop amlodipine and hydralazine  Start spironolactone 25 mg daily for cardiomyopathy and BP control   If BP is uncontrolled, may resume hydralazine 50 mg twice daily     Hyperlipidemia LDL goal <100  Reviewed lipid panel completed on 5/13/21; WNL   Continue statin therapy     Sick sinus syndrome [facility])  S/p Medtronic pacemaker implanted 6/4/21  Managed per EP   May require upgrade to Bi-V ICD; will await results of LHC     The patient has new onset LV dysfunction. The septum is not moving. 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 However we will do an angiogram to rule out CAD. If the arteries are normal then he will need a BiV revision  I have made some adjustment to his medicines which includes discontinuing potassium atenolol amlodipine. Instead he will take Toprol-XL spironolactone. I also recommended that he take Xarelto instead of warfarin which she concurs with    Follow up post procedure      Thank you very much for allowing me to participate in the care of your patient. Please do not hesitate to contact me if you have any questions.       Sincerely,    Tricia Favre M.D  [address] [place] [address]  Ph: [phone]  Fax: [phone]    This note was scribed in the presence of Dr. Balbina Kearns MD by Aminta Armas RN  Physician Attestation:  The scribes documentation has been prepared under my direction and personally reviewed by me in its entirety. I confirm that the note above accurately reflects all work, treatment, procedures, and medical decision making performed by me. at baseline, playful

## 2022-04-21 ENCOUNTER — TELEPHONE (OUTPATIENT)
Dept: PHARMACY | Age: 77
End: 2022-04-21

## 2022-04-21 ENCOUNTER — ANTI-COAG VISIT (OUTPATIENT)
Dept: PHARMACY | Age: 77
End: 2022-04-21

## 2022-04-21 ENCOUNTER — PATIENT MESSAGE (OUTPATIENT)
Dept: PRIMARY CARE CLINIC | Age: 77
End: 2022-04-21

## 2022-04-21 PROBLEM — I48.0 PAF (PAROXYSMAL ATRIAL FIBRILLATION) (HCC): Status: RESOLVED | Noted: 2021-08-10 | Resolved: 2022-04-21

## 2022-04-21 LAB — HEMATOLOGY PATH CONSULT: NORMAL

## 2022-04-21 RX ORDER — APIXABAN 5 MG/1
TABLET, FILM COATED ORAL
Qty: 180 TABLET | Refills: 1 | OUTPATIENT
Start: 2022-04-21

## 2022-04-21 NOTE — TELEPHONE ENCOUNTER
Sara Martinez called and reports that he is being switched from warfarin to Eliquis. Canceled 5/3 appt     Will close referral.  He is always welcome to return. Wanted to let Rebecca Giuseppe know he appreciated everything he has done for him.      Danita Harrison, PharmD, [address] [place] [address]  Anticoagulation Service  [phone]

## 2022-04-22 NOTE — TELEPHONE ENCOUNTER
From: Roberto Ley  To: Dr. Joseph Camp: 4/21/2022 2:55 PM EDT  Subject: change of medication    Dr. Yordy Vargas,    Dr. Monalisa Leonard has requested that I stop using warfarin and that I go back to Eliquis. I have called the order for Eliquis into [facility], but since there are 5 refills till June, 2022, indicated on the Eliquis bottle, there should be no need of any actions by you.

## 2022-04-26 RX ORDER — QUETIAPINE FUMARATE 25 MG/1
TABLET, FILM COATED ORAL
Qty: 180 TABLET | Refills: 1 | Status: SHIPPED | OUTPATIENT
Start: 2022-04-26

## 2022-04-28 ENCOUNTER — HOSPITAL ENCOUNTER (OUTPATIENT)
Dept: CARDIAC CATH/INVASIVE PROCEDURES | Age: 77
Discharge: HOME | End: 2022-04-28
Payer: MEDICARE

## 2022-04-28 VITALS
DIASTOLIC BLOOD PRESSURE: 83 MMHG | SYSTOLIC BLOOD PRESSURE: 145 MMHG | TEMPERATURE: 97.3 F | OXYGEN SATURATION: 100 % | RESPIRATION RATE: 20 BRPM | HEART RATE: 85 BPM

## 2022-04-28 LAB
LEFT VENTRICULAR EJECTION FRACTION MODE: NORMAL
LV EF: 35 %
POC ACT LR: 202 SEC

## 2022-04-28 PROCEDURE — C1887 CATHETER, GUIDING: HCPCS

## 2022-04-28 PROCEDURE — 99220 PR INITIAL OBSERVATION CARE/DAY 70 MINUTES: CPT | Performed by: INTERNAL MEDICINE

## 2022-04-28 PROCEDURE — 93458 L HRT ARTERY/VENTRICLE ANGIO: CPT | Performed by: INTERNAL MEDICINE

## 2022-04-28 PROCEDURE — 93458 L HRT ARTERY/VENTRICLE ANGIO: CPT

## 2022-04-28 PROCEDURE — C1894 INTRO/SHEATH, NON-LASER: HCPCS

## 2022-04-28 PROCEDURE — 92978 ENDOLUMINL IVUS OCT C 1ST: CPT | Performed by: INTERNAL MEDICINE

## 2022-04-28 PROCEDURE — 99152 MOD SED SAME PHYS/QHP 5/>YRS: CPT

## 2022-04-28 PROCEDURE — 99153 MOD SED SAME PHYS/QHP EA: CPT

## 2022-04-28 PROCEDURE — 85347 COAGULATION TIME ACTIVATED: CPT

## 2022-04-28 PROCEDURE — C1753 CATH, INTRAVAS ULTRASOUND: HCPCS

## 2022-04-28 PROCEDURE — 92978 ENDOLUMINL IVUS OCT C 1ST: CPT

## 2022-04-28 PROCEDURE — 99152 MOD SED SAME PHYS/QHP 5/>YRS: CPT | Performed by: INTERNAL MEDICINE

## 2022-04-28 PROCEDURE — 92979 ENDOLUMINL IVUS OCT C EA: CPT

## 2022-04-28 PROCEDURE — 2709999900 HC NON-CHARGEABLE SUPPLY

## 2022-04-28 PROCEDURE — 2500000003 HC RX 250 WO HCPCS

## 2022-04-28 PROCEDURE — C1769 GUIDE WIRE: HCPCS

## 2022-04-28 PROCEDURE — 6360000002 HC RX W HCPCS

## 2022-04-28 PROCEDURE — 6360000004 HC RX CONTRAST MEDICATION: Performed by: INTERNAL MEDICINE

## 2022-04-28 RX ORDER — SODIUM CHLORIDE 9 MG/ML
INJECTION, SOLUTION INTRAVENOUS PRN
Status: DISCONTINUED | OUTPATIENT
Start: 2022-04-28 | End: 2022-04-29 | Stop reason: HOSPADM

## 2022-04-28 RX ORDER — SODIUM CHLORIDE 0.9 % (FLUSH) 0.9 %
5-40 SYRINGE (ML) INJECTION EVERY 12 HOURS SCHEDULED
Status: DISCONTINUED | OUTPATIENT
Start: 2022-04-28 | End: 2022-04-29 | Stop reason: HOSPADM

## 2022-04-28 RX ORDER — ASPIRIN 325 MG
325 TABLET ORAL ONCE
Status: DISCONTINUED | OUTPATIENT
Start: 2022-04-28 | End: 2022-04-29 | Stop reason: HOSPADM

## 2022-04-28 RX ORDER — SODIUM CHLORIDE 0.9 % (FLUSH) 0.9 %
5-40 SYRINGE (ML) INJECTION PRN
Status: DISCONTINUED | OUTPATIENT
Start: 2022-04-28 | End: 2022-04-29 | Stop reason: HOSPADM

## 2022-04-28 RX ADMIN — IOPAMIDOL 170 ML: 755 INJECTION, SOLUTION INTRAVENOUS at 14:42

## 2022-04-28 NOTE — PRE SEDATION
Brief Pre-Op Note/Sedation Assessment      Nasim Leigh  1945  Room/bed info not found      [id number]  3:03 PM    Planned Procedure: Cardiac Catheterization Procedure    Post Procedure Plan: Return to same level of care    Consent: I have discussed with the patient and/or the patient representative the indication, alternatives, and the possible risks and/or complications of the planned procedure and the anesthesia methods. The patient and/or patient representative appear to understand and agree to proceed.     Chief Complaint: Dyspnea      Indications for Cath Procedure:  Cardiomyopathy  Anginal Classification within 2 weeks:  No symptoms  NYHA Heart Failure Class within 2 weeks: No symptoms  Is Cath Lab Visit Valve-related?: No  Surgical Risk: Low  Functional Type: N/A        Vital Signs:  BP (!) 145/83   Pulse 85   Temp 97.3 °F (36.3 °C) (Infrared)   Resp 20   SpO2 100%     Allergies:  No Known Allergies    Past Medical History:  Past Medical History:   Diagnosis Date    Anxiety     COPD, mild ([address] 4/24/2017    Depression     History of blood transfusion     Hyperlipidemia     Hypertension     IBS (irritable bowel syndrome) 10/16/2013    Insomnia     PAF (paroxysmal atrial fibrillation) ([address] 8/10/2021    Sick sinus syndrome ([address] 8/10/2021         Surgical History:  Past Surgical History:   Procedure Laterality Date    ABLATION OF DYSRHYTHMIC FOCUS      APPENDECTOMY      CARDIAC SURGERY      pacemaker    COLON[facility]COPY  03/19/2019    [place]    COLONOSCOPY N/A 3/19/2019    COLONOSCOPY WITH BIOPSY performed by Ariel Orozco MD at [facility] [address] COLONOSCOPY N/A 3/19/2019    COLONOSCOPY POLYPECTOMY SNARE/COLD BIOPSY performed by Ariel Orozco MD at [place] [address] AND ADENOIDECTOMY           Medications:  Current Outpatient Medications   Medication Sig Dispense Refill    QUEtiapine (SEROQUEL) 25 MG tablet Take 1-2 tablets nightly 180 tablet 1    apixaban (ELIQUIS) 5 MG TABS tablet Take 1 tablet by mouth 2 times daily 60 tablet 0    metoprolol succinate (TOPROL XL) 50 MG extended release tablet Take 1 tablet by mouth daily 30 tablet 3    spironolactone (ALDACTONE) 25 MG tablet Take 1 tablet by mouth daily 90 tablet 1    furosemide (LASIX) 40 MG tablet Take 1 tablet by mouth daily 90 tablet 0    estazolam (PROSOM) 2 MG tablet TAKE 1 TO 2 TABLETS BY MOUTH EVERY NIGHT AS NEEDED FOR SLEEP 60 tablet 0    NONFORMULARY Apply topically daily CBD oil      diphenoxylate-atropine (LOMOTIL) 2.5-0.025 MG per tablet Take 1 tablet by mouth 4 times daily as needed for Diarrhea for up to 30 days.  120 tablet 0    venlafaxine (EFFEXOR XR) 150 MG extended release capsule Take 1 capsule by mouth daily 90 capsule 1    losartan (COZAAR) 100 MG tablet Take 1 tablet by mouth daily 90 tablet 1    pravastatin (PRAVACHOL) 40 MG tablet Take 1 tablet by mouth daily 90 tablet 1    5-Hydroxytryptophan (5-HTP) 100 MG CAPS Take 3 capsules by mouth nightly      GAMMA AMINOBUTYRIC ACID PO Take 1,500 mg by mouth nightly      L-Tryptophan 500 MG TABS Take 3,000 mg by mouth nightly      L-Theanine 200 MG CAPS Take 600 mg by mouth nightly      vitamin D (CHOLECALCIFEROL) 125 MCG (5000 UT) CAPS capsule Take 5,000 Units by mouth daily      Multiple Vitamins-Minerals (MULTIVITAMIN) LIQD Take 30 mLs by mouth daily Has 80 mcg of vitamin K -2  Has 50 mg of ginsing root      Fluticasone furoate-vilanterol (BREO ELLIPTA) 200-25 MCG/INH AEPB inhaler Inhale 1 puff into the lungs daily 180 each 1    albuterol sulfate HFA (VENTOLIN HFA) 108 (90 Base) MCG/ACT inhaler Inhale 2 puffs into the lungs every 6 hours as needed for Wheezing or Shortness of Breath 3 Inhaler 1    famotidine (PEPCID) 20 MG tablet Take 1 tablet by mouth 2 times daily 60 tablet 3     Current Facility-Administered Medications   Medication Dose Route Frequency Provider Last Rate Last Admin    sodium chloride flush 0.9 % injection 5-40 mL  5-40 mL IntraVENous 2 times per day Roberto Bob MD        sodium chloride flush 0.9 % injection 5-40 mL  5-40 mL IntraVENous PRN Roberto Bob MD        0.9 % sodium chloride infusion   IntraVENous PRN Roberto Bob MD        aspirin tablet 325 mg  325 mg Oral Once Roberto Bob MD               Pre-Sedation:    Pre-Sedation Documentation and Exam:  I have personally completed a history, physical exam & review of systems for this patient (see notes). Prior History of Anesthesia Complications:   none    Modified Mallampati:  I (soft palate, uvula, fauces, tonsillar pillars visible)    ASA Classification:  Class 2 - A normal healthy patient with mild systemic disease      Terry Scale: Activity:  2 - Able to move 4 extremities voluntarily on command  Respiration:  2 - Able to breathe deeply and cough freely  Circulation:  2 - BP+/- 20mmHg of normal  Consciousness:  2 - Fully awake  Oxygen Saturation (color):  2 - Able to maintain oxygen saturation >92% on room air    Sedation/Anesthesia Plan:  Guard the patient's safety and welfare. Minimize physical discomfort and pain. Minimize negative psychological responses to treatment by providing sedation and analgesia and maximize the potential amnesia. Patient to meet pre-procedure discharge plan.     Medication Planned:  midazolam intravenously and fentanyl intravenously    Patient is an appropriate candidate for plan of sedation: yes      Electronically signed by Roberto Bob MD on 4/28/2022 at 3:03 PM

## 2022-04-28 NOTE — PROCEDURES
[address]   Procedure Note    CLINICAL HISTORY:       Edward Velázquez is a 64 y.o. male who is status post A. fib and flutter ablation as well as a pacemaker placement had an echocardiogram done for mild shortness of breath. The echo was read as showing EF of 35% which was a substantial drop from his previous echocardiogram.  I reviewed the echo and initially concluded that maybe he has pacemaker induced cardiomyopathy. Nevertheless because of the drop in LV ejection fraction we felt that he needed a coronary angiogram to rule out coronary disease      The risks, benefits, and details of the procedure were explained to the patient. The patient verbalized understanding and wanted to proceed. Informed written consent was obtained. INDICATION:  Cardiomyopathy    PROCEDURES PERFORMED:   Left heart catheterization  IVUS of the left main    PROCEDURE TECHNIQUE:  The patient was approached from the right radial using a 5-6  Divehi slender sheath. Left coronary angiography was done using a JL 3.5 diagnostic catheter. Right coronary angiography was done using a JR4 catheter. Left ventriculography was done using a pigtail catheter. COMPLICATIONS:  None. EBL: 20 cc      Moderate Sedation:  Start time: 1330  Stop time: 1430  5 mg versed   250 mcg fentanyl   An independent trained observer pushed medications at my direction. We monitored the patient's level of consciousness and vital signs/physiologic status throughout the procedure duration (see start and start times above). HEMODYNAMICS:      Aortic pressure was 124  LVEDP 16. There was no gradient between the left ventricle and aorta. CORONARY ARTERIOGRAM:       L Main; shelflike lesion that was at least 50% angiographically. An IVUS was performed and was thought to be much lower    LAD: Heavily calcified proximal lesion before the diagonal/septal branch point had a 5060% stenosis.   On IVUS the artery was 2.5 x 3 mm in diameter    LCX nonobstructive    RCA 40% proximal    LV GRAM:  done in the 30° RUGGIERO projection; Shows normal LV size with an estimated ejection fraction of 40%        CONCLUSION:     Shelflike lesion in the proximal left main which on IVUS was not significant  Proximal/mid LAD has about 60% stenosis angiographically. On IVUS internal diameter was 2.5 x3 millimeters    Left circumflex and RCA had no major obstructive disease  LV: Normal size with EF of about 40%      INTERVENTION      IVUS of the left main and the LAD was performed    Left main:  Proximal/mid LAD: 2.5 x 3 .0      SUMMARY:     Nonsignificant shelflike lesion in the proximal left main  60% proximal LAD; IVUS showed 2.5 x 3 mm internal diameter        Based on the above data I also reviewed his echocardiogram done a few weeks ago  I believe that the patient's LV dysfunction appears to be 35% but may be better, perhaps in the 45 to 50% range. This is because the patient left ventricle has asymmetric contraction from RV pacing. Did not make any changes to his medications. I will see him back in a few weeks    I think I would like to hold off on the recommendation for BiV upgrading for the time being.         [address] [place] [address]  [phone] (O)

## 2022-04-28 NOTE — H&P
[facility]  Cardiology Admission Note      Nasim Leigh  1945, 64 y.o.    CC: \" I've been short of breath. \"               Rhina Alexis MD:      HPI:   This is a 64 y.o. 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 Aline Mercedes, atrial fibrillation s/p afib and left atrial flutter ablation on 8/20/21 with Dr. Aline Mercedes, COVID-19 pneumonia (12/2020), COPD, hyperlipidemia, hypertension, anxiety and depression. He is referred by Dr. Aline Mercedes for evaluation of CHF. Today, he is accompanied by his wife. He states he has been very active all his life. Quit smoking 35 yrs ago and quit drinking 37 years ago. States he only felt fatigue prior to pacemaker placement. Approximately 1 month ago he felt ill, heard rattling in his chest and felt flu like symptoms. He presented to the ED in [place] and [facility] some mention of congestive heart failure. \" Echo was completed and showed EF of 35%. He states his breathing has improved since starting Lasix. He feels as though his activity level has also improved. He participates in Yoga daily and rides a stationary bike for 2 miles everyday. He has remained compliant with medical therapy.        Past Medical History:   Diagnosis Date    Anxiety     COPD, mild ([address] 4/24/2017    Depression     History of blood transfusion     Hyperlipidemia     Hypertension     IBS (irritable bowel syndrome) 10/16/2013    Insomnia     PAF (paroxysmal atrial fibrillation) ([address] 8/10/2021    Sick sinus syndrome ([address] 8/10/2021      Past Surgical History:   Procedure Laterality Date    ABLATION OF DYSRHYTHMIC FOCUS      APPENDECTOMY      CARDIAC SURGERY      pacemaker    COLONOSCOPY  03/19/2019    [place]    COLONOSCOPY N/A 3/19/2019    COLONOSCOPY WITH BIOPSY performed by Ariel Orozco MD at [address] COLONOSCOPY N/A 3/19/2019    COLONOSCOPY POLYPECTOMY SNARE/COLD BIOPSY performed by Ariel Orozco MD at [facility] ENDOSCOPY    PACEMAKER INSERTION      TONSILLECTOMY AND ADENOIDECTOMY        Family History   Problem Relation Age of Onset    Stroke Maternal Grandfather     Cancer Other         leukemia    Hypertension Sister     Hypertension Brother     Hypertension Brother       Social History     Tobacco Use    Smoking status: Former Smoker     Packs/day: 1.00     Years: 20.00     Pack years: 20.00     Types: Cigarettes     Quit date: 1988     Years since quittin.7    Smokeless tobacco: Never Used   Vaping Use    Vaping Use: Never used   Substance Use Topics    Alcohol use: No    Drug use: No     No Known Allergies      Review of Systems -   Constitutional: Negative for weight gain/loss; malaise, fever  Respiratory: Negative for Asthma;  cough and hemoptysis  Cardiovascular: Negative for palpitations,dizziness   Gastrointestinal: Negative for abd.pain; constipation/diarrhea;    Genitourinary: Negative for stones; hematuria; frequency hesitancy  Integumentt: Negative for rash or pruritis  Hematologic/lymphatic: Negative for blood dyscrasia; leukemia/lymphoma  Musculoskeletal: Negative for Connective tissue disease  Neurological:  Negative for Seizure   Behavioral/Psych:Negative for Bipolar disorder, Schizophrenia; Dementia  Endocrine: negative for thyroid, parathyroid disease    Physical Examination:    BP (!) 145/83   Pulse 85   Temp 97.3 °F (36.3 °C) (Infrared)   Resp 20   SpO2 100%    HEENT:  Face: Atraumatic, Conjunctiva: Pink; non icteric,  Mucous Memb:  Moist, No thyromegaly or Lymphadenopathy  Respiratory:  Resp Assessment: normal, Resp Auscultation: clear   Cardiovascular: Auscultation: nl S1 & S2, Palpation:  Nl PMI;  No heaves or thrills, JVP:  normal  Abdomen: Soft, non-tender, Normal bowel sounds,  No organomegaly  Extremities: No Cyanosis or Clubbing  Neurological: Oriented to time, place, and person, Non-anxious  Psychiatric: Normal mood and affect  Skin: Warm and dry,  No rash seen No outpatient medications have been marked as taking for the 22 encounter [facility] Encounter) with [address] [facility] 9 CATH LAB RM 1. Labs:   No results for input(s): WBC, HGB, HCT, PLT in the last 72 hours. No results for input(s): NA, K, CO2, BUN, CREATININE, LABGLOM in the last 72 hours. No results for input(s): BNP in the last 72 hours. Lab Results   Component Value Date    HDL 49 2021    HDL 49 2011    LDLCALC 67 2021    TRIG 108 2020       EK22, Sinus rhythm with V pacing    ECHO: 3/25/22   -Overall left ventricular systolic function is moderately depressed .   -Ejection fraction is visually estimated to be 35%. E/e'= 10.7   -There is global hypokinesis. - Grade I diastolic dysfunction with normal LV filling pressures. -Mild thickening of leaflets of mitral valve. -Mild to moderate mitral regurgitation.   -Dilated left atrium with a volume of 92.7 ml.   -The aortic valve appears sclerotic but opens well. -Mild aortic regurgitation.   -Right ventricular systolic function is normal . TAPSE= 2.61 RV S'= 13.5   -Pacer / ICD wire is visualized in the right ventricle. -Moderate tricuspid regurgitation.   -Systolic pulmonary artery pressure (SPAP) estimated at 38 mmHg (RA pressure   3 mmHg). -IVC size is dilated (>2.1 cm) but collapses > 50% with respiration   consistent with elevated RA pressure (8 mmHg).      Venous doppler 21          Localized swelling and possible hematoma in right groin region    Displacement of the common femoral vein but it remains patent    Nonvisualization of the sapheno-femoral junction    No evidence of right femoral artery aneurysm           ASSESSMENT AND PLAN:    Shortness of breath/Cardiomyopathy  Echo shows new cardiomyopathy, EF of 35% (3/2022)  Will need a LHC to r/o CAD  Change atenolol to Toprol XL 50 mg daily  Discontinue potassium and amlodipine   Begin spironolactone 25 mg daily   Continue losartan and furosemide Labs prior to [facility]; BMP, CBC     PAF (paroxysmal atrial fibrillation) ([facility])  S/p afib and left atrial flutter ablation with Dr. Bakari Gonzalez 8/2021  [id number] Score for Atrial Fibrillation Stroke Risk at least 4 (CHF, HTN, AGE)  Anticoagulated with Warfarin managed at [place]   Discussed switching to DOAC, Eliquis 5 mg bid. May consider switching at f/u. Essential hypertension  Today's BP is controlled   Stop amlodipine and hydralazine  Start spironolactone 25 mg daily for cardiomyopathy and BP control   If BP is uncontrolled, may resume hydralazine 50 mg twice daily     Hyperlipidemia LDL goal <100  Reviewed lipid panel completed on 5/13/21; WNL   Continue statin therapy     Sick sinus syndrome [facility])  S/p Medtronic pacemaker implanted 6/4/21  Managed per EP   May require upgrade to Bi-V ICD; will await results of [facility]     The patient has new onset LV dysfunction. The septum is not moving. 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 However we will do an angiogram to rule out CAD. If the arteries are normal then he will need a BiV revision  I have made some adjustment to his medicines which includes discontinuing potassium atenolol amlodipine. Instead he will take Toprol-XL spironolactone. I also recommended that he take Xarelto instead of warfarin which she concurs with    Patient here for diagnostic angiography.   Procedure and risk explained to him who understands and wishes to proceed

## 2022-05-09 DIAGNOSIS — F51.04 PSYCHOPHYSIOLOGICAL INSOMNIA: ICD-10-CM

## 2022-05-11 ENCOUNTER — OFFICE VISIT (OUTPATIENT)
Dept: CARDIOLOGY CLINIC | Age: 77
End: 2022-05-11
Payer: MEDICARE

## 2022-05-11 ENCOUNTER — TELEPHONE (OUTPATIENT)
Dept: PRIMARY CARE CLINIC | Age: 77
End: 2022-05-11

## 2022-05-11 VITALS
SYSTOLIC BLOOD PRESSURE: 104 MMHG | DIASTOLIC BLOOD PRESSURE: 68 MMHG | BODY MASS INDEX: 23.34 KG/M2 | HEART RATE: 74 BPM | HEIGHT: 68 IN | WEIGHT: 154 LBS | OXYGEN SATURATION: 98 %

## 2022-05-11 DIAGNOSIS — Z95.0 CARDIAC PACEMAKER IN SITU: ICD-10-CM

## 2022-05-11 DIAGNOSIS — I10 ESSENTIAL HYPERTENSION: ICD-10-CM

## 2022-05-11 DIAGNOSIS — E78.5 HYPERLIPIDEMIA LDL GOAL <100: ICD-10-CM

## 2022-05-11 DIAGNOSIS — I48.0 PAF (PAROXYSMAL ATRIAL FIBRILLATION) (HCC): Primary | ICD-10-CM

## 2022-05-11 DIAGNOSIS — I50.20 SYSTOLIC HEART FAILURE, UNSPECIFIED HF CHRONICITY (HCC): ICD-10-CM

## 2022-05-11 PROCEDURE — 1036F TOBACCO NON-USER: CPT | Performed by: INTERNAL MEDICINE

## 2022-05-11 PROCEDURE — 1123F ACP DISCUSS/DSCN MKR DOCD: CPT | Performed by: INTERNAL MEDICINE

## 2022-05-11 PROCEDURE — 4040F PNEUMOC VAC/ADMIN/RCVD: CPT | Performed by: INTERNAL MEDICINE

## 2022-05-11 PROCEDURE — 93000 ELECTROCARDIOGRAM COMPLETE: CPT | Performed by: INTERNAL MEDICINE

## 2022-05-11 PROCEDURE — G8420 CALC BMI NORM PARAMETERS: HCPCS | Performed by: INTERNAL MEDICINE

## 2022-05-11 PROCEDURE — G8427 DOCREV CUR MEDS BY ELIG CLIN: HCPCS | Performed by: INTERNAL MEDICINE

## 2022-05-11 PROCEDURE — 99214 OFFICE O/P EST MOD 30 MIN: CPT | Performed by: INTERNAL MEDICINE

## 2022-05-11 NOTE — TELEPHONE ENCOUNTER
----- Message from Smooth Yang sent at 5/11/2022  2:08 PM EDT -----  Subject: Message to Provider    QUESTIONS  Information for Provider? Good Afternoon, Mr. Jaylen Jackson needs to make a   appointment/referral for blood work at the [facility] can someone   call him back and schedule this for him please. Thanks  ---------------------------------------------------------------------------  --------------  Finis Lady INFO  What is the best way for the office to contact you? OK to leave message on   voicemail, OK to respond with electronic message via [employer] portal (only   for patients who have registered [employer] account)  Preferred Call Back Phone Number? [phone]  ---------------------------------------------------------------------------  --------------  SCRIPT ANSWERS  Relationship to Patient?  Self

## 2022-05-11 NOTE — PROGRESS NOTES
[address]  Cardiology Progress Note      Rosi Mcdaniel  1945, 64 y.o.    CC: \" I have more intense shortness of breath. \"              Jerilyn Gardiner MD:      HPI:   This is a 64 y.o. 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 Dylan Meadows, atrial fibrillation s/p afib and left atrial flutter ablation on 8/20/21, COVID-19 pneumonia (12/2020), COPD, hyperlipidemia, hypertension, anxiety and depression. He had an echocardiogram done for mild shortness of breath. The echo was read as showing EF of 35% which was a substantial drop from his previous echocardiogram.  I reviewed the echo and initially concluded that maybe he has pacemaker induced cardiomyopathy.     Nevertheless because of the drop in LV ejection fraction we felt that he needed a coronary angiogram to rule out coronary disease. Cath showed shelflike lesion in the proximal left main which on IVUS was not significant. Proximal/mid LAD has about 60% stenosis angiographically. Left circumflex and RCA had no major obstructive disease. LV: Normal size with EF of about 40%. I reviewed the echo done on 3/25/22 and I believe that the patient's LV dysfunction appears to be 35% but may be better, perhaps in the 45 to 50% range. This is because the patient left ventricle has asymmetric contraction from RV pacing. Today, he returns in follow up accompanied by his wife. States he gets short of breath with exertion like climbing stairs. This doesn't limit his daily activities. He has remained compliant with medical therapy and tolerating. Denies any new cardiac issues.      Past Medical History:   Diagnosis Date    Anxiety     COPD, mild ([address] 4/24/2017    Depression     History of blood transfusion     Hyperlipidemia     Hypertension     IBS (irritable bowel syndrome) 10/16/2013    Insomnia     PAF (paroxysmal atrial fibrillation) ([address] 8/10/2021    Sick sinus syndrome ([address] 8/10/2021      Past Surgical History:   Procedure Laterality Date    ABLATION OF DYSRHYTHMIC FOCUS      APPENDECTOMY      CARDIAC SURGERY      pacemaker    COLONOSCOPY  2019    [place]    COLONOSCOPY N/A 3/19/2019    COLONOSCOPY WITH BIOPSY performed by Sharlotte Apley, MD at [address] COLONOSCOPY N/A 3/19/2019    COLONOSCOPY POLYPECTOMY SNARE/COLD BIOPSY performed by Sharlotte Apley, MD at [address]      TONSILLECTOMY AND ADENOIDECTOMY        Family History   Problem Relation Age of Onset    Stroke Maternal Grandfather     Cancer Other         leukemia    Hypertension Sister     Hypertension Brother     Hypertension Brother       Social History     Tobacco Use    Smoking status: Former Smoker     Packs/day: 1.00     Years: 20.00     Pack years: 20.00     Types: Cigarettes     Quit date: 1988     Years since quittin.7    Smokeless tobacco: Never Used   Vaping Use    Vaping Use: Never used   Substance Use Topics    Alcohol use: No    Drug use: No     No Known Allergies      Review of Systems -   Constitutional: Negative for weight gain/loss; malaise, fever  Respiratory: Negative for Asthma;  cough and hemoptysis  Cardiovascular: Negative for palpitations,dizziness   Gastrointestinal: Negative for abd.pain; constipation/diarrhea;    Genitourinary: Negative for stones; hematuria; frequency hesitancy  Integumentt: Negative for rash or pruritis  Hematologic/lymphatic: Negative for blood dyscrasia; leukemia/lymphoma  Musculoskeletal: Negative for Connective tissue disease  Neurological:  Negative for Seizure   Behavioral/Psych:Negative for Bipolar disorder, Schizophrenia; Dementia  Endocrine: negative for thyroid, parathyroid disease    Physical Examination:    /68   Pulse 74   Ht 5' 8\" (1.727 m)   Wt 154 lb (69.9 kg)   SpO2 98%   BMI 23.42 kg/m²    HEENT:  Face: Atraumatic, Conjunctiva: Pink; non icteric,  Mucous Memb:  Moist, No thyromegaly or Lymphadenopathy  Respiratory:  Resp Assessment: normal, Resp Auscultation: clear   Cardiovascular: Auscultation: nl S1 & S2, Palpation:  Nl PMI;  No heaves or thrills, JVP:  normal  Abdomen: Soft, non-tender, Normal bowel sounds,  No organomegaly  Extremities: No Cyanosis or Clubbing  Neurological: Oriented to time, place, and person, Non-anxious  Psychiatric: Normal mood and affect  Skin: Warm and dry,  No rash seen     Outpatient Medications Marked as Taking for the 5/11/22 encounter (Office Visit) with Gertrudis Xiao MD   Medication Sig Dispense Refill    estazolam (PROSOM) 2 MG tablet TAKE 1 TO 2 TABLETS BY MOUTH EVERY NIGHT AS NEEDED FOR SLEEP 60 tablet 0    QUEtiapine (SEROQUEL) 25 MG tablet Take 1-2 tablets nightly 180 tablet 1    apixaban (ELIQUIS) 5 MG TABS tablet Take 1 tablet by mouth 2 times daily 60 tablet 0    metoprolol succinate (TOPROL XL) 50 MG extended release tablet Take 1 tablet by mouth daily 30 tablet 3    spironolactone (ALDACTONE) 25 MG tablet Take 1 tablet by mouth daily 90 tablet 1    furosemide (LASIX) 40 MG tablet Take 1 tablet by mouth daily 90 tablet 0    NONFORMULARY Apply topically daily CBD oil      venlafaxine (EFFEXOR XR) 150 MG extended release capsule Take 1 capsule by mouth daily 90 capsule 1    losartan (COZAAR) 100 MG tablet Take 1 tablet by mouth daily 90 tablet 1    pravastatin (PRAVACHOL) 40 MG tablet Take 1 tablet by mouth daily 90 tablet 1    5-Hydroxytryptophan (5-HTP) 100 MG CAPS Take 3 capsules by mouth nightly      GAMMA AMINOBUTYRIC ACID PO Take 1,500 mg by mouth nightly      L-Tryptophan 500 MG TABS Take 3,000 mg by mouth nightly      L-Theanine 200 MG CAPS Take 600 mg by mouth nightly      vitamin D (CHOLECALCIFEROL) 125 MCG (5000 UT) CAPS capsule Take 5,000 Units by mouth daily      Multiple Vitamins-Minerals (MULTIVITAMIN) LIQD Take 30 mLs by mouth daily Has 80 mcg of vitamin K -2  Has 50 mg of ginsing root      Fluticasone furoate-vilanterol (BREO ELLIPTA) 200-25 MCG/INH AEPB inhaler Inhale 1 puff into the lungs daily 180 each 1    albuterol sulfate HFA (VENTOLIN HFA) 108 (90 Base) MCG/ACT inhaler Inhale 2 puffs into the lungs every 6 hours as needed for Wheezing or Shortness of Breath 3 Inhaler 1    famotidine (PEPCID) 20 MG tablet Take 1 tablet by mouth 2 times daily 60 tablet 3       Labs:   No results for input(s): WBC, HGB, HCT, PLT in the last 72 hours. No results for input(s): NA, K, CO2, BUN, CREATININE, LABGLOM in the last 72 hours. No results for input(s): BNP in the last 72 hours. Lab Results   Component Value Date    HDL 49 2021    HDL 49 2011    LDLCALC 67 2021    TRIG 108 2020       EK22, Sinus rhythm with V pacing    LHC: 22  L Main; shelflike lesion that was at least 50% angiographically. An IVUS was performed and was thought to be much lower     LAD: Heavily calcified proximal lesion before the diagonal/septal branch point had a 5060% stenosis. On IVUS the artery was 2.5 x 3 mm in diameter     LCX nonobstructive     RCA 40% proximal     LV GRAM:  done in the 30° RUGGIERO projection; Shows normal LV size with an estimated ejection fraction of 40%       CONCLUSION:   Shelflike lesion in the proximal left main which on IVUS was not significant  Proximal/mid LAD has about 60% stenosis angiographically. On IVUS internal diameter was 2.5 x3 millimeters     Left circumflex and RCA had no major obstructive disease  LV: Normal size with EF of about 40%       IVUS of the left main and the LAD was performed  Left main:  Proximal/mid LAD: 2.5 x 3 .0     SUMMARY:      Nonsignificant shelflike lesion in the proximal left main  60% proximal LAD; IVUS showed 2.5 x 3 mm internal diameter    ECHO: 3/25/22   -Overall left ventricular systolic function is moderately depressed .   -Ejection fraction is visually estimated to be 35%. E/e'= 10.7   -There is global hypokinesis.    - Grade I diastolic dysfunction with normal LV filling pressures. -Mild thickening of leaflets of mitral valve. -Mild to moderate mitral regurgitation.   -Dilated left atrium with a volume of 92.7 ml.   -The aortic valve appears sclerotic but opens well. -Mild aortic regurgitation.   -Right ventricular systolic function is normal . TAPSE= 2.61 RV S'= 13.5   -Pacer / ICD wire is visualized in the right ventricle. -Moderate tricuspid regurgitation.   -Systolic pulmonary artery pressure (SPAP) estimated at 38 mmHg (RA pressure   3 mmHg). -IVC size is dilated (>2.1 cm) but collapses > 50% with respiration   consistent with elevated RA pressure (8 mmHg). Venous doppler 8/20/21          Localized swelling and possible hematoma in right groin region    Displacement of the common femoral vein but it remains patent    Nonvisualization of the sapheno-femoral junction    No evidence of right femoral artery aneurysm           ASSESSMENT AND PLAN:    Systolic heart failure   Echo 3/2022 with new cardiomyopathy, EF of 35% (3/2022)  C 4/2022 resulting in IVUS of left main and LAD; no intervention  Continue B-blocker, spironolactone, furosemide and losartan  Will need repeat echocardiogram to assess heart function. I believe that the patient's LV dysfunction appears to be 35% but may be better, perhaps in the 45 to 50% range. This is because the patient left ventricle has asymmetric contraction from RV pacing. She has mild intermittent shortness of breath. He does not have orthopnea. I am not convinced that he needs a BiV upgrade of his pacemaker and would like to continue to observe him for now.       Essential hypertension  Today's BP is controlled   If BP is uncontrolled, may resume hydralazine 50 mg twice daily     Hyperlipidemia LDL goal <100  Reviewed lipid panel completed on 5/13/21; WNL   Continue statin therapy     Sick sinus syndrome [facility])  S/p Medtronic pacemaker implanted 6/4/21  Managed per EP   May require upgrade to Bi-V ICD; will await results of [facility]     COPD    Follow up in 3 months after echocardiogram      Thank you very much for allowing me to participate in the care of your patient. Please do not hesitate to contact me if you have any questions. Sincerely,    Jose Toledo M.D  [facility], [address] [place] [address] [address] [place] [address]  Ph: [phone]  Fax: [phone]    This note was scribed in the presence of Dr. Jose Toledo MD by Zilphia Mcburney, RN  Physician Attestation:  The scribes documentation has been prepared under my direction and personally reviewed by me in its entirety. I confirm that the note above accurately reflects all work, treatment, procedures, and medical decision making performed by me.

## 2022-05-11 NOTE — PATIENT INSTRUCTIONS
Patient Education        Limiting Sodium With Heart Failure: Care Instructions  Your Care Instructions     Sodium causes your body to hold on to extra water. This may cause your heartfailure symptoms to get worse. Limiting sodium may help you feel better. People get most of their sodium from processed foods. Fast food and restaurant meals also tend to be very high in sodium. Your doctor may suggest that you limit sodium. Your doctor can tell you how much sodium is right for you. An example is less than 3,000 mg a day. This includes all the salt you eat incooked or packaged foods. Follow-up care is a key part of your treatment and safety. Be sure to make and go to all appointments, and call your doctor if you are having problems. It's also a good idea to know your test results and keep alist of the medicines you take. How can you care for yourself at home? Read food labels   Read food labels on cans and food packages. The labels tell you how much sodium is in each serving. Make sure that you look at the serving size. If you eat more than the serving size, you have eaten more sodium than is listed for one serving.  Food labels also tell you the Percent Daily Value for sodium. Choose products with low Percent Daily Values for sodium.  Be aware that sodium can come in forms other than salt, including monosodium glutamate (MSG), sodium citrate, and sodium bicarbonate (baking soda). MSG is often added to Asian food. You can sometimes ask for food without MSG or salt. Buy low-sodium foods   Buy foods that are labeled \"unsalted\" (no salt added), \"sodium-free\" (less than 5 mg of sodium per serving), or \"low-sodium\" (140 mg or less of sodium per serving). A food labeled \"light sodium\" has less than half of the full-sodium version of that food. Foods labeled \"reduced-sodium\" may still have too much sodium.  Buy fresh vegetables or plain, frozen vegetables.  Buy low-sodium versions of canned vegetables, soups, and other canned goods. Prepare low-sodium meals   Use less salt each day when cooking. Reducing salt in this way will help you adjust to the taste. Do not add salt after cooking. Take the salt shaker off the table.  Flavor your food with garlic, lemon juice, onion, vinegar, herbs, and spices instead of salt. Do not use soy sauce, steak sauce, onion salt, garlic salt, or ketchup on your food.  Make your own salad dressings, sauces, and ketchup without adding salt.  Use less salt (or none) when recipes call for it. You can often use half the salt a recipe calls for without losing flavor. Other dishes like rice, pasta, and grains do not need added salt.  Rinse canned vegetables. This removes some--but not all--of the salt.  Avoid water that has a naturally high sodium content or that has been treated with water softeners, which add sodium. If you buy bottled water, read the label and choose a sodium-free brand. Avoid high-sodium foods, such as:   Smoked, cured, salted, and canned meat, fish, and poultry.  Ham, jacob, hot dogs, and luncheon meats.  Regular, hard, and processed cheese and regular peanut butter.  Crackers with salted tops.  Frozen prepared meals.  Canned and dried soups, broths, and bouillon, unless labeled sodium-free or low-sodium.  Canned vegetables, unless labeled sodium-free or low-sodium.  Salted snack foods such as chips and pretzels.  Western Amber fries, pizza, tacos, and other fast foods.  Pickles, olives, ketchup, and other condiments, especially soy sauce, unless labeled sodium-free or low-sodium. If you cannot cook for yourself   Have family members or friends help you, or have someone cook low-sodium meals.  Check with your local senior nutrition program to find out where meals are served and whether they offer a low-sodium option. You can often find these programs through your local health department or hospital.   Have meals delivered to your home.  Most [facility] have a Meals on [employer]. These programs provide one hot meal a day for older adults, delivered to their homes. Ask whether these meals are low-sodium. Let them know that you are on a low-sodium diet. Where can you learn more? Go to https://chpeiselaeweb.[employer]Certo.com. org and sign in to your [employer] account. Enter A166 in the [facility] box to learn more about \"Limiting Sodium With Heart Failure: Care Instructions. \"     If you do not have an account, please click on the \"Sign Up Now\" link. Current as of: January 10, 2022               Content Version: 13.2  © 8149-5037 [employer]wise, Inc[employer]. Care instructions adapted under license by [facility] ([facility]). If you have questions about a medical condition or this instruction, always ask your healthcare professional. [address] any warranty or liability for your use of this information.

## 2022-05-11 NOTE — TELEPHONE ENCOUNTER
Called and D/W pt's wife. Lab orders were put in on 4/8/22. He can get them done in the next couple of weeks. I changed his June appt from 1:45 to 1:30 pm, and changed it to an AWV. Wife aware of all of this. She will let him know.

## 2022-05-13 ENCOUNTER — TELEPHONE (OUTPATIENT)
Dept: CARDIOLOGY CLINIC | Age: 77
End: 2022-05-13

## 2022-05-24 DIAGNOSIS — E78.5 HYPERLIPIDEMIA LDL GOAL <100: ICD-10-CM

## 2022-05-24 DIAGNOSIS — R73.03 PREDIABETES: ICD-10-CM

## 2022-05-24 DIAGNOSIS — I10 ESSENTIAL HYPERTENSION: ICD-10-CM

## 2022-05-24 LAB
A/G RATIO: 2 (ref 1.1–2.2)
ALBUMIN SERPL-MCNC: 4.1 G/DL (ref 3.4–5)
ALP BLD-CCNC: 106 U/L (ref 40–129)
ALT SERPL-CCNC: 18 U/L (ref 10–40)
ANION GAP SERPL CALCULATED.3IONS-SCNC: 13 MMOL/L (ref 3–16)
AST SERPL-CCNC: 22 U/L (ref 15–37)
BASOPHILS ABSOLUTE: 0 K/UL (ref 0–0.2)
BASOPHILS RELATIVE PERCENT: 0.5 %
BILIRUB SERPL-MCNC: <0.2 MG/DL (ref 0–1)
BUN BLDV-MCNC: 29 MG/DL (ref 7–20)
CALCIUM SERPL-MCNC: 8.9 MG/DL (ref 8.3–10.6)
CHLORIDE BLD-SCNC: 104 MMOL/L (ref 99–110)
CHOLESTEROL, FASTING: 153 MG/DL (ref 0–199)
CO2: 24 MMOL/L (ref 21–32)
CREAT SERPL-MCNC: 0.9 MG/DL (ref 0.8–1.3)
EOSINOPHILS ABSOLUTE: 0.3 K/UL (ref 0–0.6)
EOSINOPHILS RELATIVE PERCENT: 6.5 %
GFR AFRICAN AMERICAN: >60
GFR NON-AFRICAN AMERICAN: >60
GLUCOSE FASTING: 82 MG/DL (ref 70–99)
HCT VFR BLD CALC: 34.6 % (ref 40.5–52.5)
HDLC SERPL-MCNC: 51 MG/DL (ref 40–60)
HEMATOLOGY PATH CONSULT: NO
HEMOGLOBIN: 10.5 G/DL (ref 13.5–17.5)
LDL CHOLESTEROL CALCULATED: 89 MG/DL
LYMPHOCYTES ABSOLUTE: 1.4 K/UL (ref 1–5.1)
LYMPHOCYTES RELATIVE PERCENT: 27.1 %
MCH RBC QN AUTO: 20.2 PG (ref 26–34)
MCHC RBC AUTO-ENTMCNC: 30.5 G/DL (ref 31–36)
MCV RBC AUTO: 66.2 FL (ref 80–100)
MONOCYTES ABSOLUTE: 0.5 K/UL (ref 0–1.3)
MONOCYTES RELATIVE PERCENT: 10.4 %
NEUTROPHILS ABSOLUTE: 2.9 K/UL (ref 1.7–7.7)
NEUTROPHILS RELATIVE PERCENT: 55.5 %
PDW BLD-RTO: 19.6 % (ref 12.4–15.4)
PLATELET # BLD: 216 K/UL (ref 135–450)
PMV BLD AUTO: 8.7 FL (ref 5–10.5)
POTASSIUM SERPL-SCNC: 4.3 MMOL/L (ref 3.5–5.1)
RBC # BLD: 5.22 M/UL (ref 4.2–5.9)
SODIUM BLD-SCNC: 141 MMOL/L (ref 136–145)
T4 FREE: 0.9 NG/DL (ref 0.9–1.8)
TOTAL PROTEIN: 6.2 G/DL (ref 6.4–8.2)
TRIGLYCERIDE, FASTING: 67 MG/DL (ref 0–150)
TSH SERPL DL<=0.05 MIU/L-ACNC: 3.89 UIU/ML (ref 0.27–4.2)
VLDLC SERPL CALC-MCNC: 13 MG/DL
WBC # BLD: 5.3 K/UL (ref 4–11)

## 2022-05-25 LAB
ESTIMATED AVERAGE GLUCOSE: 125.5 MG/DL
HBA1C MFR BLD: 6 %

## 2022-06-09 DIAGNOSIS — F51.04 PSYCHOPHYSIOLOGICAL INSOMNIA: ICD-10-CM

## 2022-06-10 RX ORDER — LOSARTAN POTASSIUM 100 MG/1
100 TABLET ORAL DAILY
Qty: 90 TABLET | Refills: 1 | Status: SHIPPED | OUTPATIENT
Start: 2022-06-10

## 2022-06-13 SDOH — HEALTH STABILITY: PHYSICAL HEALTH: ON AVERAGE, HOW MANY DAYS PER WEEK DO YOU ENGAGE IN MODERATE TO STRENUOUS EXERCISE (LIKE A BRISK WALK)?: 7 DAYS

## 2022-06-13 SDOH — HEALTH STABILITY: PHYSICAL HEALTH: ON AVERAGE, HOW MANY MINUTES DO YOU ENGAGE IN EXERCISE AT THIS LEVEL?: 30 MIN

## 2022-06-13 ASSESSMENT — PATIENT HEALTH QUESTIONNAIRE - PHQ9
SUM OF ALL RESPONSES TO PHQ QUESTIONS 1-9: 0
SUM OF ALL RESPONSES TO PHQ QUESTIONS 1-9: 0
6. FEELING BAD ABOUT YOURSELF - OR THAT YOU ARE A FAILURE OR HAVE LET YOURSELF OR YOUR FAMILY DOWN: 0
10. IF YOU CHECKED OFF ANY PROBLEMS, HOW DIFFICULT HAVE THESE PROBLEMS MADE IT FOR YOU TO DO YOUR WORK, TAKE CARE OF THINGS AT HOME, OR GET ALONG WITH OTHER PEOPLE: 0
5. POOR APPETITE OR OVEREATING: 0
4. FEELING TIRED OR HAVING LITTLE ENERGY: 0
3. TROUBLE FALLING OR STAYING ASLEEP: 0
1. LITTLE INTEREST OR PLEASURE IN DOING THINGS: 0
7. TROUBLE CONCENTRATING ON THINGS, SUCH AS READING THE NEWSPAPER OR WATCHING TELEVISION: 0
8. MOVING OR SPEAKING SO SLOWLY THAT OTHER PEOPLE COULD HAVE NOTICED. OR THE OPPOSITE, BEING SO FIGETY OR RESTLESS THAT YOU HAVE BEEN MOVING AROUND A LOT MORE THAN USUAL: 0
SUM OF ALL RESPONSES TO PHQ QUESTIONS 1-9: 0
9. THOUGHTS THAT YOU WOULD BE BETTER OFF DEAD, OR OF HURTING YOURSELF: 0
2. FEELING DOWN, DEPRESSED OR HOPELESS: 0
SUM OF ALL RESPONSES TO PHQ QUESTIONS 1-9: 0
SUM OF ALL RESPONSES TO PHQ9 QUESTIONS 1 & 2: 0

## 2022-06-13 ASSESSMENT — LIFESTYLE VARIABLES
HOW MANY STANDARD DRINKS CONTAINING ALCOHOL DO YOU HAVE ON A TYPICAL DAY: PATIENT DECLINED
HOW MANY STANDARD DRINKS CONTAINING ALCOHOL DO YOU HAVE ON A TYPICAL DAY: 98
HOW OFTEN DO YOU HAVE A DRINK CONTAINING ALCOHOL: NEVER
HOW OFTEN DO YOU HAVE A DRINK CONTAINING ALCOHOL: 1
HOW OFTEN DO YOU HAVE SIX OR MORE DRINKS ON ONE OCCASION: 1

## 2022-06-15 ENCOUNTER — OFFICE VISIT (OUTPATIENT)
Dept: PRIMARY CARE CLINIC | Age: 77
End: 2022-06-15
Payer: MEDICARE

## 2022-06-15 VITALS
SYSTOLIC BLOOD PRESSURE: 120 MMHG | BODY MASS INDEX: 23.64 KG/M2 | DIASTOLIC BLOOD PRESSURE: 69 MMHG | HEIGHT: 68 IN | TEMPERATURE: 97.9 F | WEIGHT: 156 LBS | RESPIRATION RATE: 18 BRPM | HEART RATE: 82 BPM

## 2022-06-15 DIAGNOSIS — Z00.00 MEDICARE ANNUAL WELLNESS VISIT, SUBSEQUENT: Primary | ICD-10-CM

## 2022-06-15 PROCEDURE — 1123F ACP DISCUSS/DSCN MKR DOCD: CPT | Performed by: FAMILY MEDICINE

## 2022-06-15 PROCEDURE — G0439 PPPS, SUBSEQ VISIT: HCPCS | Performed by: FAMILY MEDICINE

## 2022-06-15 RX ORDER — APIXABAN 5 MG/1
TABLET, FILM COATED ORAL
Qty: 180 TABLET | Refills: 2 | Status: SHIPPED | OUTPATIENT
Start: 2022-06-15

## 2022-06-15 NOTE — PROGRESS NOTES
Medicare Annual Wellness Visit    Namita Dixon is here for Medicare AWV    Assessment & Plan   Medicare annual wellness visit, subsequent      Recommendations for Preventive Services Due: see orders and patient instructions/AVS.  Recommended screening schedule for the next 5-10 years is provided to the patient in written form: see Patient Instructions/AVS.     Return in 1 year (on 6/15/2023) for Medicare Annual Wellness Visit in 1 year. Subjective     Patient's complete Health Risk Assessment and screening values have been reviewed and are found in Flowsheets. The following problems were reviewed today and where indicated follow up appointments were made and/or referrals ordered. Positive Risk Factor Screenings with Interventions:    Fall Risk:  Do you feel unsteady or are you worried about falling? : no  2 or more falls in past year?: (!) yes  Fall with injury in past year?: no     Fall Risk Interventions:    · Home safety tips provided              General Health and ACP:  General  In general, how would you say your health is?: Good  In the past 7 days, have you experienced any of the following: New or Increased Pain, New or Increased Fatigue, Loneliness, Social Isolation, Stress or Anger?: No  Do you get the social and emotional support that you need?: Yes  Do you have a Living Will?: Yes    Advance Directives     Power of  Living Will ACP-Advance Directive ACP-Power of     Not on File Not on File Not on File Not on File      General Health Risk Interventions:  · .       Safety:  Do you have working smoke detectors?: Yes  Do you have any tripping hazards - loose or unsecured carpets or rugs?: No  Do you have any tripping hazards - clutter in doorways, halls, or stairs?: (!) Yes  Do you have either shower bars, grab bars, non-slip mats or non-slip surfaces in your shower or bathtub?: Yes  Do all of your stairways have a railing or banister?: Yes  Do you always fasten your seatbelt when you are in a car?: Yes    Safety Interventions:  · Home safety tips provided  · Patient declines any further evaluation/treatment for this issue           Objective   Vitals:    06/15/22 1340   BP: 120/69   Pulse: 82   Resp: 18   Temp: 97.9 °F (36.6 °C)   TempSrc: Temporal   Weight: 156 lb (70.8 kg)   Height: 5' 8\" (1.727 m)      Body mass index is 23.72 kg/m². General Appearance: alert and oriented to person, place and time, well developed and well- nourished, in no acute distress  Skin: warm and dry, no rash or erythema  Head: normocephalic and atraumatic  Eyes: pupils equal, round, and reactive to light, extraocular eye movements intact, conjunctivae normal  ENT: tympanic membrane, external ear and ear canal normal bilaterally, nose without deformity, nasal mucosa and turbinates normal without polyps  Neck: supple and non-tender without mass, no thyromegaly or thyroid nodules, no cervical lymphadenopathy  Pulmonary/Chest: clear to auscultation bilaterally- no wheezes, rales or rhonchi, normal air movement, no respiratory distress  Cardiovascular: normal rate, regular rhythm, normal S1 and S2, no murmurs, rubs, clicks, or gallops, distal pulses intact, no carotid bruits  Abdomen: soft, non-tender, non-distended, normal bowel sounds, no masses or organomegaly  Extremities: no cyanosis, clubbing or edema  Musculoskeletal: normal range of motion, no joint swelling, deformity or tenderness  Neurologic: reflexes normal and symmetric, no cranial nerve deficit, gait, coordination and speech normal       No Known Allergies  Prior to Visit Medications    Medication Sig Taking?  Authorizing Provider   ELIQUIS 5 MG TABS tablet TAKE ONE (1) TABLET BY MOUTH TWO (2) Brinda Masterson MD   estazolam (PROSOM) 2 MG tablet TAKE 1 TO 2 TABLETS BY MOUTH EVERY NIGHT AS NEEDED FOR SLEEP  Seda Mirza MD   losartan (COZAAR) 100 MG tablet Take 1 tablet by mouth daily  Seda Mirza MD   QUEtiapine (SEROQUEL) 25 MG tablet Take 1-2 tablets nightly  Yumiko Parsons MD   metoprolol succinate (TOPROL XL) 50 MG extended release tablet Take 1 tablet by mouth daily  Angel Aguilar MD   spironolactone (ALDACTONE) 25 MG tablet Take 1 tablet by mouth daily  Angel Aguilar MD   furosemide (LASIX) 40 MG tablet Take 1 tablet by mouth daily  Yumiko Parsons MD   NONFORMULARY Apply topically daily CBD oil  Historical Provider, MD   diphenoxylate-atropine (LOMOTIL) 2.5-0.025 MG per tablet Take 1 tablet by mouth 4 times daily as needed for Diarrhea for up to 30 days.   Yumiko Parsons MD   venlafaxine (EFFEXOR XR) 150 MG extended release capsule Take 1 capsule by mouth daily  Yumiko Parsons MD   pravastatin (PRAVACHOL) 40 MG tablet Take 1 tablet by mouth daily  Yumiko Parsons MD   5-Hydroxytryptophan (5-HTP) 100 MG CAPS Take 3 capsules by mouth nightly  Historical Provider, MD   GAMMA AMINOBUTYRIC ACID PO Take 1,500 mg by mouth nightly  Historical Provider, MD   L-Tryptophan 500 MG TABS Take 3,000 mg by mouth nightly  Historical Provider, MD   L-Theanine 200 MG CAPS Take 600 mg by mouth nightly  Historical Provider, MD   vitamin D (CHOLECALCIFEROL) 125 MCG (5000 UT) CAPS capsule Take 5,000 Units by mouth daily  Historical Provider, MD   Multiple Vitamins-Minerals (MULTIVITAMIN) LIQD Take 30 mLs by mouth daily Has 80 mcg of vitamin K -2  Has 50 mg of ginsing root  Historical Provider, MD   Fluticasone furoate-vilanterol (BREO ELLIPTA) 200-25 MCG/INH AEPB inhaler Inhale 1 puff into the lungs daily  Yumiko Parsons MD   albuterol sulfate HFA (VENTOLIN HFA) 108 (90 Base) MCG/ACT inhaler Inhale 2 puffs into the lungs every 6 hours as needed for Wheezing or Shortness of Breath  Yumiko Parsons MD   famotidine (PEPCID) 20 MG tablet Take 1 tablet by mouth 2 times daily  KLAUS Sousa - CNP       CareTeam (Including outside providers/suppliers regularly involved in providing care):   Patient Care Team:  Yumiko Parsons MD as PCP - Navneet Singh MD as PCP - [facility] Empaneled Provider     Reviewed and updated this visit:  Tobacco  Allergies  Meds  Med Hx  Surg Hx  Soc Hx  Fam Hx

## 2022-06-15 NOTE — PATIENT INSTRUCTIONS
Personalized Preventive Plan for Elder Vivit - 6/15/2022  Medicare offers a range of preventive health benefits. Some of the tests and screenings are paid in full while other may be subject to a deductible, co-insurance, and/or copay. Some of these benefits include a comprehensive review of your medical history including lifestyle, illnesses that may run in your family, and various assessments and screenings as appropriate. After reviewing your medical record and screening and assessments performed today your provider may have ordered immunizations, labs, imaging, and/or referrals for you. A list of these orders (if applicable) as well as your Preventive Care list are included within your After Visit Summary for your review. Other Preventive Recommendations:    · A preventive eye exam performed by an eye specialist is recommended every 1-2 years to screen for glaucoma; cataracts, macular degeneration, and other eye disorders. · A preventive dental visit is recommended every 6 months. · Try to get at least 150 minutes of exercise per week or 10,000 steps per day on a pedometer . · Order or download the FREE \"Exercise & Physical Activity: Your Everyday Guide\" from [employer]. Call 0-406.603.4672 or search [employer] online. · You need 5839-4814 mg of calcium and 2760-9538 IU of vitamin D per day. It is possible to meet your calcium requirement with diet alone, but a vitamin D supplement is usually necessary to meet this goal.  · When exposed to the sun, use a sunscreen that protects against both UVA and UVB radiation with an SPF of 30 or greater. Reapply every 2 to 3 hours or after sweating, drying off with a towel, or swimming. · Always wear a seat belt when traveling in a car. Always wear a helmet when riding a bicycle or motorcycle.

## 2022-06-16 RX ORDER — FLUTICASONE FUROATE AND VILANTEROL 200; 25 UG/1; UG/1
1 POWDER RESPIRATORY (INHALATION) DAILY
Qty: 180 EACH | Refills: 1 | Status: SHIPPED | OUTPATIENT
Start: 2022-06-16

## 2022-06-28 NOTE — TELEPHONE ENCOUNTER
CONSUELO per Kathy Garcia CNP:  Make sure pt is getting PT    Called and D/W pt. He is getting nursing, PT and OT at this time. Hospitalist Discharge Summary        Patient: Gerardo Acosta  YOB: 1974  MRN: [id number]   Acct: [de-identified]    Primary Care Physician: Shashi Otero MD    Admit date  6/25/2022    Discharge date: 6/28/2022  4:38 PM    Chief Complaint on presentation :-  Left flank pain    Discharge Assessment and Plan:-   1. Left flank pain 2/2 suspected L kidney abscess, improved:   ? CT/AP supports evidence of 2.4 cm x 2.1 cm fluid collection within the left mid kidneys concerning for abscess which is new compared to prior study. Urology was consulted on admission and followed through duration of her stay. S/p 3 weeks Omnicef s/p sepsis 2/2 pyelonephritis. Blood cultures negative thus far. pt received IV cefepime through duration of her stay. Urology did not feel there was need for emergent draining of abscess at this time. Pt is to have US performed OP in four weeks for recheck. Pt is afebrile and nontoxic appearing. Pt has resolved. She will be discharged with Ditropan for voiding dysfx and 5 more days of Omnicef to complete full course. We discussed the importance of f/u with urology and pt voiced her understanding, all questions were answered. 2. UTI, ruled out:  ? UA on admission with trace LE's, no bacteria, 15-25 WBCs. Patient does not feel the sensation of her bladder being full, therefore inability to know when she needs to urinate. Patient with recent history of sepsis 2/2 pyelonephritis and suspected abscess, cefepime was initiated on admission. Final urine culture revealing growth of contaminants. However, will continue 5 more days of Omnicef on discharge to complete abx course. Ditropan also to be initiated on discharge due to voiding dysfx. Pt to f/u with urology OP. 3. IDDM 2, uncontrolled:  ? Resume home regimen on discharge   4. Essential hypertension:  ? BP stable through duration of her stay, resume home meds on discharge.    5. Anxiety/depression:  ? Resume home medications       Initial H and P and Hospital course:-  Per chart review: \"46year-old female patient presented to the ED with left flank pain. Denies any fever or chills.      Recent admission and discharge after treatment for E. coli pyelonephritis and bacteremia   with sepsis on 5/29/2022.     Patient was discharged home on Omnicef x3 weeks.  Just completed Omnicef few days ago.       ED spoke with urologist who recommended admission on the hospitalist service and urology evaluation.      Patient clinically stable at the time of interview.     Initial vital signs in the ED temperature 37.1 °C, respiratory 16, heart rate 87, blood pressure 157/92, oxygen saturation 97% on room air.     Initial labs serum sodium 134, potassium 3.8, chloride 97, CO2 24, BUN 25, creatinine 0.8, blood sugar 266, calcium 10.3, osmolality 281.9, albumin 4.4, alkaline phosphatase 100, ALT 17, AST 14, total bilirubin 0.5.     WBC 8.0, hemoglobin 12.3, MCV 90.1, platelets 130. \"     6/26: L flank pain 4/10, reports her nausea is improved. Moving her bowels, no fever/chills. No CP/SOB. Await urology input.      6/27: Patient resting in bed in no acute distress, she reports that she has intermittent left flank pain but denies abdominal pain, nausea, vomiting. Patient denies chest pain or shortness of breath. Urology following patient to have possible needle aspiration performed if pain persist.  N.p.o. at midnight.    6/28: Pt resting in bed in no acute distress this AM. She reports that her flank pain is much improved, no fevers/chills overnight. Pt denies abd pain, n/v, urinary/bowel complaints. I discussed pt's case with urology at length. Bladder scan was performed this AM to ensure emptying, 0ml residual was noted. Pt will be started on Ditropan for voiding dysfx on discharge as well as 5 more days of Omnicef. We discussed her discharge instructions at length and pt voiced her understanding.  She is afebrile, nontoxic appearing on day of discharge and pain free. All questions were answered. Pt is to f/u with urology OP- to be scheduled per urology office. Physical Exam:-  1. General appearance: No apparent distress, appears stated age and cooperative. 2. HEENT: Normal cephalic, atraumatic without obvious deformity. Pupils equal, round, and reactive to light. Extra ocular muscles intact. Conjunctivae/corneas clear. 3. Neck: Supple, with full range of motion. No jugular venous distention. Trachea midline. 4. Respiratory:  Normal respiratory effort. Clear to auscultation, bilaterally without Rales/Wheezes/Rhonchi. 5. Cardiovascular: Regular rate and rhythm with normal S1/S2 without murmurs, rubs or gallops. 6. Abdomen: Soft, non-tender, non-distended with normal bowel sounds. 7. Musculoskeletal:  No clubbing, cyanosis or edema bilaterally. 8. Skin: Skin color, texture, turgor normal.  No rashes or lesions. 9. Neurologic:  Neurovascularly intact without any focal sensory/motor deficits. Cranial nerves: II-XII intact, grossly non-focal.  10. Psychiatric: Alert and oriented, thought content appropriate, normal insight  11. Capillary Refill: Brisk,< 3 seconds   12.  Peripheral Pulses: +2 palpable, equal bilaterally       Vitals:   Patient Vitals for the past 24 hrs:   BP Temp Temp src Pulse Resp SpO2   06/28/22 0800 (!) 141/83 97.8 °F (36.6 °C) Oral 70 16 97 %   06/28/22 0246 (!) 175/95 97.9 °F (36.6 °C) Oral 63 16 95 %   06/27/22 2108 (!) 164/90 98.1 °F (36.7 °C) Oral 74 16 95 %     Weight:   Weight: 205 lb 4.8 oz (93.1 kg)   24 hour intake/output:     Intake/Output Summary (Last 24 hours) at 6/28/2022 1734  Last data filed at 6/28/2022 1337  Gross per 24 hour   Intake 665 ml   Output 850 ml   Net -185 ml         Discharge Medications:-      Medication List      START taking these medications    lactobacillus capsule  Take 1 capsule by mouth daily (with breakfast)  Start taking on: June 29, 2022     oxybutynin 10 MG extended release tablet  Commonly known as: Ditropan XL  Take 1 tablet by mouth daily        CHANGE how you take these medications    * cefdinir 300 MG capsule  Commonly known as: OMNICEF  Take 1 capsule by mouth 2 times daily for 5 days  What changed: Another medication with the same name was added. Make sure you understand how and when to take each. * cefdinir 300 MG capsule  Commonly known as: OMNICEF  Take 1 capsule by mouth 2 times daily for 5 days  What changed: You were already taking a medication with the same name, and this prescription was added. Make sure you understand how and when to take each. * This list has 2 medication(s) that are the same as other medications prescribed for you. Read the directions carefully, and ask your doctor or other care provider to review them with you.             CONTINUE taking these medications    amLODIPine 5 MG tablet  Commonly known as: NORVASC  Take 1 tablet by mouth daily     aspirin EC 81 MG EC tablet  Take 1 tablet by mouth daily     hydroCHLOROthiazide 25 MG tablet  Commonly known as: HYDRODIURIL  Take 1 tablet by mouth daily     Insulin Pen Needle 29G X 12.7MM Misc  1 each by Does not apply route daily     Lancets Misc  1 each by Does not apply route daily     Lantus SoloStar 100 UNIT/ML injection pen  Generic drug: insulin glargine  Inject 25 Units into the skin nightly     lisinopril 40 MG tablet  Commonly known as: PRINIVIL;ZESTRIL  Take 1 tablet by mouth daily     metFORMIN 500 MG extended release tablet  Commonly known as: Glucophage XR  Take 1 tablet by mouth daily (with breakfast)     metoprolol 100 MG tablet  Commonly known as: LOPRESSOR  Take 1 tablet by mouth 2 times daily     sertraline 50 MG tablet  Commonly known as: ZOLOFT  Take 1 tablet by mouth daily           Where to Get Your Medications      These medications were sent to [address], [address]  [address] [place], [place] [address], [facility] [address]    Phone: [phone]   · cefdinir 300 MG capsule  · cefdinir 300 MG capsule  · lactobacillus capsule  · oxybutynin 10 MG extended release tablet          Labs :-  Recent Results (from the past 72 hour(s))   POCT Glucose    Collection Time: 06/25/22 10:42 PM   Result Value Ref Range    POC Glucose 155 (H) 70 - 108 mg/dl   Lactic acid, plasma    Collection Time: 06/26/22  5:45 AM   Result Value Ref Range    Lactic Acid 1.2 0.5 - 2.0 mmol/L   POCT glucose    Collection Time: 06/26/22  7:47 AM   Result Value Ref Range    POC Glucose 175 (H) 70 - 108 mg/dl   Culture, Blood 1    Collection Time: 06/26/22  8:20 AM    Specimen: Blood   Result Value Ref Range    Blood Culture, Routine No growth-preliminary     Culture, Blood 2    Collection Time: 06/26/22  8:28 AM    Specimen: Blood   Result Value Ref Range    Blood Culture, Routine No growth-preliminary     Basic Metabolic Panel    Collection Time: 06/26/22  8:28 AM   Result Value Ref Range    Sodium 136 135 - 145 meq/L    Potassium 3.7 3.5 - 5.2 meq/L    Chloride 102 98 - 111 meq/L    CO2 23 23 - 33 meq/L    Glucose 181 (H) 70 - 108 mg/dL    BUN 14 7 - 22 mg/dL    CREATININE 0.5 0.4 - 1.2 mg/dL    Calcium 9.1 8.5 - 10.5 mg/dL   CBC    Collection Time: 06/26/22  8:28 AM   Result Value Ref Range    WBC 5.4 4.8 - 10.8 thou/mm3    RBC 3.90 (L) 4.20 - 5.40 mill/mm3    Hemoglobin 11.5 (L) 12.0 - 16.0 gm/dl    Hematocrit 34.8 (L) 37.0 - 47.0 %    MCV 89.2 81.0 - 99.0 fL    MCH 29.5 26.0 - 33.0 pg    MCHC 33.0 32.2 - 35.5 gm/dl    RDW-CV 12.4 11.5 - 14.5 %    RDW-SD 40.1 35.0 - 45.0 fL    Platelets 699 666 - 241 thou/mm3    MPV 9.3 (L) 9.4 - 12.4 fL   Anion Gap    Collection Time: 06/26/22  8:28 AM   Result Value Ref Range    Anion Gap 11.0 8.0 - 16.0 meq/L   Glomerular Filtration Rate, Estimated    Collection Time: 06/26/22  8:28 AM   Result Value Ref Range    Est, Glom Filt Rate >90 ml/min/1.73m2   POCT glucose    Collection Time: 06/26/22 11:37 AM Result Value Ref Range    POC Glucose 217 (H) 70 - 108 mg/dl   POCT glucose    Collection Time: 06/26/22  4:42 PM   Result Value Ref Range    POC Glucose 175 (H) 70 - 108 mg/dl   POCT glucose    Collection Time: 06/26/22  7:30 PM   Result Value Ref Range    POC Glucose 276 (H) 70 - 108 mg/dl   Basic Metabolic Panel    Collection Time: 06/27/22  5:13 AM   Result Value Ref Range    Sodium 138 135 - 145 meq/L    Potassium 3.7 3.5 - 5.2 meq/L    Chloride 104 98 - 111 meq/L    CO2 23 23 - 33 meq/L    Glucose 159 (H) 70 - 108 mg/dL    BUN 11 7 - 22 mg/dL    CREATININE 0.6 0.4 - 1.2 mg/dL    Calcium 9.2 8.5 - 10.5 mg/dL   CBC    Collection Time: 06/27/22  5:13 AM   Result Value Ref Range    WBC 5.6 4.8 - 10.8 thou/mm3    RBC 3.94 (L) 4.20 - 5.40 mill/mm3    Hemoglobin 11.4 (L) 12.0 - 16.0 gm/dl    Hematocrit 35.4 (L) 37.0 - 47.0 %    MCV 89.8 81.0 - 99.0 fL    MCH 28.9 26.0 - 33.0 pg    MCHC 32.2 32.2 - 35.5 gm/dl    RDW-CV 12.3 11.5 - 14.5 %    RDW-SD 39.8 35.0 - 45.0 fL    Platelets 031 988 - 961 thou/mm3    MPV 9.3 (L) 9.4 - 12.4 fL   Anion Gap    Collection Time: 06/27/22  5:13 AM   Result Value Ref Range    Anion Gap 11.0 8.0 - 16.0 meq/L   Glomerular Filtration Rate, Estimated    Collection Time: 06/27/22  5:13 AM   Result Value Ref Range    Est, Glom Filt Rate >90 ml/min/1.73m2   POCT glucose    Collection Time: 06/27/22  7:59 AM   Result Value Ref Range    POC Glucose 145 (H) 70 - 108 mg/dl   POCT glucose    Collection Time: 06/27/22 11:37 AM   Result Value Ref Range    POC Glucose 181 (H) 70 - 108 mg/dl   POCT glucose    Collection Time: 06/27/22  4:50 PM   Result Value Ref Range    POC Glucose 167 (H) 70 - 108 mg/dl   POCT glucose    Collection Time: 06/27/22  7:43 PM   Result Value Ref Range    POC Glucose 209 (H) 70 - 108 mg/dl   POCT glucose    Collection Time: 06/27/22  9:16 PM   Result Value Ref Range    POC Glucose 160 (H) 70 - 108 mg/dl   Basic Metabolic Panel    Collection Time: 06/28/22  7:57 AM Result Value Ref Range    Sodium 136 135 - 145 meq/L    Potassium 3.5 3.5 - 5.2 meq/L    Chloride 101 98 - 111 meq/L    CO2 24 23 - 33 meq/L    Glucose 127 (H) 70 - 108 mg/dL    BUN 8 7 - 22 mg/dL    CREATININE 0.5 0.4 - 1.2 mg/dL    Calcium 9.3 8.5 - 10.5 mg/dL   CBC    Collection Time: 06/28/22  7:57 AM   Result Value Ref Range    WBC 5.8 4.8 - 10.8 thou/mm3    RBC 3.95 (L) 4.20 - 5.40 mill/mm3    Hemoglobin 11.5 (L) 12.0 - 16.0 gm/dl    Hematocrit 34.1 (L) 37.0 - 47.0 %    MCV 86.3 81.0 - 99.0 fL    MCH 29.1 26.0 - 33.0 pg    MCHC 33.7 32.2 - 35.5 gm/dl    RDW-CV 12.4 11.5 - 14.5 %    RDW-SD 39.0 35.0 - 45.0 fL    Platelets 193 467 - 701 thou/mm3    MPV 9.0 (L) 9.4 - 12.4 fL   Anion Gap    Collection Time: 06/28/22  7:57 AM   Result Value Ref Range    Anion Gap 11.0 8.0 - 16.0 meq/L   Glomerular Filtration Rate, Estimated    Collection Time: 06/28/22  7:57 AM   Result Value Ref Range    Est, Glom Filt Rate >90 ml/min/1.73m2   POCT glucose    Collection Time: 06/28/22  8:09 AM   Result Value Ref Range    POC Glucose 132 (H) 70 - 108 mg/dl   POCT glucose    Collection Time: 06/28/22 11:29 AM   Result Value Ref Range    POC Glucose 185 (H) 70 - 108 mg/dl        Microbiology:    Blood culture #1:   Lab Results   Component Value Date    BC No growth-preliminary  06/26/2022       Blood culture #2:No results found for: BLOODCULT2    Organism:  No results found for: LABGRAM    MRSA culture only:No results found for: [facility]    Urine culture:   Lab Results   Component Value Date    LABURIN Nice count: >100,000 CFU/mL  05/29/2022     Lab Results   Component Value Date    ORG Growth of Contaminants 06/25/2022        Respiratory culture: No results found for: CULTRESP    Aerobic and Anaerobic :  No results found for: LABAERO  No results found for: LABANAE    Urinalysis:      Lab Results   Component Value Date    NITRU NEGATIVE 06/25/2022    WBCUA 15-25 06/25/2022    BACTERIA NONE SEEN 06/25/2022    RBCUA 0-2 06/25/2022 BLOODU TRACE 06/25/2022    SPECGRAV 1.025 05/29/2022    GLUCOSEU 250 06/25/2022       Radiology:-  CT ABDOMEN PELVIS W IV CONTRAST Additional Contrast? None    Result Date: 6/25/2022  PROCEDURE: CT ABDOMEN PELVIS W IV CONTRAST CLINICAL INFORMATION: left back pain COMPARISON: 5/29/2022, 12/29/2021 TECHNIQUE:  Axial CT images were obtained through the abdomen and pelvis following the intravenous administration of 80 mL Isovue 370 contrast. Coronal and sagittal reformatted images were rendered. All CT scans at this facility use dose modulation, iterative reconstruction, and/or weight-based dosing when appropriate to reduce radiation dose to as low as reasonably achievable. FINDINGS: There is a small collection of fluid within the left mid kidney measuring approximately 2.4 x 2.1 cm on axial image 42. There is surrounding strandy opacity. There is also increased attenuation of the renal pelvis. This could be related to thickening of the urothelium versus sequela from debris within the left renal pelvis. This left mid renal intraparenchymal fluid collection appears new when compared to prior examination dated 5/29/2022. Limited evaluation of the lung bases demonstrates mild left basilar atelectasis or scarring within the lingula. There is evidence of prior cholecystectomy. The liver, spleen, and pancreas appear normal. There is a nodular lesion within the left adrenal gland measuring 1.5 cm. This is incompletely characterized but appears stable when compared to prior CT examination dated 11/1/2018. There is no evidence of bowel obstruction. There is no free air. There is no free fluid. Mild prominent lymph nodes within the left para-aortic region are demonstrated which may represent reactive lymph nodes. No acute osseous findings are seen. The appendix appears normal.     1. There is a small collection of fluid within the left mid kidney measuring approximately 2.4 x 2.1 cm on axial image 42.  There is surrounding strandy opacity. This is concerning for small abscess within the left kidney. There is also increased attenuation of the renal pelvis. This could be related to thickening of the urothelium versus sequela from debris within the left renal pelvis. This fluid collection appears new when compared to prior examination dated 5/29/2022. 2. Mild prominent lymph nodes within the left para-aortic region are present which may be reactive in nature. Recommend continued follow-up CT to document resolution. **This report has been created using voice recognition software. It may contain minor errors which are inherent in voice recognition technology. ** Final report electronically signed by Dr. Nayla Zazueta on 6/25/2022 6:26 PM       Follow-up scheduled after discharge :-    in 1 weeks with Americo Cox MD  in 1 weeks with urology     Consultations during this hospital stay:-  [] NONE [] Cardiology  [] Nephrology  [] Hemo onco   [] GI   [] ID  [] Endocrine  [] Pulm    [] Neuro    [] Psych   [x] Urology  [] ENT   [] G SURGERY   []Ortho    []CV surg    [] Palliative  [] Hospice [] Pain management   []    []TCU   [] PT/OT  OTHERS:-    Disposition: home  Condition at Discharge: Stable    Time Spent:- 45 minutes    Electronically signed by Blair Olivier PA-C on 6/28/22 at 1:43 PM EDT   Discharging Hospitalist

## 2022-07-07 ENCOUNTER — NURSE ONLY (OUTPATIENT)
Dept: CARDIOLOGY CLINIC | Age: 77
End: 2022-07-07
Payer: MEDICARE

## 2022-07-07 DIAGNOSIS — Z95.0 PACEMAKER: ICD-10-CM

## 2022-07-07 DIAGNOSIS — I49.5 SICK SINUS SYNDROME (HCC): ICD-10-CM

## 2022-07-07 PROCEDURE — 93296 REM INTERROG EVL PM/IDS: CPT | Performed by: INTERNAL MEDICINE

## 2022-07-07 PROCEDURE — 93294 REM INTERROG EVL PM/LDLS PM: CPT | Performed by: INTERNAL MEDICINE

## 2022-07-07 NOTE — PROGRESS NOTES
Remote transmission received from patient's dual chamber pacemaker monitor at home. Transmission shows normal sensing and pacing function. No new arrhythmias/events recorded. Ap 7.7%   99.6% (MVP On)  Echo 66.0800 and cath 04.2022 showed EF of 35%. End of 91-day monitoring period 7/7/22. EP physician will review. See interrogation under cardiology tab in the [address] [place] [address] [address] field for more details. Will continue to monitor remotely.

## 2022-07-08 DIAGNOSIS — F51.04 PSYCHOPHYSIOLOGICAL INSOMNIA: ICD-10-CM

## 2022-07-15 RX ORDER — FUROSEMIDE 40 MG/1
40 TABLET ORAL DAILY
Qty: 90 TABLET | Refills: 1 | Status: SHIPPED | OUTPATIENT
Start: 2022-07-15 | End: 2022-10-31

## 2022-08-04 ENCOUNTER — PROCEDURE VISIT (OUTPATIENT)
Dept: CARDIOLOGY CLINIC | Age: 77
End: 2022-08-04
Payer: MEDICARE

## 2022-08-04 DIAGNOSIS — I50.20 SYSTOLIC HEART FAILURE, UNSPECIFIED HF CHRONICITY (HCC): ICD-10-CM

## 2022-08-04 DIAGNOSIS — F51.04 PSYCHOPHYSIOLOGICAL INSOMNIA: ICD-10-CM

## 2022-08-04 PROCEDURE — 93321 DOPPLER ECHO F-UP/LMTD STD: CPT | Performed by: INTERNAL MEDICINE

## 2022-08-04 PROCEDURE — 93325 DOPPLER ECHO COLOR FLOW MAPG: CPT | Performed by: INTERNAL MEDICINE

## 2022-08-04 PROCEDURE — 93308 TTE F-UP OR LMTD: CPT | Performed by: INTERNAL MEDICINE

## 2022-08-15 NOTE — PROGRESS NOTES
[facility]  Cardiology Progress Note      Dejan Jeffries  1945, 76 y.o.    CC: \"  I continue to have shortness of breath. \"             Lance Stallworth MD:      HPI:   This is a 76 y.o. 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 Nasrene Reid, atrial fibrillation s/p afib and left atrial flutter ablation on 8/20/21, COVID-19 pneumonia (12/2020), COPD, hyperlipidemia, hypertension, anxiety and depression. Patient is status post RV pacing. There was concern about pacemaker induced cardiomyopathy. A repeat limited echo shows EF to be 50%. Today, he returns in follow up to discuss Echo results. He does report shortness of breath and states he has a mild case of COPD. He has noticed when walking up a flight of stairs, he has to stop and take a break before continuing. He denies orthopnea, cough, weight gain or LE edema. Reports compliance with medical management and tolerating. previous note  He had an echocardiogram done for mild shortness of breath. The echo was read as showing EF of 35% which was a substantial drop from his previous echocardiogram.  I reviewed the echo and initially concluded that maybe he has pacemaker induced cardiomyopathy. Nevertheless because of the drop in LV ejection fraction we felt that he needed a coronary angiogram to rule out coronary disease. Cath showed shelflike lesion in the proximal left main which on IVUS was not significant. Proximal/mid LAD has about 60% stenosis angiographically. Left circumflex and RCA had no major obstructive disease. LV: Normal size with EF of about 40%. I reviewed the echo done on 3/25/22 and I believe that the patient's LV dysfunction appears to be 35% but may be better, perhaps in the 45 to 50% range. This is because the patient left ventricle has asymmetric contraction from RV pacing.     Past Medical History:   Diagnosis Date    Anxiety     COPD, mild ([address] 4/24/2017 Depression     History of blood transfusion     Hyperlipidemia     Hypertension     IBS (irritable bowel syndrome) 10/16/2013    Insomnia     PAF (paroxysmal atrial fibrillation) ([facility] 8/10/2021    Sick sinus syndrome ([facility] 8/10/2021      Past Surgical History:   Procedure Laterality Date    ABLATION OF DYSRHYTHMIC FOCUS      APPENDECTOMY      CARDIAC SURGERY      pacemaker    COLONOSCOPY  2019    [place]    COLONOSCOPY N/A 3/19/2019    COLONOSCOPY WITH BIOPSY performed by Micheline Morgan MD at [place] [address] N/A 3/19/2019    COLONOSCOPY POLYPECTOMY SNARE/COLD BIOPSY performed by Micheline Morgan MD at [place] [address]        Family History   Problem Relation Age of Onset    Stroke Maternal Grandfather     Cancer Other         leukemia    Hypertension Sister     Hypertension Brother     Hypertension Brother       Social History     Tobacco Use    Smoking status: Former     Packs/day: 1.00     Years: 20.00     Pack years: 20.00     Types: Cigarettes     Quit date: 1988     Years since quittin.0     Passive exposure: Past    Smokeless tobacco: Never   Vaping Use    Vaping Use: Never used   Substance Use Topics    Alcohol use: No    Drug use: No     No Known Allergies      Review of Systems -   Constitutional: Negative for weight gain/loss; malaise, fever  Respiratory: Negative for Asthma;  cough and hemoptysis  Cardiovascular: Negative for palpitations,dizziness   Gastrointestinal: Negative for abd.pain; constipation/diarrhea;    Genitourinary: Negative for stones; hematuria; frequency hesitancy  Integumentt: Negative for rash or pruritis  Hematologic/lymphatic: Negative for blood dyscrasia; leukemia/lymphoma  Musculoskeletal: Negative for Connective tissue disease  Neurological:  Negative for Seizure   Behavioral/Psych:Negative for Bipolar disorder, Schizophrenia; Dementia  Endocrine: negative for thyroid, parathyroid MG TABS Take 3,000 mg by mouth nightly      L-Theanine 200 MG CAPS Take 600 mg by mouth nightly      vitamin D (CHOLECALCIFEROL) 125 MCG (5000 UT) CAPS capsule Take 5,000 Units by mouth daily      Multiple Vitamins-Minerals (MULTIVITAMIN) LIQD Take 30 mLs by mouth daily Has 80 mcg of vitamin K -2  Has 50 mg of ginsing root      albuterol sulfate HFA (VENTOLIN HFA) 108 (90 Base) MCG/ACT inhaler Inhale 2 puffs into the lungs every 6 hours as needed for Wheezing or Shortness of Breath 3 Inhaler 1    famotidine (PEPCID) 20 MG tablet Take 1 tablet by mouth 2 times daily 60 tablet 3       Labs:   No results for input(s): WBC, HGB, HCT, PLT in the last 72 hours. No results for input(s): NA, K, CO2, BUN, CREATININE, LABGLOM in the last 72 hours. No results for input(s): BNP in the last 72 hours. Lab Results   Component Value Date/Time    HDL 51 2022 11:02 AM    HDL 49 2011 11:09 AM    LDLCALC 89 2022 11:02 AM    TRIG 108 2020 11:31 AM       EK22, Sinus rhythm with V pacing  22, V paced       LHC: 22  L Main; shelflike lesion that was at least 50% angiographically. An IVUS was performed and was thought to be much lower     LAD: Heavily calcified proximal lesion before the diagonal/septal branch point had a 5060% stenosis. On IVUS the artery was 2.5 x 3 mm in diameter     LCX nonobstructive     RCA 40% proximal     LV GRAM:  done in the 30° RUGGIERO projection; Shows normal LV size with an estimated ejection fraction of 40%       CONCLUSION:   Shelflike lesion in the proximal left main which on IVUS was not significant  Proximal/mid LAD has about 60% stenosis angiographically.   On IVUS internal diameter was 2.5 x3 millimeters     Left circumflex and RCA had no major obstructive disease  LV: Normal size with EF of about 40%       IVUS of the left main and the LAD was performed  Left main:  Proximal/mid LAD: 2.5 x 3 .0     SUMMARY:      Nonsignificant shelflike lesion in the proximal s/s of CHF on today's visit    Essential hypertension  Today's BP is controlled     Hyperlipidemia LDL goal <100  Reviewed lipid panel completed on 5/24/22; WNL  Continue statin therapy     Sick sinus syndrome [facility])  S/p Medtronic pacemaker implanted 6/4/21  Managed per EP   EF on limited Echo appears to be 50%, thus no upgrade to BiV ICD is needed at this time    COPD    Follow up in 6 months or sooner for s/s of CHF     Thank you very much for allowing me to participate in the care of your patient. Please do not hesitate to contact me if you have any questions. Sincerely,    Anusha Haynes M.D  [facility], [address] [place], [place] [address]way, [place] [address]  Ph: [phone]  Fax: [phone]    This note was scribed in the presence of Dr. Anusha Haynes MD by Danna Bettencourt RN  Physician Attestation:  The scribes documentation has been prepared under my direction and personally reviewed by me in its entirety. I confirm that the note above accurately reflects all work, treatment, procedures, and medical decision making performed by me.

## 2022-08-16 ENCOUNTER — OFFICE VISIT (OUTPATIENT)
Dept: CARDIOLOGY CLINIC | Age: 77
End: 2022-08-16
Payer: MEDICARE

## 2022-08-16 VITALS
HEART RATE: 82 BPM | SYSTOLIC BLOOD PRESSURE: 112 MMHG | BODY MASS INDEX: 23.64 KG/M2 | WEIGHT: 156 LBS | OXYGEN SATURATION: 95 % | HEIGHT: 68 IN | DIASTOLIC BLOOD PRESSURE: 70 MMHG

## 2022-08-16 DIAGNOSIS — I10 ESSENTIAL HYPERTENSION: ICD-10-CM

## 2022-08-16 DIAGNOSIS — I50.22 CHRONIC SYSTOLIC HEART FAILURE (HCC): Primary | ICD-10-CM

## 2022-08-16 DIAGNOSIS — I49.5 SICK SINUS SYNDROME (HCC): ICD-10-CM

## 2022-08-16 DIAGNOSIS — E78.5 HYPERLIPIDEMIA LDL GOAL <100: ICD-10-CM

## 2022-08-16 DIAGNOSIS — J44.9 COPD, MILD (HCC): ICD-10-CM

## 2022-08-16 PROCEDURE — 93000 ELECTROCARDIOGRAM COMPLETE: CPT | Performed by: INTERNAL MEDICINE

## 2022-08-16 PROCEDURE — 3023F SPIROM DOC REV: CPT | Performed by: INTERNAL MEDICINE

## 2022-08-16 PROCEDURE — G8427 DOCREV CUR MEDS BY ELIG CLIN: HCPCS | Performed by: INTERNAL MEDICINE

## 2022-08-16 PROCEDURE — 99214 OFFICE O/P EST MOD 30 MIN: CPT | Performed by: INTERNAL MEDICINE

## 2022-08-16 PROCEDURE — 1123F ACP DISCUSS/DSCN MKR DOCD: CPT | Performed by: INTERNAL MEDICINE

## 2022-08-16 PROCEDURE — 1036F TOBACCO NON-USER: CPT | Performed by: INTERNAL MEDICINE

## 2022-08-16 PROCEDURE — G8420 CALC BMI NORM PARAMETERS: HCPCS | Performed by: INTERNAL MEDICINE

## 2022-08-16 RX ORDER — METOPROLOL SUCCINATE 50 MG/1
TABLET, EXTENDED RELEASE ORAL
Qty: 90 TABLET | Refills: 3 | Status: SHIPPED | OUTPATIENT
Start: 2022-08-16

## 2022-08-16 RX ORDER — SPIRONOLACTONE 25 MG/1
TABLET ORAL
Qty: 90 TABLET | Refills: 3 | Status: SHIPPED | OUTPATIENT
Start: 2022-08-16

## 2022-08-17 DIAGNOSIS — K52.9 CHRONIC DIARRHEA: ICD-10-CM

## 2022-08-17 RX ORDER — DIPHENOXYLATE HYDROCHLORIDE AND ATROPINE SULFATE 2.5; .025 MG/1; MG/1
1 TABLET ORAL 4 TIMES DAILY PRN
Qty: 120 TABLET | Refills: 0 | Status: SHIPPED | OUTPATIENT
Start: 2022-08-17 | End: 2022-09-16

## 2022-09-01 DIAGNOSIS — F51.04 PSYCHOPHYSIOLOGICAL INSOMNIA: ICD-10-CM

## 2022-09-14 ENCOUNTER — TELEPHONE (OUTPATIENT)
Dept: PRIMARY CARE CLINIC | Age: 77
End: 2022-09-14

## 2022-09-20 ENCOUNTER — OFFICE VISIT (OUTPATIENT)
Dept: PRIMARY CARE CLINIC | Age: 77
End: 2022-09-20
Payer: MEDICARE

## 2022-09-20 VITALS
OXYGEN SATURATION: 97 % | HEART RATE: 101 BPM | BODY MASS INDEX: 23.39 KG/M2 | SYSTOLIC BLOOD PRESSURE: 144 MMHG | WEIGHT: 153.8 LBS | DIASTOLIC BLOOD PRESSURE: 83 MMHG | RESPIRATION RATE: 18 BRPM

## 2022-09-20 DIAGNOSIS — G89.29 CHRONIC PAIN OF LEFT KNEE: ICD-10-CM

## 2022-09-20 DIAGNOSIS — M25.562 CHRONIC PAIN OF LEFT KNEE: ICD-10-CM

## 2022-09-20 DIAGNOSIS — F41.8 DEPRESSION WITH ANXIETY: ICD-10-CM

## 2022-09-20 DIAGNOSIS — I49.5 SICK SINUS SYNDROME (HCC): ICD-10-CM

## 2022-09-20 DIAGNOSIS — I10 ESSENTIAL HYPERTENSION: ICD-10-CM

## 2022-09-20 DIAGNOSIS — I50.22 CHRONIC SYSTOLIC HEART FAILURE (HCC): ICD-10-CM

## 2022-09-20 DIAGNOSIS — J44.9 COPD, MILD (HCC): ICD-10-CM

## 2022-09-20 DIAGNOSIS — R06.09 DYSPNEA ON EXERTION: Primary | ICD-10-CM

## 2022-09-20 PROCEDURE — G8427 DOCREV CUR MEDS BY ELIG CLIN: HCPCS | Performed by: FAMILY MEDICINE

## 2022-09-20 PROCEDURE — 1036F TOBACCO NON-USER: CPT | Performed by: FAMILY MEDICINE

## 2022-09-20 PROCEDURE — 99214 OFFICE O/P EST MOD 30 MIN: CPT | Performed by: FAMILY MEDICINE

## 2022-09-20 PROCEDURE — 1123F ACP DISCUSS/DSCN MKR DOCD: CPT | Performed by: FAMILY MEDICINE

## 2022-09-20 PROCEDURE — G8420 CALC BMI NORM PARAMETERS: HCPCS | Performed by: FAMILY MEDICINE

## 2022-09-20 PROCEDURE — 3023F SPIROM DOC REV: CPT | Performed by: FAMILY MEDICINE

## 2022-09-20 ASSESSMENT — ENCOUNTER SYMPTOMS
VOICE CHANGE: 0
SHORTNESS OF BREATH: 1
DIARRHEA: 0
CONSTIPATION: 0
TROUBLE SWALLOWING: 0
ABDOMINAL PAIN: 0
BLOOD IN STOOL: 0

## 2022-09-20 NOTE — PROGRESS NOTES
2022     Marlene Bello (:  1945) is a 76 y.o. male, here for evaluation of the following medical concerns:    Fatigue  Associated symptoms include arthralgias and fatigue. Pertinent negatives include no abdominal pain, chest pain, myalgias or rash. Shortness of Breath  Pertinent negatives include no abdominal pain, chest pain, leg swelling or rash. Patient presented to the office for follow-up. She has a chronic systolic heart failure apparently started after COVID-19 infection later developed arrhythmia sick sinus syndrome requiring pacemaker placement then also found to have the A. fib on long-term anticoagulation with Eliquis. Ejection fraction on March was only 35% but recent echocardiogram showed LV function recovered with ejection fraction of 50%. But for the past several weeks patient complain of shortness of breath with exertion. He denies chest pain, denies palpitation He has mild COPD rarely uses albuterol inhaler, oxygen saturation at the office 97%. She has chronic left knee pain due to arthritis, saw orthopedic Dr. Lucius Sutherland couple of years ago and was given a steroid injection was told will will also need gel injection and consider knee arthroplasty in the near future. Patient has anxiety depression stable on Effexor and Seroquel. Review of Systems   Constitutional:  Positive for fatigue. Negative for activity change. HENT:  Negative for trouble swallowing and voice change. Eyes:  Negative for visual disturbance. Respiratory:  Positive for shortness of breath. Cardiovascular:  Negative for chest pain and leg swelling. Gastrointestinal:  Negative for abdominal pain, blood in stool, constipation and diarrhea. Genitourinary:  Negative for difficulty urinating, dysuria, frequency, hematuria and scrotal swelling. Musculoskeletal:  Positive for arthralgias. Negative for myalgias. Skin:  Negative for rash. Neurological:  Negative for dizziness. Psychiatric/Behavioral:  Negative for behavioral problems. Prior to Visit Medications    Medication Sig Taking? Authorizing Provider   guaiFENesin 100 MG PACK Take by mouth Yes Historical Provider, MD   estazolam (PROSOM) 2 MG tablet TAKE 1 TO 2 TABLETS BY MOUTH EVERY NIGHT AS NEEDED FOR SLEEP  Yarlei Gao MD   diphenoxylate-atropine (LOMOTIL) 2.5-0.025 MG per tablet Take 1 tablet by mouth 4 times daily as needed for Diarrhea for up to 30 days. Yareli Gao MD   spironolactone (ALDACTONE) 25 MG tablet TAKE ONE (1) TABLET BY MOUTH DAILY  Alicia Hernandez MD   metoprolol succinate (TOPROL XL) 50 MG extended release tablet TAKE ONE (1) TABLET BY MOUTH DAILY  Alicia Hernandez MD   furosemide (LASIX) 40 MG tablet Take 1 tablet by mouth in the morning.   Yareli Gao MD   Fluticasone furoate-vilanterol (BREO ELLIPTA) 200-25 MCG/INH AEPB inhaler Inhale 1 puff into the lungs daily  Yareli Gao MD   ELIQUIS 5 MG TABS tablet TAKE ONE (1) TABLET BY MOUTH TWO (2) Guy Watters MD   losartan (COZAAR) 100 MG tablet Take 1 tablet by mouth daily  Yareli Gao MD   QUEtiapine (SEROQUEL) 25 MG tablet Take 1-2 tablets nightly  Yareli Gao MD   NONFORMULARY Apply topically daily CBD oil  Historical Provider, MD   venlafaxine (EFFEXOR XR) 150 MG extended release capsule Take 1 capsule by mouth daily  Yareli Gao MD   pravastatin (PRAVACHOL) 40 MG tablet Take 1 tablet by mouth daily  Yareli Gao MD   5-Hydroxytryptophan (5-HTP) 100 MG CAPS Take 3 capsules by mouth nightly  Historical Provider, MD   GAMMA AMINOBUTYRIC ACID PO Take 1,500 mg by mouth nightly  Historical Provider, MD   L-Tryptophan 500 MG TABS Take 3,000 mg by mouth nightly  Historical Provider, MD   L-Theanine 200 MG CAPS Take 600 mg by mouth nightly  Historical Provider, MD   vitamin D (CHOLECALCIFEROL) 125 MCG (5000 UT) CAPS capsule Take 5,000 Units by mouth daily  Historical Provider, MD   Multiple Vitamins-Minerals (MULTIVITAMIN) LIQD Take 30 mLs by mouth daily Has 80 mcg of vitamin K -2  Has 50 mg of ginsing root  Historical Provider, MD   albuterol sulfate HFA (VENTOLIN HFA) 108 (90 Base) MCG/ACT inhaler Inhale 2 puffs into the lungs every 6 hours as needed for Wheezing or Shortness of Breath  Clifford Silva MD   famotidine (PEPCID) 20 MG tablet Take 1 tablet by mouth 2 times daily  Francois Powell, APRN - CNP        Social History     Tobacco Use    Smoking status: Former     Packs/day: 1.00     Years: 20.00     Pack years: 20.00     Types: Cigarettes     Quit date: 1988     Years since quittin.1     Passive exposure: Past    Smokeless tobacco: Never   Substance Use Topics    Alcohol use: No        Vitals:    22 1353   BP: (!) 144/83   Pulse: (!) 101   Resp: 18   SpO2: 97%   Weight: 153 lb 12.8 oz (69.8 kg)     Estimated body mass index is 23.39 kg/m² as calculated from the following:    Height as of 22: 5' 8\" (1.727 m). Weight as of this encounter: 153 lb 12.8 oz (69.8 kg). Physical Exam  Constitutional:       General: He is not in acute distress. Appearance: He is well-developed. HENT:      Head: Normocephalic. Eyes:      Conjunctiva/sclera: Conjunctivae normal.   Neck:      Thyroid: No thyromegaly. Cardiovascular:      Rate and Rhythm: Normal rate and regular rhythm. Heart sounds: Normal heart sounds. No murmur heard. Pulmonary:      Effort: No respiratory distress. Breath sounds: Normal breath sounds. No wheezing or rales. Abdominal:      General: There is no distension. Palpations: Abdomen is soft. There is no mass. Tenderness: There is no guarding or rebound. Musculoskeletal:         General: Normal range of motion. Cervical back: Neck supple. Skin:     General: Skin is warm. Findings: No rash. Neurological:      Mental Status: He is alert and oriented to person, place, and time.    Psychiatric:         Behavior: Behavior normal. ASSESSMENT/PLAN:  1. Dyspnea on exertion  Recent echocardiogram showed recovered LV ejection fraction at 50%. But still have some shortness of breath with exertion. Will order pulmonary function test and high-resolution solution CT of the chest will also refer to pulmonologist Dr. Lai Mercado  - Full PFT Study Without Bronchdilator  - Jacquelyn Garcia; Future  - [address] [place] [address], DO, Pulmonary, [place]    2. Chronic systolic heart failure ([facility]  Recent echo 8/4/22 showed LV EF recovered to 50 % from  35 % 3/25/22 continue Toprol-XL 50 mg daily, spironolactone 25 mg daily and losartan 100 mg daily plus Lasix PRN    3. Essential hypertension  Controlled. Continue Toprol-XL 50 mg daily, Aldactone. 25 mg daily and losartan 100 mg daily    4. COPD, mild ([facility]  He rarely us albuterol inhaler    5. Chronic pain of left knee  Orthopedic Dr. Argenis Phelps in the past who recommended a steroid or gel injection and consider knee arthroplasty in the near future. 6. Sick sinus syndrome [facility])  S/p pacemaker placement    7. Depression with anxiety  Stable continue Effexor 150 mg and Seroquel 25 mg at night.       RTC in 2 mos and PRN    An electronic signature was used to authenticate this note.    --Lam Villagran MD on 9/20/2022 at 5:43 PM

## 2022-10-03 DIAGNOSIS — F51.04 PSYCHOPHYSIOLOGICAL INSOMNIA: ICD-10-CM

## 2022-10-04 ENCOUNTER — PATIENT MESSAGE (OUTPATIENT)
Dept: PRIMARY CARE CLINIC | Age: 77
End: 2022-10-04

## 2022-10-04 NOTE — TELEPHONE ENCOUNTER
From: Juan Menjivar  To: Dr. Vasques [place]: 10/4/2022 2:45 PM EDT  Subject: Dr. Oniel Maldonado and Dr. Adrian Castillo,    I'm not sure whether [facility] informed you, but, since Dr. Michael Hendrix does not have an appointment open till February, I was switched to Dr. Dwayne Rodriguez, who seems fine. Also, I did put in a request for a refill for Estazolam in case you haven't seen it yet. Thanks.     Iggy Gonzalez

## 2022-10-05 ENCOUNTER — HOSPITAL ENCOUNTER (OUTPATIENT)
Dept: PULMONOLOGY | Age: 77
Discharge: HOME | End: 2022-10-05
Payer: MEDICARE

## 2022-10-05 ENCOUNTER — HOSPITAL ENCOUNTER (OUTPATIENT)
Dept: CT IMAGING | Age: 77
Discharge: HOME | End: 2022-10-05
Payer: MEDICARE

## 2022-10-05 DIAGNOSIS — R06.09 DYSPNEA ON EXERTION: ICD-10-CM

## 2022-10-05 LAB
DLCO %PRED: 33 %
DLCO PRED: NORMAL
DLCO/VA %PRED: NORMAL
DLCO/VA PRED: NORMAL
DLCO/VA: NORMAL
DLCO: NORMAL
EXPIRATORY TIME: NORMAL
FEF 25-75% %PRED-PRE: NORMAL
FEF 25-75% PRED: NORMAL
FEF 25-75%-PRE: NORMAL
FEV1 %PRED-PRE: 75 %
FEV1 PRED: NORMAL
FEV1/FVC %PRED-PRE: NORMAL
FEV1/FVC PRED: NORMAL
FEV1/FVC: 86 %
FEV1: NORMAL
FVC %PRED-PRE: NORMAL
FVC PRED: NORMAL
FVC: NORMAL
GAW %PRED: NORMAL
GAW PRED: NORMAL
GAW: NORMAL
IC %PRED: NORMAL
IC PRED: NORMAL
IC: NORMAL
MVV %PRED-PRE: NORMAL
MVV PRED: NORMAL
MVV-PRE: NORMAL
PEF %PRED-PRE: NORMAL
PEF PRED: NORMAL
PEF-PRE: NORMAL
RAW %PRED: NORMAL
RAW PRED: NORMAL
RAW: NORMAL
RV %PRED: NORMAL
RV PRED: NORMAL
RV: NORMAL
SVC %PRED: NORMAL
SVC PRED: NORMAL
SVC: NORMAL
TLC %PRED: 101 %
TLC PRED: NORMAL
TLC: NORMAL
VA %PRED: NORMAL
VA PRED: NORMAL
VA: NORMAL
VTG %PRED: NORMAL
VTG PRED: NORMAL
VTG: NORMAL

## 2022-10-05 PROCEDURE — G1010 CDSM STANSON: HCPCS

## 2022-10-05 PROCEDURE — 71250 CT THORAX DX C-: CPT

## 2022-10-05 PROCEDURE — 94729 DIFFUSING CAPACITY: CPT

## 2022-10-05 PROCEDURE — 94726 PLETHYSMOGRAPHY LUNG VOLUMES: CPT | Performed by: INTERNAL MEDICINE

## 2022-10-05 PROCEDURE — 94010 BREATHING CAPACITY TEST: CPT

## 2022-10-05 PROCEDURE — 94726 PLETHYSMOGRAPHY LUNG VOLUMES: CPT

## 2022-10-05 PROCEDURE — 94010 BREATHING CAPACITY TEST: CPT | Performed by: INTERNAL MEDICINE

## 2022-10-05 PROCEDURE — 94729 DIFFUSING CAPACITY: CPT | Performed by: INTERNAL MEDICINE

## 2022-10-05 ASSESSMENT — PULMONARY FUNCTION TESTS
FEV1_PERCENT_PREDICTED_PRE: 75
FEV1/FVC: 86

## 2022-10-05 NOTE — PROCEDURES
spirometry was acceptable and reproducible by ATS standards      Spirometry/Flow volume loop: There is moderate degree of airflow obstruction, postbronchodilator response was not done. Lung volumes: There is evidence of air trapping without hyperinflation    Diffusing capacity:  Severely reduced at 33% predicted. Impression:  Moderate airflow obstruction, evidence of air trapping, severely reduced DLCO, consistent with emphysema. TLC %Pred   Date Value Ref Range Status   10/05/2022 101 % Final           OBSTRUCTION % Predicted FEV1   MILD >70%   MODERATE 60-69%   MODERATELY-SEVERE 50-59%   SEVERE 35-49%   VERY SEVERE <35%         RESTRICTION % Predicted TLC   MILD 66-80%   MODERATE 54-65%   MODERATELY-SEVERE <54%                 DIFFUSION CAPACITY DLCO % Pred   MILD >60% AND < LLN   MODERATE 40-60%   SEVERE <40%       PFT data will be scanned into the media tab under this encounter. Please see the scanned data for numerical values.      Josi Amador MD  [facility] Pulmonary, Sleep and Critical Care Medicine

## 2022-10-06 ENCOUNTER — OFFICE VISIT (OUTPATIENT)
Dept: PULMONOLOGY | Age: 77
End: 2022-10-06
Payer: MEDICARE

## 2022-10-06 VITALS
DIASTOLIC BLOOD PRESSURE: 75 MMHG | RESPIRATION RATE: 21 BRPM | TEMPERATURE: 97.3 F | HEART RATE: 94 BPM | SYSTOLIC BLOOD PRESSURE: 115 MMHG | WEIGHT: 156.2 LBS | HEIGHT: 68 IN | BODY MASS INDEX: 23.67 KG/M2 | OXYGEN SATURATION: 97 %

## 2022-10-06 DIAGNOSIS — R06.2 WHEEZING: ICD-10-CM

## 2022-10-06 DIAGNOSIS — R06.02 SHORTNESS OF BREATH: Primary | ICD-10-CM

## 2022-10-06 DIAGNOSIS — J84.9 ILD (INTERSTITIAL LUNG DISEASE) (HCC): ICD-10-CM

## 2022-10-06 LAB
C-REACTIVE PROTEIN: 4.2 MG/L (ref 0–5.1)
RHEUMATOID FACTOR: 37 IU/ML
SEDIMENTATION RATE, ERYTHROCYTE: 7 MM/HR (ref 0–20)

## 2022-10-06 PROCEDURE — 1123F ACP DISCUSS/DSCN MKR DOCD: CPT | Performed by: INTERNAL MEDICINE

## 2022-10-06 PROCEDURE — 99204 OFFICE O/P NEW MOD 45 MIN: CPT | Performed by: INTERNAL MEDICINE

## 2022-10-06 PROCEDURE — G8420 CALC BMI NORM PARAMETERS: HCPCS | Performed by: INTERNAL MEDICINE

## 2022-10-06 PROCEDURE — 1036F TOBACCO NON-USER: CPT | Performed by: INTERNAL MEDICINE

## 2022-10-06 PROCEDURE — G8427 DOCREV CUR MEDS BY ELIG CLIN: HCPCS | Performed by: INTERNAL MEDICINE

## 2022-10-06 PROCEDURE — G8484 FLU IMMUNIZE NO ADMIN: HCPCS | Performed by: INTERNAL MEDICINE

## 2022-10-06 NOTE — PROGRESS NOTES
[address] [facility] time____1100________        Surgery time__1200__________    Take the following medications with a sip of water: Follow your MD/Surgeons pre-procedure instructions regarding your medications     Do not eat or drink anything after 12:00 midnight prior to your surgery. This includes water chewing gum, mints and ice chips. You may brush your teeth and gargle the morning of your surgery, but do not swallow the water     Please see your family doctor/pediatrician for a history and physical and/or concerning medications. Bring any test results/reports from your physicians office. If you are under the care of a heart doctor or specialist doctor, please be aware that you may be asked to them for clearance    You may be asked to stop blood thinners such as Coumadin, Plavix, Fragmin, Lovenox, etc., or any anti-inflammatories such as:  Aspirin, Ibuprofen, Advil, Naproxen prior to your surgery. STOP ELIQUIS AS INSTRUCTED   We also ask that you stop any OTC medications such as fish oil, vitamin E, glucosamine, garlic, Multivitamins, COQ 10, etc.  MAY TAKE TYLENOL  We ask that you do not smoke 24 hours prior to surgery  We ask that you do not  drink any alcoholic beverages 24 hours prior to surgery     You must make arrangements for a responsible adult to take you home after your surgery. For your safety you will not be allowed to leave alone or drive yourself home. Your surgery will be cancelled if you do not have a ride home. Also for your safety, it is strongly suggested that someone stay with you the first 24 hours after your surgery. A parent or legal guardian must accompany a child scheduled for surgery and plan to stay at the hospital until the child is discharged. Please do not bring other children with you. For your comfort, please wear simple loose fitting clothing to the hospital.  Please do not bring valuables.     Do not wear any make-up or nail polish on your fingers or toes      For your safety, please do not wear any jewelry or body piercing's on the day of surgery. All jewelry must be removed. If you have dentures, they will be removed before going to operating room. For your convenience, we will provide you with a container. If you wear contact lenses or glasses, they will be removed, please bring a case for them. If you have a living will and a durable power of  for healthcare, please bring in a copy. As part of our patient safety program to minimize surgical site infections, we ask you to do the following:    Please notify your surgeon if you develop any illness between         now and the  day of your surgery. This includes a cough, cold, fever, sore throat, nausea,         or vomiting, and diarrhea, etc.   Please notify your surgeon if you experience dizziness, shortness         of breath or blurred vision between now and the time of your surgery. Do not shave your operative site 96 hours prior to surgery. For face and neck surgery, men may use an electric razor 48 hours   prior to surgery. You may shower the night before surgery or the morning of   your surgery with an antibacterial soap. You will need to bring a photo ID and insurance card    [facility] has an onsite pharmacy, would you like to utilize our pharmacy     If you will be staying overnight and use a C-pap machine, please bring   your C-pap to hospital     Our goal is to provide you with excellent care, therefore, visitors will be limited to two(2) in the room at a time so that we may focus on providing this care for you. Please contact pre-admission testing if you have any further questions.                  [facility] phone number:  [phone] [facility] fax number:  [phone]  Please note these are generalized instructions for all surgical cases, you may be provided with more specific instructions according to your surgery. C-Difficile admission screening and protocol:       * Admitted with diarrhea? [] YES    [x]  NO     *Prior history of C-Diff. In last 3 months? [] YES    [x]  NO     *Antibiotic use in the past 6-8 weeks? [x]  NO    []  YES                 If yes, which ANTIBIOTIC AND REASON______     *Prior hospitalization or nursing home in the last month? []  YES    [x]  NO        SAFETY FIRST. .call before you fall

## 2022-10-06 NOTE — PROGRESS NOTES
[address], SLEEP, AND CRITICAL CARE    Sai George (:  1945) is a 76 y.o. male,New patient, here for evaluation of the following chief complaint(s):  Establish Care (Dyspnea on exertion )         ASSESSMENT/PLAN:  1. Shortness of breath  2. ILD (interstitial lung disease) ([address]  Assessment & Plan:   - Focal; right upper lobe  -Broad differential including NSIP, HP, , NTM  -Recommend bronchoscopy with lavage as first step to evaluate for AFB  -Also send off autoimmune serologies  Orders:  -     Aldolase; Future  -     Cyclic Citrul Peptide Antibody, IgG; Future  -     C-reactive protein; Future  -     Sedimentation Rate; Future  -     MPO and PR3 Antibody Reflex; Future  -     Rheumatoid Factor; Future  -     MARIELA Reflex to Antibody Cascade; Future  -     HYPERSENSITIVITY PNEUMONITIS EXTENDED PANEL; Future  -     C-Reactive Protein; Future  3. Wheezing   -     HYPERSENSITIVITY PNEUMONITIS EXTENDED PANEL; Future    Return in about 4 weeks (around 11/3/2022). Future Appointments   Date Time Provider Pedrito Nicholas   10/12/2022  1:15 PM SCHEDULE, [facility] REMOTE TRANSMISSION [facility]   2022  1:00 PM [facility]   2022  1:45 PM SethMD Marisela Whitaker RD [facility] [place] - DYD   3/7/2023 12:15 PM Vic Cortes MD [facility]            Subjective   SUBJECTIVE/OBJECTIVE:  75-year-old presents for evaluation of shortness of breath, intermittent wheezing and abnormal CT scan of the chest.  Patient states that he has some shortness of breath with exertion. He has an associated cough that is intermittently productive of clear sputum. He has previously worked as a schoolteacher. CT scan obtained shows focal groundglass and interstitial reticulations in the right upper lobe. This is progressed from previous CT scan performed last July      Review of Systems   Constitutional: Negative.     Respiratory:  Positive for cough, shortness of breath and wheezing. Cardiovascular: Negative. Neurological: Negative. Psychiatric/Behavioral: Negative. Objective   Physical Exam     Gen:  No acute distress. Eyes: EOMI. Anicteric sclera. No conjunctival injection. ENT: No discharge. External appearance of ears and nose normal.  Neck: Trachea midline. No mass   Resp:  No crackles. No wheezes. No rhonchi. No dullness on percussion. CV: Regular rate. Regular rhythm. No murmur or rub. No edema. Neuro:  no focal neurologic deficit. Moves all extremities  Psych: Awake and alert, Oriented x 3. Judgement and insight appropriate. Mood stable. CT chest images reviewed personally by me, interpretation as follows:  -Focal groundglass with some mild fibrosis localized mostly to the right upper lobe. This is progressed since last CT scan in July 2021    PFTs  -Mild COPD, FEV1 75%  -Reduced diffusing capacity  -+ Air trapping    An electronic signature was used to authenticate this note.     --Harshil Marcano MD

## 2022-10-07 PROBLEM — J84.9 ILD (INTERSTITIAL LUNG DISEASE) (HCC): Status: ACTIVE | Noted: 2022-10-07

## 2022-10-07 LAB
ANTI-NUCLEAR ANTIBODY (ANA): NEGATIVE
CYCLIC CITRULLINATED PEPTIDE ANTIBODY IGG: <0.5 U/ML (ref 0–2.9)

## 2022-10-07 ASSESSMENT — ENCOUNTER SYMPTOMS
SHORTNESS OF BREATH: 1
WHEEZING: 1
COUGH: 1

## 2022-10-07 NOTE — ASSESSMENT & PLAN NOTE
- Focal; right upper lobe  -Broad differential including NSIP, HP, , NTM  -Recommend bronchoscopy with lavage as first step to evaluate for AFB  -Also send off autoimmune serologies

## 2022-10-08 LAB — ALDOLASE: 5 U/L (ref 1.2–7.6)

## 2022-10-09 LAB
MYELOPEROXIDASE AB: 0 AU/ML (ref 0–19)
SERINE PROTEASE 3 AB: 1 AU/ML (ref 0–19)

## 2022-10-11 ENCOUNTER — HOSPITAL ENCOUNTER (OUTPATIENT)
Age: 77
Setting detail: OUTPATIENT SURGERY
Discharge: HOME | End: 2022-10-11
Attending: INTERNAL MEDICINE | Admitting: INTERNAL MEDICINE
Payer: MEDICARE

## 2022-10-11 VITALS
OXYGEN SATURATION: 100 % | RESPIRATION RATE: 16 BRPM | BODY MASS INDEX: 23.49 KG/M2 | WEIGHT: 155 LBS | DIASTOLIC BLOOD PRESSURE: 77 MMHG | TEMPERATURE: 97 F | HEIGHT: 68 IN | SYSTOLIC BLOOD PRESSURE: 145 MMHG | HEART RATE: 70 BPM

## 2022-10-11 DIAGNOSIS — J84.9 ILD (INTERSTITIAL LUNG DISEASE) (HCC): ICD-10-CM

## 2022-10-11 LAB
APPEARANCE BAL (LAVAGE): ABNORMAL
CLOT EVALUATION BAL: ABNORMAL
COLOR LAVAGE: COLORLESS
EOSIN: 1 %
LYMPHOCYTES, BAL: 11 % (ref 5–10)
MACROPHAGES, BAL: 59 % (ref 90–95)
MONOCYTES, BAL: 21 %
NUMBER OF CELLS COUNTED BAL (LAVAGE): 100
RBC, BAL: 225 /CUMM
SEGMENTED NEUTROPHILS, BAL: 8 % (ref 5–10)
WBC/EPI CELLS BAL: 788 /CUMM

## 2022-10-11 PROCEDURE — 87102 FUNGUS ISOLATION CULTURE: CPT

## 2022-10-11 PROCEDURE — 99152 MOD SED SAME PHYS/QHP 5/>YRS: CPT | Performed by: INTERNAL MEDICINE

## 2022-10-11 PROCEDURE — 87015 SPECIMEN INFECT AGNT CONCNTJ: CPT

## 2022-10-11 PROCEDURE — 3609010800 HC BRONCHOSCOPY ALVEOLAR LAVAGE: Performed by: INTERNAL MEDICINE

## 2022-10-11 PROCEDURE — 88185 FLOWCYTOMETRY/TC ADD-ON: CPT

## 2022-10-11 PROCEDURE — 88312 SPECIAL STAINS GROUP 1: CPT

## 2022-10-11 PROCEDURE — 6360000002 HC RX W HCPCS: Performed by: INTERNAL MEDICINE

## 2022-10-11 PROCEDURE — 2500000003 HC RX 250 WO HCPCS: Performed by: INTERNAL MEDICINE

## 2022-10-11 PROCEDURE — 88112 CYTOPATH CELL ENHANCE TECH: CPT

## 2022-10-11 PROCEDURE — 7100000010 HC PHASE II RECOVERY - FIRST 15 MIN: Performed by: INTERNAL MEDICINE

## 2022-10-11 PROCEDURE — 89051 BODY FLUID CELL COUNT: CPT

## 2022-10-11 PROCEDURE — 88305 TISSUE EXAM BY PATHOLOGIST: CPT

## 2022-10-11 PROCEDURE — 7100000011 HC PHASE II RECOVERY - ADDTL 15 MIN: Performed by: INTERNAL MEDICINE

## 2022-10-11 PROCEDURE — 87070 CULTURE OTHR SPECIMN AEROBIC: CPT

## 2022-10-11 PROCEDURE — 87206 SMEAR FLUORESCENT/ACID STAI: CPT

## 2022-10-11 PROCEDURE — 31624 DX BRONCHOSCOPE/LAVAGE: CPT | Performed by: INTERNAL MEDICINE

## 2022-10-11 PROCEDURE — 87205 SMEAR GRAM STAIN: CPT

## 2022-10-11 PROCEDURE — 3609027000 HC BRONCHOSCOPY: Performed by: INTERNAL MEDICINE

## 2022-10-11 PROCEDURE — 87116 MYCOBACTERIA CULTURE: CPT

## 2022-10-11 PROCEDURE — 2580000003 HC RX 258: Performed by: INTERNAL MEDICINE

## 2022-10-11 PROCEDURE — 2709999900 HC NON-CHARGEABLE SUPPLY: Performed by: INTERNAL MEDICINE

## 2022-10-11 PROCEDURE — 6370000000 HC RX 637 (ALT 250 FOR IP): Performed by: INTERNAL MEDICINE

## 2022-10-11 PROCEDURE — 88184 FLOWCYTOMETRY/ TC 1 MARKER: CPT

## 2022-10-11 RX ORDER — MIDAZOLAM HYDROCHLORIDE 1 MG/ML
INJECTION INTRAMUSCULAR; INTRAVENOUS PRN
Status: DISCONTINUED | OUTPATIENT
Start: 2022-10-11 | End: 2022-10-11 | Stop reason: ALTCHOICE

## 2022-10-11 RX ORDER — LIDOCAINE HYDROCHLORIDE 20 MG/ML
JELLY TOPICAL PRN
Status: DISCONTINUED | OUTPATIENT
Start: 2022-10-11 | End: 2022-10-11 | Stop reason: ALTCHOICE

## 2022-10-11 RX ORDER — SODIUM CHLORIDE 0.9 % (FLUSH) 0.9 %
5-40 SYRINGE (ML) INJECTION PRN
Status: CANCELLED | OUTPATIENT
Start: 2022-10-11

## 2022-10-11 RX ORDER — FENTANYL CITRATE 50 UG/ML
INJECTION, SOLUTION INTRAMUSCULAR; INTRAVENOUS PRN
Status: DISCONTINUED | OUTPATIENT
Start: 2022-10-11 | End: 2022-10-11 | Stop reason: ALTCHOICE

## 2022-10-11 RX ORDER — LIDOCAINE HYDROCHLORIDE 20 MG/ML
INJECTION, SOLUTION EPIDURAL; INFILTRATION; INTRACAUDAL; PERINEURAL PRN
Status: DISCONTINUED | OUTPATIENT
Start: 2022-10-11 | End: 2022-10-11 | Stop reason: ALTCHOICE

## 2022-10-11 RX ORDER — SODIUM CHLORIDE 0.9 % (FLUSH) 0.9 %
5-40 SYRINGE (ML) INJECTION EVERY 12 HOURS SCHEDULED
Status: CANCELLED | OUTPATIENT
Start: 2022-10-11

## 2022-10-11 RX ORDER — SODIUM CHLORIDE 9 MG/ML
INJECTION, SOLUTION INTRAVENOUS PRN
Status: CANCELLED | OUTPATIENT
Start: 2022-10-11

## 2022-10-11 RX ORDER — SODIUM CHLORIDE 9 MG/ML
INJECTION, SOLUTION INTRAVENOUS ONCE
Status: COMPLETED | OUTPATIENT
Start: 2022-10-11 | End: 2022-10-11

## 2022-10-11 RX ADMIN — SODIUM CHLORIDE: 9 INJECTION, SOLUTION INTRAVENOUS at 11:45

## 2022-10-11 ASSESSMENT — PAIN - FUNCTIONAL ASSESSMENT: PAIN_FUNCTIONAL_ASSESSMENT: 0-10

## 2022-10-11 ASSESSMENT — PAIN SCALES - GENERAL: PAINLEVEL_OUTOF10: 0

## 2022-10-11 NOTE — H&P
Fiberoptic bronchoscopy history:     Nursing History and Physical reviewed and agreed upon: For additional findings, please see my notes in EPIC. Atypical Pneumonia. Patient has No Known Allergies. Past Medical History:   Diagnosis Date    Anxiety     COPD, mild ([address] 04/24/2017    COVID-19 12/2020    Depression     History of blood transfusion     Hyperlipidemia     Hypertension     IBS (irritable bowel syndrome) 10/16/2013    Insomnia     PAF (paroxysmal atrial fibrillation) ([facility] 08/10/2021    Sick sinus syndrome ([facility] [address] 08/10/2021    Wears glasses        Past Surgical History:   Procedure Laterality Date    ABLATION OF DYSRHYTHMIC FOCUS      APPENDECTOMY      CARDIAC SURGERY      pacemaker    COLONOSCOPY  03/19/2019    [place]    COLONOSCOPY N/A 03/19/2019    COLONOSCOPY WITH BIOPSY performed by Dai Vargas MD at [place] [address] N/A 03/19/2019    COLONOSCOPY POLYPECTOMY SNARE/COLD BIOPSY performed by Dai Vargas MD at [place] [address]         No Known Allergies    Medication list was reviewed and updated as needed in Epic     reports that he quit smoking about 35 years ago. His smoking use included cigarettes. He has a 20.00 pack-year smoking history. He has been exposed to tobacco smoke. He quit smokeless tobacco use about 34 years ago. family history includes COPD in his father; Cancer in his brother, mother, and another family member; Hypertension in his brother, brother, and sister; Vena Pih in his mother; Stroke in his maternal grandfather. HENT: Airway patent and reviewed. Mallampati II  Cardiovascular: Normal rate, regular rhythm, normal heart sounds. Pulmonary/Chest: No wheezes. No rhonchi. No rales. Abdominal: Soft. Bowel sounds are normal. No distension. ASA CLASS    II. Mild systemic disease       Sedation plan:    Moderate      Post Procedure Plan   Return to same level of care  Future Appointments Date Time Provider [place]   10/12/2022  1:15 PM SCHEDULE, [place] REMOTE TRANSMISSION [facility]   11/8/2022  1:00 PM Chan Schmidt MD [address] [place] [address]   11/21/2022  1:45 PM MD Efra Mojica [facility] [place] - DYD   3/7/2023 12:15 PM David Jeong MD [facility]        ______________________     The risks and benefits as well as alternatives to the procedure have been discussed with the patient. The patient understands and agrees to proceed.     Chan Schmidt MD

## 2022-10-11 NOTE — PROGRESS NOTES
Vss. No c/o. Tolerated sitting up and po ice chips. Wife at bedside. Verbalized understanding of discharge instructions.

## 2022-10-11 NOTE — PROCEDURES
Bronchoscopy Procedure Note    Date of Operation: 10/11/2022    Surgeon: Richard Cerda MD    Anesthesia: Versed 2 mg Fentanyl 75 mcg    Operation: Flexible fiberoptic bronchoscopy, diagnostic / therapeutic    Estimated Blood Loss: Minimal    Complications: None    Indications and History:  The patient is a 76 y.o. male with atypical pneumonia. The risks, benefits, complications, treatment options and expected outcomes were discussed with the patient. The possibilities of reaction to medication, pulmonary aspiration, perforation of a viscus(Pneumothorax), bleeding, respiratory failure and failure to diagnose a condition and creating a complication requiring transfusion or operation were discussed with the patient who freely signed the consent. Description of Procedure: The patient was taken to the endoscopy suite, identified as Rainer Kurtz and the procedure verified as Flexible Fiberoptic Bronchoscopy. A Time Out was held and the above information confirmed. After the induction of topical nasopharyngeal anesthesia, the patient was positioned supine and the bronchoscope was passed through the right nare . The vocal cords were visualized, and 1% lidocaine was topically placed onto the cords. The cords were normal. The scope was then passed into the trachea. Additional 1% lidocaine was used topically within the airway. Careful inspection of the tracheal lumen was accomplished. The scope was sequentially passed into all airways.  Total lidocaine 10      Endobronchial findings:    Trachea: Normal mucosa  Batsheva: Normal mucosa  Right main bronchus: Normal mucosa  Right upper lobe bronchus including anterior, apical and posterior until the fourth generation bronchi: Normal mucosa  Right intermedius bronchus: Normal mucosa  Right middle lobe: Normal mucosa  Right lower lobe superior segment uptill 3rd generation bronchi: Normal mucosa  Left main bronchus: Normal mucosa  Left upper lobe including Lingula, anterior segment, and apico-posterior segment through the 4th generation bronchi: Normal mucosa  Left lower lobe basilar and superior segments uptill 4th generation bronchi: Normal mucosa    Specimens:    BAL of the RUL lobe with 50 mL in and ~ 20 mL out  Wash of RLL 50cc in and 15 out      The Patient was taken to the Endoscopy Recovery area in satisfactory condition. Joselyn Engel MD    Type of sedation used:  Moderate Sedation  Physician/patient face-to-face sedation start time: 1224    Physician/patient face-to-face sedation stop time: 1240    Total moderate sedation time in minutes: 16 minutes  Patient was monitored continuously 1:1 throughout the entire procedure while sedation was administered

## 2022-10-11 NOTE — DISCHARGE INSTRUCTIONS
Bronchoscopy: What to Expect at [address] [facility]     Bronchoscopy lets your doctor look at your airway through a tube called a bronchoscope. Afterward, you may feel tired for 1 or 2 days. Your mouth may feel very dry for several hours after the procedure. You may also have a sore throat and a hoarse voice for a few days. Sucking on throat lozenges or gargling with warm salt water may help soothe your sore throat. If a sample of tissue (biopsy) was taken, you may spit up a small amount of blood or have bloody saliva. This is normal.  Ask your doctor when you can drive again. Do not smoke for at least 24 hours. This care sheet gives you a general idea about how long it will take for you to recover. But each person recovers at a different pace. Follow the steps below to get better as quickly as possible. How can you care for yourself at home? Activity    Do not eat anything for 2 hours after the procedure. Rest when you feel tired. Getting enough sleep will help you recover. Avoid strenuous activities, such as bicycle riding, jogging, weight lifting, or aerobic exercise, until your doctor says it is okay. Ask your doctor when you can drive again. Diet    You can eat your normal diet. If your stomach is upset, try bland, low-fat foods like plain rice, broiled chicken, toast, and yogurt. If it is painful to swallow, start out with cold drinks, flavored ice pops, and ice cream. Next, try soft foods like pudding, yogurt, canned or cooked fruit, scrambled eggs, and mashed potatoes. Avoid eating hard or scratchy foods like chips or raw vegetables. Avoid orange or tomato juice and other acidic foods that can sting the throat. Drink plenty of fluids to avoid becoming dehydrated (unless your doctor tells you not to). Medicines    Take pain medicines exactly as directed. If the doctor gave you a prescription medicine for pain, take it as prescribed.   If you are not taking a prescription pain medicine, ask your doctor if you can take an over-the-counter medicine. If you think your pain medicine is making you sick to your stomach: Take your medicine after meals (unless your doctor has told you not to). Ask your doctor for a different pain medicine. If your doctor prescribed antibiotics, take them as directed. Do not stop taking them just because you feel better. You need to take the full course of antibiotics. Follow-up care is a key part of your treatment and safety. Be sure to make and go to all appointments, and call your doctor if you are having problems. It's also a good idea to know your test results and keep a list of the medicines you take. When should you call for help? Call 911 anytime you think you may need emergency care. For example, call if:    You passed out (lost consciousness). You have sudden chest pain and shortness of breath. You cough up large amounts of bright red blood. You have severe pain in your chest.     You have severe trouble breathing. Call your doctor now or seek immediate medical care if:    You cough up more than a few tablespoons of blood. You have pain that does not get better after you take pain medicine. You have a fever over 100°F.     You still sound hoarse after a few days. You have bubbles under the skin around the collarbone. These may crackle and pop when you press on them. Watch closely for changes in your health, and be sure to contact your doctor if you have any problems. Where can you learn more? Go to [email or web address] org and sign in to your [email or web address] account. Enter L241 in the [facility] box to learn more about \"Bronchoscopy: What to Expect at Home. \"     If you do not have an account, please click on the \"Sign Up Now\" link. Current as of: March 9, 2022               Content Version: 13.4  © 2783-9991 Healthwise, Incorporated.    Care instructions adapted under license by [facility]. If you have questions about a medical condition or this instruction, always ask your healthcare professional. [place] [address] any warranty or liability for your use of this information.

## 2022-10-11 NOTE — PROGRESS NOTES
Anesthesia pulse oximeter used during procedure due to malfunction of endo-01 associated monitor. Oxygen saturation remained > 95% throughout procedure.  Pt transported to Phase 2 on room air at 12:48pm.

## 2022-10-12 ENCOUNTER — NURSE ONLY (OUTPATIENT)
Dept: CARDIOLOGY CLINIC | Age: 77
End: 2022-10-12
Payer: MEDICARE

## 2022-10-12 DIAGNOSIS — Z95.0 PACEMAKER: ICD-10-CM

## 2022-10-12 DIAGNOSIS — I49.5 SICK SINUS SYNDROME (HCC): Primary | ICD-10-CM

## 2022-10-12 LAB
ALLERGEN BEEF: <0.1 KU/L
ALLERGEN PHOMA BETAE: <0.1 KU/L
ALLERGEN PORK: <0.1 KU/L
ALLERGEN SEE NOTE: NORMAL
ALLERGEN, FEATHER MIX: NEGATIVE KU/L
ASPERGILLUS FLAVUS ANTIBODIES: NORMAL
ASPERGILLUS FUMIGATUS #1: NORMAL
ASPERGILLUS FUMIGATUS #2: NORMAL
ASPERGILLUS FUMIGATUS #3: NORMAL
ASPERGILLUS FUMIGATUS #6: NORMAL
AUREOBASIDIUM PULLULANS: NORMAL
MICROPOLYSPORA FAENI: NORMAL
PIGEON SERUM ABS: NORMAL
SACCHAROMONOSPORA VIRIDIS: NORMAL
THERMOACTINOMYCES CANDIDUS AB: NORMAL
THERMOACTINOMYCES VULGARIS #1: NORMAL

## 2022-10-12 PROCEDURE — 93296 REM INTERROG EVL PM/IDS: CPT | Performed by: INTERNAL MEDICINE

## 2022-10-12 PROCEDURE — 93294 REM INTERROG EVL PM/LDLS PM: CPT | Performed by: INTERNAL MEDICINE

## 2022-10-13 PROBLEM — Z95.0 PACEMAKER: Status: ACTIVE | Noted: 2021-06-04

## 2022-10-13 LAB
CULTURE, RESPIRATORY: NORMAL
GRAM STAIN RESULT: NORMAL

## 2022-10-14 NOTE — PROGRESS NOTES
Remote transmission received from patient's dual chamber pacemaker monitor at home. Transmission shows normal sensing and pacing function. No new arrhythmias/events recorded. Ap 7.4%   98.8% (MVP Off)  PVCs 12.4/hr  Echo 79.7560 showed EF of 50%. End of 91-day monitoring period 10/12/22. EP physician will review. See interrogation under cardiology tab in the [address] [place] [address] [address] field for more details. Will continue to monitor remotely.

## 2022-10-24 LAB
FUNGUS (MYCOLOGY) CULTURE: NORMAL
FUNGUS STAIN: NORMAL

## 2022-10-25 LAB
AFB CULTURE (MYCOBACTERIA): NORMAL
AFB SMEAR: NORMAL

## 2022-10-28 NOTE — PROGRESS NOTES
Cardiac Electrophysiology Consultation   Date: 7/19/2021  Reason for Consultation: atrial fibrillation  Consult Requesting Physician:  Marisol Quintanilla MD    Chief Complaint:   Chief Complaint   Patient presents with    Follow-up     afib      HPI: Krystina Casas is a 64 y.o. patient with a history of COVID-19 pneumonia (Dec. 2020), anxiety, COPD, hyperlipidemia, HTN, IBS and depression. He was seen in office by his PCP Marisol Quintanilla MD on 5/4/2021 and reported to him that he had multiple reading on his pulse oximeter showing HR as low at 32, 36 and 42. He reported vague symptoms of lightheadedness. Dr. Uriel Pruett cut his Atenolol from 100 mg daily to 50 mg daily. He presented to the office on 5/21/2021 for initial consultation for evaluation of bradycardia. He stated that he researched his medications and found that atenolol was a beta block and he also looked into the CBD oil he was applying to his knee and he took CBD under the tongue to help him sleep and he found it also has effects of a beta blocker. He stopped the CBD oil on Friday 5/7/2021. He stated he waited one day to take his atenolol later in the day and his heart rate was in the 60's. He stated that he used to ride a bike several miles a day but when he started feeling unsteady her transitioned into riding a stationary bike and rode it 2-3 miles yesterday. 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 Interval History: Today, he presents to office for follow up to discuss treatment options for his atrial fibrillation which was first detected around the time of his bradycardia in 5//2021. He states he feels more steady on his feet since his pacemaker was implanted on 6/4/2021. He states he has fallen a couple of time since the pacemaker was implanted. He state that he has shortness of breath from his COPD.  Patient denies exertional chest pain/pressure, dyspnea at rest, PND, orthopnea, palpitations, lightheadedness, weight changes, changes in LE edema, and syncope. Past Medical History:   Diagnosis Date    Anxiety     COPD, mild ([address] 4/24/2017    Depression     History of blood transfusion     Hyperlipidemia     Hypertension     IBS (irritable bowel syndrome) 10/16/2013    Insomnia         Past Surgical History:   Procedure Laterality Date    APPENDECTOMY      COLONOSCOPY  03/19/2019    [place]    COLONOSCOPY N/A 3/19/2019    COLONOSCOPY WITH BIOPSY performed by Bishop Bobby MD at [address] COLONOSCOPY N/A 3/19/2019    COLONOSCOPY POLYPECTOMY SNARE/COLD BIOPSY performed by Bishop Bobby MD at [address]         Allergies:  No Known Allergies    Medication:   Prior to Admission medications    Medication Sig Start Date End Date Taking? Authorizing Provider   diphenoxylate-atropine (LOMOTIL) 2.5-0.025 MG per tablet Take 1 tablet by mouth 4 times daily as needed for Diarrhea for up to 60 days.  7/6/21 9/4/21 Yes Yrn Shi MD   QUEtiapine (SEROQUEL) 25 MG tablet Take 1 tablet by mouth nightly 7/6/21  Yes Yrn Shi MD   albuterol sulfate HFA (VENTOLIN HFA) 108 (90 Base) MCG/ACT inhaler Inhale 2 puffs into the lungs every 6 hours as needed for Wheezing or Shortness of Breath 7/6/21  Yes Yrn Shi MD   estazolam (PROSOM) 2 MG tablet TAKE 1 TO 2 TABLETS BY MOUTH EVERY NIGHT AS NEEDED FOR SLEEP 6/21/21 7/21/21 Yes Yrn Shi MD   apixaban (ELIQUIS) 5 MG TABS tablet Take 1 tablet by mouth 2 times daily 6/9/21  Yes Stacie Pisano MD   Fluticasone furoate-vilanterol (BREO ELLIPTA) 200-25 MCG/INH AEPB inhaler Inhale 1 puff into the lungs daily 5/17/21  Yes Yrn Shi MD   hydrALAZINE (APRESOLINE) 25 MG tablet Take 1 tablet by mouth 3 times daily 5/17/21  Yes Stacie Pisano MD   pravastatin (PRAVACHOL) 40 MG tablet Take 1 tablet by mouth daily 5/10/21  Yes Bishop Mullins MD Vikas   hydroCHLOROthiazide (HYDRODIURIL) 25 MG tablet Take 1 tablet by mouth daily 5/4/21  Yes [place] Samples, MD   amLODIPine (NORVASC) 10 MG tablet Take 0.5 tablets by mouth daily 3/26/21  Yes [place] Samples, MD   venlafaxine (EFFEXOR XR) 150 MG extended release capsule TAKE ONE (1) CAPSULE BY MOUTH DAILY  3/8/21  Yes [place] Samples, MD   losartan (COZAAR) 100 MG tablet TAKE ONE (1) TABLET BY MOUTH DAILY  2/9/21  Yes [place] Samples, MD   famotidine (PEPCID) 20 MG tablet Take 1 tablet by mouth 2 times daily 12/23/20  Yes KLAUS Miller - CNP       Social History:   reports that he quit smoking about 32 years ago. His smoking use included cigarettes. He has a 20.00 pack-year smoking history. He has never used smokeless tobacco. He reports that he does not drink alcohol and does not use drugs. Family History:  family history includes Cancer in an other family member; Hypertension in his brother, brother, and sister; Stroke in his maternal grandfather. Reviewed. Denies family history of sudden cardiac death, arrhythmia, premature CAD    Review of System:    · General ROS: negative for - chills, fever   · Psychological ROS: negative for - anxiety or depression  · Ophthalmic ROS: negative for - eye pain or loss of vision  · ENT ROS: negative for - epistaxis, headaches, nasal discharge, sore throat   · Allergy and Immunology ROS: negative for - hives, nasal congestion   · Hematological and Lymphatic ROS: negative for - bleeding problems, blood clots, bruising or jaundice  · Endocrine ROS: negative for - skin changes, temperature intolerance or unexpected weight changes  · Respiratory ROS: negative for - cough, hemoptysis, pleuritic pain, SOB, sputum changes or wheezing  · Cardiovascular ROS: Per HPI.    · Gastrointestinal ROS: negative for - abdominal pain, blood in stools, diarrhea, hematemesis, melena, nausea/vomiting or swallowing difficulty/pain  · Genito-Urinary ROS: negative for - dysuria or incontinence  · Musculoskeletal ROS: negative for - joint swelling or muscle pain  · Neurological ROS: negative for - confusion, dizziness, gait disturbance, headaches, numbness/tingling, seizures, speech problems, tremors, visual changes or weakness  · Dermatological ROS: negative for - rash    Physical Examination:  Vitals:    21 1344   BP: (!) 142/80   Pulse: 109   SpO2: 98%       · Constitutional: Oriented. No distress. · Head: Normocephalic and atraumatic. · Mouth/Throat: Oropharynx is clear and moist.   · Eyes: Conjunctivae normal. EOM are normal.   · Neck: Normal range of motion. Neck supple. No rigidity. No JVD present. · Cardiovascular: Normal rate, regular rhythm, S1&S2 and intact distal pulses. · Pulmonary/Chest: Bilateral respiratory sounds. No wheezes. No rhonchi. · Abdominal: Soft. Bowel sounds present. No distension, No tenderness. · Musculoskeletal: No tenderness. No edema    · Lymphadenopathy: Has no cervical adenopathy. · Neurological: Alert and oriented. Cranial nerve appears intact, No Gross deficit   · Skin: Skin is warm and dry. No rash noted. · Psychiatric: Has a normal mood, affect and behavior     Labs:  Reviewed. EC2021 reviewed, electronic ventricular pacemaker with underlying sinus rhythm that is tracked    1. Event monitor:   none    2. Echo: 2019  Summary  Left ventricular cavity size is normal. Assymetric basal septal hypertrophy  EF 55-60%. Left atrium is of normal size. Mild mitral regurgitation. Normal right ventricular size and function. Mild tricuspid regurgitation  Trivial pulmonic regurgitation present. 3. Stress Echo:  10/9/2015  Summary  Normal (Negative) response to exercise. Baseline echocardiogram shows normal LV function with an ejection fraction of 60%. Following exercise there was uniform augmentation of all segments with appropriate hyperdynamic response to exercise. Normal; stress echo.     4. Cath:   None    I SP tube exchanged for new 20 Fr catheter. 300 units of botox injected into detrusor independently reviewed the ECG, MCOT, echocardiogram, stress test, and coronary angiography/PCI results and used them for my plan of care. Procedures:  1. 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 T    On 5/10/2021 he was seen in office and ambulated in our hallway revealed a HR maximum of 50 bpm, strongly suggesting chronotropic incompetence. Unfortunately, we did not have EKG monitoring during the ambulation. Instead we were only able to place a pulse oximeter. His level of exertion was limited by shortness of breath. 48 hour CAM patch was placed on 5/10/2021 to assess heart rate and rhythm with specific instructions from us to do physical activities, including the patient's norm of stationary bicycle. He was instructed to not limit his activity in anyway during the time he wears the patch. 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 Atrial fibrillation  First seen on CAM patch monitor 5/10/2021. He has a [id number] Score 3 (HTN, AGE). On Eliquis 5 mg BID for  thromboembolic risk reduction. Tolerating well no signs or symptoms of abnormal bruising or bleeding. We educated the patient that atrial fibrillation is a worsening and progressive disease, with more frequent episodes that will ensue. Subsequent episodes usually become more sustained to the extent that many individuals would then develop persistent atrial fibrillation. Once persistence is reached, permanent atrial fibrillation is inevitable.  We also discussed the fact that atrial fibrillation is associated with stroke, including life-threatening stroke, and therefore oral anticoagulation is warranted depending on the patient's [id number] score. We discussed different management options for atrial fibrillation including their risks and benefits. These options include use of cardioversion (mainly for persisting atrial fibrillation or atrial flutter) which provides an effective immediate therapy with success rates of 75% or higher, but it provides no short nor long term efficacy. Anti-arrhythmic medications provide a very effective short term therapy, but even with our most potent anti-arrhythmic medication there is limited long term efficacy (clinical studies have shown that 40% of patients remain atrial fibrillation-free after 4 years of follow-up after starting one of the more powerful anti-arrhythmic medication (amiodarone), and, if extrapolated, may have further diminishing success as time goes on). Atrial fibrillation ablation is a potentially curative therapy with very reasonable success rate after a first time procedure and with improving success rates with subsequent procedures. The risks, benefits and alternatives of the atrial fibrillation ablation procedure were discussed with the patient. The risks including, but not limited to, bleeding, infection, radiation exposure, injury to vascular, cardiac and surrounding structures (including pneumothorax), stroke, cardiac perforation, tamponade, need for emergent open heart surgery, need for pacemaker implantation, injury to the phrenic nerve, injury to the esophagus, myocardial infarction and death were discussed in detail. The patient was also counseled at length about the risks of shital Covid-19 in the marcelo-operative and post-operative states including the recovery window of their procedure. The patient was made aware that shital Covid-19 after a surgical procedure may worsen their prognosis for recovering from the virus and lend to a higher morbidity and or mortality risk.  The patient was given the option of postponing their procedure. The patient was also presented reasonable alternatives to the proposed care, treatment, and services. The discussion I have had with the patient encompassed risks, benefits, and side effects related to the alternatives and the risks related to not receiving the proposed care, treatment and services. I spent 40 minutes face to face with the patient, with greater than 50% of that time spent in counseling on the above. The patient opted to proceed with the atrial fibrillation ablation. We will schedule for a radiofrequency ablation with [employer] system with a DEB procedure immediately prior to this ablation. A cardiac CTA will be ordered for pulmonary vein mapping prior to this procedure. We will hold Eliquis  for 12 hours prior to procedure. We will order BMP, CBC, PT/INR, and Type & Screen prior to the procedure. Follow up 3 months after the procedure with Boris Cartagena CNP. Thank you for allowing me to participate in the care of Nasim Leigh. All questions and concerns were addressed to the patient/family. Alternatives to my treatment were discussed. This note was scribed in the presence of Dr. Amber Cunningham MD by Ileana Waldrop RN. The scribe's documentation has been prepared under my direction and personally reviewed by me in its entirety. I confirm that the note above accurately reflects all work, physical examination, the discussion of treatments and procedures, and medical decision making performed by me.     Amber Cunningham MD, MS, [facility], [facility]  Cardiac Electrophysiology  [address]  [address] [facility], [address] [place] [address]  Rudi Zambrano [place] [address]  [phone]

## 2022-10-31 RX ORDER — FUROSEMIDE 40 MG/1
40 TABLET ORAL DAILY
Qty: 90 TABLET | Refills: 1 | Status: SHIPPED | OUTPATIENT
Start: 2022-10-31

## 2022-11-02 DIAGNOSIS — F51.04 PSYCHOPHYSIOLOGICAL INSOMNIA: ICD-10-CM

## 2022-11-11 ENCOUNTER — OFFICE VISIT (OUTPATIENT)
Dept: PULMONOLOGY | Age: 77
End: 2022-11-11
Payer: MEDICARE

## 2022-11-11 VITALS
WEIGHT: 149.2 LBS | OXYGEN SATURATION: 91 % | RESPIRATION RATE: 21 BRPM | BODY MASS INDEX: 22.61 KG/M2 | HEIGHT: 68 IN | HEART RATE: 89 BPM | TEMPERATURE: 96.9 F | SYSTOLIC BLOOD PRESSURE: 120 MMHG | DIASTOLIC BLOOD PRESSURE: 70 MMHG

## 2022-11-11 DIAGNOSIS — J84.9 ILD (INTERSTITIAL LUNG DISEASE) (HCC): Primary | ICD-10-CM

## 2022-11-11 PROCEDURE — G8484 FLU IMMUNIZE NO ADMIN: HCPCS | Performed by: INTERNAL MEDICINE

## 2022-11-11 PROCEDURE — G8427 DOCREV CUR MEDS BY ELIG CLIN: HCPCS | Performed by: INTERNAL MEDICINE

## 2022-11-11 PROCEDURE — 1036F TOBACCO NON-USER: CPT | Performed by: INTERNAL MEDICINE

## 2022-11-11 PROCEDURE — G8420 CALC BMI NORM PARAMETERS: HCPCS | Performed by: INTERNAL MEDICINE

## 2022-11-11 PROCEDURE — 99214 OFFICE O/P EST MOD 30 MIN: CPT | Performed by: INTERNAL MEDICINE

## 2022-11-11 PROCEDURE — 3078F DIAST BP <80 MM HG: CPT | Performed by: INTERNAL MEDICINE

## 2022-11-11 PROCEDURE — 3074F SYST BP LT 130 MM HG: CPT | Performed by: INTERNAL MEDICINE

## 2022-11-11 PROCEDURE — 1123F ACP DISCUSS/DSCN MKR DOCD: CPT | Performed by: INTERNAL MEDICINE

## 2022-11-11 RX ORDER — PREDNISONE 20 MG/1
TABLET ORAL
Qty: 40 TABLET | Refills: 0 | Status: SHIPPED | OUTPATIENT
Start: 2022-11-11 | End: 2022-12-11

## 2022-11-11 ASSESSMENT — ENCOUNTER SYMPTOMS
COUGH: 0
WHEEZING: 0
SHORTNESS OF BREATH: 1

## 2022-11-11 NOTE — ASSESSMENT & PLAN NOTE
- Largely focal in the right upper lobe there are some other scattered nodularities  -Vasculitis/serologic work-up unremarkable  -Bronchoscopy unrevealing though technically fungal and AFB cultures have not finalized  -Cannot rule out BLU discussed transbronchial biopsies with patient but he would like to take a more conservative approach. I recommended prolonged steroid with a taper to the lower dose with repeat chest imaging to see if it is steroid responsive. If there is no improvement by next visit I told him we will need to again discuss biopsy if he wants to figure out what this is.

## 2022-11-11 NOTE — PROGRESS NOTES
[address], SLEEP, [facility] CRITICAL CARE    Sai George (:  1945) is a 76 y.o. male,New patient, here for evaluation of the following chief complaint(s):  Follow-up (4 weeks follow up/SOB/congestion )         ASSESSMENT/PLAN:  1. ILD (interstitial lung disease) ([address]  Assessment & Plan:   - Largely focal in the right upper lobe there are some other scattered nodularities  -Vasculitis/serologic work-up unremarkable  -Bronchoscopy unrevealing though technically fungal and AFB cultures have not finalized  -Cannot rule out BLU discussed transbronchial biopsies with patient but he would like to take a more conservative approach. I recommended prolonged steroid with a taper to the lower dose with repeat chest imaging to see if it is steroid responsive. If there is no improvement by next visit I told him we will need to again discuss biopsy if he wants to figure out what this is. Orders:  -     predniSONE (DELTASONE) 20 MG tablet; Take 2 tablets by mouth daily for 10 days, THEN 1 tablet daily for 20 days. , Disp-40 tablet, R-0Normal  -     CT CHEST WO CONTRAST; Future    Return in about 5 weeks (around 2022). Future Appointments   Date Time Provider Pedrito Nicholas   2022  1:45 PM MD Marisela Rodriguez RD [facility] [place] [facility]   2022  1:20 PM Betzy Pace MD [facility]   2023 11:30 AM SCHEDULE, [facility] REMOTE TRANSMISSION [facility]   3/7/2023 12:15 PM Vic Cortes MD [facility]   2023 10:30 AM SCHEDULE, [facility] REMOTE TRANSMISSION [facility]   2023 10:30 AM SCHEDULE, [facility] REMOTE TRANSMISSION [facility]   2023 11:30 AM SCHEDULE, [facility] REMOTE TRANSMISSION [facility]            Subjective   SUBJECTIVE/OBJECTIVE:  58-year-old presents for follow-up of shortness of breath and abnormal CT. Since last visit underwent bronchoscopy. That was wholly unremarkable.   Patient states he is able to ride on stationary bike and also able to do yoga without any issues but does get winded when he walks up steps. Does not feel like the Breo inhaler improved his symptoms at all. Review of Systems   Constitutional: Negative. Respiratory:  Positive for shortness of breath. Negative for cough and wheezing. Cardiovascular: Negative. Neurological: Negative. Psychiatric/Behavioral: Negative. Objective   Physical Exam     Gen:  No acute distress. Eyes: EOMI. Anicteric sclera. No conjunctival injection. ENT: No discharge. External appearance of ears and nose normal.  Neck: Trachea midline. No mass   Resp:  No crackles. No wheezes. No rhonchi. No dullness on percussion. CV: Regular rate. Regular rhythm. No murmur or rub. No edema. Neuro:  no focal neurologic deficit. Moves all extremities  Psych: Awake and alert, Oriented x 3. Judgement and insight appropriate. Mood stable. PFTs  -Mild COPD, FEV1 75%  -Reduced diffusing capacity  -+ Air trapping      An electronic signature was used to authenticate this note.     --Lara Lora MD

## 2022-11-17 ENCOUNTER — PATIENT MESSAGE (OUTPATIENT)
Dept: PRIMARY CARE CLINIC | Age: 77
End: 2022-11-17

## 2022-11-21 ENCOUNTER — OFFICE VISIT (OUTPATIENT)
Dept: PRIMARY CARE CLINIC | Age: 77
End: 2022-11-21
Payer: MEDICARE

## 2022-11-21 VITALS
DIASTOLIC BLOOD PRESSURE: 70 MMHG | BODY MASS INDEX: 23.72 KG/M2 | SYSTOLIC BLOOD PRESSURE: 142 MMHG | HEART RATE: 93 BPM | TEMPERATURE: 97.3 F | RESPIRATION RATE: 22 BRPM | OXYGEN SATURATION: 89 % | WEIGHT: 156 LBS

## 2022-11-21 DIAGNOSIS — I10 ESSENTIAL HYPERTENSION: ICD-10-CM

## 2022-11-21 DIAGNOSIS — E78.5 HYPERLIPIDEMIA LDL GOAL <100: ICD-10-CM

## 2022-11-21 DIAGNOSIS — I50.22 CHRONIC SYSTOLIC HEART FAILURE (HCC): ICD-10-CM

## 2022-11-21 DIAGNOSIS — J44.9 COPD, MILD (HCC): ICD-10-CM

## 2022-11-21 DIAGNOSIS — J84.9 ILD (INTERSTITIAL LUNG DISEASE) (HCC): ICD-10-CM

## 2022-11-21 PROCEDURE — 99214 OFFICE O/P EST MOD 30 MIN: CPT | Performed by: FAMILY MEDICINE

## 2022-11-21 PROCEDURE — 3074F SYST BP LT 130 MM HG: CPT | Performed by: FAMILY MEDICINE

## 2022-11-21 PROCEDURE — 3078F DIAST BP <80 MM HG: CPT | Performed by: FAMILY MEDICINE

## 2022-11-21 PROCEDURE — G8420 CALC BMI NORM PARAMETERS: HCPCS | Performed by: FAMILY MEDICINE

## 2022-11-21 PROCEDURE — G8484 FLU IMMUNIZE NO ADMIN: HCPCS | Performed by: FAMILY MEDICINE

## 2022-11-21 PROCEDURE — 1123F ACP DISCUSS/DSCN MKR DOCD: CPT | Performed by: FAMILY MEDICINE

## 2022-11-21 PROCEDURE — 3023F SPIROM DOC REV: CPT | Performed by: FAMILY MEDICINE

## 2022-11-21 PROCEDURE — G8427 DOCREV CUR MEDS BY ELIG CLIN: HCPCS | Performed by: FAMILY MEDICINE

## 2022-11-21 PROCEDURE — 1036F TOBACCO NON-USER: CPT | Performed by: FAMILY MEDICINE

## 2022-11-21 ASSESSMENT — ENCOUNTER SYMPTOMS
VOICE CHANGE: 0
CONSTIPATION: 0
ABDOMINAL PAIN: 0
DIARRHEA: 0
BLOOD IN STOOL: 0
SHORTNESS OF BREATH: 0
TROUBLE SWALLOWING: 0

## 2022-11-21 NOTE — PROGRESS NOTES
2022     Cheri Sr (:  1945) is a 76 y.o. male, here for evaluation of the following medical concerns:    HPI  Patient is 76years old male with history of COVID infection later found to have sick sinus syndrome requiring pacemaker placement then AEnid baker and started on long-term anticoagulation with Eliquis. Was also found to have congestive heart failure mild 2022 ejection fraction dropped to 35% and was treated with beta-blocker, ARB Lasix and spironolactone and last 2022 ejection fraction went up to 50%. Unfortunately she still complain of shortness of breath with minimal activity for just testing showed that she has interstitial lung disease. He was seen by pulmonologist a week ago started with the tapering dose of prednisone. He also have mild COPD and takes Breo and albuterol inhaler. He has anxiety depression stable on Effexor 150 daily and Seroquel 25 mg daily. He denies headache nausea vomiting, he denies chest pain, denies urinary disturbance. Review of Systems   Constitutional:  Negative for activity change. HENT:  Negative for trouble swallowing and voice change. Eyes:  Negative for visual disturbance. Respiratory:  Negative for shortness of breath. Cardiovascular:  Negative for chest pain and leg swelling. Gastrointestinal:  Negative for abdominal pain, blood in stool, constipation and diarrhea. Genitourinary:  Negative for difficulty urinating, dysuria, frequency, hematuria and scrotal swelling. Musculoskeletal:  Negative for arthralgias and myalgias. Skin:  Negative for rash. Neurological:  Negative for dizziness. Psychiatric/Behavioral:  Negative for behavioral problems. Prior to Visit Medications    Medication Sig Taking? Authorizing Provider   predniSONE (DELTASONE) 20 MG tablet Take 2 tablets by mouth daily for 10 days, THEN 1 tablet daily for 20 days.  Yes Papito Rico MD   diphenoxylate-atropine (LOMOTIL) 2.5-0.025 MG per tablet Take 1 tablet by mouth 4 times daily as needed for Diarrhea for up to 30 days.   Ezekiel Valdivia MD   estazolam (PROSOM) 2 MG tablet TAKE 1 TO 2 TABLETS BY MOUTH EVERY NIGHT AS NEEDED FOR SLEEP  Ezekiel Valdivia MD   furosemide (LASIX) 40 MG tablet Take 1 tablet by mouth daily  Ezekiel Valdivia MD   spironolactone (ALDACTONE) 25 MG tablet TAKE ONE (1) TABLET BY MOUTH DAILY  Cheri Bledsoe MD   metoprolol succinate (TOPROL XL) 50 MG extended release tablet TAKE ONE (1) TABLET BY MOUTH DAILY  Cheri Bledsoe MD   Fluticasone furoate-vilanterol (BREO ELLIPTA) 200-25 MCG/INH AEPB inhaler Inhale 1 puff into the lungs daily  Ezekiel Valdivia MD   ELIQUIS 5 MG TABS tablet TAKE ONE (1) TABLET BY MOUTH TWO (2) Myriam Hdez MD   losartan (COZAAR) 100 MG tablet Take 1 tablet by mouth daily  Ezekiel Valdivia MD   QUEtiapine (SEROQUEL) 25 MG tablet Take 1-2 tablets nightly  Ezekiel Valdivia MD   NONFORMULARY Apply topically as needed CBD oil  Historical Provider, MD   venlafaxine (EFFEXOR XR) 150 MG extended release capsule Take 1 capsule by mouth daily  Ezekiel Valdivia MD   pravastatin (PRAVACHOL) 40 MG tablet Take 1 tablet by mouth daily  Ezekiel Valdivia MD   vitamin D (CHOLECALCIFEROL) 125 MCG (5000 UT) CAPS capsule Take 5,000 Units by mouth every evening  Historical Provider, MD   Multiple Vitamins-Minerals (MULTIVITAMIN) LIQD Take 30 mLs by mouth daily Has 80 mcg of vitamin K -2  Has 50 mg of ginsing root  Historical Provider, MD   albuterol sulfate HFA (VENTOLIN HFA) 108 (90 Base) MCG/ACT inhaler Inhale 2 puffs into the lungs every 6 hours as needed for Wheezing or Shortness of Breath  Ezekiel Valdivia MD   famotidine (PEPCID) 20 MG tablet Take 1 tablet by mouth 2 times daily  KLAUS Stein - CNP        Social History     Tobacco Use    Smoking status: Former     Packs/day: 1.00     Years: 20.00     Pack years: 20.00     Types: Cigarettes     Quit date: 1987     Years since quittin.2 Passive exposure: Past    Smokeless tobacco: Former     Quit date: 8/20/1988   Substance Use Topics    Alcohol use: No        Vitals:    11/21/22 1404   BP: (!) 142/70   Pulse: 93   Resp: 22   Temp: 97.3 °F (36.3 °C)   TempSrc: Temporal   SpO2: (!) 89%  Comment: room air- at rest   Weight: 156 lb (70.8 kg)     Estimated body mass index is 23.72 kg/m² as calculated from the following:    Height as of 11/11/22: 5' 8\" (1.727 m). Weight as of this encounter: 156 lb (70.8 kg). Physical Exam  Constitutional:       Appearance: He is well-developed. HENT:      Head: Normocephalic. Eyes:      Conjunctiva/sclera: Conjunctivae normal.      Pupils: Pupils are equal, round, and reactive to light. Neck:      Thyroid: No thyromegaly. Cardiovascular:      Rate and Rhythm: Normal rate and regular rhythm. Heart sounds: Normal heart sounds. No murmur heard. Pulmonary:      Effort: Pulmonary effort is normal.      Breath sounds: Normal breath sounds. Abdominal:      General: Bowel sounds are normal.      Palpations: Abdomen is soft. There is no mass. Genitourinary:     Penis: Normal.       Prostate: Normal.   Musculoskeletal:         General: Normal range of motion. Cervical back: Normal range of motion and neck supple. Skin:     General: Skin is warm. Findings: No rash. Neurological:      Mental Status: He is alert. Psychiatric:         Behavior: Behavior normal.       ASSESSMENT/PLAN:  1. ILD (interstitial lung disease) ([facility] /2. COPD, mild ([facility]  Patient was started by his pulmonologist , Dr Deniz Cortez on a tapering dose of prednisone. He also takes Breo and albuterol inhaler. 3. Chronic systolic heart failure [facility])  Recent echocardiogram August 4, 2022 showed LV ejection fraction recovered to 50% from 35% on March 25, 2022. He takes Toprol-XL 50 mg daily, spironolactone 25 mg daily losartan 100 mg daily and Lasix PRN. 4. Essential hypertension  Controlled.   Continue Toprol, Aldactone and losartan    5. Hyperlipidemia LDL goal <100  Controlled. Continue Pravachol 40 mg daily. Will check lipid panel next blood draw.       RTC in 3 mos    An electronic signature was used to authenticate this note.    --Bryan Ledezma MD on 11/21/2022 at 6:23 PM

## 2022-11-21 NOTE — TELEPHONE ENCOUNTER
From: Elizabeth Gutierrez  To: Dr. Luz Antonio: 11/17/2022 3:50 PM EST  Subject: Previsit form    Maria L Garcia,    I don't have the [employer] on my computer. Can you send me a link to the form so I can take care of it before my appointment Monday? Thanks.   Maribel Whitten

## 2022-11-22 RX ORDER — PRAVASTATIN SODIUM 40 MG
40 TABLET ORAL DAILY
Qty: 90 TABLET | Refills: 1 | Status: SHIPPED | OUTPATIENT
Start: 2022-11-22

## 2022-11-26 ENCOUNTER — APPOINTMENT (OUTPATIENT)
Dept: GENERAL RADIOLOGY | Age: 77
DRG: 871 | End: 2022-11-26
Payer: MEDICARE

## 2022-11-26 ENCOUNTER — HOSPITAL ENCOUNTER (INPATIENT)
Age: 77
LOS: 3 days | Discharge: HOME | DRG: 871 | End: 2022-11-30
Attending: EMERGENCY MEDICINE | Admitting: PEDIATRICS
Payer: MEDICARE

## 2022-11-26 ENCOUNTER — APPOINTMENT (OUTPATIENT)
Dept: CT IMAGING | Age: 77
DRG: 871 | End: 2022-11-26
Payer: MEDICARE

## 2022-11-26 DIAGNOSIS — Z95.0 PACEMAKER ECG PATTERN: ICD-10-CM

## 2022-11-26 DIAGNOSIS — J18.9 PNEUMONIA OF BOTH LUNGS DUE TO INFECTIOUS ORGANISM, UNSPECIFIED PART OF LUNG: ICD-10-CM

## 2022-11-26 DIAGNOSIS — A41.9 SEPTICEMIA (HCC): Primary | ICD-10-CM

## 2022-11-26 DIAGNOSIS — R09.02 HYPOXIA: ICD-10-CM

## 2022-11-26 DIAGNOSIS — R05.1 ACUTE COUGH: ICD-10-CM

## 2022-11-26 LAB
ANION GAP SERPL CALCULATED.3IONS-SCNC: 14 MMOL/L (ref 3–16)
BANDED NEUTROPHILS RELATIVE PERCENT: 4 % (ref 0–7)
BASOPHILS ABSOLUTE: 0 K/UL (ref 0–0.2)
BASOPHILS RELATIVE PERCENT: 0 %
BUN BLDV-MCNC: 38 MG/DL (ref 7–20)
CALCIUM SERPL-MCNC: 9.3 MG/DL (ref 8.3–10.6)
CHLORIDE BLD-SCNC: 99 MMOL/L (ref 99–110)
CO2: 23 MMOL/L (ref 21–32)
CREAT SERPL-MCNC: 1.3 MG/DL (ref 0.8–1.3)
EOSINOPHILS ABSOLUTE: 0 K/UL (ref 0–0.6)
EOSINOPHILS RELATIVE PERCENT: 0 %
GFR SERPL CREATININE-BSD FRML MDRD: 56 ML/MIN/{1.73_M2}
GLUCOSE BLD-MCNC: 99 MG/DL (ref 70–99)
HCT VFR BLD CALC: 40.9 % (ref 40.5–52.5)
HEMOGLOBIN: 12.1 G/DL (ref 13.5–17.5)
LACTIC ACID: 2.6 MMOL/L (ref 0.4–2)
LACTIC ACID: 4.2 MMOL/L (ref 0.4–2)
LYMPHOCYTES ABSOLUTE: 0 K/UL (ref 1–5.1)
LYMPHOCYTES RELATIVE PERCENT: 0 %
MCH RBC QN AUTO: 21.3 PG (ref 26–34)
MCHC RBC AUTO-ENTMCNC: 29.6 G/DL (ref 32–36.4)
MCV RBC AUTO: 72.1 FL (ref 80–100)
MONOCYTES ABSOLUTE: 1.2 K/UL (ref 0–1.3)
MONOCYTES RELATIVE PERCENT: 4 %
NEUTROPHILS ABSOLUTE: 28.5 K/UL (ref 1.7–7.7)
NEUTROPHILS RELATIVE PERCENT: 92 %
PDW BLD-RTO: 18.1 % (ref 12.4–15.4)
PLATELET # BLD: 359 K/UL (ref 135–450)
PMV BLD AUTO: 9.2 FL (ref 5–10.5)
POTASSIUM REFLEX MAGNESIUM: 4 MMOL/L (ref 3.5–5.1)
PRO-BNP: 267 PG/ML (ref 0–449)
RAPID INFLUENZA  B AGN: NEGATIVE
RAPID INFLUENZA A AGN: NEGATIVE
RBC # BLD: 5.67 M/UL (ref 4.2–5.9)
SARS-COV-2, NAAT: NOT DETECTED
SODIUM BLD-SCNC: 136 MMOL/L (ref 136–145)
TROPONIN: <0.01 NG/ML
WBC # BLD: 29.7 K/UL (ref 4–11)

## 2022-11-26 PROCEDURE — 2580000003 HC RX 258: Performed by: EMERGENCY MEDICINE

## 2022-11-26 PROCEDURE — 96367 TX/PROPH/DG ADDL SEQ IV INF: CPT

## 2022-11-26 PROCEDURE — 83605 ASSAY OF LACTIC ACID: CPT

## 2022-11-26 PROCEDURE — 6360000004 HC RX CONTRAST MEDICATION: Performed by: EMERGENCY MEDICINE

## 2022-11-26 PROCEDURE — 6370000000 HC RX 637 (ALT 250 FOR IP): Performed by: EMERGENCY MEDICINE

## 2022-11-26 PROCEDURE — 84484 ASSAY OF TROPONIN QUANT: CPT

## 2022-11-26 PROCEDURE — 99285 EMERGENCY DEPT VISIT HI MDM: CPT

## 2022-11-26 PROCEDURE — 96366 THER/PROPH/DIAG IV INF ADDON: CPT

## 2022-11-26 PROCEDURE — 83880 ASSAY OF NATRIURETIC PEPTIDE: CPT

## 2022-11-26 PROCEDURE — 80048 BASIC METABOLIC PNL TOTAL CA: CPT

## 2022-11-26 PROCEDURE — 36415 COLL VENOUS BLD VENIPUNCTURE: CPT

## 2022-11-26 PROCEDURE — 6360000002 HC RX W HCPCS: Performed by: EMERGENCY MEDICINE

## 2022-11-26 PROCEDURE — 93005 ELECTROCARDIOGRAM TRACING: CPT | Performed by: EMERGENCY MEDICINE

## 2022-11-26 PROCEDURE — 87804 INFLUENZA ASSAY W/OPTIC: CPT

## 2022-11-26 PROCEDURE — 71260 CT THORAX DX C+: CPT | Performed by: EMERGENCY MEDICINE

## 2022-11-26 PROCEDURE — 87635 SARS-COV-2 COVID-19 AMP PRB: CPT

## 2022-11-26 PROCEDURE — 71045 X-RAY EXAM CHEST 1 VIEW: CPT

## 2022-11-26 PROCEDURE — 85025 COMPLETE CBC W/AUTO DIFF WBC: CPT

## 2022-11-26 PROCEDURE — 96365 THER/PROPH/DIAG IV INF INIT: CPT

## 2022-11-26 PROCEDURE — 87040 BLOOD CULTURE FOR BACTERIA: CPT

## 2022-11-26 RX ORDER — IPRATROPIUM BROMIDE AND ALBUTEROL SULFATE 2.5; .5 MG/3ML; MG/3ML
1 SOLUTION RESPIRATORY (INHALATION) ONCE
Status: COMPLETED | OUTPATIENT
Start: 2022-11-26 | End: 2022-11-26

## 2022-11-26 RX ORDER — 0.9 % SODIUM CHLORIDE 0.9 %
1000 INTRAVENOUS SOLUTION INTRAVENOUS ONCE
Status: COMPLETED | OUTPATIENT
Start: 2022-11-26 | End: 2022-11-26

## 2022-11-26 RX ADMIN — CEFEPIME 2000 MG: 2 INJECTION, POWDER, FOR SOLUTION INTRAVENOUS at 21:15

## 2022-11-26 RX ADMIN — IPRATROPIUM BROMIDE AND ALBUTEROL SULFATE 1 AMPULE: .5; 3 SOLUTION RESPIRATORY (INHALATION) at 20:19

## 2022-11-26 RX ADMIN — IOPAMIDOL 100 ML: 755 INJECTION, SOLUTION INTRAVENOUS at 20:55

## 2022-11-26 RX ADMIN — SODIUM CHLORIDE 1000 ML: 9 INJECTION, SOLUTION INTRAVENOUS at 21:15

## 2022-11-26 RX ADMIN — VANCOMYCIN HYDROCHLORIDE 1250 MG: 1 INJECTION, POWDER, LYOPHILIZED, FOR SOLUTION INTRAVENOUS at 22:25

## 2022-11-26 ASSESSMENT — LIFESTYLE VARIABLES
HOW OFTEN DO YOU HAVE A DRINK CONTAINING ALCOHOL: PATIENT DECLINED
HOW MANY STANDARD DRINKS CONTAINING ALCOHOL DO YOU HAVE ON A TYPICAL DAY: PATIENT DECLINED

## 2022-11-26 ASSESSMENT — PAIN - FUNCTIONAL ASSESSMENT: PAIN_FUNCTIONAL_ASSESSMENT: NONE - DENIES PAIN

## 2022-11-27 PROBLEM — A41.9 SEPSIS (HCC): Status: ACTIVE | Noted: 2022-11-27

## 2022-11-27 PROBLEM — R93.89 ABNORMAL CT OF THE CHEST: Status: ACTIVE | Noted: 2022-11-27

## 2022-11-27 LAB
ANION GAP SERPL CALCULATED.3IONS-SCNC: 12 MMOL/L (ref 3–16)
BUN BLDV-MCNC: 30 MG/DL (ref 7–20)
CALCIUM SERPL-MCNC: 8.5 MG/DL (ref 8.3–10.6)
CHLORIDE BLD-SCNC: 101 MMOL/L (ref 99–110)
CO2: 23 MMOL/L (ref 21–32)
CREAT SERPL-MCNC: 1 MG/DL (ref 0.8–1.3)
EKG ATRIAL RATE: 119 BPM
EKG DIAGNOSIS: NORMAL
EKG P AXIS: 33 DEGREES
EKG P-R INTERVAL: 192 MS
EKG Q-T INTERVAL: 398 MS
EKG QRS DURATION: 172 MS
EKG QTC CALCULATION (BAZETT): 559 MS
EKG R AXIS: -61 DEGREES
EKG T AXIS: 104 DEGREES
EKG VENTRICULAR RATE: 119 BPM
GFR SERPL CREATININE-BSD FRML MDRD: >60 ML/MIN/{1.73_M2}
GLUCOSE BLD-MCNC: 101 MG/DL (ref 70–99)
L. PNEUMOPHILA SEROGP 1 UR AG: NORMAL
LACTIC ACID, SEPSIS: 2.2 MMOL/L (ref 0.4–1.9)
LACTIC ACID, SEPSIS: 2.7 MMOL/L (ref 0.4–1.9)
MRSA SCREEN RT-PCR: NORMAL
POTASSIUM REFLEX MAGNESIUM: 4.5 MMOL/L (ref 3.5–5.1)
PROCALCITONIN: 9.37 NG/ML (ref 0–0.15)
REPORT: NORMAL
RESPIRATORY PANEL PCR: NORMAL
SEDIMENTATION RATE, ERYTHROCYTE: 15 MM/HR (ref 0–20)
SODIUM BLD-SCNC: 136 MMOL/L (ref 136–145)
STREP PNEUMONIAE ANTIGEN, URINE: NORMAL
VANCOMYCIN RANDOM: 5.5 UG/ML

## 2022-11-27 PROCEDURE — 6370000000 HC RX 637 (ALT 250 FOR IP): Performed by: NURSE PRACTITIONER

## 2022-11-27 PROCEDURE — 87449 NOS EACH ORGANISM AG IA: CPT

## 2022-11-27 PROCEDURE — 83605 ASSAY OF LACTIC ACID: CPT

## 2022-11-27 PROCEDURE — 2580000003 HC RX 258: Performed by: NURSE PRACTITIONER

## 2022-11-27 PROCEDURE — 6360000002 HC RX W HCPCS: Performed by: PEDIATRICS

## 2022-11-27 PROCEDURE — 36415 COLL VENOUS BLD VENIPUNCTURE: CPT

## 2022-11-27 PROCEDURE — 85025 COMPLETE CBC W/AUTO DIFF WBC: CPT

## 2022-11-27 PROCEDURE — 86612 BLASTOMYCES ANTIBODY: CPT

## 2022-11-27 PROCEDURE — 6370000000 HC RX 637 (ALT 250 FOR IP): Performed by: HOSPITALIST

## 2022-11-27 PROCEDURE — 2060000000 HC ICU INTERMEDIATE R&B

## 2022-11-27 PROCEDURE — 85652 RBC SED RATE AUTOMATED: CPT

## 2022-11-27 PROCEDURE — 99223 1ST HOSP IP/OBS HIGH 75: CPT | Performed by: INTERNAL MEDICINE

## 2022-11-27 PROCEDURE — 94761 N-INVAS EAR/PLS OXIMETRY MLT: CPT

## 2022-11-27 PROCEDURE — 87305 ASPERGILLUS AG IA: CPT

## 2022-11-27 PROCEDURE — 2700000000 HC OXYGEN THERAPY PER DAY

## 2022-11-27 PROCEDURE — 6370000000 HC RX 637 (ALT 250 FOR IP): Performed by: PEDIATRICS

## 2022-11-27 PROCEDURE — 6370000000 HC RX 637 (ALT 250 FOR IP): Performed by: INTERNAL MEDICINE

## 2022-11-27 PROCEDURE — 84145 PROCALCITONIN (PCT): CPT

## 2022-11-27 PROCEDURE — 2580000003 HC RX 258: Performed by: HOSPITALIST

## 2022-11-27 PROCEDURE — 86698 HISTOPLASMA ANTIBODY: CPT

## 2022-11-27 PROCEDURE — 80202 ASSAY OF VANCOMYCIN: CPT

## 2022-11-27 PROCEDURE — 2580000003 HC RX 258: Performed by: PEDIATRICS

## 2022-11-27 PROCEDURE — 86635 COCCIDIOIDES ANTIBODY: CPT

## 2022-11-27 PROCEDURE — 93010 ELECTROCARDIOGRAM REPORT: CPT | Performed by: INTERNAL MEDICINE

## 2022-11-27 PROCEDURE — 0202U NFCT DS 22 TRGT SARS-COV-2: CPT

## 2022-11-27 PROCEDURE — 87070 CULTURE OTHR SPECIMN AEROBIC: CPT

## 2022-11-27 PROCEDURE — 87641 MR-STAPH DNA AMP PROBE: CPT

## 2022-11-27 PROCEDURE — 86606 ASPERGILLUS ANTIBODY: CPT

## 2022-11-27 PROCEDURE — 6370000000 HC RX 637 (ALT 250 FOR IP): Performed by: EMERGENCY MEDICINE

## 2022-11-27 PROCEDURE — 87205 SMEAR GRAM STAIN: CPT

## 2022-11-27 PROCEDURE — 94640 AIRWAY INHALATION TREATMENT: CPT

## 2022-11-27 PROCEDURE — 80048 BASIC METABOLIC PNL TOTAL CA: CPT

## 2022-11-27 PROCEDURE — 6360000002 HC RX W HCPCS: Performed by: HOSPITALIST

## 2022-11-27 RX ORDER — QUETIAPINE FUMARATE 25 MG/1
50 TABLET, FILM COATED ORAL NIGHTLY
Status: DISCONTINUED | OUTPATIENT
Start: 2022-11-27 | End: 2022-11-27

## 2022-11-27 RX ORDER — ACETAMINOPHEN 650 MG/1
650 SUPPOSITORY RECTAL EVERY 6 HOURS PRN
Status: DISCONTINUED | OUTPATIENT
Start: 2022-11-27 | End: 2022-11-30 | Stop reason: HOSPADM

## 2022-11-27 RX ORDER — ACETAMINOPHEN 325 MG/1
650 TABLET ORAL EVERY 6 HOURS PRN
Status: DISCONTINUED | OUTPATIENT
Start: 2022-11-27 | End: 2022-11-30 | Stop reason: HOSPADM

## 2022-11-27 RX ORDER — FAMOTIDINE 20 MG/1
20 TABLET, FILM COATED ORAL 2 TIMES DAILY
Status: CANCELLED | OUTPATIENT
Start: 2022-11-27

## 2022-11-27 RX ORDER — GUAIFENESIN 600 MG/1
600 TABLET, EXTENDED RELEASE ORAL 2 TIMES DAILY
Status: DISCONTINUED | OUTPATIENT
Start: 2022-11-27 | End: 2022-11-30 | Stop reason: HOSPADM

## 2022-11-27 RX ORDER — GUAIFENESIN/DEXTROMETHORPHAN 100-10MG/5
5 SYRUP ORAL EVERY 4 HOURS PRN
Status: DISCONTINUED | OUTPATIENT
Start: 2022-11-27 | End: 2022-11-30 | Stop reason: HOSPADM

## 2022-11-27 RX ORDER — ALBUTEROL SULFATE 2.5 MG/3ML
2.5 SOLUTION RESPIRATORY (INHALATION)
Status: DISCONTINUED | OUTPATIENT
Start: 2022-11-27 | End: 2022-11-30 | Stop reason: HOSPADM

## 2022-11-27 RX ORDER — IPRATROPIUM BROMIDE AND ALBUTEROL SULFATE 2.5; .5 MG/3ML; MG/3ML
1 SOLUTION RESPIRATORY (INHALATION)
Status: DISCONTINUED | OUTPATIENT
Start: 2022-11-27 | End: 2022-11-30 | Stop reason: HOSPADM

## 2022-11-27 RX ORDER — LORAZEPAM 0.5 MG/1
0.5 TABLET ORAL NIGHTLY
Status: CANCELLED | OUTPATIENT
Start: 2022-11-27

## 2022-11-27 RX ORDER — METOPROLOL SUCCINATE 50 MG/1
50 TABLET, EXTENDED RELEASE ORAL DAILY
Status: DISCONTINUED | OUTPATIENT
Start: 2022-11-27 | End: 2022-11-30 | Stop reason: HOSPADM

## 2022-11-27 RX ORDER — SPIRONOLACTONE 25 MG/1
25 TABLET ORAL DAILY
Status: DISCONTINUED | OUTPATIENT
Start: 2022-11-27 | End: 2022-11-30 | Stop reason: HOSPADM

## 2022-11-27 RX ORDER — PREDNISONE 20 MG/1
20 TABLET ORAL DAILY
Status: DISCONTINUED | OUTPATIENT
Start: 2022-11-27 | End: 2022-11-30 | Stop reason: HOSPADM

## 2022-11-27 RX ORDER — LORAZEPAM 0.5 MG/1
0.5 TABLET ORAL NIGHTLY PRN
Status: DISCONTINUED | OUTPATIENT
Start: 2022-11-27 | End: 2022-11-27

## 2022-11-27 RX ORDER — SODIUM CHLORIDE 9 MG/ML
INJECTION, SOLUTION INTRAVENOUS CONTINUOUS
Status: DISCONTINUED | OUTPATIENT
Start: 2022-11-27 | End: 2022-11-28

## 2022-11-27 RX ORDER — PRAVASTATIN SODIUM 40 MG
40 TABLET ORAL DAILY
Status: DISCONTINUED | OUTPATIENT
Start: 2022-11-27 | End: 2022-11-30 | Stop reason: HOSPADM

## 2022-11-27 RX ORDER — CALCIUM CARB/VITAMIN D3/VIT K1 500-500-40
30 TABLET,CHEWABLE ORAL DAILY
Status: CANCELLED | OUTPATIENT
Start: 2022-11-27

## 2022-11-27 RX ORDER — FUROSEMIDE 40 MG/1
40 TABLET ORAL DAILY
Status: DISCONTINUED | OUTPATIENT
Start: 2022-11-27 | End: 2022-11-27

## 2022-11-27 RX ORDER — QUETIAPINE FUMARATE 25 MG/1
50 TABLET, FILM COATED ORAL NIGHTLY
Status: DISCONTINUED | OUTPATIENT
Start: 2022-11-27 | End: 2022-11-30 | Stop reason: HOSPADM

## 2022-11-27 RX ORDER — BENZONATATE 100 MG/1
200 CAPSULE ORAL ONCE
Status: COMPLETED | OUTPATIENT
Start: 2022-11-27 | End: 2022-11-27

## 2022-11-27 RX ORDER — SODIUM CHLORIDE 0.9 % (FLUSH) 0.9 %
5-40 SYRINGE (ML) INJECTION EVERY 12 HOURS SCHEDULED
Status: DISCONTINUED | OUTPATIENT
Start: 2022-11-27 | End: 2022-11-30 | Stop reason: HOSPADM

## 2022-11-27 RX ORDER — VENLAFAXINE HYDROCHLORIDE 75 MG/1
150 CAPSULE, EXTENDED RELEASE ORAL DAILY
Status: DISCONTINUED | OUTPATIENT
Start: 2022-11-27 | End: 2022-11-30 | Stop reason: HOSPADM

## 2022-11-27 RX ORDER — LOSARTAN POTASSIUM 100 MG/1
100 TABLET ORAL DAILY
Status: DISCONTINUED | OUTPATIENT
Start: 2022-11-27 | End: 2022-11-30 | Stop reason: HOSPADM

## 2022-11-27 RX ORDER — SODIUM CHLORIDE 0.9 % (FLUSH) 0.9 %
5-40 SYRINGE (ML) INJECTION PRN
Status: DISCONTINUED | OUTPATIENT
Start: 2022-11-27 | End: 2022-11-30 | Stop reason: HOSPADM

## 2022-11-27 RX ORDER — SODIUM CHLORIDE 9 MG/ML
INJECTION, SOLUTION INTRAVENOUS PRN
Status: DISCONTINUED | OUTPATIENT
Start: 2022-11-27 | End: 2022-11-30 | Stop reason: HOSPADM

## 2022-11-27 RX ADMIN — PREDNISONE 20 MG: 20 TABLET ORAL at 11:55

## 2022-11-27 RX ADMIN — APIXABAN 5 MG: 5 TABLET, FILM COATED ORAL at 08:48

## 2022-11-27 RX ADMIN — QUETIAPINE FUMARATE 50 MG: 25 TABLET ORAL at 06:12

## 2022-11-27 RX ADMIN — APIXABAN 5 MG: 5 TABLET, FILM COATED ORAL at 23:35

## 2022-11-27 RX ADMIN — GUAIFENESIN SYRUP AND DEXTROMETHORPHAN 5 ML: 100; 10 SYRUP ORAL at 13:37

## 2022-11-27 RX ADMIN — VENLAFAXINE HYDROCHLORIDE 150 MG: 75 CAPSULE, EXTENDED RELEASE ORAL at 08:49

## 2022-11-27 RX ADMIN — SODIUM CHLORIDE: 9 INJECTION, SOLUTION INTRAVENOUS at 04:42

## 2022-11-27 RX ADMIN — GUAIFENESIN 600 MG: 600 TABLET, EXTENDED RELEASE ORAL at 23:35

## 2022-11-27 RX ADMIN — CEFEPIME 2000 MG: 2 INJECTION, POWDER, FOR SOLUTION INTRAVENOUS at 08:48

## 2022-11-27 RX ADMIN — IPRATROPIUM BROMIDE AND ALBUTEROL SULFATE 1 AMPULE: 2.5; .5 SOLUTION RESPIRATORY (INHALATION) at 12:20

## 2022-11-27 RX ADMIN — SPIRONOLACTONE 25 MG: 25 TABLET ORAL at 08:49

## 2022-11-27 RX ADMIN — CEFEPIME 2000 MG: 2 INJECTION, POWDER, FOR SOLUTION INTRAVENOUS at 23:39

## 2022-11-27 RX ADMIN — QUETIAPINE FUMARATE 50 MG: 25 TABLET ORAL at 23:35

## 2022-11-27 RX ADMIN — SODIUM CHLORIDE: 9 INJECTION, SOLUTION INTRAVENOUS at 15:29

## 2022-11-27 RX ADMIN — METOPROLOL SUCCINATE 50 MG: 50 TABLET, EXTENDED RELEASE ORAL at 08:48

## 2022-11-27 RX ADMIN — LORAZEPAM 0.5 MG: 0.5 TABLET ORAL at 05:56

## 2022-11-27 RX ADMIN — GUAIFENESIN 600 MG: 600 TABLET, EXTENDED RELEASE ORAL at 08:49

## 2022-11-27 RX ADMIN — BENZONATATE 200 MG: 100 CAPSULE ORAL at 00:17

## 2022-11-27 RX ADMIN — LOSARTAN POTASSIUM 100 MG: 100 TABLET, FILM COATED ORAL at 08:55

## 2022-11-27 RX ADMIN — IPRATROPIUM BROMIDE AND ALBUTEROL SULFATE 1 AMPULE: 2.5; .5 SOLUTION RESPIRATORY (INHALATION) at 08:29

## 2022-11-27 RX ADMIN — VANCOMYCIN HYDROCHLORIDE 1000 MG: 1 INJECTION, POWDER, LYOPHILIZED, FOR SOLUTION INTRAVENOUS at 17:39

## 2022-11-27 RX ADMIN — IPRATROPIUM BROMIDE AND ALBUTEROL SULFATE 1 AMPULE: 2.5; .5 SOLUTION RESPIRATORY (INHALATION) at 20:09

## 2022-11-27 RX ADMIN — IPRATROPIUM BROMIDE AND ALBUTEROL SULFATE 1 AMPULE: 2.5; .5 SOLUTION RESPIRATORY (INHALATION) at 16:17

## 2022-11-27 RX ADMIN — PRAVASTATIN SODIUM 40 MG: 40 TABLET ORAL at 08:49

## 2022-11-27 ASSESSMENT — LIFESTYLE VARIABLES
HOW OFTEN DO YOU HAVE A DRINK CONTAINING ALCOHOL: NEVER
HOW MANY STANDARD DRINKS CONTAINING ALCOHOL DO YOU HAVE ON A TYPICAL DAY: PATIENT DOES NOT DRINK

## 2022-11-27 NOTE — CONSULTS
PATIENT IS SEEN AT THE REQUEST OF DR. Bills for multifocal pneumonia     HISTORY OF PRESENT ILLNESS:  This is a 76 y.o. male who presented to the ED on 11/26 with a CC of illness and SOB. Per ED notes he has had SOB and coughing post eating dinner. He had complications from COVID in the past.  He was admitted for pneumonia and sepsis. On oxygen at 6 liters currently. Says he has had significant side effects from COVID. Heart was weakened and had afib. Recently put on prednisone 40 mg for two weeks with eventual taper due to persistent RUL ground glass. Does not think he was getting that much better with the prednisone. Then became more SOB and had issues feeling sick. Coughed up some mucus when previously he did not. He has no history of lung disease.   He did talk with Dr. Alberto Taylor about biopsy in the past    Established Pulmonologist:  [address]:  Past Medical History:   Diagnosis Date    Anxiety     CHF (congestive heart failure) ([address]     COPD, mild ([address] 04/24/2017    COVID-19 12/2020    Depression     History of blood transfusion     Hyperlipidemia     Hypertension     IBS (irritable bowel syndrome) 10/16/2013    Insomnia     PAF (paroxysmal atrial fibrillation) ([address] 08/10/2021    Sick sinus syndrome ([address] 08/10/2021    Wears glasses        PAST SURGICAL HISTORY:  Past Surgical History:   Procedure Laterality Date    ABLATION OF DYSRHYTHMIC FOCUS      APPENDECTOMY      BRONCHOSCOPY N/A 10/11/2022    BRONCHOSCOPY WITH BRONCHIOLAR ALVEOLAR LAVAGE performed by Ayla Hammonds MD at [address]  10/11/2022    BRONCHOSCOPY DIAGNOSTIC OR CELL [address] ONLY performed by Ayla Hammonds MD at [address]      pacemaker    COLONOSCOPY  03/19/2019    [place]    COLONOSCOPY N/A 03/19/2019    COLONOSCOPY WITH BIOPSY performed by Krsytal Thompson MD at [facility] N/A 03/19/2019    COLONOSCOPY POLYPECTOMY SNARE/COLD BIOPSY performed by Khanh Blackwood Stefanie Guzman MD at [place] [address]         FAMILY HISTORY:  family history includes COPD in his father; Cancer in his brother, mother, and another family member; Hypertension in his brother, brother, and sister; Jennifer Khalif in his mother; Stroke in his maternal grandfather. SOCIAL HISTORY:   reports that he quit smoking about 35 years ago. His smoking use included cigarettes. He has a 20.00 pack-year smoking history. He has been exposed to tobacco smoke. He quit smokeless tobacco use about 34 years ago. Scheduled Meds:   apixaban  5 mg Oral BID    losartan  100 mg Oral Daily    metoprolol succinate  50 mg Oral Daily    pravastatin  40 mg Oral Daily    spironolactone  25 mg Oral Daily    venlafaxine  150 mg Oral Daily    sodium chloride flush  5-40 mL IntraVENous 2 times per day    guaiFENesin  600 mg Oral BID    ipratropium-albuterol  1 ampule Inhalation Q4H WA    QUEtiapine  50 mg Oral Nightly    cefepime  2,000 mg IntraVENous Q12H    vancomycin (VANCOCIN) intermittent dosing (placeholder)   Other RX Placeholder       Continuous Infusions:   sodium chloride      sodium chloride 100 mL/hr at 11/27/22 0442       PRN Meds:  sodium chloride flush, sodium chloride, acetaminophen **OR** acetaminophen, albuterol, LORazepam    ALLERGIES:  Patient has No Known Allergies.     REVIEW OF SYSTEMS:  Constitutional: Negative for fever or chills  HENT: Negative for sore throat  Eyes: Negative for redness   Respiratory: SOB and difficulty in breathing   Cardiovascular: Negative for chest pain  Gastrointestinal: Negative for vomiting, diarrhea   Genitourinary: Negative for hematuria, negative for dysuria  Musculoskeletal: Negative for arthralgias   Skin: Negative for rash  Neurological: Negative for syncope  Hematological: Negative for adenopathy  Extremities:  Negative for swelling    PHYSICAL EXAM:  Blood pressure 116/74, pulse 81, temperature 98.6 °F (37 °C), temperature source Oral, resp. rate 12, height 5' 10\" (1.778 m), weight 150 lb (68 kg), SpO2 92 %.'  Gen: No distress. Flat affect   Eyes: PERRL. No sclera icterus. No conjunctival injection. ENT: No discharge. Pharynx clear. Neck: Trachea midline. No obvious mass. Resp: Faint crackles   CV: Regular rate. Regular rhythm. No murmur or rub. GI: Non-tender. Non-distended. No hernia. BS present. Skin: Warm and dry. No nodule on exposed extremities. Lymph: No cervical LAD. No supraclavicular LAD. M/S: No cyanosis. No joint deformity. Neuro: Awake. Alert. Moves all four extremities. EXT:   no edema, no clubbing    LABS:  CBC:   Recent Labs     11/26/22 1951 11/27/22  0600   WBC 29.7* 19.5*   HGB 12.1* 10.6*   HCT 40.9 34.1*   MCV 72.1* 69.5*    244     BMP:   Recent Labs     11/26/22 1951 11/27/22  0601    136   K 4.0 4.5   CL 99 101   CO2 23 23   BUN 38* 30*   CREATININE 1.3 1.0     LIVER PROFILE: No results for input(s): AST, ALT, LIPASE, BILIDIR, BILITOT, ALKPHOS in the last 72 hours. Invalid input(s): AMYLASE,  ALB  PT/INR: No results for input(s): PROTIME, INR in the last 72 hours. APTT: No results for input(s): APTT in the last 72 hours. UA:No results for input(s): NITRITE, COLORU, PHUR, LABCAST, WBCUA, RBCUA, MUCUS, TRICHOMONAS, YEAST, BACTERIA, CLARITYU, SPECGRAV, LEUKOCYTESUR, UROBILINOGEN, BILIRUBINUR, BLOODU, GLUCOSEU, AMORPHOUS in the last 72 hours. Invalid input(s): KETONESU  No results for input(s): PHART, JUO2ACZ, PO2ART in the last 72 hours. Cultures:   Pending     PFTs: 2022  Moderate obstruction     ECHO:   Overall left ventricular systolic function appears mildly reduced. Ejection   fraction is visually estimated to be 50%. Abnormal (paradoxical) septal   motion is present likely due to right ventricular pacing. Normal left   ventricular wall thickness and cavity size. The right ventricle is normal in size and function.    Pacer / ICD wire is visualized in the right ventricle. Mild aortic and tricuspid regurgitation. ABG:  None      Chest CT:  Chest imaging was reviewed by me and showed worsening bilateral airspace disease when compared to CT in October. Has multiple lobe involvement     I reviewed all the above labs and studies pertaining to this visit. ASSESSMENT/PLAN:  Acute Hypoxic Respiratory Failure with saturations less than 90% on room air   Titrate oxygen for saturations greater than or equal to 90%  Abnormal CT Chest with multifocal areas of pneumonia with recent prednisone use   Seems to have an acute change on top of a persistent RUL opacity. Have to treat infection first, then reimage then consider open lung biopsy  Continue with antibiotics. Cefepime and Vanco is reasonable.   Cultures from bronchoscopy in October were all negative  Check fungal serology and CMV  Possible underlying ILD  May require open lung biopsy down in the future   Prednisone at 20 mg as he was on a taper    DVT prophylaxis  Narendra Grant, DO [facility]

## 2022-11-27 NOTE — ED NOTES
Sepsis bundle bolus is 1000 ML per Dr. Bailey Spann due to patient having a decrease in ejection fraction and history of CHF.      Jesus Wells RN  11/26/22 2059

## 2022-11-27 NOTE — CONSULTS
Clinical Pharmacy Note  Vancomycin Consult    Tiffanie Camacho is a 76 y.o. male ordered Vancomycin for CAP; consult received from KLAUS Augustin to manage therapy. Also receiving cefepime. Allergies:  Patient has no known allergies. Temp max:  Temp (24hrs), Av.5 °F (37.5 °C), Min:98.9 °F (37.2 °C), Max:100 °F (37.8 °C)      Recent Labs     22  0600   WBC 29.7* 19.5*       Recent Labs     22   BUN 38*   CREATININE 1.3       No intake or output data in the 24 hours ending 22 4943    Culture Results:  pending    Ht Readings from Last 1 Encounters:   22 5' 10\" (1.778 m)        Wt Readings from Last 1 Encounters:   22 150 lb (68 kg)         Estimated Creatinine Clearance: 44 mL/min (based on SCr of 1.3 mg/dL). Assessment/Plan:  Day # 1 of vancomycin. Vancomycin 1250 mg IV x 1 dose ordered  Goal trough 15-20  Due to patient's age and renal function (baseline Scr appears to be around 0.8-0.9 mg/dL), will dose initially based on levels  Level ordered for 12 hours after dose (1100 on )    Thank you for the consult.    Tyler Ratliff, PharmD  2022 6:46 AM

## 2022-11-27 NOTE — PLAN OF CARE
Problem: Discharge Planning  Goal: Discharge to home or other facility with appropriate resources  Outcome: Progressing  Flowsheets (Taken 11/27/2022 6735)  Discharge to home or other facility with appropriate resources:   Identify barriers to discharge with patient and caregiver   Identify discharge learning needs (meds, wound care, etc)   Arrange for needed discharge resources and transportation as appropriate   Refer to discharge planning if patient needs post-hospital services based on physician order or complex needs related to functional status, cognitive ability or social support system     Problem: Safety - Adult  Goal: Free from fall injury  Outcome: Progressing     Problem: ABCDS Injury Assessment  Goal: Absence of physical injury  Outcome: Progressing     Problem: Pain  Goal: Verbalizes/displays adequate comfort level or baseline comfort level  Outcome: Progressing     Problem: Chronic Conditions and Co-morbidities  Goal: Patient's chronic conditions and co-morbidity symptoms are monitored and maintained or improved  Outcome: Progressing

## 2022-11-27 NOTE — PROGRESS NOTES
4 Eyes Skin Assessment     NAME:  Dai Michaud  YOB: 1945  MEDICAL RECORD NUMBER:  [id number]    The patient is being assessed for  Admission    I agree that One RN have performed a thorough Head to Toe Skin Assessment on the patient. ALL assessment sites listed below have been assessed. Areas assessed by both nurses:    Head, Face, Ears, Shoulders, Back, Chest, Arms, Elbows, Hands, Sacrum. Buttock, Coccyx, Ischium, and Legs. Feet and Heels        Does the Patient have a Wound?  No noted wound(s)       John Prevention initiated by RN: No   Wound Care Orders initiated by RN: No    Pressure Injury (Stage 3,4, Unstageable, DTI, NWPT, and Complex wounds) if present place referral order by RN under : No    New and Established Ostomies, if present place, referral order under : NA      Nurse 1 eSignature: Electronically signed by Carlos Ward RN on 11/27/22 at 6:57 AM EST    **SHARE this note so that the co-signing nurse is able to place an eSignature**    Nurse 2 eSignature: Electronically signed by Manual Hashimoto, RN on 11/27/22 at 6:59 AM EST

## 2022-11-27 NOTE — ED NOTES
Pt transferred to [facility] via [facility]. Report called to [employer]. O/10 pain.      Kate Toro RN  11/27/22 [address], RN  11/27/22 5123

## 2022-11-27 NOTE — CONSULTS
Clinical Pharmacy Note  Vancomycin Consult    Pharmacy consult received for one-time dose of vancomycin in the Emergency Department per Dr. Joan Potter. Ht Readings from Last 1 Encounters:   11/26/22 5' 10\" (1.778 m)        Wt Readings from Last 1 Encounters:   11/26/22 150 lb (68 kg)         Assessment/Plan:  Vancomycin 1250mg x 1 in ED. If Vancomycin is to continue on admission and pharmacy is to manage dosing, please re-consult with admission orders.     Angie Snow, PharmD  11/26/2022 9:34 PM

## 2022-11-27 NOTE — ED NOTES
Pt placed on O2 at 4 lpm due to room air SaO2 at 85%, Sao2 increase to 91%-93%     Ramona Brown RN  11/26/22 3073

## 2022-11-27 NOTE — ED PROVIDER NOTES
[address]      Pt Name: Juan Menjivar  MRN: [id number]  [place] 1945  Date of evaluation: 11/26/2022  Provider: Erendira Correa, [address] [place] [address]  Chief Complaint   Patient presents with    Illness     Pt states cough with SOB and fever that started today at 1pm.           This patient is at risk for COVID-19 viral infection. Therefore, personal protection equipment consisting of a mask and gloves was worn for the exam.    HPI  Juan Menjivar is a 76 y.o. male who presents with complaint shortness of breath and severe coughing at dinner tonight. He has had a long history of COVID infection resulting in decreased ejection fraction and tachycardia. He ended up with atrial fibrillation after his COVID infection. Dr. Judy Boyd to the catheterization and found an ejection fraction of 50% after was reported as 35% on his echocardiogram.  He subsequently has had increasing shortness of breath. They did a CT and found to \"smoke\" up in the right upper lobe. He been on prednisone 40 mg just recently decreased to 20 today. He has a repeat CT scan scheduled in 2 weeks. He has been diagnosed with interstitial lung disease. His cough got worse tonight. He could not stop coughing and he felt weak. They were going to go on to play but then decided they better come to the emergency department. He took Mucinex and that seemed to help. REVIEW OF SYSTEMS  All systems negative except as noted in the HPI. Reviewed Nurses' notes and concur. No LMP for male patient.     PAST MEDICAL HISTORY  Past Medical History:   Diagnosis Date    Anxiety     COPD, mild ([address] 04/24/2017    COVID-19 12/2020    Depression     History of blood transfusion     Hyperlipidemia     Hypertension     IBS (irritable bowel syndrome) 10/16/2013    Insomnia     PAF (paroxysmal atrial fibrillation) ([address] 08/10/2021    Sick sinus syndrome ([address] 08/10/2021    Wears glasses FAMILY HISTORY  Family History   Problem Relation Age of Onset    Cancer Mother     Lung Cancer Mother     COPD Father     Hypertension Sister     Hypertension Brother     Cancer Brother     Hypertension Brother     Stroke Maternal Grandfather     Cancer Other         leukemia       SOCIAL HISTORY   reports that he quit smoking about 35 years ago. His smoking use included cigarettes. He has a 20.00 pack-year smoking history. He has been exposed to tobacco smoke. He quit smokeless tobacco use about 34 years ago. He reports that he does not drink alcohol and does not use drugs. SURGICAL HISTORY  Past Surgical History:   Procedure Laterality Date    ABLATION OF DYSRHYTHMIC FOCUS      APPENDECTOMY      BRONCHOSCOPY N/A 10/11/2022    BRONCHOSCOPY WITH BRONCHIOLAR ALVEOLAR LAVAGE performed by Richard Cerda MD at [address]  10/11/2022    BRONCHOSCOPY DIAGNOSTIC OR CELL [address] ONLY performed by Richard Cerda MD at [address]      pacemaker    COLONOSCOPY  03/19/2019    [place]    COLONOSCOPY N/A 03/19/2019    COLONOSCOPY WITH BIOPSY performed by Sylvia Leary MD at [place] [address] N/A 03/19/2019    COLONOSCOPY POLYPECTOMY SNARE/COLD BIOPSY performed by Sylvia Leary MD at [id number] [place] [address]  Current Outpatient Rx   Medication Sig Dispense Refill    diphenoxylate-atropine (LOMOTIL) 2.5-0.025 MG per tablet Take 1 tablet by mouth 4 times daily as needed for Diarrhea for up to 30 days. 120 tablet 0    pravastatin (PRAVACHOL) 40 MG tablet Take 1 tablet by mouth daily 90 tablet 1    predniSONE (DELTASONE) 20 MG tablet Take 2 tablets by mouth daily for 10 days, THEN 1 tablet daily for 20 days.  40 tablet 0    estazolam (PROSOM) 2 MG tablet TAKE 1 TO 2 TABLETS BY MOUTH EVERY NIGHT AS NEEDED FOR SLEEP 60 tablet 0    furosemide (LASIX) 40 MG tablet Take 1 tablet by mouth daily 90 tablet 1    spironolactone (ALDACTONE) 25 MG tablet TAKE ONE (1) TABLET BY MOUTH DAILY 90 tablet 3    metoprolol succinate (TOPROL XL) 50 MG extended release tablet TAKE ONE (1) TABLET BY MOUTH DAILY 90 tablet 3    Fluticasone furoate-vilanterol (BREO ELLIPTA) 200-25 MCG/INH AEPB inhaler Inhale 1 puff into the lungs daily 180 each 1    ELIQUIS 5 MG TABS tablet TAKE ONE (1) TABLET BY MOUTH TWO (2) TIMES DAILY 180 tablet 2    losartan (COZAAR) 100 MG tablet Take 1 tablet by mouth daily 90 tablet 1    QUEtiapine (SEROQUEL) 25 MG tablet Take 1-2 tablets nightly 180 tablet 1    NONFORMULARY Apply topically as needed CBD oil      venlafaxine (EFFEXOR XR) 150 MG extended release capsule Take 1 capsule by mouth daily 90 capsule 1    vitamin D (CHOLECALCIFEROL) 125 MCG (5000 UT) CAPS capsule Take 5,000 Units by mouth every evening      Multiple Vitamins-Minerals (MULTIVITAMIN) LIQD Take 30 mLs by mouth daily Has 80 mcg of vitamin K -2  Has 50 mg of ginsing root      albuterol sulfate HFA (VENTOLIN HFA) 108 (90 Base) MCG/ACT inhaler Inhale 2 puffs into the lungs every 6 hours as needed for Wheezing or Shortness of Breath 3 Inhaler 1    famotidine (PEPCID) 20 MG tablet Take 1 tablet by mouth 2 times daily 60 tablet 3       ALLERGIES  No Known Allergies    PHYSICAL EXAM  VITAL SIGNS: /71   Pulse (!) 106   Temp 100 °F (37.8 °C) (Oral)   Resp 25   Ht 5' 10\" (1.778 m)   Wt 150 lb (68 kg)   SpO2 90%   BMI 21.52 kg/m²   Constitutional: Well-developed, well-nourished, appears normal, nontoxic, activity: Running on a car, coughing frequently, has 7-8 word sentences with forced expiration at the end of his sentences  HENT: Normocephalic, Atraumatic, Bilateral external ears normal, Oropharynx moist, no pharyngeal exudates, no pharyngeal erythema, no nasal discharge. Eyes: PERRLA, Conjunctiva normal, No discharge. Neck: Normal range of motion, no tenderness, Supple, no adenopathy.   Cardiovascular: Mildly tachycardic heart rate, Normal rhythm, no murmurs, no gallops, no rubs. Thorax & Lungs: Mildly decreased breath sounds, mild respiratory distress, diffuse,end-expiratory wheezing, no rales, no rhonchi  Abdomen: Soft, no tender with no distension, no masses, no pulsatile masses, no hepatosplenomegaly, normal bowel sounds  Skin: Warm, Dry, No erythema, no rash. Extremities: No edema, No tenderness, No cyanosis, No clubbing. No amputations, capillary refill less than 2 seconds.   Neurologic: Alert & oriented x 3    LABORATORY  Labs Reviewed   CBC WITH AUTO DIFFERENTIAL - Abnormal; Notable for the following components:       Result Value    WBC 29.7 (*)     Hemoglobin 12.1 (*)     MCV 72.1 (*)     MCH 21.3 (*)     MCHC 29.6 (*)     RDW 18.1 (*)     Neutrophils Absolute 28.5 (*)     Lymphocytes Absolute 0.0 (*)     All other components within normal limits   BASIC METABOLIC PANEL W/ REFLEX TO MG FOR LOW K - Abnormal; Notable for the following components:    BUN 38 (*)     Est, Glom Filt Rate 56 (*)     All other components within normal limits   LACTIC ACID - Abnormal; Notable for the following components:    Lactic Acid 4.2 (*)     All other components within normal limits    Narrative:     Barbie Flores tel. [phone],                  Chemistry handed to Dr. Deyanira Peralta, 11/26/2022 20:35, by Lc [id number], RAPID   RAPID INFLUENZA A/B ANTIGENS   CULTURE, BLOOD 1   CULTURE, BLOOD 2   BRAIN NATRIURETIC PEPTIDE   TROPONIN   LACTIC ACID     EKG Interpretation    Interpreted by emergency department physician  Time performed: 1947  Time read: 1949    Rhythm: Atrial sensed ventricular paced rhythm  Ventricular Rate: 119  QRS Axis: -61  Ectopy: None  Conduction: Atrial-sensed, ventricular-paced rhythm with a left bundle branch block pattern with early repolarization abnormalities  ST Segments: Consistent with early repolarization abnormalities  T Waves: Consistent with early repolarization abnormalities  Q Waves: Consistent with left bundle branch block    Other findings: Motion artifact but EKG is readable    Compared to EKG on: 3/21/2022 which was paced at that time also and appears unchanged    Clinical Impression: Atrial sensed ventricular paced rhythm with left bundle branch block pattern with early repolarization abnormalities. There is motion artifact but EKG is read below. This is compared to an EKG on 3/21/2022 and is essentially unchanged. Casandra 149, DO      ?  RADIOLOGY/PROCEDURES  I personally reviewed the images for this case. CT CHEST PULMONARY EMBOLISM W CONTRAST   Final Result   1. No CTA evidence for acute pulmonary embolus. 2. Bilateral pulmonary infiltrates. This likely represents pneumonia,   including COVID pneumonia. I would recommend follow-up to resolution. XR CHEST PORTABLE   Final Result   Multifocal airspace disease. COURSE & MEDICAL DECISION MAKING  Pertinent Labs, EKG, & Imaging studies reviewed.  (See chart for details)    Vitals:    11/26/22 2002 11/26/22 2017 11/26/22 2030 11/26/22 2042   BP: 122/70 (!) 140/69 136/79 125/71   Pulse: (!) 104 (!) 108 100 (!) 106   Resp: 24 20 13 25   Temp:       TempSrc:       SpO2: (!) 89% 90% 95% 90%   Weight:       Height:           Medications   cefepime (MAXIPIME) 2000 mg IVPB minibag (0 mg IntraVENous Stopped 11/26/22 2212)   vancomycin (VANCOCIN) 1,250 mg in dextrose 5 % 250 mL IVPB (1,250 mg IntraVENous New Bag 11/26/22 2225)   ipratropium-albuterol (DUONEB) nebulizer solution 1 ampule (1 ampule Inhalation Given 11/26/22 2019)   0.9 % sodium chloride bolus (0 mLs IntraVENous Stopped 11/26/22 2222)   iopamidol (ISOVUE-370) 76 % injection 100 mL (100 mLs IntraVENous Given 11/26/22 2055)       New Prescriptions    No medications on file       SEP-1 CORE MEASURE DATA  Classification: severe sepsis  Amount of fluids ordered: less than 30mL/kg because of concomitant acute pulmonary edema  Time at which sepsis was identified: 2130  Broad-spectrum antibiotics chosen:  based on sepsis order-set for a suspected source of: Pulmonary - Nosocomial  Repeat lactate level: pending    On reassessment after fluid resuscitation:  pending, to be completed by inpatient team    Patient remained stable in the ED. his laboratory work revealed elevated white count over 30,000. His pulse ox was 85% on room air. He does not wear oxygen at home. Chest x-ray was essentially negative but the chest CT to rule out pulmonary embolus did not show any pulmonary emboli but did show bilateral infiltrates which appear to be pneumonia. Patient improved with nebulizer treatment. His therefore, patient was admitted to the hospital for further evaluation and treatment of his pneumonia. He was treated for a hospital-acquired pneumonia due to his recent admission. He was given vancomycin and cefepime in the emergency department after his blood cultures were obtained. His COVID test was negative. His influenza test was negative. Therefore, hospitalist was notified, Dr. Ovi Contreras at [phone] and he agreed to accept the patient for admission. Patient remained stable throughout his emergency department stay. The patient's blood pressure was found to be elevated according to CMS/Medicare and the Affordable Care Act/ObamaCare criteria. Elevated blood pressure could occur because of pain or anxiety or other reasons and does not mean that they need to have their blood pressure treated or medications otherwise adjusted. However, this could also be a sign that they will need to have their blood pressure treated or medications changed. The patient was instructed to follow up closely with their personal physician to have their blood pressure rechecked. The patient was instructed to take a list of recent blood pressure readings to their next visit with their personal physician. See discharge instructions for specific medications, discharge information, and treatments.  They were verbally instructed to return to emergency if any problems. I reviewed old records    (This chart has been completed using [address]. Although attempts have been made to ensure accuracy, words and/or phrases may not be transcribed as intended.)    Patient refused pain medicines at the time of their exam.    IMPRESSION(S):  1. Septicemia ([facility]    2. Pneumonia of both lungs due to infectious organism, unspecified part of lung    3. Pacemaker ECG pattern    4. Hypoxia    5. Acute cough        ? Recheck Times: 2130, 2200, 2215  Critical Care Time: 35 minutes not including billable procedures. Diagnostic considerations include but are not limited to: Influenza, Other viral illness, Meningitis, Group A strep, Airway Obstruction, Pneumonia, Hypoxemia, Dehydration, COVID-19, other.           Fransisco Guo DO  11/26/22 0186

## 2022-11-27 NOTE — PROGRESS NOTES
Clinical Pharmacy Note  Vancomycin Consult    Petros Guzman is a 76 y.o. male ordered Vancomycin for CAP; consult received from KLAUS Walker to manage therapy. Also receiving cefepime. Allergies:  Patient has no known allergies. Temp max:  Temp (24hrs), Av.7 °F (37.1 °C), Min:97.8 °F (36.6 °C), Max:100 °F (37.8 °C)      Recent Labs     22  0600   WBC 29.7* 19.5*         Recent Labs     22  0601   BUN 38* 30*   CREATININE 1.3 1.0           Intake/Output Summary (Last 24 hours) at 2022 1717  Last data filed at 2022 1037  Gross per 24 hour   Intake 480 ml   Output 400 ml   Net 80 ml       Culture Results:  pending    Ht Readings from Last 1 Encounters:   22 5' 10\" (1.778 m)          Wt Readings from Last 1 Encounters:   22 150 lb (68 kg)         Estimated Creatinine Clearance: 58 mL/min (based on SCr of 1 mg/dL). Assessment/Plan:  Day # 2 of Vancomycin. Random Vancomycin level = 5.5 ug/mL  Serum creatinine is improving  Will re-dose Vancomycin 1,000 mg IVPB x 1 and obtain a random vancomycin level on 22 with AM labs. MRSA nasal ordered/pending    Thank you for the consult. Will continue to follow.     Lokesh Ly, [address] [place] [address], PharmD, BCPS  2022 5:18 PM

## 2022-11-27 NOTE — H&P
Hospital Medicine History & Physical      PCP: Hailey Busby MD    Date of Admission: 11/26/2022    Date of Service: Pt seen/examined on 11/27/22 and Admitted to Inpatient with expected LOS greater than two midnights due to medical therapy. Chief Complaint:  cough, wheeze, dyspnea       History Of Present Illness:      76 y.o. male who presented to [facility] with report of covid 2 years ago and some subsequent lung changes on CT scan that he reports were diagnosed as ILD. He reports he went to a restaurant tonight and started shaking with chills. He reports feeling weak so they left the restaurant, his wife brought him to the ED. In the ED he was found to be septic with CT scan showing multifocal pneumonia. Reports adequate home med supply / denies running out of anything. He denies sick contacts. He reports sensation of aspiration sometimes. Reports sensation of dysphagia with eating meet lately.      ROS:   - baseline on room air at home   - denies chest pain   - he reports longstanding history of insomnia   + cough  + wheeze     SocHx:   - quit smoking 35 years ago   - denies alcohol   -    - retired, previously worked as a teacher       Past Medical History:          Diagnosis Date    Anxiety     COPD, mild ([address] 04/24/2017    COVID-19 12/2020    Depression     History of blood transfusion     Hyperlipidemia     Hypertension     IBS (irritable bowel syndrome) 10/16/2013    Insomnia     PAF (paroxysmal atrial fibrillation) ([address] 08/10/2021    Sick sinus syndrome ([address] 08/10/2021    Wears glasses        Past Surgical History:          Procedure Laterality Date    ABLATION OF DYSRHYTHMIC FOCUS      APPENDECTOMY      BRONCHOSCOPY N/A 10/11/2022    BRONCHOSCOPY WITH BRONCHIOLAR ALVEOLAR LAVAGE performed by Joselyn Engel MD at [address]  10/11/2022    BRONCHOSCOPY DIAGNOSTIC OR CELL [address] ONLY performed by Joselyn Engel MD at [address] pacemaker    COLONOSCOPY  03/19/2019    [place]    COLONOSCOPY N/A 03/19/2019    COLONOSCOPY WITH BIOPSY performed by Celine Greenberg MD at [place] [address] N/A 03/19/2019    COLONOSCOPY POLYPECTOMY SNARE/COLD BIOPSY performed by Celine Greenberg MD at [place] [address]         Medications Prior to Admission:      Prior to Admission medications    Medication Sig Start Date End Date Taking? Authorizing Provider   diphenoxylate-atropine (LOMOTIL) 2.5-0.025 MG per tablet Take 1 tablet by mouth 4 times daily as needed for Diarrhea for up to 30 days. 8/17/22 9/16/22  Enedina Shah MD   pravastatin (PRAVACHOL) 40 MG tablet Take 1 tablet by mouth daily 11/22/22   Enedina Shah MD   predniSONE (DELTASONE) 20 MG tablet Take 2 tablets by mouth daily for 10 days, THEN 1 tablet daily for 20 days.  11/11/22 12/11/22  Floyd Brown MD   estazolam (PROSOM) 2 MG tablet TAKE 1 TO 2 TABLETS BY MOUTH EVERY NIGHT AS NEEDED FOR SLEEP 11/3/22 12/2/22  Enedina Shah MD   furosemide (LASIX) 40 MG tablet Take 1 tablet by mouth daily 10/31/22   Enedina Shah MD   spironolactone (ALDACTONE) 25 MG tablet TAKE ONE (1) TABLET BY MOUTH DAILY 8/16/22   Sabas Nelson MD   metoprolol succinate (TOPROL XL) 50 MG extended release tablet TAKE ONE (1) TABLET BY MOUTH DAILY 8/16/22   Sabas Nelson MD   Fluticasone furoate-vilanterol (BREO ELLIPTA) 200-25 MCG/INH AEPB inhaler Inhale 1 puff into the lungs daily 6/16/22   Enedina Shah MD   ELIQUIS 5 MG TABS tablet TAKE ONE (1) TABLET BY MOUTH TWO (2) TIMES DAILY 6/15/22   Marylene Rash, MD   losartan (COZAAR) 100 MG tablet Take 1 tablet by mouth daily 6/10/22   Enedina Shah MD   QUEtiapine (SEROQUEL) 25 MG tablet Take 1-2 tablets nightly 4/26/22   Enedina Shah MD   NONFORMULARY Apply topically as needed CBD oil    Historical Provider, MD   venlafaxine (EFFEXOR XR) 150 MG extended release capsule Take 1 capsule by mouth daily 3/7/22   Dexter Rendon MD   vitamin D (CHOLECALCIFEROL) 125 MCG (5000 UT) CAPS capsule Take 5,000 Units by mouth every evening    Historical Provider, MD   Multiple Vitamins-Minerals (MULTIVITAMIN) LIQD Take 30 mLs by mouth daily Has 80 mcg of vitamin K -2  Has 50 mg of ginsing root    Historical Provider, MD   albuterol sulfate HFA (VENTOLIN HFA) 108 (90 Base) MCG/ACT inhaler Inhale 2 puffs into the lungs every 6 hours as needed for Wheezing or Shortness of Breath 7/6/21   Dexter Rendon MD   famotidine (PEPCID) 20 MG tablet Take 1 tablet by mouth 2 times daily 12/23/20   KLAUS Townsend - CNP       Allergies:  Patient has no known allergies. Social History:      The patient currently lives at home     TOBACCO:   reports that he quit smoking about 35 years ago. His smoking use included cigarettes. He has a 20.00 pack-year smoking history. He has been exposed to tobacco smoke. He quit smokeless tobacco use about 34 years ago. ETOH:   reports no history of alcohol use. E-cigarette/Vaping       Questions Responses    E-cigarette/Vaping Use Never User    Start Date     Passive Exposure No    Quit Date     Counseling Given No    Comments               Family History:    Reviewed and negative in regards to presenting illness/complaint. Problem Relation Age of Onset    Cancer Mother     Lung Cancer Mother     COPD Father     Hypertension Sister     Hypertension Brother     Cancer Brother     Hypertension Brother     Stroke Maternal Grandfather     Cancer Other         leukemia       REVIEW OF SYSTEMS COMPLETED:   Pertinent positives as noted in the HPI. All other systems reviewed and negative. PHYSICAL EXAM PERFORMED:    /76   Pulse 90   Temp 98.9 °F (37.2 °C) (Oral)   Resp 29   Ht 5' 10\" (1.778 m)   Wt 150 lb (68 kg)   SpO2 90%   BMI 21.52 kg/m²     General appearance:  No apparent distress, appears stated age and cooperative.   HEENT:  Normal cephalic, atraumatic without obvious deformity. Pupils equal, round, and reactive to light. Extra ocular muscles intact. Conjunctivae/corneas clear. Neck: Supple, with full range of motion. No jugular venous distention. Respiratory:  Normal respiratory effort. Clear to auscultation, bilaterally without Rales/Wheezes/Rhonchi. Cardiovascular:  Regular rate and rhythm with normal S1/S2 without murmurs, rubs or gallops. Abdomen: Soft, non-tender, non-distended   Musculoskeletal:  No clubbing, cyanosis   Skin: Skin color, texture, turgor normal.    Neurologic:   .  Speech clear   Moves ext x 4   No facial droop   Psychiatric:  Alert and oriented   Capillary Refill: Brisk,3 seconds, normal  Peripheral Pulses: +2 palpable, equal bilaterally       Labs:     Recent Labs     11/26/22 1951   WBC 29.7*   HGB 12.1*   HCT 40.9        Recent Labs     11/26/22 1951      K 4.0   CL 99   CO2 23   BUN 38*   CREATININE 1.3   CALCIUM 9.3     No results for input(s): AST, ALT, BILIDIR, BILITOT, ALKPHOS in the last 72 hours. No results for input(s): INR in the last 72 hours. Recent Labs     11/26/22 1951   TROPONINI <0.01       Urinalysis:      Lab Results   Component Value Date/Time    NITRU Negative 12/16/2020 08:32 PM    WBCUA 1 12/16/2020 08:32 PM    BACTERIA 1+ 02/08/2019 10:55 PM    RBCUA 1 12/16/2020 08:32 PM    BLOODU TRACE 12/16/2020 08:32 PM    SPECGRAV >1.030 12/16/2020 08:32 PM    GLUCOSEU Negative 12/16/2020 08:32 PM       Radiology:     CXR: I have reviewed the CXR with the following interpretation:   EKG:  I have reviewed the EKG with the following interpretation:     CT CHEST PULMONARY EMBOLISM W CONTRAST   Final Result   1. No CTA evidence for acute pulmonary embolus. 2. Bilateral pulmonary infiltrates. This likely represents pneumonia,   including COVID pneumonia. I would recommend follow-up to resolution. XR CHEST PORTABLE   Final Result   Multifocal airspace disease.              Consults:    PHARMACY TO DOSE VANCOMYCIN  IP CONSULT TO PHARMACY    ASSESSMENT:    Active Hospital Problems    Diagnosis Date Noted    Sepsis ([facility] [A41.9] 11/27/2022     Priority: Sherry Kurtz is a 76 y.o. male     Sepsis:   - s/p NS bolus x 1000 ml   - abx as noted below     Respiratory failure with hypoxia, pneumonia:   - cefepime q12 hrs   - vancomycin q12 hr   - duoneb q4 hrs while awake   - mucinex 600 mg po BID   - flu / covid negative   - procalcitonin pending   - mrsa swab pending   - urine strep / legionella antigens     Atrial fibrillation:   - apixaban 5 mg po BID   - metoprolol 50 mg po daily     Hypertension:   - losartan 100 mg po daily   - spironolactone 25 mg po daily     Chronic heart failure:   - furosemide 40 mg po daily - HELD     Insomnia:   - quetiapine 50 mg po qhs   - [employer] --> formulary --> lorazepam 0.5 mg po qhs     Mood disorder:   - venlafaxine 150 mg po daily     Leg scratches:   - he reports from his dog   - doubt infection at this time     DVT Prophylaxis: on apixaban   Diet: ADULT DIET; Regular; 3 carb choices (45 gm/meal)  Code Status: Prior DNR - CCA   - alternative decision maker -wife - Jackelin Solitario     PT/OT Eval Status: not consulted     Dispo - pending improvement        Rashawn Taylor MD    Thank you Henna Lemus MD for the opportunity to be involved in this patient's care.

## 2022-11-27 NOTE — SIGNIFICANT EVENT
Admission to [address] [place] [address] pending     Ender Correa is a 76 y.o. male   Pt with cough, wheeze, dyspnea starting tonight. Hx of S-CHF, EF 35% on echo, later on cath had EF of 50%. Recently decreased prednisone dosing from 40 mg po daily down to 20 mg po daily. Presented to the ED with O2 sat of 85% on room air. Workup in the ED showed WBC 32k, tachycardic to 108, Lactic 4.2. CXR without a specific finding. CTA negative for PE but showed apparent bilateral pneumonia. Started on vanc / cefepime.        Step down unit   Full code   Inpatient   Covid test negative     Rodrigo Finnegan MD

## 2022-11-28 LAB
(1,3)-BETA-D-GLUCAN (FUNGITELL) INTERPRETATION: NEGATIVE
(1,3)-BETA-D-GLUCAN (FUNGITELL): <31 PG/ML
ANION GAP SERPL CALCULATED.3IONS-SCNC: 11 MMOL/L (ref 3–16)
BASOPHILS ABSOLUTE: 0 K/UL (ref 0–0.2)
BASOPHILS ABSOLUTE: 0 K/UL (ref 0–0.2)
BASOPHILS RELATIVE PERCENT: 0.1 %
BASOPHILS RELATIVE PERCENT: 0.2 %
BUN BLDV-MCNC: 23 MG/DL (ref 7–20)
CALCIUM SERPL-MCNC: 8.1 MG/DL (ref 8.3–10.6)
CHLORIDE BLD-SCNC: 110 MMOL/L (ref 99–110)
CO2: 18 MMOL/L (ref 21–32)
CREAT SERPL-MCNC: 0.8 MG/DL (ref 0.8–1.3)
EOSINOPHILS ABSOLUTE: 0 K/UL (ref 0–0.6)
EOSINOPHILS ABSOLUTE: 0.1 K/UL (ref 0–0.6)
EOSINOPHILS RELATIVE PERCENT: 0.2 %
EOSINOPHILS RELATIVE PERCENT: 0.8 %
GFR SERPL CREATININE-BSD FRML MDRD: >60 ML/MIN/{1.73_M2}
GLUCOSE BLD-MCNC: 125 MG/DL (ref 70–99)
HCT VFR BLD CALC: 28.2 % (ref 40.5–52.5)
HCT VFR BLD CALC: 34.1 % (ref 40.5–52.5)
HEMATOLOGY PATH CONSULT: NO
HEMATOLOGY PATH CONSULT: NO
HEMOGLOBIN: 10.6 G/DL (ref 13.5–17.5)
HEMOGLOBIN: 8.9 G/DL (ref 13.5–17.5)
LYMPHOCYTES ABSOLUTE: 0.8 K/UL (ref 1–5.1)
LYMPHOCYTES ABSOLUTE: 1 K/UL (ref 1–5.1)
LYMPHOCYTES RELATIVE PERCENT: 5 %
LYMPHOCYTES RELATIVE PERCENT: 7.4 %
MCH RBC QN AUTO: 21.5 PG (ref 26–34)
MCH RBC QN AUTO: 21.9 PG (ref 26–34)
MCHC RBC AUTO-ENTMCNC: 31 G/DL (ref 31–36)
MCHC RBC AUTO-ENTMCNC: 31.4 G/DL (ref 31–36)
MCV RBC AUTO: 69.5 FL (ref 80–100)
MCV RBC AUTO: 69.6 FL (ref 80–100)
MONOCYTES ABSOLUTE: 0.4 K/UL (ref 0–1.3)
MONOCYTES ABSOLUTE: 0.8 K/UL (ref 0–1.3)
MONOCYTES RELATIVE PERCENT: 4 %
MONOCYTES RELATIVE PERCENT: 4 %
NEUTROPHILS ABSOLUTE: 17.6 K/UL (ref 1.7–7.7)
NEUTROPHILS ABSOLUTE: 9.2 K/UL (ref 1.7–7.7)
NEUTROPHILS RELATIVE PERCENT: 87.7 %
NEUTROPHILS RELATIVE PERCENT: 90.6 %
PDW BLD-RTO: 18.3 % (ref 12.4–15.4)
PDW BLD-RTO: 18.7 % (ref 12.4–15.4)
PLATELET # BLD: 203 K/UL (ref 135–450)
PLATELET # BLD: 244 K/UL (ref 135–450)
PMV BLD AUTO: 7.6 FL (ref 5–10.5)
PMV BLD AUTO: 7.9 FL (ref 5–10.5)
POTASSIUM REFLEX MAGNESIUM: 4.3 MMOL/L (ref 3.5–5.1)
PROCALCITONIN: 6.31 NG/ML (ref 0–0.15)
RBC # BLD: 4.05 M/UL (ref 4.2–5.9)
RBC # BLD: 4.9 M/UL (ref 4.2–5.9)
SODIUM BLD-SCNC: 139 MMOL/L (ref 136–145)
VANCOMYCIN RANDOM: 9.7 UG/ML
WBC # BLD: 10.5 K/UL (ref 4–11)
WBC # BLD: 19.5 K/UL (ref 4–11)

## 2022-11-28 PROCEDURE — 2580000003 HC RX 258: Performed by: PEDIATRICS

## 2022-11-28 PROCEDURE — 6370000000 HC RX 637 (ALT 250 FOR IP): Performed by: NURSE PRACTITIONER

## 2022-11-28 PROCEDURE — 6360000002 HC RX W HCPCS: Performed by: PEDIATRICS

## 2022-11-28 PROCEDURE — 80048 BASIC METABOLIC PNL TOTAL CA: CPT

## 2022-11-28 PROCEDURE — 6370000000 HC RX 637 (ALT 250 FOR IP): Performed by: HOSPITALIST

## 2022-11-28 PROCEDURE — 94640 AIRWAY INHALATION TREATMENT: CPT

## 2022-11-28 PROCEDURE — 6370000000 HC RX 637 (ALT 250 FOR IP): Performed by: PEDIATRICS

## 2022-11-28 PROCEDURE — 6370000000 HC RX 637 (ALT 250 FOR IP): Performed by: INTERNAL MEDICINE

## 2022-11-28 PROCEDURE — 2060000000 HC ICU INTERMEDIATE R&B

## 2022-11-28 PROCEDURE — 94761 N-INVAS EAR/PLS OXIMETRY MLT: CPT

## 2022-11-28 PROCEDURE — 85025 COMPLETE CBC W/AUTO DIFF WBC: CPT

## 2022-11-28 PROCEDURE — 2580000003 HC RX 258: Performed by: NURSE PRACTITIONER

## 2022-11-28 PROCEDURE — 80202 ASSAY OF VANCOMYCIN: CPT

## 2022-11-28 PROCEDURE — 36415 COLL VENOUS BLD VENIPUNCTURE: CPT

## 2022-11-28 PROCEDURE — 84145 PROCALCITONIN (PCT): CPT

## 2022-11-28 PROCEDURE — 2700000000 HC OXYGEN THERAPY PER DAY

## 2022-11-28 PROCEDURE — 99233 SBSQ HOSP IP/OBS HIGH 50: CPT | Performed by: INTERNAL MEDICINE

## 2022-11-28 RX ORDER — CODEINE PHOSPHATE AND GUAIFENESIN 10; 100 MG/5ML; MG/5ML
5 SOLUTION ORAL EVERY 4 HOURS PRN
Status: DISCONTINUED | OUTPATIENT
Start: 2022-11-28 | End: 2022-11-30 | Stop reason: HOSPADM

## 2022-11-28 RX ORDER — SODIUM BICARBONATE 650 MG/1
650 TABLET ORAL 3 TIMES DAILY
Status: COMPLETED | OUTPATIENT
Start: 2022-11-28 | End: 2022-11-29

## 2022-11-28 RX ADMIN — APIXABAN 5 MG: 5 TABLET, FILM COATED ORAL at 09:20

## 2022-11-28 RX ADMIN — QUETIAPINE FUMARATE 50 MG: 25 TABLET ORAL at 20:14

## 2022-11-28 RX ADMIN — IPRATROPIUM BROMIDE AND ALBUTEROL SULFATE 1 AMPULE: 2.5; .5 SOLUTION RESPIRATORY (INHALATION) at 20:22

## 2022-11-28 RX ADMIN — APIXABAN 5 MG: 5 TABLET, FILM COATED ORAL at 20:13

## 2022-11-28 RX ADMIN — Medication 4 MG: at 22:18

## 2022-11-28 RX ADMIN — PREDNISONE 20 MG: 20 TABLET ORAL at 09:20

## 2022-11-28 RX ADMIN — Medication 4 MG: at 00:34

## 2022-11-28 RX ADMIN — GUAIFENESIN 600 MG: 600 TABLET, EXTENDED RELEASE ORAL at 20:14

## 2022-11-28 RX ADMIN — SODIUM BICARBONATE 650 MG: 650 TABLET ORAL at 20:13

## 2022-11-28 RX ADMIN — CEFEPIME 2000 MG: 2 INJECTION, POWDER, FOR SOLUTION INTRAVENOUS at 09:18

## 2022-11-28 RX ADMIN — SODIUM BICARBONATE 650 MG: 650 TABLET ORAL at 17:18

## 2022-11-28 RX ADMIN — VENLAFAXINE HYDROCHLORIDE 150 MG: 75 CAPSULE, EXTENDED RELEASE ORAL at 09:20

## 2022-11-28 RX ADMIN — Medication 4 MG: at 22:19

## 2022-11-28 RX ADMIN — LOSARTAN POTASSIUM 100 MG: 100 TABLET, FILM COATED ORAL at 09:20

## 2022-11-28 RX ADMIN — IPRATROPIUM BROMIDE AND ALBUTEROL SULFATE 1 AMPULE: 2.5; .5 SOLUTION RESPIRATORY (INHALATION) at 07:50

## 2022-11-28 RX ADMIN — IPRATROPIUM BROMIDE AND ALBUTEROL SULFATE 1 AMPULE: 2.5; .5 SOLUTION RESPIRATORY (INHALATION) at 16:06

## 2022-11-28 RX ADMIN — IPRATROPIUM BROMIDE AND ALBUTEROL SULFATE 1 AMPULE: 2.5; .5 SOLUTION RESPIRATORY (INHALATION) at 12:35

## 2022-11-28 RX ADMIN — CEFEPIME 2000 MG: 2 INJECTION, POWDER, FOR SOLUTION INTRAVENOUS at 20:19

## 2022-11-28 RX ADMIN — GUAIFENESIN AND CODEINE PHOSPHATE 5 ML: 10; 100 LIQUID ORAL at 13:18

## 2022-11-28 RX ADMIN — Medication 10 ML: at 20:14

## 2022-11-28 RX ADMIN — GUAIFENESIN 600 MG: 600 TABLET, EXTENDED RELEASE ORAL at 09:20

## 2022-11-28 RX ADMIN — PRAVASTATIN SODIUM 40 MG: 40 TABLET ORAL at 09:20

## 2022-11-28 RX ADMIN — SPIRONOLACTONE 25 MG: 25 TABLET ORAL at 09:20

## 2022-11-28 RX ADMIN — METOPROLOL SUCCINATE 50 MG: 50 TABLET, EXTENDED RELEASE ORAL at 09:20

## 2022-11-28 NOTE — PROGRESS NOTES
11/28/22 1630   Encounter Summary   Encounter Overview/Reason  Initial Encounter;Rituals, Rites and Sacraments   Service Provided For: Patient and family together   Referral/Consult From: Other    Support System Spouse;Friends/neighbors   Last Encounter  11/28/22  (confession, fr wallace)   Complexity of Encounter Low   Begin Time 1615   End Time  1632   Total Time Calculated 17 min   Encounter    Type Initial Screen/Assessment   Spiritual/Emotional needs   Type Spiritual Support   Rituals, Rites and Sacraments   Type Sacrament of Reconciliation   Assessment/Intervention/Outcome   Assessment Calm;Coping; Hopeful   Intervention Active listening;Discussed belief system/Scientologist practices/becka;Discussed relationship with God   Outcome Comfort;Coping;Encouraged;Engaged in conversation;Expressed feelings, needs, and concerns;Expressed Gratitude   Electronically signed by Clifford Michelle on 11/28/2022 at 4:33 PM

## 2022-11-28 NOTE — PROGRESS NOTES
Physician Progress Note      [facility]  CSN #:                  [id number]  :                       1945  ADMIT DATE:       2022 7:04 PM  [address] [place] DATE:  RESPONDING  PROVIDER #:        Moon Ly MD          QUERY TEXT:    Pt admitted with SOB and cough. Pt noted to have pneumonia. If possible,   please document in the progress notes and discharge summary if you are   evaluating and/or treating any of the following:      Note: CAP and HCAP indicate where the pneumonia was acquired, not a specific   type. The medical record reflects the following:  Risk Factors: Current admission for pneumonia. Clinical Indicators: T 100 (oral), 109, 31, 133/63 - 91/45   85% RA   92%   6L. ...lactic acid 4.2,  wbc 29.7, neut 28.5,  procal 9.37, 6.31, MRSA DNA   Probe-Negative. .. Pink Srinath Pink Srinath CT-Bilateral pulmonary infiltrates. This likely represents   pneumonia,  Treatment: -ivf, Cefepime IV, Vanco IV, Duonebs, Resp cultures, Pulmonary   consult    Thank Luis Ludwig RN BSN CDS CRCR  [email or web address]  Options provided:  -- Treating for gram negative pneumonia  -- Treating for aspiration pneumonia  -- Other - I will add my own diagnosis  -- Disagree - Not applicable / Not valid  -- Disagree - Clinically unable to determine / Unknown  -- Refer to Clinical Documentation Reviewer    PROVIDER RESPONSE TEXT:    Treating for bacterial pneumonia    Query created by: Tanika Sanders on 2022 12:56 PM      Electronically signed by:   Moon Ly MD 2022 3:31 PM

## 2022-11-28 NOTE — PROGRESS NOTES
Pt continues with moist congested cough. Pt intermittently requires 2L of O2 with sats occasionally dropping to mid to high 80's. Pt provided with an incentive spirometer and reviewed instructions to use it. Pt up indep in room.    Electronically signed by Amy Castellon RN on 11/28/22 at 6:52 PM EST

## 2022-11-28 NOTE — PROGRESS NOTES
Pulmonary Progress Note    CC:  Follow up hypoxia, pneumonia    Subjective:  2 liters of oxygen but was on room air last night  Coughing a lot with deep breathing and would like a suppressant  Feeling better  Procal was 9 and now down to 6    ROS  Dry coughing  Less SOB    Intake/Output Summary (Last 24 hours) at 11/28/2022 0830  Last data filed at 11/28/2022 0535  Gross per 24 hour   Intake 730 ml   Output 1050 ml   Net -320 ml         PHYSICAL EXAM:  Blood pressure (!) 149/80, pulse 85, temperature 97.7 °F (36.5 °C), temperature source Oral, resp. rate 17, height 5' 10\" (1.778 m), weight 154 lb 5.2 oz (70 kg), SpO2 92 %.'  Gen: No distress. Eyes: PERRL. No sclera icterus. No conjunctival injection. ENT: No discharge. Pharynx clear. External appearance of ears and nose normal.  Neck: Trachea midline. No obvious mass. Resp: Few scattered crackles   CV: Regular rate. Regular rhythm. No murmur or rub. GI: Non-tender. Non-distended. No hernia. Skin: Warm, dry, normal texture and turgor. No nodule on exposed extremities. Lymph: No cervical LAD. No supraclavicular LAD. M/S: No cyanosis. No clubbing. No joint deformity. Neuro: Moves all four extremities. CN 2-12 tested, no defect noted.   Ext:   no edema    Medications:    Scheduled Meds:   estazolam  4 mg Oral Nightly    apixaban  5 mg Oral BID    losartan  100 mg Oral Daily    metoprolol succinate  50 mg Oral Daily    pravastatin  40 mg Oral Daily    spironolactone  25 mg Oral Daily    venlafaxine  150 mg Oral Daily    sodium chloride flush  5-40 mL IntraVENous 2 times per day    guaiFENesin  600 mg Oral BID    ipratropium-albuterol  1 ampule Inhalation Q4H WA    QUEtiapine  50 mg Oral Nightly    cefepime  2,000 mg IntraVENous Q12H    vancomycin (VANCOCIN) intermittent dosing (placeholder)   Other RX Placeholder    predniSONE  20 mg Oral Daily       Continuous Infusions:   sodium chloride      sodium chloride 100 mL/hr at 11/27/22 1529       PRN Meds:  sodium chloride flush, sodium chloride, acetaminophen **OR** acetaminophen, albuterol, guaiFENesin-dextromethorphan    Labs:  CBC:   Recent Labs     11/26/22 1951 11/27/22  0600 11/28/22  0522   WBC 29.7* 19.5* 10.5   HGB 12.1* 10.6* 8.9*   HCT 40.9 34.1* 28.2*   MCV 72.1* 69.5* 69.6*    244 203     BMP:   Recent Labs     11/26/22 1951 11/27/22  0601 11/28/22  0521    136 139   K 4.0 4.5 4.3   CL 99 101 110   CO2 23 23 18*   BUN 38* 30* 23*   CREATININE 1.3 1.0 0.8     LIVER PROFILE: No results for input(s): AST, ALT, LIPASE, BILIDIR, BILITOT, ALKPHOS in the last 72 hours. Invalid input(s): AMYLASE,  ALB  PT/INR: No results for input(s): PROTIME, INR in the last 72 hours. APTT: No results for input(s): APTT in the last 72 hours. UA:No results for input(s): NITRITE, COLORU, PHUR, LABCAST, WBCUA, RBCUA, MUCUS, TRICHOMONAS, YEAST, BACTERIA, CLARITYU, SPECGRAV, LEUKOCYTESUR, UROBILINOGEN, BILIRUBINUR, BLOODU, GLUCOSEU, AMORPHOUS in the last 72 hours. Invalid input(s): [place]  No results for input(s): PH, PCO2, PO2 in the last 72 hours. Films:  Chest imaging reports were reviewed and imaging was reviewed by me and showed no new films     ABG:  No new draws    Cultures:  Sputum:  pending  MRSA probe:  negative    I reviewed the labs and images listed above    ASSESSMENT/PLAN:  Acute Hypoxic Respiratory Failure with saturations less than 90% on room air         Titrate oxygen for saturations greater than or equal to 90%  Home oxygen assessment prior to DC  Abnormal CT Chest with multifocal areas of pneumonia with recent prednisone use   Continue with Cefepime.   DC Vanco due to negative probe  Cultures from bronchoscopy in October were all negative  Fungal serology and CMV are pending   Add cough medicine   Possible underlying ILD  May require open lung biopsy down in the future for the RUL  Prednisone at 20 mg as he was on a taper     DVT prophylaxis  Eliquis         Tony Chard, DO  [facility]

## 2022-11-28 NOTE — PLAN OF CARE
Problem: Discharge Planning  Goal: Discharge to home or other facility with appropriate resources  Outcome: Progressing     Problem: Safety - Adult  Goal: Free from fall injury  Outcome: Progressing  Note: Fall precautions in place. Bed locked and in lowest position. Call light within reach.       Problem: ABCDS Injury Assessment  Goal: Absence of physical injury  Outcome: Progressing     Problem: Pain  Goal: Verbalizes/displays adequate comfort level or baseline comfort level  Outcome: Progressing     Problem: Chronic Conditions and Co-morbidities  Goal: Patient's chronic conditions and co-morbidity symptoms are monitored and maintained or improved  Outcome: Progressing

## 2022-11-28 NOTE — PROGRESS NOTES
Hospitalist Progress Note      PCP: Delroy Lloyd MD    Date of Admission: 11/26/2022      Hospital Course:     76 y.o. male who presented to [facility] with report of covid 2 years ago and some subsequent lung changes on CT scan that he reports were diagnosed as ILD. He reports he went to a restaurant tonight and started shaking with chills. He reports feeling weak so they left the restaurant, his wife brought him to the ED. In the ED he was found to be septic with CT scan showing multifocal pneumonia. Subjective:     Feels better. Shortness of breath is improved. Still has some cough. .  No fevers. .      Medications:  Reviewed    Infusion Medications    sodium chloride      sodium chloride 100 mL/hr at 11/27/22 1529     Scheduled Medications    estazolam  4 mg Oral Nightly    apixaban  5 mg Oral BID    losartan  100 mg Oral Daily    metoprolol succinate  50 mg Oral Daily    pravastatin  40 mg Oral Daily    spironolactone  25 mg Oral Daily    venlafaxine  150 mg Oral Daily    sodium chloride flush  5-40 mL IntraVENous 2 times per day    guaiFENesin  600 mg Oral BID    ipratropium-albuterol  1 ampule Inhalation Q4H WA    QUEtiapine  50 mg Oral Nightly    cefepime  2,000 mg IntraVENous Q12H    vancomycin (VANCOCIN) intermittent dosing (placeholder)   Other RX Placeholder    predniSONE  20 mg Oral Daily     PRN Meds: sodium chloride flush, sodium chloride, acetaminophen **OR** acetaminophen, albuterol, guaiFENesin-dextromethorphan      Intake/Output Summary (Last 24 hours) at 11/28/2022 0810  Last data filed at 11/28/2022 0535  Gross per 24 hour   Intake 730 ml   Output 1050 ml   Net -320 ml       Physical Exam Performed:    BP (!) 149/80   Pulse 85   Temp 97.7 °F (36.5 °C) (Oral)   Resp 17   Ht 5' 10\" (1.778 m)   Wt 154 lb 5.2 oz (70 kg)   SpO2 92%   BMI 22.14 kg/m²     General appearance: No apparent distress, appears stated age and cooperative.   HEENT: Pupils equal, round, and reactive to light. Conjunctivae/corneas clear. Neck: Supple, with full range of motion. No jugular venous distention. Trachea midline. Respiratory:  Normal respiratory effort. Clear to auscultation, bilaterally without Rales/Wheezes/Rhonchi. Cardiovascular: Regular rate and rhythm with normal S1/S2 without murmurs, rubs or gallops. Abdomen: Soft, non-tender, non-distended with normal bowel sounds. Musculoskeletal: No clubbing, cyanosis or edema bilaterally. Full range of motion without deformity. Skin: Skin color, texture, turgor normal.  No rashes or lesions. Neurologic:  Neurovascularly intact without any focal sensory/motor deficits. Cranial nerves: II-XII intact, grossly non-focal.  Psychiatric: Alert and oriented, thought content appropriate, normal insight  Capillary Refill: Brisk, 3 seconds, normal   Peripheral Pulses: +2 palpable, equal bilaterally       Labs:   Recent Labs     11/26/22 1951 11/27/22  0600 11/28/22  0522   WBC 29.7* 19.5* 10.5   HGB 12.1* 10.6* 8.9*   HCT 40.9 34.1* 28.2*    244 203     Recent Labs     11/26/22 1951 11/27/22  0601 11/28/22  0521    136 139   K 4.0 4.5 4.3   CL 99 101 110   CO2 23 23 18*   BUN 38* 30* 23*   CREATININE 1.3 1.0 0.8   CALCIUM 9.3 8.5 8.1*     No results for input(s): AST, ALT, BILIDIR, BILITOT, ALKPHOS in the last 72 hours. No results for input(s): INR in the last 72 hours. Recent Labs     11/26/22 1951   TROPONINI <0.01       Urinalysis:      Lab Results   Component Value Date/Time    NITRU Negative 12/16/2020 08:32 PM    WBCUA 1 12/16/2020 08:32 PM    BACTERIA 1+ 02/08/2019 10:55 PM    RBCUA 1 12/16/2020 08:32 PM    BLOODU TRACE 12/16/2020 08:32 PM    SPECGRAV >1.030 12/16/2020 08:32 PM    GLUCOSEU Negative 12/16/2020 08:32 PM       Radiology:  CT CHEST PULMONARY EMBOLISM W CONTRAST   Final Result   1. No CTA evidence for acute pulmonary embolus. 2. Bilateral pulmonary infiltrates. This likely represents pneumonia,   including COVID pneumonia. I would recommend follow-up to resolution. XR CHEST PORTABLE   Final Result   Multifocal airspace disease. PHARMACY TO DOSE VANCOMYCIN  IP CONSULT TO PHARMACY  IP CONSULT TO PULMONOLOGY    Assessment/Plan:    Multifocal pneumonia, likely bacterial--procalcitonin significant elevated--follow-up on culture data and serologies-continue cefepime and azithromycin-will need follow-up imaging in 6 to 8 weeks-pulmonology consult appreciated    Sepsis due to pneumonia-poa--criteria includes leukocytosis, sinus tachycardia, tachypnea, elevated lactic acid--resolved-continue antibiotics as above    Interstitial lung disease-follow-up with pulmonology--on p.o. prednisone    Acute respiratory failure with hypoxia due to pneumonia--patient's oxygen saturation of 85% on room air, required up to 6 L nasal cannula--currently on 2 L--continue to wean as tolerated    Paroxysmal atrial fibrillation,SSS s/p PPM  -Continue apixaban and metoprolol    Essential hypertension:   -Stable-continue losartan and Aldactone     Chronic heart failure with reduced ejection fraction, EF 50%, echo5 222  Compensated--continue Lasix, metoprolol, losartan       Mood disorder:   - venlafaxine 150 mg po daily     Chronic microcytic anemia  Suspect due to iron deficiency. H&H is stable. No evidence of gross bleeding. Obtain iron studies    DVT Prophylaxis: Lovenox  Diet: ADULT DIET; Regular; 3 carb choices (45 gm/meal)  Code Status: DNR-CCA      Disposition. .  Likely home within 48 hours      Clark Chaudhary MD

## 2022-11-29 LAB
ANION GAP SERPL CALCULATED.3IONS-SCNC: 11 MMOL/L (ref 3–16)
ASPERGILLUS GALACTO AG: NEGATIVE
ASPERGILLUS GALACTO INDEX: 0.46
BASOPHILS ABSOLUTE: 0 K/UL (ref 0–0.2)
BASOPHILS RELATIVE PERCENT: 0.2 %
BUN BLDV-MCNC: 22 MG/DL (ref 7–20)
CALCIUM SERPL-MCNC: 8.3 MG/DL (ref 8.3–10.6)
CHLORIDE BLD-SCNC: 108 MMOL/L (ref 99–110)
CMV QNT BY NAAT, PLASMA INTERP: NOT DETECTED
CMV QNT BY NAAT, PLASMA IU/ML: NOT DETECTED
CMV QNT BY NAAT, PLASMA LOG IU/ML: NOT DETECTED
CO2: 20 MMOL/L (ref 21–32)
CREAT SERPL-MCNC: 0.9 MG/DL (ref 0.8–1.3)
CULTURE, RESPIRATORY: NORMAL
EOSINOPHILS ABSOLUTE: 0.2 K/UL (ref 0–0.6)
EOSINOPHILS RELATIVE PERCENT: 1.6 %
FERRITIN: 40.3 NG/ML (ref 30–400)
GFR SERPL CREATININE-BSD FRML MDRD: >60 ML/MIN/{1.73_M2}
GLUCOSE BLD-MCNC: 115 MG/DL (ref 70–99)
GRAM STAIN RESULT: NORMAL
HCT VFR BLD CALC: 28.7 % (ref 40.5–52.5)
HEMATOLOGY PATH CONSULT: NO
HEMOGLOBIN: 8.9 G/DL (ref 13.5–17.5)
IRON SATURATION: 13 % (ref 20–50)
IRON: 30 UG/DL (ref 59–158)
LYMPHOCYTES ABSOLUTE: 1 K/UL (ref 1–5.1)
LYMPHOCYTES RELATIVE PERCENT: 9.3 %
MCH RBC QN AUTO: 21.4 PG (ref 26–34)
MCHC RBC AUTO-ENTMCNC: 31 G/DL (ref 31–36)
MCV RBC AUTO: 69.1 FL (ref 80–100)
MONOCYTES ABSOLUTE: 0.6 K/UL (ref 0–1.3)
MONOCYTES RELATIVE PERCENT: 5.2 %
NEUTROPHILS ABSOLUTE: 9.3 K/UL (ref 1.7–7.7)
NEUTROPHILS RELATIVE PERCENT: 83.7 %
PDW BLD-RTO: 18.6 % (ref 12.4–15.4)
PLATELET # BLD: 199 K/UL (ref 135–450)
PMV BLD AUTO: 7.9 FL (ref 5–10.5)
POTASSIUM REFLEX MAGNESIUM: 4.2 MMOL/L (ref 3.5–5.1)
RBC # BLD: 4.14 M/UL (ref 4.2–5.9)
SODIUM BLD-SCNC: 139 MMOL/L (ref 136–145)
TOTAL IRON BINDING CAPACITY: 231 UG/DL (ref 260–445)
WBC # BLD: 11.1 K/UL (ref 4–11)

## 2022-11-29 PROCEDURE — 82728 ASSAY OF FERRITIN: CPT

## 2022-11-29 PROCEDURE — 6370000000 HC RX 637 (ALT 250 FOR IP): Performed by: HOSPITALIST

## 2022-11-29 PROCEDURE — 2580000003 HC RX 258: Performed by: PEDIATRICS

## 2022-11-29 PROCEDURE — 6370000000 HC RX 637 (ALT 250 FOR IP): Performed by: NURSE PRACTITIONER

## 2022-11-29 PROCEDURE — 6370000000 HC RX 637 (ALT 250 FOR IP): Performed by: INTERNAL MEDICINE

## 2022-11-29 PROCEDURE — 94640 AIRWAY INHALATION TREATMENT: CPT

## 2022-11-29 PROCEDURE — 94760 N-INVAS EAR/PLS OXIMETRY 1: CPT

## 2022-11-29 PROCEDURE — 94669 MECHANICAL CHEST WALL OSCILL: CPT

## 2022-11-29 PROCEDURE — 2580000003 HC RX 258: Performed by: NURSE PRACTITIONER

## 2022-11-29 PROCEDURE — 85025 COMPLETE CBC W/AUTO DIFF WBC: CPT

## 2022-11-29 PROCEDURE — 83550 IRON BINDING TEST: CPT

## 2022-11-29 PROCEDURE — 80048 BASIC METABOLIC PNL TOTAL CA: CPT

## 2022-11-29 PROCEDURE — 6370000000 HC RX 637 (ALT 250 FOR IP): Performed by: PEDIATRICS

## 2022-11-29 PROCEDURE — 99233 SBSQ HOSP IP/OBS HIGH 50: CPT | Performed by: INTERNAL MEDICINE

## 2022-11-29 PROCEDURE — 6360000002 HC RX W HCPCS: Performed by: PEDIATRICS

## 2022-11-29 PROCEDURE — 2060000000 HC ICU INTERMEDIATE R&B

## 2022-11-29 PROCEDURE — 36415 COLL VENOUS BLD VENIPUNCTURE: CPT

## 2022-11-29 PROCEDURE — 2700000000 HC OXYGEN THERAPY PER DAY

## 2022-11-29 PROCEDURE — 83540 ASSAY OF IRON: CPT

## 2022-11-29 RX ADMIN — PREDNISONE 20 MG: 20 TABLET ORAL at 08:38

## 2022-11-29 RX ADMIN — SODIUM BICARBONATE 650 MG: 650 TABLET ORAL at 08:38

## 2022-11-29 RX ADMIN — GUAIFENESIN AND CODEINE PHOSPHATE 5 ML: 10; 100 LIQUID ORAL at 08:43

## 2022-11-29 RX ADMIN — GUAIFENESIN AND CODEINE PHOSPHATE 5 ML: 10; 100 LIQUID ORAL at 17:00

## 2022-11-29 RX ADMIN — CEFEPIME 2000 MG: 2 INJECTION, POWDER, FOR SOLUTION INTRAVENOUS at 08:37

## 2022-11-29 RX ADMIN — QUETIAPINE FUMARATE 50 MG: 25 TABLET ORAL at 21:38

## 2022-11-29 RX ADMIN — PRAVASTATIN SODIUM 40 MG: 40 TABLET ORAL at 08:38

## 2022-11-29 RX ADMIN — SPIRONOLACTONE 25 MG: 25 TABLET ORAL at 08:38

## 2022-11-29 RX ADMIN — SODIUM CHLORIDE: 9 INJECTION, SOLUTION INTRAVENOUS at 21:37

## 2022-11-29 RX ADMIN — GUAIFENESIN 600 MG: 600 TABLET, EXTENDED RELEASE ORAL at 08:38

## 2022-11-29 RX ADMIN — IPRATROPIUM BROMIDE AND ALBUTEROL SULFATE 1 AMPULE: 2.5; .5 SOLUTION RESPIRATORY (INHALATION) at 13:21

## 2022-11-29 RX ADMIN — GUAIFENESIN 600 MG: 600 TABLET, EXTENDED RELEASE ORAL at 21:28

## 2022-11-29 RX ADMIN — GUAIFENESIN AND CODEINE PHOSPHATE 5 ML: 10; 100 LIQUID ORAL at 21:30

## 2022-11-29 RX ADMIN — APIXABAN 5 MG: 5 TABLET, FILM COATED ORAL at 08:38

## 2022-11-29 RX ADMIN — METOPROLOL SUCCINATE 50 MG: 50 TABLET, EXTENDED RELEASE ORAL at 08:38

## 2022-11-29 RX ADMIN — IPRATROPIUM BROMIDE AND ALBUTEROL SULFATE 1 AMPULE: 2.5; .5 SOLUTION RESPIRATORY (INHALATION) at 16:20

## 2022-11-29 RX ADMIN — Medication 10 ML: at 08:33

## 2022-11-29 RX ADMIN — GUAIFENESIN AND CODEINE PHOSPHATE 5 ML: 10; 100 LIQUID ORAL at 03:12

## 2022-11-29 RX ADMIN — Medication 10 ML: at 21:38

## 2022-11-29 RX ADMIN — Medication 4 MG: at 22:23

## 2022-11-29 RX ADMIN — GUAIFENESIN AND CODEINE PHOSPHATE 5 ML: 10; 100 LIQUID ORAL at 12:55

## 2022-11-29 RX ADMIN — VENLAFAXINE HYDROCHLORIDE 150 MG: 75 CAPSULE, EXTENDED RELEASE ORAL at 08:38

## 2022-11-29 RX ADMIN — CEFEPIME 2000 MG: 2 INJECTION, POWDER, FOR SOLUTION INTRAVENOUS at 21:37

## 2022-11-29 RX ADMIN — LOSARTAN POTASSIUM 100 MG: 100 TABLET, FILM COATED ORAL at 08:38

## 2022-11-29 RX ADMIN — APIXABAN 5 MG: 5 TABLET, FILM COATED ORAL at 21:28

## 2022-11-29 RX ADMIN — IPRATROPIUM BROMIDE AND ALBUTEROL SULFATE 1 AMPULE: 2.5; .5 SOLUTION RESPIRATORY (INHALATION) at 20:00

## 2022-11-29 RX ADMIN — IPRATROPIUM BROMIDE AND ALBUTEROL SULFATE 1 AMPULE: 2.5; .5 SOLUTION RESPIRATORY (INHALATION) at 08:45

## 2022-11-29 NOTE — PROGRESS NOTES
Pt continues with moist congested cough. PRN cough meds administered multiple times. O2 removed this afternoon. Spot checks have shown pt 91-92% on RA. Pt using incentive spirometer. Pt now provided with pep/flutter by RT.    Electronically signed by Esther Ramos RN on 11/29/22 at 5:45 PM EST

## 2022-11-29 NOTE — PLAN OF CARE
Problem: Discharge Planning  Goal: Discharge to home or other facility with appropriate resources  Outcome: Progressing  Flowsheets (Taken 11/28/2022 2213 by Sona Kaiser RN)  Discharge to home or other facility with appropriate resources: Identify barriers to discharge with patient and caregiver     Problem: Safety - Adult  Goal: Free from fall injury  Outcome: Progressing  Note: Fall precautions in place. Bed locked and in lowest position. Call light within reach.       Problem: ABCDS Injury Assessment  Goal: Absence of physical injury  Outcome: Progressing     Problem: Pain  Goal: Verbalizes/displays adequate comfort level or baseline comfort level  Outcome: Progressing     Problem: Chronic Conditions and Co-morbidities  Goal: Patient's chronic conditions and co-morbidity symptoms are monitored and maintained or improved  Outcome: Progressing  Flowsheets (Taken 11/28/2022 2213 by Sona Kaiser RN)  Care Plan - Patient's Chronic Conditions and Co-Morbidity Symptoms are Monitored and Maintained or Improved: Monitor and assess patient's chronic conditions and comorbid symptoms for stability, deterioration, or improvement

## 2022-11-29 NOTE — PROGRESS NOTES
Pulmonary Progress Note    CC:  Follow up hypoxia, pneumonia    Subjective:  2 liters of oxygen  Doing better  Not off oxygen yet      ROS  Coughing is less with codeine     Intake/Output Summary (Last 24 hours) at 11/29/2022 0809  Last data filed at 11/29/2022 0422  Gross per 24 hour   Intake 1335 ml   Output 1800 ml   Net -465 ml           PHYSICAL EXAM:  Blood pressure (!) 161/86, pulse 86, temperature 97.6 °F (36.4 °C), temperature source Oral, resp. rate 16, height 5' 10\" (1.778 m), weight 153 lb 14.1 oz (69.8 kg), SpO2 95 %.'  Gen: No distress. Eyes: PERRL. No sclera icterus. No conjunctival injection. ENT: No discharge. Pharynx clear. External appearance of ears and nose normal.  Neck: Trachea midline. No obvious mass. Resp: Few scattered crackles continue. Most on the right lung  CV: Regular rate. Regular rhythm. No murmur or rub. GI: Non-tender. Non-distended. No hernia. Skin: Warm, dry, normal texture and turgor. No nodule on exposed extremities. Lymph: No cervical LAD. No supraclavicular LAD. M/S: No cyanosis. No clubbing. No joint deformity. Neuro: Moves all four extremities. CN 2-12 tested, no defect noted.   Ext:   no edema    Medications:    Scheduled Meds:   estazolam  4 mg Oral Nightly    sodium bicarbonate  650 mg Oral TID    apixaban  5 mg Oral BID    losartan  100 mg Oral Daily    metoprolol succinate  50 mg Oral Daily    pravastatin  40 mg Oral Daily    spironolactone  25 mg Oral Daily    venlafaxine  150 mg Oral Daily    sodium chloride flush  5-40 mL IntraVENous 2 times per day    guaiFENesin  600 mg Oral BID    ipratropium-albuterol  1 ampule Inhalation Q4H WA    QUEtiapine  50 mg Oral Nightly    cefepime  2,000 mg IntraVENous Q12H    predniSONE  20 mg Oral Daily       Continuous Infusions:   sodium chloride 5 mL/hr at 11/28/22 2017       PRN Meds:  guaiFENesin-codeine, sodium chloride flush, sodium chloride, acetaminophen **OR** acetaminophen, albuterol, guaiFENesin-dextromethorphan    Labs:  CBC:   Recent Labs     11/27/22  0600 11/28/22  0522 11/29/22  0549   WBC 19.5* 10.5 11.1*   HGB 10.6* 8.9* 8.9*   HCT 34.1* 28.2* 28.7*   MCV 69.5* 69.6* 69.1*    203 199       BMP:   Recent Labs     11/27/22  0601 11/28/22  0521 11/29/22  0549    139 139   K 4.5 4.3 4.2    110 108   CO2 23 18* 20*   BUN 30* 23* 22*   CREATININE 1.0 0.8 0.9       LIVER PROFILE: No results for input(s): AST, ALT, LIPASE, BILIDIR, BILITOT, ALKPHOS in the last 72 hours. Invalid input(s): AMYLASE,  ALB  PT/INR: No results for input(s): PROTIME, INR in the last 72 hours. APTT: No results for input(s): APTT in the last 72 hours. UA:No results for input(s): NITRITE, COLORU, PHUR, LABCAST, WBCUA, RBCUA, MUCUS, TRICHOMONAS, YEAST, BACTERIA, CLARITYU, SPECGRAV, LEUKOCYTESUR, UROBILINOGEN, BILIRUBINUR, BLOODU, GLUCOSEU, AMORPHOUS in the last 72 hours. Invalid input(s): Froilan [place]  No results for input(s): PH, PCO2, PO2 in the last 72 hours. Films:  Chest imaging reports were reviewed and imaging was reviewed by me and showed no new films     ABG:  No new draws    Cultures:  Sputum:  in process   MRSA probe:  negative  Beta glucan:  negative  Galactomannan:  negative   Fungal Ab:  pending  CMV: Negative     I reviewed the labs and images listed above    ASSESSMENT/PLAN:  Acute Hypoxic Respiratory Failure with saturations less than 90% on room air         Titrate oxygen for saturations greater than or equal to 90%  I would prefer he be weaned off oxygen prior to DC  Abnormal CT Chest with multifocal areas of pneumonia with recent prednisone use   Continue with Cefepime.   Real discontinued   Cultures from bronchoscopy in October were all negative  Cough medicine as needed  Possible underlying ILD  May require open lung biopsy down in the future for the RUL  Prednisone at 20 mg as he was on a taper     DVT prophylaxis  Eliquis     Increase activity       Marielos Escudero   [facility]

## 2022-11-29 NOTE — PROGRESS NOTES
Hospitalist Progress Note      PCP: Heather Lee MD    Date of Admission: 11/26/2022    Chief Complaint: cough    Hospital Course:      Subjective: having cough with talking. Medications:  Reviewed    Infusion Medications    sodium chloride 5 mL/hr at 11/28/22 2017     Scheduled Medications    estazolam  4 mg Oral Nightly    apixaban  5 mg Oral BID    losartan  100 mg Oral Daily    metoprolol succinate  50 mg Oral Daily    pravastatin  40 mg Oral Daily    spironolactone  25 mg Oral Daily    venlafaxine  150 mg Oral Daily    sodium chloride flush  5-40 mL IntraVENous 2 times per day    guaiFENesin  600 mg Oral BID    ipratropium-albuterol  1 ampule Inhalation Q4H WA    QUEtiapine  50 mg Oral Nightly    cefepime  2,000 mg IntraVENous Q12H    predniSONE  20 mg Oral Daily     PRN Meds: guaiFENesin-codeine, sodium chloride flush, sodium chloride, acetaminophen **OR** acetaminophen, albuterol, guaiFENesin-dextromethorphan      Intake/Output Summary (Last 24 hours) at 11/29/2022 1610  Last data filed at 11/29/2022 1245  Gross per 24 hour   Intake 960 ml   Output 1950 ml   Net -990 ml       Exam:    BP (!) 161/71   Pulse 85   Temp 97.7 °F (36.5 °C) (Oral)   Resp 20   Ht 5' 10\" (1.778 m)   Wt 153 lb 14.1 oz (69.8 kg)   SpO2 92%   BMI 22.08 kg/m²     General appearance: No apparent distress, appears stated age and cooperative. HEENT: Pupils equal, round, and reactive to light. Conjunctivae/corneas clear. Neck: Supple, with full range of motion. No jugular venous distention. Trachea midline. Respiratory:  SOB and cough with talking. Clear to auscultation, bilaterally without Rales/Wheezes/Rhonchi. Cardiovascular: Regular rate and rhythm with normal S1/S2 without murmurs, rubs or gallops. Abdomen: Soft, non-tender, non-distended with normal bowel sounds. Musculoskeletal: No clubbing, cyanosis or edema bilaterally. Full range of motion without deformity.   Skin: Skin color, texture, turgor normal. No rashes or lesions. Neurologic:  Neurovascularly intact without any focal sensory/motor deficits. Cranial nerves: II-XII intact, grossly non-focal.  Psychiatric: Alert and oriented, thought content appropriate, normal insight  Capillary Refill: Brisk,< 3 seconds   Peripheral Pulses: +2 palpable, equal bilaterally       Labs:   Recent Labs     11/27/22  0600 11/28/22  0522 11/29/22  0549   WBC 19.5* 10.5 11.1*   HGB 10.6* 8.9* 8.9*   HCT 34.1* 28.2* 28.7*    203 199     Recent Labs     11/27/22  0601 11/28/22  0521 11/29/22  0549    139 139   K 4.5 4.3 4.2    110 108   CO2 23 18* 20*   BUN 30* 23* 22*   CREATININE 1.0 0.8 0.9   CALCIUM 8.5 8.1* 8.3     No results for input(s): AST, ALT, BILIDIR, BILITOT, ALKPHOS in the last 72 hours. No results for input(s): INR in the last 72 hours. Recent Labs     11/26/22 1951   TROPONINI <0.01       Urinalysis:      Lab Results   Component Value Date/Time    NITRU Negative 12/16/2020 08:32 PM    WBCUA 1 12/16/2020 08:32 PM    BACTERIA 1+ 02/08/2019 10:55 PM    RBCUA 1 12/16/2020 08:32 PM    BLOODU TRACE 12/16/2020 08:32 PM    SPECGRAV >1.030 12/16/2020 08:32 PM    GLUCOSEU Negative 12/16/2020 08:32 PM       Radiology:  CT CHEST PULMONARY EMBOLISM W CONTRAST   Final Result   1. No CTA evidence for acute pulmonary embolus. 2. Bilateral pulmonary infiltrates. This likely represents pneumonia,   including COVID pneumonia. I would recommend follow-up to resolution. XR CHEST PORTABLE   Final Result   Multifocal airspace disease.                  Assessment/Plan:    Active Hospital Problems    Diagnosis Date Noted    Sepsis ([facility] [address] [A41.9] 11/27/2022     Priority: Medium    Abnormal CT of the chest [R93.89] 11/27/2022     Priority: Medium    Acute respiratory failure with hypoxia [facility] [J96.01] 12/16/2020     Multifocal pneumonia, likely bacterial--procalcitonin significant elevated--follow-up on culture data and serologies-continue cefepime and azithromycin-will need follow-up imaging in 6 to 8 weeks-pulmonology consult appreciated     Sepsis due to pneumonia-poa--criteria includes leukocytosis, sinus tachycardia, tachypnea, elevated lactic acid--resolved-continue antibiotics as above     Interstitial lung disease-follow-up with pulmonology--on p.o. prednisone     Acute respiratory failure with hypoxia due to pneumonia--patient's oxygen saturation of 85% on room air, required up to 6 L nasal cannula--currently on 2 L--continue to wean as tolerated     Paroxysmal atrial fibrillation,SSS s/p PPM  -Continue apixaban and metoprolol     Essential hypertension:   -Stable-continue losartan and Aldactone     Chronic heart failure with reduced ejection fraction, EF 50%, echo5 222  Compensated--continue Lasix, metoprolol, losartan         Mood disorder:   - venlafaxine 150 mg po daily      Chronic microcytic anemia  Suspect due to iron deficiency. H&H is stable. No evidence of gross bleeding. Obtain iron studies       DVT Prophylaxis: lovenox  Diet: ADULT DIET; Regular; 3 carb choices (45 gm/meal);  Low Sodium (2 gm)  Code Status: DNR-CCA    PT/OT Eval Status: home care    Dispo - PCU, ok to downgrade to [facility] if needed    Farhat Luther MD

## 2022-11-30 VITALS
RESPIRATION RATE: 18 BRPM | DIASTOLIC BLOOD PRESSURE: 62 MMHG | HEIGHT: 70 IN | WEIGHT: 153 LBS | TEMPERATURE: 96.7 F | OXYGEN SATURATION: 100 % | BODY MASS INDEX: 21.9 KG/M2 | HEART RATE: 85 BPM | SYSTOLIC BLOOD PRESSURE: 114 MMHG

## 2022-11-30 LAB — PROCALCITONIN: 1.9 NG/ML (ref 0–0.15)

## 2022-11-30 PROCEDURE — 84145 PROCALCITONIN (PCT): CPT

## 2022-11-30 PROCEDURE — 6370000000 HC RX 637 (ALT 250 FOR IP): Performed by: INTERNAL MEDICINE

## 2022-11-30 PROCEDURE — 94760 N-INVAS EAR/PLS OXIMETRY 1: CPT

## 2022-11-30 PROCEDURE — 94640 AIRWAY INHALATION TREATMENT: CPT

## 2022-11-30 PROCEDURE — 94669 MECHANICAL CHEST WALL OSCILL: CPT

## 2022-11-30 PROCEDURE — 2580000003 HC RX 258: Performed by: PEDIATRICS

## 2022-11-30 PROCEDURE — 99232 SBSQ HOSP IP/OBS MODERATE 35: CPT | Performed by: INTERNAL MEDICINE

## 2022-11-30 PROCEDURE — 36415 COLL VENOUS BLD VENIPUNCTURE: CPT

## 2022-11-30 PROCEDURE — 94680 O2 UPTK RST&XERS DIR SIMPLE: CPT

## 2022-11-30 PROCEDURE — 2700000000 HC OXYGEN THERAPY PER DAY

## 2022-11-30 PROCEDURE — 6360000002 HC RX W HCPCS: Performed by: PEDIATRICS

## 2022-11-30 PROCEDURE — 6370000000 HC RX 637 (ALT 250 FOR IP): Performed by: FAMILY MEDICINE

## 2022-11-30 PROCEDURE — 2580000003 HC RX 258: Performed by: NURSE PRACTITIONER

## 2022-11-30 PROCEDURE — 6370000000 HC RX 637 (ALT 250 FOR IP): Performed by: NURSE PRACTITIONER

## 2022-11-30 RX ORDER — HYDRALAZINE HYDROCHLORIDE 20 MG/ML
5 INJECTION INTRAMUSCULAR; INTRAVENOUS EVERY 6 HOURS PRN
Status: DISCONTINUED | OUTPATIENT
Start: 2022-11-30 | End: 2022-11-30 | Stop reason: HOSPADM

## 2022-11-30 RX ORDER — GUAIFENESIN 600 MG/1
600 TABLET, EXTENDED RELEASE ORAL 2 TIMES DAILY
Qty: 20 TABLET | Refills: 0 | Status: SHIPPED | OUTPATIENT
Start: 2022-11-30 | End: 2022-12-10

## 2022-11-30 RX ORDER — AMLODIPINE BESYLATE 5 MG/1
5 TABLET ORAL DAILY
Status: DISCONTINUED | OUTPATIENT
Start: 2022-11-30 | End: 2022-11-30 | Stop reason: HOSPADM

## 2022-11-30 RX ORDER — LEVOFLOXACIN 750 MG/1
750 TABLET ORAL DAILY
Qty: 4 TABLET | Refills: 0 | Status: SHIPPED | OUTPATIENT
Start: 2022-11-30 | End: 2022-12-04

## 2022-11-30 RX ORDER — GUAIFENESIN 600 MG/1
600 TABLET, EXTENDED RELEASE ORAL 2 TIMES DAILY
Qty: 20 TABLET | Refills: 0 | Status: SHIPPED | OUTPATIENT
Start: 2022-11-30 | End: 2022-11-30 | Stop reason: SDUPTHER

## 2022-11-30 RX ORDER — LEVOFLOXACIN 750 MG/1
750 TABLET ORAL DAILY
Qty: 4 TABLET | Refills: 0 | Status: SHIPPED | OUTPATIENT
Start: 2022-11-30 | End: 2022-11-30 | Stop reason: SDUPTHER

## 2022-11-30 RX ADMIN — PREDNISONE 20 MG: 20 TABLET ORAL at 08:51

## 2022-11-30 RX ADMIN — PRAVASTATIN SODIUM 40 MG: 40 TABLET ORAL at 08:51

## 2022-11-30 RX ADMIN — CEFEPIME 2000 MG: 2 INJECTION, POWDER, FOR SOLUTION INTRAVENOUS at 08:56

## 2022-11-30 RX ADMIN — GUAIFENESIN 600 MG: 600 TABLET, EXTENDED RELEASE ORAL at 08:51

## 2022-11-30 RX ADMIN — AMLODIPINE BESYLATE 5 MG: 5 TABLET ORAL at 11:43

## 2022-11-30 RX ADMIN — GUAIFENESIN AND CODEINE PHOSPHATE 5 ML: 10; 100 LIQUID ORAL at 03:27

## 2022-11-30 RX ADMIN — VENLAFAXINE HYDROCHLORIDE 150 MG: 75 CAPSULE, EXTENDED RELEASE ORAL at 08:51

## 2022-11-30 RX ADMIN — IPRATROPIUM BROMIDE AND ALBUTEROL SULFATE 1 AMPULE: 2.5; .5 SOLUTION RESPIRATORY (INHALATION) at 12:42

## 2022-11-30 RX ADMIN — GUAIFENESIN AND CODEINE PHOSPHATE 5 ML: 10; 100 LIQUID ORAL at 13:13

## 2022-11-30 RX ADMIN — SPIRONOLACTONE 25 MG: 25 TABLET ORAL at 08:51

## 2022-11-30 RX ADMIN — IPRATROPIUM BROMIDE AND ALBUTEROL SULFATE 1 AMPULE: 2.5; .5 SOLUTION RESPIRATORY (INHALATION) at 09:10

## 2022-11-30 RX ADMIN — LOSARTAN POTASSIUM 100 MG: 100 TABLET, FILM COATED ORAL at 08:51

## 2022-11-30 RX ADMIN — METOPROLOL SUCCINATE 50 MG: 50 TABLET, EXTENDED RELEASE ORAL at 08:51

## 2022-11-30 RX ADMIN — Medication 10 ML: at 08:52

## 2022-11-30 RX ADMIN — GUAIFENESIN AND CODEINE PHOSPHATE 5 ML: 10; 100 LIQUID ORAL at 08:51

## 2022-11-30 RX ADMIN — APIXABAN 5 MG: 5 TABLET, FILM COATED ORAL at 08:51

## 2022-11-30 NOTE — PROGRESS NOTES
Sats are 87 on r/a at rest     Sats are 92 on 2 lpm nc at rest     Sats 90 on 2 lpm n/c while walking for at least 3 minutes 22 seconds    Pt qualifies for 2 lpm n/c at home       Will call home care company

## 2022-11-30 NOTE — PROGRESS NOTES
Pt discharged to home with wife via private car. Discharge instructions including med dosing and times reviewed with pt. Pt confirmed that 2 prescriptions were sent to [facility]. Pt's home meds retrieved from [email or web address] appts reviewed with pt. Pt voiced understanding of all instructions. IV and telemetry removed prior to discharge. Electronically signed by Stella Bravo RN on 11/30/22 at 3:59 PM EST      RN received call after pt's discharge stating that blood cultures came back gram positive rods. Perfect serve to Dr. Deann Noel. Discussed with Dr. Lulú Vaughan who stated that is likely contaminated and no further action would be required.   Electronically signed by Stella Bravo RN on 11/30/22 at 5:01 PM EST

## 2022-11-30 NOTE — CARE COORDINATION
11/30/22 1354   IMM Letter   IMM Letter given to Patient/Family/Significant other/Guardian/POA/by: Anu Crowe RN   IMM Letter date given: 11/30/22   IMM Letter time given: 1235   Electronically signed by Richi Smith RN on 11/30/2022 at 1:54 PM

## 2022-11-30 NOTE — PROGRESS NOTES
Pulmonary Progress Note    CC:  Follow up hypoxia, pneumonia    Subjective:  1 liter of oxygen  Procal continues to trend down   Coughing seems to improve with oxygen  No willing to go home with oxygen     ROS  Coughing is less with oxygen     Intake/Output Summary (Last 24 hours) at 11/30/2022 0829  Last data filed at 11/29/2022 2334  Gross per 24 hour   Intake 1080 ml   Output 825 ml   Net 255 ml           PHYSICAL EXAM:  Blood pressure (!) 170/93, pulse 81, temperature 97.9 °F (36.6 °C), temperature source Oral, resp. rate 16, height 5' 10\" (1.778 m), weight 153 lb (69.4 kg), SpO2 90 %.'  Gen: No distress. Eyes: PERRL. No sclera icterus. No conjunctival injection. ENT: No discharge. Pharynx clear. External appearance of ears and nose normal.  Neck: Trachea midline. No obvious mass. Resp: Few scattered crackles continue. Most on the right lung  CV: Regular rate. Regular rhythm. No murmur or rub. GI: Non-tender. Non-distended. No hernia. Skin: Warm, dry, normal texture and turgor. No nodule on exposed extremities. Lymph: No cervical LAD. No supraclavicular LAD. M/S: No cyanosis. No clubbing. No joint deformity. Neuro: Moves all four extremities. CN 2-12 tested, no defect noted.   Ext:   no edema    Medications:    Scheduled Meds:   estazolam  4 mg Oral Nightly    apixaban  5 mg Oral BID    losartan  100 mg Oral Daily    metoprolol succinate  50 mg Oral Daily    pravastatin  40 mg Oral Daily    spironolactone  25 mg Oral Daily    venlafaxine  150 mg Oral Daily    sodium chloride flush  5-40 mL IntraVENous 2 times per day    guaiFENesin  600 mg Oral BID    ipratropium-albuterol  1 ampule Inhalation Q4H WA    QUEtiapine  50 mg Oral Nightly    cefepime  2,000 mg IntraVENous Q12H    predniSONE  20 mg Oral Daily       Continuous Infusions:   sodium chloride 100 mL/hr at 11/29/22 2137       PRN Meds:  guaiFENesin-codeine, sodium chloride flush, sodium chloride, acetaminophen **OR** acetaminophen, albuterol, guaiFENesin-dextromethorphan    Labs:  CBC:   Recent Labs     11/28/22  0522 11/29/22  0549   WBC 10.5 11.1*   HGB 8.9* 8.9*   HCT 28.2* 28.7*   MCV 69.6* 69.1*    199       BMP:   Recent Labs     11/28/22  0521 11/29/22  0549    139   K 4.3 4.2    108   CO2 18* 20*   BUN 23* 22*   CREATININE 0.8 0.9       LIVER PROFILE: No results for input(s): AST, ALT, LIPASE, BILIDIR, BILITOT, ALKPHOS in the last 72 hours. Invalid input(s): AMYLASE,  ALB  PT/INR: No results for input(s): PROTIME, INR in the last 72 hours. APTT: No results for input(s): APTT in the last 72 hours. UA:No results for input(s): NITRITE, COLORU, PHUR, LABCAST, WBCUA, RBCUA, MUCUS, TRICHOMONAS, YEAST, BACTERIA, CLARITYU, SPECGRAV, LEUKOCYTESUR, UROBILINOGEN, BILIRUBINUR, BLOODU, GLUCOSEU, AMORPHOUS in the last 72 hours. Invalid input(s): Fouzia Solomon  No results for input(s): PH, PCO2, PO2 in the last 72 hours. Films:  Chest imaging reports were reviewed and imaging was reviewed by me and showed no new films     ABG:  No new draws    Cultures:  Sputum:  in process   MRSA probe:  negative  Beta glucan:  negative  Galactomannan:  negative   Fungal Ab:  pending  CMV: Negative     I reviewed the labs and images listed above    ASSESSMENT/PLAN:  Acute Hypoxic Respiratory Failure with saturations less than 90% on room air         Titrate oxygen for saturations greater than or equal to 90%  Home oxygen assessment prior to DC. He is willing to be on oxygen if needed to go home  Abnormal CT Chest with multifocal areas of pneumonia with recent prednisone use   Continue with Cefepime. Vanco discontinued   Cultures from bronchoscopy in October were all negative  Procal continues to trend down   Possible underlying ILD  May require open lung biopsy down in the future for the RUL  Prednisone at 20 mg as he was on a taper     DVT prophylaxis  Eliquis     Home oxygen assessment today.   Ok to DC home with levaquin to finish 7 total days of antibiotics       Quentin Matthews DO  [facility] Pulmonary

## 2022-11-30 NOTE — PLAN OF CARE
Problem: Discharge Planning  Goal: Discharge to home or other facility with appropriate resources  11/30/2022 0850 by Murali Calhoun RN  Outcome: Progressing  11/30/2022 0137 by Jg Ricketts RN  Outcome: Progressing     Problem: Safety - Adult  Goal: Free from fall injury  11/30/2022 0850 by Murali Calhoun RN  Outcome: Progressing  11/30/2022 0137 by Jg Ricketts RN  Outcome: Progressing     Problem: ABCDS Injury Assessment  Goal: Absence of physical injury  11/30/2022 0850 by Murali Calhoun RN  Outcome: Progressing  11/30/2022 0137 by Jg Ricketts RN  Outcome: Progressing     Problem: Pain  Goal: Verbalizes/displays adequate comfort level or baseline comfort level  11/30/2022 0850 by Murali Calhoun RN  Outcome: Progressing  11/30/2022 0137 by Jg Ricketts RN  Outcome: Progressing     Problem: Chronic Conditions and Co-morbidities  Goal: Patient's chronic conditions and co-morbidity symptoms are monitored and maintained or improved  11/30/2022 0850 by Murali Calhoun RN  Outcome: Progressing  11/30/2022 0137 by Jg Ricketts RN  Outcome: Progressing

## 2022-11-30 NOTE — PLAN OF CARE
Problem: Discharge Planning  Goal: Discharge to home or other facility with appropriate resources  11/30/2022 1411 by Gwendolyn Seymour RN  Outcome: Completed  11/30/2022 0850 by Gwendolyn Seymour RN  Outcome: Progressing  11/30/2022 0137 by Antonio John RN  Outcome: Progressing     Problem: Safety - Adult  Goal: Free from fall injury  11/30/2022 1411 by Gwendolyn Seymour RN  Outcome: Completed  11/30/2022 0850 by Gwendolyn Seymour RN  Outcome: Progressing  11/30/2022 0137 by Antonio John RN  Outcome: Progressing     Problem: ABCDS Injury Assessment  Goal: Absence of physical injury  11/30/2022 1411 by Gwendolyn Seymour RN  Outcome: Completed  11/30/2022 0850 by Gwendolyn Seymour RN  Outcome: Progressing  11/30/2022 0137 by Antonio John RN  Outcome: Progressing     Problem: Pain  Goal: Verbalizes/displays adequate comfort level or baseline comfort level  11/30/2022 1411 by Gwendolyn Seymour RN  Outcome: Completed  11/30/2022 0850 by Gwendolyn Seymour RN  Outcome: Progressing  11/30/2022 0137 by Antonio John RN  Outcome: Progressing     Problem: Chronic Conditions and Co-morbidities  Goal: Patient's chronic conditions and co-morbidity symptoms are monitored and maintained or improved  11/30/2022 1411 by Gwendolyn Seymour RN  Outcome: Completed  11/30/2022 0850 by Gwendolyn Seymour RN  Outcome: Progressing  11/30/2022 0137 by Antonio John RN  Outcome: Progressing

## 2022-11-30 NOTE — DISCHARGE INSTR - COC
Continuity of Care Form    Patient Name: Kerry Gonzalez   :  1945  MRN:  [id number]    Admit date:  2022  Discharge date:  ***    Code Status Order: DNR-CCA   Advance Directives:     Admitting Physician:  Betty Blue MD  PCP: Letty Chaudhary MD    Discharging Nurse: [facility] Unit/Room#: D2M-9234/1699-80  Discharging Unit Phone Number: ***    Emergency Contact:   Extended Emergency Contact Information  Primary Emergency Contact: Michelle Camacho  Address: [facility]           [address], [address] [facility] [address] Phone: [phone]  Mobile Phone: [phone]  Relation: Spouse  Secondary Emergency Contact: Severo Golas, [place] [facility] [address] Phone: [phone]  Relation: Brother/Sister    Past Surgical History:  Past Surgical History:   Procedure Laterality Date    ABLATION OF DYSRHYTHMIC FOCUS      APPENDECTOMY      BRONCHOSCOPY N/A 10/11/2022    BRONCHOSCOPY WITH BRONCHIOLAR ALVEOLAR LAVAGE performed by Chicho Mann MD at [address]  10/11/2022    BRONCHOSCOPY DIAGNOSTIC OR CELL [address] performed by Chicho Mann MD at [address]      pacemaker    COLONOSCOPY  2019    [place]    COLONOSCOPY N/A 2019    COLONOSCOPY WITH BIOPSY performed by Reece Crowe MD at [place] [address] N/A 2019    COLONOSCOPY POLYPECTOMY SNARE/COLD BIOPSY performed by Reece Crowe MD at [place] [address]         Immunization History:   Immunization History   Administered Date(s) Administered    COVID-19, MODERNA BLUE border, Primary or Immunocompromised, (age 12y+), IM, 100 mcg/0.5mL 2021, 2021    COVID-19, MODERNA Booster BLUE border, (age 18y+), IM, 50mcg/0.25mL 2021    Influenza 10/16/2013    Influenza Virus Vaccine 10/20/2014, 2015, 2022    Influenza, FLUAD, (age 72 y+), Adjuvanted, 0.5mL 10/23/2021    Influenza, FLUARIX, FLULAVAL, FLUZONE (age 10 mo+) AND AFLURIA, (age 1 y+), PF, 0.5mL 09/18/2019    Influenza, FLUZONE (age 72 y+), High Dose, 0.7mL 08/13/2020    Influenza, High Dose (Fluzone 65 yrs and older) 10/01/2016, 09/26/2017, 10/15/2018, 08/13/2020    Pneumococcal Conjugate 13-valent (Otuevok19) 10/01/2016    Pneumococcal Polysaccharide (Cmzmquaoi50) 07/12/2013, 08/13/2020    Td, unspecified formulation 10/16/2013    Tdap (Boostrix, Adacel) 05/06/2017, 06/13/2018    Zoster Live (Zostavax) 09/04/2013    Zoster Recombinant (Shingrix) 04/05/2018, 09/17/2018       Active Problems:  Patient Active Problem List   Diagnosis Code    Primary insomnia F51.01    Hyperlipidemia LDL goal <100 E78.5    Chronic diarrhea K52.9    Irritable bowel syndrome with diarrhea K58.0    Essential hypertension I10    Dysthymia F34.1    Depression with anxiety F41.8    COPD, mild ([address] J44.9    Adenomatous polyp of rectum D12.8    Acute respiratory failure with hypoxia (HCC) J96.01    Sick sinus syndrome (HCC) I49.5    Left atrial flutter by electrocardiogram (HCC) I48.92    Shortness of breath M31.55    Systolic heart failure (HCC) I50.20    Cardiomyopathy ([address] I42.9    ILD (interstitial lung disease) ([facility] J84.9    Pacemaker Z95.0    Sepsis ([facility] A41.9    Abnormal CT of the chest R93.89       Isolation/Infection:   Isolation            No Isolation          Patient Infection Status       Infection Onset Added Last Indicated Last Indicated By Review Planned Expiration Resolved Resolved By    None active    Resolved    COVID-19 (Rule Out) 11/27/22 11/27/22 11/27/22 Respiratory Panel, Molecular, with COVID-19 (Restricted: peds pts or suitable admitted adults) (Ordered)   11/27/22 Rule-Out Test Resulted    COVID-19 (Rule Out) 11/26/22 11/26/22 11/26/22 COVID-19, Rapid (Ordered)   11/26/22 Rule-Out Test Resulted    Pulmonary Tuberculosis (Rule Out) 10/11/22 10/11/22 10/11/22 Culture with Smear, Acid Fast Bacillius (Ordered)   10/25/22 Rule-Out Test Resulted    COVID-19 20 COVID-19   20     COVID-19 (Rule Out) 20 COVID-19 (Ordered)   20 Rule-Out Test Resulted            Nurse Assessment:  Last Vital Signs: /62   Pulse 85   Temp (!) 96.7 °F (35.9 °C) (Oral)   Resp 18   Ht 5' 10\" (1.778 m)   Wt 153 lb (69.4 kg)   SpO2 100%   BMI 21.95 kg/m²     Last documented pain score (0-10 scale):    Last Weight:   Wt Readings from Last 1 Encounters:   22 153 lb (69.4 kg)     Mental Status:  {IP PT MENTAL STATUS:}    IV Access:  { WANDY IV ACCESS:173385079}    Nursing Mobility/ADLs:  Walking   {CHP DME EBSL:195572941}  Transfer  {CHP DME XYMP:613056001}  Bathing  {CHP DME VGIJ:475273179}  Dressing  {CHP DME ZVYI:834734853}  Toileting  {CHP DME TBPT:337933317}  Feeding  {P DME SHZQ:580901206}  Med Admin  {P DME REXB:922275147}  Med Delivery   { WANDY MED Delivery:187398113}    Wound Care Documentation and Therapy:        Elimination:  Continence: Bowel: {YES / XC:51549}  Bladder: {YES / D}  Urinary Catheter: {Urinary Catheter:339891600}   Colostomy/Ileostomy/Ileal Conduit: {YES / GZ:13289}       Date of Last BM: ***    Intake/Output Summary (Last 24 hours) at 2022 1419  Last data filed at 2022 1244  Gross per 24 hour   Intake 600 ml   Output 1500 ml   Net -900 ml     I/O last 3 completed shifts:   In: 1080 [P.O.:1080]  Out: 2375 [Urine:2375]    Safety Concerns:     [address] [employer] Safety Concerns:248605721}    Impairments/Disabilities:      [address] [employer] Impairments/Disabilities:286752690}    Nutrition Therapy:  Current Nutrition Therapy:   Toi SABA Diet List:814187355}    Routes of Feeding: {CHP DME Other Feedings:284344173}  Liquids: {Slp liquid thickness:49330}  Daily Fluid Restriction: {CHP DME Yes amt example:819239612}  Last Modified Barium Swallow with Video (Video Swallowing Test): {Done Not Done MIRIANZ:155442966}    Treatments at the Time of Hospital Discharge:   Respiratory Treatments: ***  Oxygen Therapy:  {Therapy; copd oxygen:76597}  Ventilator:    {[facility] Vent ERYC:012838958}    Rehab Therapies: {THERAPEUTIC INTERVENTION:9737785117}  Weight Bearing Status/Restrictions: {[facility] Weight Bearin}  Other Medical Equipment (for information only, NOT a DME order):  {EQUIPMENT:956306215}  Other Treatments: ***    Patient's personal belongings (please select all that are sent with patient):  {[facility] DME Belongings:042236413}    RN SIGNATURE:  {Esignature:585889274}    CASE MANAGEMENT/SOCIAL WORK SECTION    Inpatient Status Date: ***    Readmission Risk Assessment Score:  Readmission Risk              Risk of Unplanned Readmission:  18           Discharging to Facility/ Agency   Name:   Address:  Phone:  Fax:    Dialysis Facility (if applicable)   Name:  Address:  Dialysis Schedule:  Phone:  Fax:    / signature: {Esignature:166123008}    PHYSICIAN SECTION    Prognosis: {Prognosis:0867108186}    Condition at Discharge: [address] [place] [address] Patient Condition:607261844}    Rehab Potential (if transferring to Rehab): {Prognosis:1624169495}    Recommended Labs or Other Treatments After Discharge: ***    Physician Certification: I certify the above information and transfer of Aysha Kirkpatrick  is necessary for the continuing treatment of the diagnosis listed and that he requires {Admit to Appropriate Level of Care:84144} for {GREATER/LESS:074880413} 30 days.      Update Admission H&P: {CHP DME Changes in UEMCK:882545225}    PHYSICIAN SIGNATURE:  {Esignature:170192444}

## 2022-12-01 ENCOUNTER — CARE COORDINATION (OUTPATIENT)
Dept: CASE MANAGEMENT | Age: 77
End: 2022-12-01

## 2022-12-01 ENCOUNTER — PATIENT MESSAGE (OUTPATIENT)
Dept: PRIMARY CARE CLINIC | Age: 77
End: 2022-12-01

## 2022-12-01 DIAGNOSIS — J18.9 COMMUNITY ACQUIRED PNEUMONIA, UNSPECIFIED LATERALITY: Primary | ICD-10-CM

## 2022-12-01 DIAGNOSIS — R06.02 SHORTNESS OF BREATH: Primary | ICD-10-CM

## 2022-12-01 LAB
ASPERGILLUS ANTIBODY ID: NOT DETECTED
BLASTOMYCES ANTIBODY ID: NOT DETECTED
BLOOD CULTURE, ROUTINE: NORMAL
COCCIDIOIDES ANTIBODY ID: NOT DETECTED
HISTOPLASMA ABS, ID: NOT DETECTED

## 2022-12-01 PROCEDURE — 1111F DSCHRG MED/CURRENT MED MERGE: CPT

## 2022-12-01 RX ORDER — PROMETHAZINE HYDROCHLORIDE AND CODEINE PHOSPHATE 6.25; 1 MG/5ML; MG/5ML
10 SYRUP ORAL 2 TIMES DAILY PRN
Qty: 120 ML | Refills: 0 | Status: SHIPPED | OUTPATIENT
Start: 2022-12-01 | End: 2022-12-07

## 2022-12-01 NOTE — DISCHARGE SUMMARY
Hospital Medicine Discharge Summary    Patient ID: Pollo Magallanes      Patient's PCP: Haider Quintanilla MD    Admit Date: 11/26/2022     Discharge Date: 11/30/2022      Admitting Physician: Ava Herbert MD     Discharge Physician: Gisella Loco MD     Discharge Diagnoses: Active Hospital Problems    Diagnosis Date Noted    Sepsis [facility]) [A41.9] 11/27/2022     Priority: Medium    Abnormal CT of the chest [R93.89] 11/27/2022     Priority: Medium    Acute respiratory failure with hypoxia [facility]) [J96.01] 12/16/2020       The patient was seen and examined on day of discharge and this discharge summary is in conjunction with any daily progress note from day of discharge. Hospital Course: \    Multifocal pneumonia, likely bacterial--procalcitonin significant elevated--follow-up on culture data and serologies-continue cefepime and azithromycin  -will need follow-up imaging in 6 to 8 weeks  - pulmonary consulted --> ok for levaquin on d/c     Sepsis due to pneumonia-poa--criteria includes leukocytosis, sinus tachycardia, tachypnea, elevated lactic acid--resolved-continue antibiotics as above     Interstitial lung disease-follow-up with pulmonology--on p.o. prednisone     Acute respiratory failure with hypoxia due to pneumonia--patient's oxygen saturation of 85% on room air, required up to 6 L nasal cannula--currently on 2 L--continue to wean as tolerated     Paroxysmal atrial fibrillation,SSS s/p PPM  -Continue apixaban and metoprolol     Essential hypertension:   -Stable-continue losartan and Aldactone     Chronic heart failure with reduced ejection fraction, EF 50%, echo5 222  Compensated--continue Lasix, metoprolol, losartan         Mood disorder:   - venlafaxine 150 mg po daily      Chronic microcytic anemia  Suspect due to iron deficiency. H&H is stable. No evidence of gross bleeding.   Obtain iron studies         Exam:     /62   Pulse 85   Temp (!) 96.7 °F (35.9 °C) (Oral)   Resp 18   Ht 5' 10\" (1.778 m)   Wt 153 lb (69.4 kg)   SpO2 100%   BMI 21.95 kg/m²     General appearance: No apparent distress, appears stated age and cooperative. HEENT: Pupils equal, round, and reactive to light. Conjunctivae/corneas clear. Neck: Supple, with full range of motion. No jugular venous distention. Trachea midline. Respiratory:  Normal respiratory effort. Clear to auscultation, bilaterally without Rales/Wheezes/Rhonchi. Cardiovascular: Regular rate and rhythm with normal S1/S2 without murmurs, rubs or gallops. Abdomen: Soft, non-tender, non-distended with normal bowel sounds. Musculoskelatal: No clubbing, cyanosis or edema bilaterally. Full range of motion without deformity. Skin: Skin color, texture, turgor normal.  No rashes or lesions. Neurologic:  Neurovascularly intact without any focal sensory/motor deficits. Cranial nerves: II-XII intact, grossly non-focal.  Psychiatric: Alert and oriented, thought content appropriate, normal insight      Consults:     PHARMACY TO DOSE VANCOMYCIN  IP CONSULT TO PULMONOLOGY    Significant Diagnostic Studies:       Radiology:  CT CHEST PULMONARY EMBOLISM W CONTRAST   Final Result   1. No CTA evidence for acute pulmonary embolus. 2. Bilateral pulmonary infiltrates. This likely represents pneumonia,   including COVID pneumonia. I would recommend follow-up to resolution. XR CHEST PORTABLE   Final Result   Multifocal airspace disease. PCP/SNF to follow up:   Needs repeat Chest CT imaging in 6-8 weeks. may require open lung biopsy for lung disease    Disposition:  Home    Condition on d/c:  Stable     Discharge Instructions/Follow-up: Fu with pulmonary within 1 month    Code Status:  DNR-CCA     Activity: activity as tolerated    Diet: cardiac diet    Labs:  For convenience and continuity at follow-up the following most recent labs are provided:      CBC:    Lab Results   Component Value Date/Time    WBC 11.1 11/29/2022 05:49 AM    HGB 8.9 11/29/2022 05:49 AM    HCT 28.7 11/29/2022 05:49 AM     11/29/2022 05:49 AM       Renal:    Lab Results   Component Value Date/Time     11/29/2022 05:49 AM    K 4.2 11/29/2022 05:49 AM     11/29/2022 05:49 AM    CO2 20 11/29/2022 05:49 AM    BUN 22 11/29/2022 05:49 AM    CREATININE 0.9 11/29/2022 05:49 AM    CALCIUM 8.3 11/29/2022 05:49 AM    PHOS 2.7 07/19/2021 04:44 PM       Discharge Medications:     Discharge Medication List as of 11/30/2022  2:20 PM             Details   guaiFENesin (MUCINEX) 600 MG extended release tablet Take 1 tablet by mouth 2 times daily for 10 days, Disp-20 tablet, R-0Normal      levoFLOXacin (LEVAQUIN) 750 MG tablet Take 1 tablet by mouth daily for 4 days, Disp-4 tablet, R-0Normal                Details   diphenoxylate-atropine (LOMOTIL) 2.5-0.025 MG per tablet Take 1 tablet by mouth 4 times daily as needed for Diarrhea for up to 30 days. , Disp-120 tablet, R-0Normal      pravastatin (PRAVACHOL) 40 MG tablet Take 1 tablet by mouth daily, Disp-90 tablet, R-1Normal      predniSONE (DELTASONE) 20 MG tablet Take 2 tablets by mouth daily for 10 days, THEN 1 tablet daily for 20 days. , Disp-40 tablet, R-0Normal      estazolam (PROSOM) 2 MG tablet TAKE 1 TO 2 TABLETS BY MOUTH EVERY NIGHT AS NEEDED FOR SLEEP, Disp-60 tablet, R-0Normal      furosemide (LASIX) 40 MG tablet Take 1 tablet by mouth daily, Disp-90 tablet, R-1Normal      spironolactone (ALDACTONE) 25 MG tablet TAKE ONE (1) TABLET BY MOUTH DAILY, Disp-90 tablet, R-3for future refillsNormal      metoprolol succinate (TOPROL XL) 50 MG extended release tablet TAKE ONE (1) TABLET BY MOUTH DAILY, Disp-90 tablet, R-3Normal      Fluticasone furoate-vilanterol (BREO ELLIPTA) 200-25 MCG/INH AEPB inhaler Inhale 1 puff into the lungs daily, Disp-180 each, R-1Normal      ELIQUIS 5 MG TABS tablet TAKE ONE (1) TABLET BY MOUTH TWO (2) TIMES DAILY, Disp-180 tablet, R-2Normal      losartan (COZAAR) 100 MG tablet Take 1 tablet by mouth daily, Disp-90 tablet, R-1Normal      QUEtiapine (SEROQUEL) 25 MG tablet Take 1-2 tablets nightly, Disp-180 tablet, R-1Normal      NONFORMULARY Apply topically as needed CBD oilHistorical Med      venlafaxine (EFFEXOR XR) 150 MG extended release capsule Take 1 capsule by mouth daily, Disp-90 capsule, R-1Normal      vitamin D (CHOLECALCIFEROL) 125 MCG (5000 UT) CAPS capsule Take 5,000 Units by mouth every eveningHistorical Med      Multiple Vitamins-Minerals (MULTIVITAMIN) LIQD Take 30 mLs by mouth daily Has 80 mcg of vitamin K -2  Has 50 mg of ginsing rootHistorical Med      albuterol sulfate HFA (VENTOLIN HFA) 108 (90 Base) MCG/ACT inhaler Inhale 2 puffs into the lungs every 6 hours as needed for Wheezing or Shortness of Breath, Disp-3 Inhaler, R-1Normal      famotidine (PEPCID) 20 MG tablet Take 1 tablet by mouth 2 times daily, Disp-60 tablet, R-3Normal             Time Spent on discharge is more than 30 minutes in the examination, evaluation, counseling and review of medications and discharge plan. Signed: Wil Walters MD   11/30/2022      Thank you Celia Lopes MD for the opportunity to be involved in this patient's care. If you have any questions or concerns please feel free to contact me at [phone].

## 2022-12-01 NOTE — CARE COORDINATION
[facility] Care Transitions Initial Follow Up Call    Call within 2 business days of discharge: Yes    Care Transition Nurse contacted the patient by telephone to perform post hospital discharge assessment. Verified name and  with patient as identifiers. Provided introduction to self, and explanation of the Care Transition Nurse role. Patient: Eliz Morgan Patient : 1945   MRN: [id number]  Reason for Admission: Septicemia  Discharge Date: 22 RARS: Readmission Risk Score: 18.5      Last Discharge  Street       Date Complaint Diagnosis Description Type Department Provider    22 Illness Septicemia ([address] . .. ED to Hosp-Admission (Discharged) (ADMIT) Sergio Medina MD; [facility]. .. Was this an external facility discharge? No Discharge Facility: [facility] to be reviewed by the provider   Additional needs identified to be addressed with provider: No                 Method of communication with provider: none. Initial attempt at CT discharge phone call. Key Rosas states he is doing \"fine\". He states he is using the home oxygen continuously @ 2lpm. He states he has a pulse oximeter at home. He states he has not yet checked his O2 sat today because he just got up. Key Rosas states he slept well through the night. Key Rosas confirms his appointment with Ashish Cabello on 2022. He states he hopes to provide his own transportation, but if needed his wife can drive him. He states he will reach out to  to see about a hospital follow up appointment. Key Rosas states he does not weigh himself daily. Discussed the importance of daily weights. He states they have a scale and he can begin doing that. He denies any SOB, chest pain, or edema at this time. Key Rosas states he follows a low sodium diet. He states he does not currently monitor/restrict his fluids. Discussed fluid restriction. Key Rosas states he will begin doing that. Discussed the heart failure zones.  Key Rosas states he is in the \"green\" zone at this time. He denies any fever. Meghan Peters states he is coughing and occasionally bringing up some mucous. He states he has used his prn inhaler since he has been home. Meghan Peters denies any needs at this time. Care Transition Nurse reviewed discharge instructions and medical action plan with patient who verbalized understanding. The patient was given an opportunity to ask questions and does not have any further questions or concerns at this time. Were discharge instructions available to patient? Yes. Reviewed appropriate site of care based on symptoms and resources available to patient including: PCP  Specialist  When to call 911. The patient agrees to contact the PCP office for questions related to their healthcare. Advance Care Planning:   Advance Care Planning   Healthcare Decision Maker:    Primary Decision Maker: Shana Mcghee - [phone]    Secondary Decision Maker: Sonali Chong - Brother/Sister - [phone]    Meghan Peters states he does have a Living Will and Healthcare POA. Medication reconciliation was performed with patient, who verbalizes understanding of administration of home medications. Medications reviewed, 1111F entered: yes Meghan Peters believed he did not have any Spironolactone. Writer placed call to [facility] - spoke with Marcial Argueta. She states Meghan Peters picked up a 90 day supply of Spironolactone on 10/17/2022. She also states he currently has refills from [address] [place] [address]. Writer placed 2nd call to Meghan Peters. Reminded him of the above information. Meghan Peters states he now remembers and did find it - is currently taking the spironolactone. Was patient discharged with a pulse oximeter? no    Non-face-to-face services provided:  Obtained and reviewed discharge summary and/or continuity of care documents  Assessment and support for treatment adherence and medication management-medication list reviewed.     Offered patient enrollment in the Remote Patient Monitoring (RPM) program for in-home monitoring: Patient is not eligible for RPM program.    Care Transitions 24 Hour Call    Do you have a copy of your discharge instructions?: Yes  Do you have all of your prescriptions and are they filled?: Yes  Have you been contacted by a [facility] Pharmacist?: No  Have you scheduled your follow up appointment?: No  Do you have support at home?: Partner/Spouse/SO  Do you feel like you have everything you need to keep you well at home?: Yes  Care Transitions Interventions         Follow Up  Future Appointments   Date Time Provider Pedrito Nicholas   12/12/2022  2:00 PM [place] CT RM 2 [facility] [address]   12/23/2022  1:20 PM Richard Cerda MD [facility]   1/18/2023 11:30 AM SCHEDULE, [facility] TRANSMISSION [facility]   1/23/2023  1:45 PM MD Bob Bloom RD PC [place] - DYD   3/7/2023 12:15 PM Nancy Hernandez MD [facility]   4/26/2023 10:30 AM SCHEDULE, [facility] REMOTE TRANSMISSION [facility]   8/2/2023 10:30 AM SCHEDULE, [facility] TRANSMISSION [facility]   11/8/2023 11:30 AM SCHEDULE, [facility]       Care Transition Nurse provided contact information.     Plan for next call:  SOB - cough - PCP follow up    [address] [facility] Transition Nurse  [phone]

## 2022-12-01 NOTE — TELEPHONE ENCOUNTER
From: Aysha Kirkpatrick  To: Dr. Gray Mole: 12/1/2022 12:15 PM EST  Subject: Phenergram/Codeine    Dr. Key De La Fuente,   In the hospital, I was getting Phenergram/Codeine for the cough which made taking any deep breath impossible. I didn't bring any home. Could you send a prescription to [place] for this? I shouldn't (or hope) need it for more than a week or so. I'm glad you got to meet \"the better half of the duo\" this week. Thanks much.     Neena Moreno

## 2022-12-02 DIAGNOSIS — F51.04 PSYCHOPHYSIOLOGICAL INSOMNIA: ICD-10-CM

## 2022-12-02 LAB
CULTURE, BLOOD 2: ABNORMAL
ORGANISM: ABNORMAL

## 2022-12-05 ENCOUNTER — PATIENT MESSAGE (OUTPATIENT)
Dept: PRIMARY CARE CLINIC | Age: 77
End: 2022-12-05

## 2022-12-06 ENCOUNTER — CARE COORDINATION (OUTPATIENT)
Dept: CASE MANAGEMENT | Age: 77
End: 2022-12-06

## 2022-12-06 RX ORDER — VENLAFAXINE HYDROCHLORIDE 150 MG/1
150 CAPSULE, EXTENDED RELEASE ORAL DAILY
Qty: 90 CAPSULE | Refills: 1 | Status: SHIPPED | OUTPATIENT
Start: 2022-12-06

## 2022-12-06 NOTE — TELEPHONE ENCOUNTER
From: Juan Menjivar  To: Dr. Vasques [place]: 12/5/2022 4:02 PM EST  Subject: Approve prescription    Dr. Adrian Castillo and Wale Varghese has sent you two requests to refill my Estazolam prescriptions. Could you please approve it and send it back to them? Thanks.     Shaka Cannon

## 2022-12-06 NOTE — CARE COORDINATION
[facility] Care Transitions Follow Up Call    Care Transition Nurse contacted the patient by telephone to follow up after admission on 2022. Verified name and  with patient as identifiers. Patient: Mone Pham  Patient : 1945   MRN: [id number]  Reason for Admission: Septicemia  Discharge Date: 22 RARS: Readmission Risk Score: 18.5      Needs to be reviewed by the provider   Additional needs identified to be addressed with provider: No               Method of communication with provider: none. Tristan Young states he is doing \"fine\". He states he continues to be SOB when going up and down the steps - states he has to stop to rest  1 time with each trip. He states he is still wearing the home oxygen continuously @ 2lpm. He states he has not checked his O2 sats yet today. He states he is occasionally trying to wean himself so he is checking and without O2 he is hovering @ 88-89%. Tristan Young states he will be seeing Ashok Vergara on Friday. He states he is not weighing himself currently - states he is losing weight. Tristan Young states he feels his SOB is stable - no worse than when he left the hospital. He denies any chest pain or edema. Tristan Young states he is in the \"green\" heart failure zone at this time. He denies any fever. Tristan Young states he continues to have a cough and did receive cough medication from his PCP. Tristan Young states that is helpful. He denies any needs at this time.       Follow Up  Future Appointments   Date Time Provider Pedrito Nicholas   2022  4:45 PM MD Wally Espinoza RD  [place] - DYD   2022  2:00 PM [facility]   2022  1:20 PM Nain Garcia MD [facility]   2023 11:30 AM SCHEDULE, [facility] REMOTE TRANSMISSION [facility]   2023  1:45 PM MD Wally Espinoza RD  [place] - DYD   3/7/2023 12:15 PM Simona Chow MD [facility]   2023 10:30 AM SCHEDULE, [facility] REMOTE TRANSMISSION [facility]   2023 10:30 AM SCHEDULE, [facility] TRANSMISSION [facility]   11/8/2023 11:30 AM SCHEDULE, [facility] REMOTE TRANSMISSION [facility]        Care Transitions Subsequent and Final Call    Subsequent and Final Calls  Do you have any ongoing symptoms?: No  Have your medications changed?: No  Do you have any questions related to your medications?: No  Do you currently have any active services?: Yes  Are you currently active with any services?: Other  Do you have any needs or concerns that I can assist you with?: No  Identified Barriers: None  Care Transitions Interventions  Other Interventions:             Care Transition Nurse provided contact information for future needs.    Plan for next call:  SOB & O2 usage    Cally Ricketts RN BSN  Care Transition Nurse  [phone]

## 2022-12-07 RX ORDER — LOSARTAN POTASSIUM 100 MG/1
100 TABLET ORAL DAILY
Qty: 90 TABLET | Refills: 1 | Status: SHIPPED | OUTPATIENT
Start: 2022-12-07

## 2022-12-08 DIAGNOSIS — K52.9 CHRONIC DIARRHEA: ICD-10-CM

## 2022-12-08 RX ORDER — ALBUTEROL SULFATE 90 UG/1
2 AEROSOL, METERED RESPIRATORY (INHALATION) EVERY 6 HOURS PRN
Qty: 18 G | Refills: 3 | Status: SHIPPED | OUTPATIENT
Start: 2022-12-08

## 2022-12-09 ENCOUNTER — TELEPHONE (OUTPATIENT)
Dept: ADMINISTRATIVE | Age: 77
End: 2022-12-09

## 2022-12-09 ENCOUNTER — OFFICE VISIT (OUTPATIENT)
Dept: PRIMARY CARE CLINIC | Age: 77
End: 2022-12-09

## 2022-12-09 VITALS
SYSTOLIC BLOOD PRESSURE: 132 MMHG | HEART RATE: 79 BPM | WEIGHT: 155 LBS | BODY MASS INDEX: 22.24 KG/M2 | OXYGEN SATURATION: 96 % | TEMPERATURE: 97.1 F | DIASTOLIC BLOOD PRESSURE: 68 MMHG

## 2022-12-09 DIAGNOSIS — Z95.0 PACEMAKER: ICD-10-CM

## 2022-12-09 DIAGNOSIS — Z09 HOSPITAL DISCHARGE FOLLOW-UP: Primary | ICD-10-CM

## 2022-12-09 DIAGNOSIS — J18.9 MULTIFOCAL PNEUMONIA: ICD-10-CM

## 2022-12-09 DIAGNOSIS — R65.20 SEPSIS WITH ACUTE HYPOXIC RESPIRATORY FAILURE WITHOUT SEPTIC SHOCK, DUE TO UNSPECIFIED ORGANISM (HCC): ICD-10-CM

## 2022-12-09 DIAGNOSIS — J96.01 SEPSIS WITH ACUTE HYPOXIC RESPIRATORY FAILURE WITHOUT SEPTIC SHOCK, DUE TO UNSPECIFIED ORGANISM (HCC): ICD-10-CM

## 2022-12-09 DIAGNOSIS — J18.9 COMMUNITY ACQUIRED PNEUMONIA, UNSPECIFIED LATERALITY: ICD-10-CM

## 2022-12-09 DIAGNOSIS — A41.9 SEPSIS WITH ACUTE HYPOXIC RESPIRATORY FAILURE WITHOUT SEPTIC SHOCK, DUE TO UNSPECIFIED ORGANISM (HCC): ICD-10-CM

## 2022-12-09 DIAGNOSIS — J84.9 ILD (INTERSTITIAL LUNG DISEASE) (HCC): ICD-10-CM

## 2022-12-09 DIAGNOSIS — I49.5 SICK SINUS SYNDROME (HCC): ICD-10-CM

## 2022-12-09 DIAGNOSIS — I50.22 CHRONIC SYSTOLIC HEART FAILURE (HCC): ICD-10-CM

## 2022-12-09 DIAGNOSIS — J96.01 ACUTE RESPIRATORY FAILURE WITH HYPOXIA (HCC): ICD-10-CM

## 2022-12-09 RX ORDER — PROMETHAZINE HYDROCHLORIDE AND CODEINE PHOSPHATE 6.25; 1 MG/5ML; MG/5ML
10 SYRUP ORAL 2 TIMES DAILY PRN
Qty: 120 ML | Refills: 0 | Status: SHIPPED | OUTPATIENT
Start: 2022-12-09 | End: 2022-12-15

## 2022-12-09 NOTE — PROGRESS NOTES
Post-Discharge Transitional Care  Follow Up      Ibeth Zhou   YOB: 1945    Date of Office Visit:  12/9/2022  Date of Hospital Admission: 11/26/22  Date of Hospital Discharge: 11/30/22  Risk of hospital readmission (high >=14%. Medium >=10%) :Readmission Risk Score: 18.5      Care management risk score Rising risk (score 2-5) and Complex Care (Scores >=6): No Risk Score On File     Non face to face  following discharge, date last encounter closed (first attempt may have been earlier): 12/01/2022    Call initiated 2 business days of discharge: Yes    ASSESSMENT/PLAN:   Multifocal pneumonia  Treated with Iv abx at hospital and oral abx levaquin x 7 days on dc    Sepsis with acute hypoxic respiratory failure without septic shock, due to unspecified organism ([facility]  Improved. No fever ,stable Vs    Acute respiratory failure with hypoxia (HCC)  Still on continuous oxygen at 2 L/nc to keep O2 sat > 90%    ILD (interstitial lung disease) (HCC)  On Prednisone at 20 mg /day. Has upcoming appointment with pulmonologist    Chronic systolic heart failure ([facility]  Systolic ejection fraction improved to 50% last August 4, 2022 from 35% last March 25, 2022. Continue Toprol, spironolactone, losartan and Lasix PRN    Sick sinus syndrome (HCC)  Pacemaker  -Heart rate controlled with beta-blocker. He also take Eliquis 5 mg twice daily for thromboembolic reduction. Community acquired pneumonia, unspecified laterality  -     promethazine-codeine (PHENERGAN WITH CODEINE) 6.25-10 MG/5ML syrup; Take 10 mLs by mouth 2 times daily as needed for Cough for up to 6 days. , Disp-120 mL, R-0Normal      Medical Decision Making: high complexity  No follow-ups on file. On this date 12/9/2022 I have spent 21 minutes reviewing previous notes, test results and face to face with the patient discussing the diagnosis and importance of compliance with the treatment plan as well as documenting on the day of the visit. Subjective:   HPI:  Follow up of Hospital problems/diagnosis(es):     Inpatient course: Discharge summary reviewed- see chart. Interval history/Current status: Patient presented to the hospital 11/26/2022 which progressive shortness of breath and work-up showed multifocal pneumonia likely bacterial with sepsis due to pneumonia and acute respiratory failure with hypoxia. Patient was started with IV antibiotics eventually discharged on oral antibiotics Levaquin for 7 days. On discharge he was maintained on continuous home oxygen at 2 L by nasal cannula to keep oxygen saturation above 90%. He has interstitial lung disease goes to pulmonologist and was advised to continue p.o. prednisone now at 20 mg daily. He has a chronic congestive heart failure now improved ejection fraction at 50% since August 2022 from 35% last March 2022 patient takes Toprol, losartan, Aldactone, and Lasix. He has paroxysmal A. fib and Eliquis 5 mg twice daily and heart rate controlled with beta-blocker. Stated that he is doing fairly well on oxygen denies chest pain or shortness of breath of the tired very easily with minimal activity. Denies bowel or urinary disturbance. Patient Active Problem List   Diagnosis    Primary insomnia    Hyperlipidemia LDL goal <100    Chronic diarrhea    Irritable bowel syndrome with diarrhea    Essential hypertension    Depression with anxiety    COPD, mild (HCC)    Adenomatous polyp of rectum    Acute respiratory failure with hypoxia (HCC)    Sick sinus syndrome (HCC)    Left atrial flutter by electrocardiogram (HCC)    Shortness of breath    Systolic heart failure (HCC)    Cardiomyopathy ([address]    ILD (interstitial lung disease) ([address]    Pacemaker    Sepsis ([address]    Abnormal CT of the chest       Medications listed as ordered at the time of discharge from hospital     Medication List            Accurate as of December 9, 2022  5:36 PM. If you have any questions, ask your nurse or doctor. CONTINUE taking these medications      albuterol sulfate  (90 Base) MCG/ACT inhaler  Commonly known as: PROVENTIL;VENTOLIN;PROAIR  Inhale 2 puffs into the lungs every 6 hours as needed for Wheezing     diphenoxylate-atropine 2.5-0.025 MG per tablet  Commonly known as: LOMOTIL  Take 1 tablet by mouth 4 times daily as needed for Diarrhea for up to 30 days. Eliquis 5 MG Tabs tablet  Generic drug: apixaban  TAKE ONE (1) TABLET BY MOUTH TWO (2) TIMES DAILY     estazolam 2 MG tablet  Commonly known as: PROSOM  TAKE 1 TO 2 TABLETS BY MOUTH EVERY NIGHT AS NEEDED FOR SLEEP     famotidine 20 MG tablet  Commonly known as: PEPCID  Take 1 tablet by mouth 2 times daily     Fluticasone furoate-vilanterol 200-25 MCG/INH Aepb inhaler  Commonly known as: Breo Ellipta  Inhale 1 puff into the lungs daily     furosemide 40 MG tablet  Commonly known as: LASIX  Take 1 tablet by mouth daily     guaiFENesin 600 MG extended release tablet  Commonly known as: MUCINEX  Take 1 tablet by mouth 2 times daily for 10 days     losartan 100 MG tablet  Commonly known as: COZAAR  Take 1 tablet by mouth daily     metoprolol succinate 50 MG extended release tablet  Commonly known as: TOPROL XL  TAKE ONE (1) TABLET BY MOUTH DAILY     Multivitamin Liqd     NONFORMULARY     pravastatin 40 MG tablet  Commonly known as: PRAVACHOL  Take 1 tablet by mouth daily     predniSONE 20 MG tablet  Commonly known as: DELTASONE  Take 2 tablets by mouth daily for 10 days, THEN 1 tablet daily for 20 days. Start taking on: November 11, 2022     promethazine-codeine 6.25-10 MG/5ML syrup  Commonly known as: PHENERGAN with CODEINE  Take 10 mLs by mouth 2 times daily as needed for Cough for up to 6 days.      QUEtiapine 25 MG tablet  Commonly known as: SEROQUEL  Take 1-2 tablets nightly     spironolactone 25 MG tablet  Commonly known as: ALDACTONE  TAKE ONE (1) TABLET BY MOUTH DAILY     venlafaxine 150 MG extended release capsule  Commonly known as: EFFEXOR XR  Take 1 capsule by mouth daily     vitamin D 125 MCG (5000 UT) Caps capsule  Commonly known as: CHOLECALCIFEROL               Where to Get Your Medications        These medications were sent to [facility]  [address] [place] [address], [facility] [address]      Phone: [phone]   promethazine-codeine 6.25-10 MG/5ML syrup           Medications marked \"taking\" at this time  Outpatient Medications Marked as Taking for the 12/9/22 encounter (Office Visit) with Arlyn Rojas MD   Medication Sig Dispense Refill    promethazine-codeine (PHENERGAN WITH CODEINE) 6.25-10 MG/5ML syrup Take 10 mLs by mouth 2 times daily as needed for Cough for up to 6 days. 120 mL 0    albuterol sulfate HFA (PROVENTIL;VENTOLIN;PROAIR) 108 (90 Base) MCG/ACT inhaler Inhale 2 puffs into the lungs every 6 hours as needed for Wheezing 18 g 3    losartan (COZAAR) 100 MG tablet Take 1 tablet by mouth daily 90 tablet 1    estazolam (PROSOM) 2 MG tablet TAKE 1 TO 2 TABLETS BY MOUTH EVERY NIGHT AS NEEDED FOR SLEEP 60 tablet 0    venlafaxine (EFFEXOR XR) 150 MG extended release capsule Take 1 capsule by mouth daily 90 capsule 1    guaiFENesin (MUCINEX) 600 MG extended release tablet Take 1 tablet by mouth 2 times daily for 10 days 20 tablet 0    pravastatin (PRAVACHOL) 40 MG tablet Take 1 tablet by mouth daily 90 tablet 1    predniSONE (DELTASONE) 20 MG tablet Take 2 tablets by mouth daily for 10 days, THEN 1 tablet daily for 20 days. 40 tablet 0    furosemide (LASIX) 40 MG tablet Take 1 tablet by mouth daily 90 tablet 1    diphenoxylate-atropine (LOMOTIL) 2.5-0.025 MG per tablet Take 1 tablet by mouth 4 times daily as needed for Diarrhea for up to 30 days.  120 tablet 0    spironolactone (ALDACTONE) 25 MG tablet TAKE ONE (1) TABLET BY MOUTH DAILY 90 tablet 3    metoprolol succinate (TOPROL XL) 50 MG extended release tablet TAKE ONE (1) TABLET BY MOUTH DAILY 90 tablet 3    Fluticasone furoate-vilanterol (BREO ELLIPTA) 200-25 MCG/INH AEPB inhaler Inhale 1 puff into the lungs daily 180 each 1    ELIQUIS 5 MG TABS tablet TAKE ONE (1) TABLET BY MOUTH TWO (2) TIMES DAILY 180 tablet 2    QUEtiapine (SEROQUEL) 25 MG tablet Take 1-2 tablets nightly 180 tablet 1    NONFORMULARY Apply topically as needed CBD oil      vitamin D (CHOLECALCIFEROL) 125 MCG (5000 UT) CAPS capsule Take 5,000 Units by mouth every evening      Multiple Vitamins-Minerals (MULTIVITAMIN) LIQD Take 30 mLs by mouth daily Has 80 mcg of vitamin K -2  Has 50 mg of ginsing root      famotidine (PEPCID) 20 MG tablet Take 1 tablet by mouth 2 times daily 60 tablet 3        Medications patient taking as of now reconciled against medications ordered at time of hospital discharge: Yes    A comprehensive review of systems was negative except for what was noted in the HPI.     Objective:    /68   Pulse 79   Temp 97.1 °F (36.2 °C) (Temporal)   Wt 155 lb (70.3 kg)   SpO2 96%   BMI 22.24 kg/m²   General Appearance: alert and oriented to person, place and time, well developed and well- nourished, in no acute distress  Skin: warm and dry, no rash or erythema  Head: normocephalic and atraumatic  Eyes: pupils equal, round, and reactive to light, extraocular eye movements intact, conjunctivae normal  ENT: tympanic membrane, external ear and ear canal normal bilaterally, nose without deformity, nasal mucosa and turbinates normal without polyps  Neck: supple and non-tender without mass, no thyromegaly or thyroid nodules, no cervical lymphadenopathy  Pulmonary/Chest: clear to auscultation bilaterally- no wheezes, rales or rhonchi, normal air movement, no respiratory distress  Cardiovascular: normal rate, regular rhythm, normal S1 and S2, no murmurs, rubs, clicks, or gallops, distal pulses intact, no carotid bruits  Abdomen: soft, non-tender, non-distended, normal bowel sounds, no masses or organomegaly  Extremities: no cyanosis, clubbing or edema  Musculoskeletal: normal range of motion, no joint swelling, deformity or tenderness  Neurologic: reflexes normal and symmetric, no cranial nerve deficit, gait, coordination and speech normal      An electronic signature was used to authenticate this note.  --Heather Lee MD

## 2022-12-09 NOTE — TELEPHONE ENCOUNTER
Received a PA for Albuterol Sulfate  (90 Base)MCG/ACT aerosol. Will need a Dx code to complete. If this requires a response please respond to the pool ( P MHCX [address] [facility]).

## 2022-12-12 ENCOUNTER — HOSPITAL ENCOUNTER (OUTPATIENT)
Dept: CT IMAGING | Age: 77
Discharge: HOME | End: 2022-12-12
Payer: MEDICARE

## 2022-12-12 DIAGNOSIS — J84.9 ILD (INTERSTITIAL LUNG DISEASE) (HCC): ICD-10-CM

## 2022-12-12 PROCEDURE — 71250 CT THORAX DX C-: CPT

## 2022-12-12 RX ORDER — DIPHENOXYLATE HYDROCHLORIDE AND ATROPINE SULFATE 2.5; .025 MG/1; MG/1
TABLET ORAL
Qty: 120 TABLET | Refills: 0 | Status: SHIPPED | OUTPATIENT
Start: 2022-12-12 | End: 2023-01-11

## 2022-12-13 ENCOUNTER — CARE COORDINATION (OUTPATIENT)
Dept: CASE MANAGEMENT | Age: 77
End: 2022-12-13

## 2022-12-13 NOTE — CARE COORDINATION
[facility] Care Transitions Follow Up Call    Care Transition Nurse contacted the patient by telephone to follow up after admission on 2022. Verified name and  with patient as identifiers. Patient: Ender Correa  Patient : 1945   MRN: [id number]  Reason for Admission: Septicemia  Discharge Date: 22 RARS: Readmission Risk Score: 18.5      Needs to be reviewed by the provider   Additional needs identified to be addressed with provider: No               Method of communication with provider: none. Gene Pinedo states he is doing okay. He states he is using the home oxygen continuously @ 2lpm. He states he has not checked his O2 sat today. He states he feels his SOB is the same as when he got out of the hospital. Gene Pinedo denies any chest pain, edema, or fever at this time. He states he is in the \"green\" heart failure zone at this time. Gene Pinedo states he continues to have a productive cough - clear mucous/phlegm. He denies any needs at this time.       Follow Up  Future Appointments   Date Time Provider Pedrito Nicholas   2022  1:20 PM [facility]   2023 11:30 AM SCHEDULE, [facility] REMOTE TRANSMISSION [facility]   2023  1:45 PM MD Génesis Morales [facility] [place] - [place]   3/7/2023 12:15 PM Wing Gomez MD [facility]   2023 10:30 AM SCHEDULE, [facility] REMOTE TRANSMISSION [facility]   2023 10:30 AM SCHEDULE, [facility] TRANSMISSION [facility]   2023 11:30 AM SCHEDULE, [facility] TRAN[facility]        Care Transitions Subsequent and Final Call    Subsequent and Final Calls  Do you have any ongoing symptoms?: No  Have your medications changed?: No  Do you have any questions related to your medications?: No  Do you currently have any active services?: Yes  Are you currently active with any services?: Other  Do you have any needs or concerns that I can assist you with?: No  Identified Barriers: None  Care Transitions Interventions  Other Interventions:             Care Transition Nurse provided contact information for future needs.    Plan for next call:  SOB & O2 usage    Elver Fisher RN BSN  Care Transition Nurse  [phone]

## 2022-12-14 NOTE — TELEPHONE ENCOUNTER
Spoke with pharmacy. The order was changed to alternate brand of Albuterol and already taken care of. No PA needed.

## 2022-12-15 RX ORDER — QUETIAPINE FUMARATE 25 MG/1
TABLET, FILM COATED ORAL
Qty: 180 TABLET | Refills: 1 | Status: SHIPPED | OUTPATIENT
Start: 2022-12-15

## 2022-12-21 ENCOUNTER — CARE COORDINATION (OUTPATIENT)
Dept: CASE MANAGEMENT | Age: 77
End: 2022-12-21

## 2022-12-21 NOTE — CARE COORDINATION
[facility] Care Transitions Follow Up Call    Care Transition Nurse contacted the patient by telephone to follow up after admission on 2022. Verified name and  with patient as identifiers. Patient: Екатерина Murrell  Patient : 1945   MRN: [id number]  Reason for Admission: Septicemia  Discharge Date: 22 RARS: Readmission Risk Score: 18.5      Needs to be reviewed by the provider   Additional needs identified to be addressed with provider: No               Method of communication with provider: none. Genny Yo states he is doing \"okay\". He states he continues to use his oxygen @ 2lpm. He states his SOB remains stable. Genny Yo states there is nothing that has really changed at this time. He states he would place himself in the \"green\" heart failure zone at this time. He denies any needs at this time. Follow Up  Future Appointments   Date Time Provider Pedrito Nicholas   2022  2:00 PM Sundar Conteh MD [facility]   2023 11:30 AM SCHEDULE, [facility] REMOTE TRANSMISSION [facility]   2023  1:45 PM [facility] [place] [facility]   3/7/2023 12:15 PM Ramez Douglas MD [facility]   2023 10:30 AM SCHEDULE, [facility] REMOTE TRANSMISSION [facility]   2023 10:30 AM SCHEDULE, [facility] REMOTE TRANSMISSION [facility]   2023 11:30 AM SCHEDULE, [facility] TRANSMISSION [facility]        Care Transitions Subsequent and Final Call    Subsequent and Final Calls  Are you currently active with any services?: Other  Care Transitions Interventions  Other Interventions:             Care Transition Nurse provided contact information for future needs. Plan for next call:  final outreach if patient is stable.     Erich Mcduffie RN BSN  Care Transition Nurse  [phone]

## 2022-12-28 ENCOUNTER — CARE COORDINATION (OUTPATIENT)
Dept: CASE MANAGEMENT | Age: 77
End: 2022-12-28

## 2022-12-28 NOTE — CARE COORDINATION
[facility] Care Transitions Follow Up Call    Care Transition Nurse contacted the patient by telephone to follow up after admission on 2022. Verified name and  with family as identifiers. Patient: Wil Billing  Patient : 1945   MRN: [id number]  Reason for Admission: Septicemia  Discharge Date: 22 RARS: Readmission Risk Score: 18.5      Needs to be reviewed by the provider   Additional needs identified to be addressed with provider: No               Method of communication with provider: none. Final attempt at CT follow up phone call. Spoke with Srinath Gonzalez - wife - ELAINE verified. She states Rayo Darby is doing alright. She states he is currently not home. He took a friend who does not drive to a doctor's appointment. Srinath Gonzalez states Rayo Darby is using the portable oxygen tanks. She denies any needs for Neil at this time. Informed Srinath Gonzalez this would be the final CTN outreach. She verbalized understanding. She states Rayo Darby sees the Pulmonary physician on Friday and he will follow up with the PCP as needed. Follow Up  Future Appointments   Date Time Provider Pedrito Nicholas   2022  2:00 PM [facility]   2023 11:30 AM SCHEDULE, [facility] REMOTE TRANSMISSION [facility]   2023  1:45 PM MD Efra Mojica  [facility] [place] - [place]D   3/7/2023 12:15 PM David Jeong MD [facility]   2023 10:30 AM SCHEDULE, [facility] REMOTE TRANSMISSION [facility]   2023 10:30 AM SCHEDULE, [facility] REMOTE TRANSMISSION [facility]   2023 11:30 AM SCHEDULE, [facility] REMOTE TRANSMISSION [facility]       Care Transitions Subsequent and Final Call    Subsequent and Final Calls  Are you currently active with any services?: Other  Care Transitions Interventions  Other Interventions:             Care Transition Nurse provided contact information for future needs. No further follow-up call indicated based on severity of symptoms and risk factors.     Chantell Daniel RN BSN  Care Transition Nurse  [phone]

## 2022-12-30 ENCOUNTER — OFFICE VISIT (OUTPATIENT)
Dept: PULMONOLOGY | Age: 77
End: 2022-12-30
Payer: MEDICARE

## 2022-12-30 VITALS
RESPIRATION RATE: 16 BRPM | SYSTOLIC BLOOD PRESSURE: 118 MMHG | DIASTOLIC BLOOD PRESSURE: 60 MMHG | WEIGHT: 151.4 LBS | BODY MASS INDEX: 21.67 KG/M2 | HEIGHT: 70 IN | TEMPERATURE: 97.1 F

## 2022-12-30 DIAGNOSIS — J96.11 CHRONIC RESPIRATORY FAILURE WITH HYPOXIA (HCC): ICD-10-CM

## 2022-12-30 DIAGNOSIS — J84.9 ILD (INTERSTITIAL LUNG DISEASE) (HCC): Primary | ICD-10-CM

## 2022-12-30 PROCEDURE — 1111F DSCHRG MED/CURRENT MED MERGE: CPT | Performed by: INTERNAL MEDICINE

## 2022-12-30 PROCEDURE — G8427 DOCREV CUR MEDS BY ELIG CLIN: HCPCS | Performed by: INTERNAL MEDICINE

## 2022-12-30 PROCEDURE — 1123F ACP DISCUSS/DSCN MKR DOCD: CPT | Performed by: INTERNAL MEDICINE

## 2022-12-30 PROCEDURE — 3078F DIAST BP <80 MM HG: CPT | Performed by: INTERNAL MEDICINE

## 2022-12-30 PROCEDURE — 1036F TOBACCO NON-USER: CPT | Performed by: INTERNAL MEDICINE

## 2022-12-30 PROCEDURE — G8420 CALC BMI NORM PARAMETERS: HCPCS | Performed by: INTERNAL MEDICINE

## 2022-12-30 PROCEDURE — G8484 FLU IMMUNIZE NO ADMIN: HCPCS | Performed by: INTERNAL MEDICINE

## 2022-12-30 PROCEDURE — 3074F SYST BP LT 130 MM HG: CPT | Performed by: INTERNAL MEDICINE

## 2022-12-30 PROCEDURE — 99214 OFFICE O/P EST MOD 30 MIN: CPT | Performed by: INTERNAL MEDICINE

## 2022-12-30 ASSESSMENT — ENCOUNTER SYMPTOMS
WHEEZING: 0
SHORTNESS OF BREATH: 1
COUGH: 0

## 2022-12-30 NOTE — PROGRESS NOTES
[address], SLEEP, AND CRITICAL CARE    Laura Roque (:  1945) is a 76 y.o. male,New patient, here for evaluation of the following chief complaint(s):  Shortness of Breath and Follow-up         ASSESSMENT/PLAN:  1. ILD (interstitial lung disease) ([address]  Assessment & Plan:   - Focal right upper lobe there are some scattered interstitial changes elsewhere  -Steroid did not seem to make much of a difference. In fact may be coincidence but was admitted for pneumonia after starting steroids.  -I am not inclined to rechallenge with steroids  -Underlying etiology remains elusive. Theoretically would need an open lung biopsy to determine causality but at this time given his age patient would like to defer. We will discuss at next appointment. 2. Chronic respiratory failure with hypoxia [facility])  Assessment & Plan:   - Was discharged on supplemental oxygen on discharge  -We will continue to follow to see if there is any improvement    Return in about 2 months (around 2023). Future Appointments   Date Time Provider Pedrito Nicholas   2023 11:30 AM [facility]   2023  1:45 PM MD Ana Paula Oconnor RD [facility] [place] - [place]D   3/7/2023 12:15 PM Jonny Ballard MD [facility]   3/15/2023  2:20 PM Bessie Whittaker MD [address] [place] [address]   2023 10:30 AM SCHEDULE, [facility] REMOTE TRANSMISSION [facility]   2023 10:30 AM SCHEDULE, [facility] REMOTE TRANSMISSION [facility]   2023 11:30 AM SCHEDULE[facility]            Subjective   SUBJECTIVE/OBJECTIVE:  78-year-old presents for follow-up of shortness of breath and abnormal CT. recently admitted for superimposed pneumonia on his background ILD. Once is cleared he had a repeat CT done that actually shows somewhat stability from the initial CT scan. Though remains unclear what the underlying pathology is. Does not seem to respond well to steroids.   We again discussed open lung biopsy but patient would like to defer at this time. He has been discharged on supplemental oxygen he is currently on 2 L. Review of Systems   Constitutional: Negative. Respiratory:  Positive for shortness of breath. Negative for cough and wheezing. Cardiovascular: Negative. Neurological: Negative. Psychiatric/Behavioral: Negative. Objective   Physical Exam     Gen:  No acute distress. Eyes: EOMI. Anicteric sclera. No conjunctival injection. ENT: No discharge. External appearance of ears and nose normal.  Neck: Trachea midline. No mass   Resp:  No crackles. No wheezes. No rhonchi. No dullness on percussion. CV: Regular rate. Regular rhythm. No murmur or rub. No edema. Neuro:  no focal neurologic deficit. Moves all extremities  Psych: Awake and alert, Oriented x 3. Judgement and insight appropriate. Mood stable. PFTs  -Mild COPD, FEV1 75%  -Reduced diffusing capacity  -+ Air trapping    CT chest images reviewed personally by me, interpretation as follows:  -Scattered interstitial lung disease most focal fibrosis in the right upper lobe. Compared to CT scan 2 occurrences ago appear somewhat stable. An electronic signature was used to authenticate this note.     --Connor Wolf MD

## 2023-01-04 DIAGNOSIS — F51.04 PSYCHOPHYSIOLOGICAL INSOMNIA: ICD-10-CM

## 2023-01-05 PROBLEM — J96.11 CHRONIC RESPIRATORY FAILURE WITH HYPOXIA (HCC): Status: ACTIVE | Noted: 2020-12-16

## 2023-01-05 PROBLEM — J44.9 COPD, MILD (HCC): Status: RESOLVED | Noted: 2017-04-24 | Resolved: 2023-01-05

## 2023-01-05 NOTE — ASSESSMENT & PLAN NOTE
- Focal right upper lobe there are some scattered interstitial changes elsewhere  -Steroid did not seem to make much of a difference. In fact may be coincidence but was admitted for pneumonia after starting steroids.  -I am not inclined to rechallenge with steroids  -Underlying etiology remains elusive. Theoretically would need an open lung biopsy to determine causality but at this time given his age patient would like to defer. We will discuss at next appointment.

## 2023-01-05 NOTE — ASSESSMENT & PLAN NOTE
- Was discharged on supplemental oxygen on discharge  -We will continue to follow to see if there is any improvement

## 2023-01-17 NOTE — PROGRESS NOTES
[facility]   Electrophysiology      Date: 1/19/2023    Primary Cardiologist: Fela Bello MD  PCP: Gabby Rodriguez MD     Chief Complaint:   Chief Complaint   Patient presents with    Follow-up     1 yr     History of Present Illness:    I saw Cortes Avila in the office for electrophysiology follow up today. He is a 76 y.o. male with a past medical history of second degree AV block type II s/p Medtronic dual chamber PPM implanted 6/4/21, recovered cardiomyopathy, CAD, paroxysmal atrial fibrillation, COVID-19 pneumonia (Dec. 2020) with ILD possibly secondary to this, anxiety, COPD, hyperlipidemia, HTN, IBS and depression. He underwent atrial fibrillation (PVI, CAFE) and left atrial flutter (roof line) ablation on 8/20/21 with Dr. Luis Ruiz. He had an echo in April 2022 with and EF of 35%. Underwent LHC which showed nonobstructive disease. Repeat limited echo in August 2022 showed an EF of 50%. He presents today for yearly device follow up. He had multifocal pneumonia in November and was hospitalized for 4 days. He is now on continuous oxygen. He does have NOEL but continues to do daily yoga and ride a stationary bike. Denies any chest pain or palpitations. No edema. No bleeding issues aside from mild nosebleeds and no falling. Allergies:  No Known Allergies  Home Medications:  Prior to Visit Medications    Medication Sig Taking? Authorizing Provider   estazolam (PROSOM) 2 MG tablet TAKE 1 TO 2 TABLETS BY MOUTH EVERY NIGHT AS NEEDED FOR SLEEP Yes Yumiko Parsons MD   QUEtiapine (SEROQUEL) 25 MG tablet TAKE ONE (1) TO TWO (2) TABLETS BY MOUTH NIGHTLY Yes Yumiko Parsons MD   diphenoxylate-atropine (LOMOTIL) 2.5-0.025 MG per tablet TAKE ONE (1) TABLET BY MOUTH FOUR (4) TIMES DAILY AS NEEDED FOR DIARRHEA FOR UP TO 30 DAYS.  Yes Yumiko Parsons MD   albuterol sulfate HFA (PROVENTIL;VENTOLIN;PROAIR) 108 (90 Base) MCG/ACT inhaler Inhale 2 puffs into the lungs every 6 hours as needed for Wheezing Yes Kumar Parsons Marlen Ferro MD   losartan (COZAAR) 100 MG tablet Take 1 tablet by mouth daily Yes Giovanna Gomez MD   venlafaxine (EFFEXOR XR) 150 MG extended release capsule Take 1 capsule by mouth daily Yes Giovanna Gomez MD   pravastatin (PRAVACHOL) 40 MG tablet Take 1 tablet by mouth daily Yes Giovanna Gomez MD   furosemide (LASIX) 40 MG tablet Take 1 tablet by mouth daily Yes Giovanna Gomez MD   spironolactone (ALDACTONE) 25 MG tablet TAKE ONE (1) TABLET BY MOUTH DAILY Yes Yael Osorio MD   metoprolol succinate (TOPROL XL) 50 MG extended release tablet TAKE ONE (1) TABLET BY MOUTH DAILY Yes Yael Osorio MD   Fluticasone furoate-vilanterol (BREO ELLIPTA) 200-25 MCG/INH AEPB inhaler Inhale 1 puff into the lungs daily Yes Giovanna Gomez MD   ELIQUIS 5 MG TABS tablet TAKE ONE (1) TABLET BY MOUTH TWO (2) TIMES DAILY Yes Lexii Leon MD   NONFORMULARY Apply topically as needed CBD oil Yes Historical Provider, MD   vitamin D (CHOLECALCIFEROL) 125 MCG (5000 UT) CAPS capsule Take 5,000 Units by mouth every evening Yes Historical Provider, MD   Multiple Vitamins-Minerals (MULTIVITAMIN) LIQD Take 30 mLs by mouth daily Has 80 mcg of vitamin K -2  Has 50 mg of ginsing root Yes Historical Provider, MD   famotidine (PEPCID) 20 MG tablet Take 1 tablet by mouth 2 times daily Yes Nani Kaiser, APRN - CNP        Past Medical History:  Past Medical History:   Diagnosis Date    Anxiety     CHF (congestive heart failure) (HCC)     COPD, mild ([facility] 04/24/2017    COVID-19 12/2020    Depression     History of blood transfusion     Hyperlipidemia     Hypertension     IBS (irritable bowel syndrome) 10/16/2013    Insomnia     PAF (paroxysmal atrial fibrillation) ([facility] 08/10/2021    Sick sinus syndrome ([facility] 08/10/2021    Wears glasses        Past Surgical History:   Past Surgical History:   Procedure Laterality Date    ABLATION OF DYSRHYTHMIC FOCUS      APPENDECTOMY      BRONCHOSCOPY N/A 10/11/2022    BRONCHOSCOPY WITH BRONCHIOLAR ALVEOLAR LAVAGE performed by Nigel Wolf MD at [address]  10/11/2022    BRONCHOSCOPY DIAGNOSTIC OR CELL [address] ONLY performed by Nigel Wolf MD at [address]      pacemaker    COLONOSCOPY  03/19/2019    [place]    COLONOSCOPY N/A 03/19/2019    COLONOSCOPY WITH BIOPSY performed by Oneil Jeffries MD at [place] [address] N/A 03/19/2019    COLONOSCOPY POLYPECTOMY SNARE/COLD BIOPSY performed by Oneil Jeffries MD at [facility] History:   reports that he quit smoking about 35 years ago. His smoking use included cigarettes. He has a 20.00 pack-year smoking history. He has been exposed to tobacco smoke. He quit smokeless tobacco use about 34 years ago. He reports that he does not drink alcohol and does not use drugs. Family History:      Problem Relation Age of Onset    Cancer Mother     Lung Cancer Mother     COPD Father     Hypertension Sister     Hypertension Brother     Cancer Brother     Hypertension Brother     Stroke Maternal Grandfather     Cancer Other         leukemia       Review of Systems   Constitutional:  Negative for chills, fatigue, fever and unexpected weight change. HENT:  Positive for nosebleeds. Negative for congestion, hearing loss, sinus pressure, sore throat and trouble swallowing. Respiratory:  Positive for shortness of breath. Negative for cough and wheezing. Cardiovascular:  Negative for chest pain, palpitations and leg swelling. Gastrointestinal:  Negative for abdominal pain, blood in stool, constipation, diarrhea, nausea and vomiting. Genitourinary:  Negative for hematuria. Musculoskeletal:  Negative for arthralgias, back pain, gait problem and myalgias. Skin:  Negative for color change, rash and wound. Neurological:  Negative for dizziness, seizures, syncope, speech difficulty, weakness and light-headedness.    Hematological:  Does not bruise/bleed easily. Physical Examination:  Vitals:    01/19/23 1309   BP: 114/64   Pulse: (!) 36   SpO2: 91%        Wt Readings from Last 3 Encounters:   01/19/23 154 lb (69.9 kg)   12/30/22 151 lb 6.4 oz (68.7 kg)   12/09/22 155 lb (70.3 kg)       Physical Exam  Vitals reviewed. Constitutional:       General: He is not in acute distress. Appearance: Normal appearance. HENT:      Head: Normocephalic and atraumatic. Nose: Nose normal.      Mouth/Throat:      Mouth: Mucous membranes are moist.   Eyes:      Conjunctiva/sclera: Conjunctivae normal.      Pupils: Pupils are equal, round, and reactive to light. Cardiovascular:      Rate and Rhythm: Normal rate and regular rhythm. Heart sounds: No murmur heard. No friction rub. No gallop. Pulmonary:      Effort: No respiratory distress. Breath sounds: Decreased breath sounds present. No wheezing, rhonchi or rales. Abdominal:      General: Abdomen is flat. Bowel sounds are normal.      Palpations: Abdomen is soft. Musculoskeletal:         General: Normal range of motion. Right lower leg: No edema. Left lower leg: No edema. Skin:     General: Skin is warm and dry. Findings: No bruising. Neurological:      General: No focal deficit present. Mental Status: He is alert and oriented to person, place, and time. Motor: No weakness.    Psychiatric:         Mood and Affect: Mood normal.         Behavior: Behavior normal.        Pertinent labs, diagnostic, device, and imaging results reviewed as a part of this visit    LABS    CBC:   Lab Results   Component Value Date    WBC 11.1 (H) 11/29/2022    HGB 8.9 (L) 11/29/2022    HCT 28.7 (L) 11/29/2022    MCV 69.1 (L) 11/29/2022     11/29/2022     BMP:   Lab Results   Component Value Date    CREATININE 0.9 11/29/2022    BUN 22 (H) 11/29/2022     11/29/2022    K 4.2 11/29/2022     11/29/2022    CO2 20 (L) 11/29/2022     Estimated Creatinine Clearance: 64 mL/min (based on SCr of 0.9 mg/dL). No results found for: BNP    Thyroid:   Lab Results   Component Value Date    TSH 3.89 2022     Lipid Panel:   Lab Results   Component Value Date/Time    CHOL 138 2020 11:31 AM    HDL 51 2022 11:02 AM    HDL 49 2011 11:09 AM    TRIG 108 2020 11:31 AM     LFTs:  Lab Results   Component Value Date    ALT 18 2022    AST 22 2022    ALKPHOS 106 2022    BILITOT <0.2 2022     Coags:   Lab Results   Component Value Date    PROTIME >170.0 (H) 10/15/2021    INR 2.0 2022       EC2023   A-sensed V-paced at 83 BPM.    Echo: 3/25/22   -Overall left ventricular systolic function is moderately depressed .   -Ejection fraction is visually estimated to be 35%. E/e'= 10.7   -There is global hypokinesis. - Grade I diastolic dysfunction with normal LV filling pressures. -Mild thickening of leaflets of mitral valve. -Mild to moderate mitral regurgitation.   -Dilated left atrium with a volume of 92.7 ml.   -The aortic valve appears sclerotic but opens well. -Mild aortic regurgitation.   -Right ventricular systolic function is normal . TAPSE= 2.61 RV S'= 13.5   -Pacer / ICD wire is visualized in the right ventricle. -Moderate tricuspid regurgitation.   -Systolic pulmonary artery pressure (SPAP) estimated at 38 mmHg (RA pressure   3 mmHg). -IVC size is dilated (>2.1 cm) but collapses > 50% with respiration   consistent with elevated RA pressure (8 mmHg). Limited echo: 22   Suboptimal image quality. Overall left ventricular systolic function appears mildly reduced. Ejection   fraction is visually estimated to be 50%. Abnormal (paradoxical) septal   motion is present likely due to right ventricular pacing. Normal left   ventricular wall thickness and cavity size. The right ventricle is normal in size and function. Pacer / ICD wire is visualized in the right ventricle.    Mild aortic and tricuspid regurgitation. Stress echo: 10/9/15   Normal (Negative) response to exercise. Baseline echocardiogram shows normal LV function with an ejection fraction   of 60%. Following exercise there was uniform augmentation of all segments   with appropriate hyperdynamic response to exercise. Normal; stress echo. Cath: 4/28/22  CORONARY ARTERIOGRAM:        L Main; shelflike lesion that was at least 50% angiographically. An IVUS was performed and was thought to be much lower     LAD: Heavily calcified proximal lesion before the diagonal/septal branch point had a 5060% stenosis. On IVUS the artery was 2.5 x 3 mm in diameter     LCX nonobstructive     RCA 40% proximal     LV GRAM:  done in the 30° RUGGIERO projection; Shows normal LV size with an estimated ejection fraction of 40%          CONCLUSION:      Shelflike lesion in the proximal left main which on IVUS was not significant  Proximal/mid LAD has about 60% stenosis angiographically. On IVUS internal diameter was 2.5 x3 millimeters     Left circumflex and RCA had no major obstructive disease  LV: Normal size with EF of about 40%        INTERVENTION        IVUS of the left main and the LAD was performed     Left main:  Proximal/mid LAD: 2.5 x 3 .0        SUMMARY:      Nonsignificant shelflike lesion in the proximal left main  60% proximal LAD; IVUS showed 2.5 x 3 mm internal diameter           Based on the above data I also reviewed his echocardiogram done a few weeks ago  I believe that the patient's LV dysfunction appears to be 35% but may be better, perhaps in the 45 to 50% range. This is because the patient left ventricle has asymmetric contraction from RV pacing. Did not make any changes to his medications. I will see him back in a few weeks     I think I would like to hold off on the recommendation for BiV upgrading for the time being. EP Procedures:  1. Medtronic dual chamber PPM, 6/4/21, Dr. Manjula Thompson  2.  Atrial fibrillation (PVI, CAFE) and left atrial flutter (roof line) ablation, 8/20/21, Dr. Cass Higuera interrogation: 1/19/2023   Normal device function. Battery life 10.6 years  V paced 99.2%  A paced 5.7%  No new arrhythmias. Assessment & Plan:    Paroxysmal atrial fibrillation   - first seen on CAM patch in May 2021   - s/p PVI, CAFE RFA on 8/20/21    - CHADS2-VASc 3 (age, HTN) on Eliquis 5mg BID    - device interrogation today with no new events, burden <0.1%   - continue to monitor for symptoms of atrial fibrillation/flutter, if symptomatic should send remote device check    Left atrial flutter   - seen during EP study s/p roof line ablation   - see above    Second degree AV block type II   - seen on CAM patch in May 2021, also likely some chronotropic incompetence as highest HR was 76 BPM   - s/p Medtronic dual chamber PPM implanted 6/4/21   - device interrogation as above, device with normal function   - continue with routine follow up with device clinic    Chronic systolic heart failure   - EF was 35% on echo 4/22, improved to 50%   - [facility] 4/22 without significant disease   - appears well compensated   - GDMT as directed by Dr. Ruddy Leslie   - does have high amounts of RV pacing so if EF drops again, may have to consider up grade to Bi-V ICD versus PPM    CAD   - nonobstructive on [facility] 4/22   - on statin, beta blocker   - managed by Dr. Juventino Bah HTN   - controlled    - managed by PCP    Thank you for allowing to us to participate in the care of Cortes Avila. [facility]     [facility]  [facility] [address] [place] [address]  Phone: [phone]  Fax: [phone]    Electronically signed by KLAUS Horton - CNP on 1/19/2023 at 1:36 PM

## 2023-01-18 ENCOUNTER — NURSE ONLY (OUTPATIENT)
Dept: CARDIOLOGY CLINIC | Age: 78
End: 2023-01-18

## 2023-01-18 DIAGNOSIS — I49.5 SICK SINUS SYNDROME (HCC): ICD-10-CM

## 2023-01-18 DIAGNOSIS — Z95.0 PACEMAKER: Primary | ICD-10-CM

## 2023-01-18 NOTE — PROGRESS NOTES
Remote transmission received from patient's dual chamber pacemaker monitor at home. Transmission shows normal sensing and pacing function. No new arrhythmias/events recorded. Ap 4.7%   99.1% (MVP Off)  PVCs 11.1/hr  Echo 22.3726 showed EF of 50%. End of 91-day monitoring period 1/18/23. EP physician will review. See interrogation under cardiology tab in the [address] [place] [address] [address] field for more details. Will continue to monitor remotely.

## 2023-01-19 ENCOUNTER — OFFICE VISIT (OUTPATIENT)
Dept: CARDIOLOGY CLINIC | Age: 78
End: 2023-01-19
Payer: MEDICARE

## 2023-01-19 ENCOUNTER — NURSE ONLY (OUTPATIENT)
Dept: CARDIOLOGY CLINIC | Age: 78
End: 2023-01-19
Payer: MEDICARE

## 2023-01-19 VITALS
OXYGEN SATURATION: 91 % | BODY MASS INDEX: 23.34 KG/M2 | HEIGHT: 68 IN | DIASTOLIC BLOOD PRESSURE: 64 MMHG | WEIGHT: 154 LBS | SYSTOLIC BLOOD PRESSURE: 114 MMHG | HEART RATE: 36 BPM

## 2023-01-19 DIAGNOSIS — R06.02 SHORTNESS OF BREATH: ICD-10-CM

## 2023-01-19 DIAGNOSIS — I50.20 SYSTOLIC HEART FAILURE, UNSPECIFIED HF CHRONICITY (HCC): ICD-10-CM

## 2023-01-19 DIAGNOSIS — Z95.0 PACEMAKER: ICD-10-CM

## 2023-01-19 DIAGNOSIS — I50.22 CHRONIC SYSTOLIC HEART FAILURE (HCC): ICD-10-CM

## 2023-01-19 DIAGNOSIS — I48.92 LEFT ATRIAL FLUTTER BY ELECTROCARDIOGRAM (HCC): ICD-10-CM

## 2023-01-19 DIAGNOSIS — Z95.0 PACEMAKER: Primary | ICD-10-CM

## 2023-01-19 DIAGNOSIS — I49.5 SICK SINUS SYNDROME (HCC): ICD-10-CM

## 2023-01-19 DIAGNOSIS — I25.10 CORONARY ARTERY DISEASE INVOLVING NATIVE CORONARY ARTERY OF NATIVE HEART WITHOUT ANGINA PECTORIS: ICD-10-CM

## 2023-01-19 DIAGNOSIS — I10 ESSENTIAL HYPERTENSION: ICD-10-CM

## 2023-01-19 DIAGNOSIS — I44.1 MOBITZ TYPE 2 SECOND DEGREE ATRIOVENTRICULAR BLOCK: ICD-10-CM

## 2023-01-19 DIAGNOSIS — I42.9 CARDIOMYOPATHY, UNSPECIFIED TYPE (HCC): ICD-10-CM

## 2023-01-19 DIAGNOSIS — I48.0 PAF (PAROXYSMAL ATRIAL FIBRILLATION) (HCC): Primary | ICD-10-CM

## 2023-01-19 PROCEDURE — 3078F DIAST BP <80 MM HG: CPT | Performed by: NURSE PRACTITIONER

## 2023-01-19 PROCEDURE — 93280 PM DEVICE PROGR EVAL DUAL: CPT | Performed by: INTERNAL MEDICINE

## 2023-01-19 PROCEDURE — G8427 DOCREV CUR MEDS BY ELIG CLIN: HCPCS | Performed by: NURSE PRACTITIONER

## 2023-01-19 PROCEDURE — 3074F SYST BP LT 130 MM HG: CPT | Performed by: NURSE PRACTITIONER

## 2023-01-19 PROCEDURE — G8420 CALC BMI NORM PARAMETERS: HCPCS | Performed by: NURSE PRACTITIONER

## 2023-01-19 PROCEDURE — 1036F TOBACCO NON-USER: CPT | Performed by: NURSE PRACTITIONER

## 2023-01-19 PROCEDURE — G8484 FLU IMMUNIZE NO ADMIN: HCPCS | Performed by: NURSE PRACTITIONER

## 2023-01-19 PROCEDURE — 99214 OFFICE O/P EST MOD 30 MIN: CPT | Performed by: NURSE PRACTITIONER

## 2023-01-19 PROCEDURE — 1123F ACP DISCUSS/DSCN MKR DOCD: CPT | Performed by: NURSE PRACTITIONER

## 2023-01-19 ASSESSMENT — ENCOUNTER SYMPTOMS
SORE THROAT: 0
BACK PAIN: 0
CONSTIPATION: 0
TROUBLE SWALLOWING: 0
SINUS PRESSURE: 0
DIARRHEA: 0
ABDOMINAL PAIN: 0
VOMITING: 0
COUGH: 0
NAUSEA: 0
COLOR CHANGE: 0
WHEEZING: 0
BLOOD IN STOOL: 0
SHORTNESS OF BREATH: 1

## 2023-01-19 NOTE — PROGRESS NOTES
Patient comes in for programming evaluation on their Medtronic [id number] [place] DC PPM.     Last remote on 23    All sensing and pacing parameters are within normal range. Battery life 10.6y  P-AsVp @ 88 bpm  U-AsVs @ 85bpm  AP 5.7%.  99.2%. AT/AF burden <0.1%  PVC 8.1/hr  No episodes noted. Patient remains on Metoprolol, Eliquis  Updated time, patient ID, and . Please see interrogation for more detail. Patient will see NPCP today and follow up in 3 months in office or remotely.

## 2023-01-23 ENCOUNTER — OFFICE VISIT (OUTPATIENT)
Dept: PRIMARY CARE CLINIC | Age: 78
End: 2023-01-23
Payer: MEDICARE

## 2023-01-23 VITALS
RESPIRATION RATE: 20 BRPM | WEIGHT: 154 LBS | DIASTOLIC BLOOD PRESSURE: 78 MMHG | SYSTOLIC BLOOD PRESSURE: 134 MMHG | HEART RATE: 91 BPM | BODY MASS INDEX: 23.42 KG/M2

## 2023-01-23 DIAGNOSIS — I10 ESSENTIAL HYPERTENSION: ICD-10-CM

## 2023-01-23 DIAGNOSIS — J96.11 CHRONIC RESPIRATORY FAILURE WITH HYPOXIA (HCC): ICD-10-CM

## 2023-01-23 DIAGNOSIS — E78.5 HYPERLIPIDEMIA LDL GOAL <100: ICD-10-CM

## 2023-01-23 DIAGNOSIS — I48.0 PAF (PAROXYSMAL ATRIAL FIBRILLATION) (HCC): ICD-10-CM

## 2023-01-23 DIAGNOSIS — I49.5 SICK SINUS SYNDROME (HCC): ICD-10-CM

## 2023-01-23 DIAGNOSIS — F51.01 PRIMARY INSOMNIA: ICD-10-CM

## 2023-01-23 DIAGNOSIS — F41.8 DEPRESSION WITH ANXIETY: ICD-10-CM

## 2023-01-23 DIAGNOSIS — I42.8 NONISCHEMIC CARDIOMYOPATHY (HCC): ICD-10-CM

## 2023-01-23 DIAGNOSIS — J84.9 ILD (INTERSTITIAL LUNG DISEASE) (HCC): ICD-10-CM

## 2023-01-23 PROBLEM — A41.9 SEPSIS (HCC): Status: RESOLVED | Noted: 2022-11-27 | Resolved: 2023-01-23

## 2023-01-23 PROCEDURE — 3075F SYST BP GE 130 - 139MM HG: CPT | Performed by: FAMILY MEDICINE

## 2023-01-23 PROCEDURE — 99214 OFFICE O/P EST MOD 30 MIN: CPT | Performed by: FAMILY MEDICINE

## 2023-01-23 PROCEDURE — 1036F TOBACCO NON-USER: CPT | Performed by: FAMILY MEDICINE

## 2023-01-23 PROCEDURE — 1123F ACP DISCUSS/DSCN MKR DOCD: CPT | Performed by: FAMILY MEDICINE

## 2023-01-23 PROCEDURE — G8484 FLU IMMUNIZE NO ADMIN: HCPCS | Performed by: FAMILY MEDICINE

## 2023-01-23 PROCEDURE — G8420 CALC BMI NORM PARAMETERS: HCPCS | Performed by: FAMILY MEDICINE

## 2023-01-23 PROCEDURE — 3078F DIAST BP <80 MM HG: CPT | Performed by: FAMILY MEDICINE

## 2023-01-23 PROCEDURE — G8427 DOCREV CUR MEDS BY ELIG CLIN: HCPCS | Performed by: FAMILY MEDICINE

## 2023-01-23 ASSESSMENT — PATIENT HEALTH QUESTIONNAIRE - PHQ9
SUM OF ALL RESPONSES TO PHQ9 QUESTIONS 1 & 2: 0
9. THOUGHTS THAT YOU WOULD BE BETTER OFF DEAD, OR OF HURTING YOURSELF: 0
2. FEELING DOWN, DEPRESSED OR HOPELESS: 0
3. TROUBLE FALLING OR STAYING ASLEEP: 0
SUM OF ALL RESPONSES TO PHQ QUESTIONS 1-9: 1
SUM OF ALL RESPONSES TO PHQ QUESTIONS 1-9: 1
8. MOVING OR SPEAKING SO SLOWLY THAT OTHER PEOPLE COULD HAVE NOTICED. OR THE OPPOSITE, BEING SO FIGETY OR RESTLESS THAT YOU HAVE BEEN MOVING AROUND A LOT MORE THAN USUAL: 0
7. TROUBLE CONCENTRATING ON THINGS, SUCH AS READING THE NEWSPAPER OR WATCHING TELEVISION: 0
10. IF YOU CHECKED OFF ANY PROBLEMS, HOW DIFFICULT HAVE THESE PROBLEMS MADE IT FOR YOU TO DO YOUR WORK, TAKE CARE OF THINGS AT HOME, OR GET ALONG WITH OTHER PEOPLE: 0
6. FEELING BAD ABOUT YOURSELF - OR THAT YOU ARE A FAILURE OR HAVE LET YOURSELF OR YOUR FAMILY DOWN: 0
4. FEELING TIRED OR HAVING LITTLE ENERGY: 1
5. POOR APPETITE OR OVEREATING: 0
1. LITTLE INTEREST OR PLEASURE IN DOING THINGS: 0
SUM OF ALL RESPONSES TO PHQ QUESTIONS 1-9: 1
SUM OF ALL RESPONSES TO PHQ QUESTIONS 1-9: 1

## 2023-01-23 ASSESSMENT — ENCOUNTER SYMPTOMS
CONSTIPATION: 0
ABDOMINAL PAIN: 0
VOICE CHANGE: 0
TROUBLE SWALLOWING: 0
DIARRHEA: 0
BLOOD IN STOOL: 0
SHORTNESS OF BREATH: 1

## 2023-01-23 NOTE — PROGRESS NOTES
2023     Ana Mccord (:  1945) is a 76 y.o. male, here for evaluation of the following medical concerns:    HPI  Patient presented to the office for follow-up. Is a 76years old white male who has second-degree AV block, sick sinus syndrome underwent pacemaker placement 2021, has known ischemic cardiomyopathy initially echocardiogram 35% 2022 went up to 50% on August 3, 2022, has paroxysmal A. fib underwent ablation by Dr. Priscilla Chavez heart rate controlled with the beta-blocker also on blood thinner for thromboembolic prevention. Was hospitalized 2022 due to pneumonia was also found to have interstitial lung disease now on continuous home oxygen at 2 to 3 L by nasal cannula to keep oxygen saturation above the 92% patient reported that his breathing is baseline, denies chest pain or palpitation denies urinary disturbance. She still have trouble sleeping and takes prednisone 2 mg 1 to 2 tablets at night plus Seroquel 25 mg 1 to 2 tablets at 9 weeks help him sleep. She has anxiety depression is stable on Effexor Exar 150 mg daily,. He has hyperlipidemia and reported to be compliant with a statin, takes Lipitor 40 mg daily and tolerating medication without side effect. Review of Systems   Constitutional:  Positive for fatigue. Negative for activity change. HENT:  Negative for trouble swallowing and voice change. Eyes:  Negative for visual disturbance. Respiratory:  Positive for shortness of breath. Cardiovascular:  Negative for chest pain and leg swelling. Gastrointestinal:  Negative for abdominal pain, blood in stool, constipation and diarrhea. Genitourinary:  Negative for difficulty urinating, dysuria, frequency, hematuria and scrotal swelling. Musculoskeletal:  Negative for arthralgias and myalgias. Skin:  Negative for rash. Neurological:  Negative for dizziness. Psychiatric/Behavioral:  Negative for behavioral problems.       Prior to Visit Medications    Medication Sig Taking? Authorizing Provider   estazolam (PROSOM) 2 MG tablet TAKE 1 TO 2 TABLETS BY MOUTH EVERY NIGHT AS NEEDED FOR SLEEP  Queta Guardado MD   QUEtiapine (SEROQUEL) 25 MG tablet TAKE ONE (1) TO TWO (2) TABLETS BY MOUTH NIGHTLY  Queta Guardado MD   diphenoxylate-atropine (LOMOTIL) 2.5-0.025 MG per tablet TAKE ONE (1) TABLET BY MOUTH FOUR (4) TIMES DAILY AS NEEDED FOR DIARRHEA FOR UP TO 30 DAYS.   Queta Guardado MD   albuterol sulfate HFA (PROVENTIL;VENTOLIN;PROAIR) 108 (90 Base) MCG/ACT inhaler Inhale 2 puffs into the lungs every 6 hours as needed for Young Mancilla MD   losartan (COZAAR) 100 MG tablet Take 1 tablet by mouth daily  Queta Guardado MD   venlafaxine (EFFEXOR XR) 150 MG extended release capsule Take 1 capsule by mouth daily  Queta Guardado MD   pravastatin (PRAVACHOL) 40 MG tablet Take 1 tablet by mouth daily  Queta Guardado MD   furosemide (LASIX) 40 MG tablet Take 1 tablet by mouth daily  Queta Guardado MD   spironolactone (ALDACTONE) 25 MG tablet TAKE ONE (1) TABLET BY MOUTH DAILY  Michelle Rollins MD   metoprolol succinate (TOPROL XL) 50 MG extended release tablet TAKE ONE (1) TABLET BY MOUTH DAILY  Michelle Rollins MD   Fluticasone furoate-vilanterol (BREO ELLIPTA) 200-25 MCG/INH AEPB inhaler Inhale 1 puff into the lungs daily  Queta Guardado MD   ELIQUIS 5 MG TABS tablet TAKE ONE (1) TABLET BY MOUTH TWO (2) TIMES DAILY  Jasmin Gomez MD   NONFORMULARY Apply topically as needed CBD oil  Historical Provider, MD   vitamin D (CHOLECALCIFEROL) 125 MCG (5000 UT) CAPS capsule Take 5,000 Units by mouth every evening  Historical Provider, MD   Multiple Vitamins-Minerals (MULTIVITAMIN) LIQD Take 30 mLs by mouth daily Has 80 mcg of vitamin K -2  Has 50 mg of ginsing root  Historical Provider, MD   famotidine (PEPCID) 20 MG tablet Take 1 tablet by mouth 2 times daily  Johnna Santo, APRN - CNP        Social History     Tobacco Use    Smoking status: Former Packs/day: 1.00     Years: 20.00     Pack years: 20.00     Types: Cigarettes     Quit date: 1987     Years since quittin.4     Passive exposure: Past    Smokeless tobacco: Former     Quit date: 1988   Substance Use Topics    Alcohol use: No        Vitals:    23 1434   BP: 134/78   Pulse: 91   Resp: 20   Weight: 154 lb (69.9 kg)     Estimated body mass index is 23.42 kg/m² as calculated from the following:    Height as of 23: 5' 8\" (1.727 m). Weight as of this encounter: 154 lb (69.9 kg). Physical Exam  Constitutional:       General: He is not in acute distress. Appearance: He is well-developed. HENT:      Head: Normocephalic. Eyes:      Conjunctiva/sclera: Conjunctivae normal.      Pupils: Pupils are equal, round, and reactive to light. Neck:      Thyroid: No thyromegaly. Cardiovascular:      Rate and Rhythm: Normal rate and regular rhythm. Heart sounds: Normal heart sounds. No murmur heard. Pulmonary:      Effort: Pulmonary effort is normal. No respiratory distress. Breath sounds: Normal breath sounds. No wheezing or rales. Abdominal:      General: Bowel sounds are normal. There is no distension. Palpations: Abdomen is soft. There is no mass. Tenderness: There is no guarding or rebound. Genitourinary:     Penis: Normal.       Prostate: Normal.   Musculoskeletal:         General: Normal range of motion. Cervical back: Normal range of motion and neck supple. Skin:     General: Skin is warm. Findings: No rash. Neurological:      Mental Status: He is alert and oriented to person, place, and time. Psychiatric:         Behavior: Behavior normal.       ASSESSMENT/PLAN:  1. ILD (interstitial lung disease) ([facility]  He goes to pulmonologist , Dr Kelton Smiley who ordered repeat CT of chest scheduled for March    2.  Chronic respiratory failure with hypoxia ([address]  Patient is on  continues home oxygen at 2 to 3 L by nasal cannula to keep oxygen saturation above 92%. 3. Essential hypertension  Controlled. Continue losartan 100 mg daily, Aldactone 25 mg daily and metoprolol 50 mg daily    4. Primary insomnia  He takes ProSom as needed for sleep    5. Depression with anxiety  Stable. Continue Effexor 150 mg daily. Also takes Seroquel 25 mg 1 to 2 tablets at night. 6. Sick sinus syndrome (HCC)  S/P pacemaker placement. Biventricular pacemaker being considered    7. Nonischemic cardiomyopathy ([address]  LV function was 35% per echo 3/25//22 improved to 50% per echo 8/4/22    8. PAF (paroxysmal atrial fibrillation) ([facility])  Controlled V.rate on BB Toprol XL 50 mg daily. On Eliquis for thromboembolic reduction    9. Hyperlipidemia LDL goal <100  Controlled . Continue Pravastatin 40 mg daily      No follow-ups on file.     An electronic signature was used to authenticate this note.    --Lam Villagran MD on 1/23/2023 at 4:31 PM

## 2023-02-02 ENCOUNTER — PATIENT MESSAGE (OUTPATIENT)
Dept: PRIMARY CARE CLINIC | Age: 78
End: 2023-02-02

## 2023-02-02 DIAGNOSIS — F51.04 PSYCHOPHYSIOLOGICAL INSOMNIA: ICD-10-CM

## 2023-02-03 ENCOUNTER — HOSPITAL ENCOUNTER (EMERGENCY)
Age: 78
Discharge: HOME | End: 2023-02-03
Attending: EMERGENCY MEDICINE
Payer: MEDICARE

## 2023-02-03 ENCOUNTER — APPOINTMENT (OUTPATIENT)
Dept: GENERAL RADIOLOGY | Age: 78
End: 2023-02-03
Payer: MEDICARE

## 2023-02-03 VITALS
RESPIRATION RATE: 26 BRPM | HEART RATE: 96 BPM | OXYGEN SATURATION: 93 % | BODY MASS INDEX: 23.19 KG/M2 | SYSTOLIC BLOOD PRESSURE: 122 MMHG | TEMPERATURE: 98.5 F | HEIGHT: 68 IN | DIASTOLIC BLOOD PRESSURE: 77 MMHG | WEIGHT: 153 LBS

## 2023-02-03 DIAGNOSIS — J18.9 PNEUMONIA OF RIGHT MIDDLE LOBE DUE TO INFECTIOUS ORGANISM: Primary | ICD-10-CM

## 2023-02-03 DIAGNOSIS — J96.11 CHRONIC RESPIRATORY FAILURE WITH HYPOXIA (HCC): ICD-10-CM

## 2023-02-03 DIAGNOSIS — Z87.09 HX OF INTERSTITIAL LUNG DISEASE: ICD-10-CM

## 2023-02-03 LAB
ANION GAP SERPL CALCULATED.3IONS-SCNC: 15 MMOL/L (ref 3–16)
BASOPHILS ABSOLUTE: 0.1 K/UL (ref 0–0.2)
BASOPHILS RELATIVE PERCENT: 0.6 %
BUN BLDV-MCNC: 27 MG/DL (ref 7–20)
CALCIUM SERPL-MCNC: 9.3 MG/DL (ref 8.3–10.6)
CHLORIDE BLD-SCNC: 99 MMOL/L (ref 99–110)
CO2: 23 MMOL/L (ref 21–32)
CREAT SERPL-MCNC: 1 MG/DL (ref 0.8–1.3)
EOSINOPHILS ABSOLUTE: 0.1 K/UL (ref 0–0.6)
EOSINOPHILS RELATIVE PERCENT: 1 %
GFR SERPL CREATININE-BSD FRML MDRD: >60 ML/MIN/{1.73_M2}
GLUCOSE BLD-MCNC: 86 MG/DL (ref 70–99)
HCT VFR BLD CALC: 31.9 % (ref 40.5–52.5)
HEMATOLOGY PATH CONSULT: NO
HEMOGLOBIN: 9.7 G/DL (ref 13.5–17.5)
LYMPHOCYTES ABSOLUTE: 0.8 K/UL (ref 1–5.1)
LYMPHOCYTES RELATIVE PERCENT: 7.2 %
MCH RBC QN AUTO: 20.9 PG (ref 26–34)
MCHC RBC AUTO-ENTMCNC: 30.5 G/DL (ref 31–36)
MCV RBC AUTO: 68.4 FL (ref 80–100)
MONOCYTES ABSOLUTE: 0.7 K/UL (ref 0–1.3)
MONOCYTES RELATIVE PERCENT: 6.9 %
NEUTROPHILS ABSOLUTE: 8.8 K/UL (ref 1.7–7.7)
NEUTROPHILS RELATIVE PERCENT: 84.3 %
PDW BLD-RTO: 18.3 % (ref 12.4–15.4)
PLATELET # BLD: 249 K/UL (ref 135–450)
PMV BLD AUTO: 7.8 FL (ref 5–10.5)
POTASSIUM REFLEX MAGNESIUM: 4.5 MMOL/L (ref 3.5–5.1)
PRO-BNP: 378 PG/ML (ref 0–449)
RBC # BLD: 4.66 M/UL (ref 4.2–5.9)
SODIUM BLD-SCNC: 137 MMOL/L (ref 136–145)
TROPONIN: <0.01 NG/ML
WBC # BLD: 10.4 K/UL (ref 4–11)

## 2023-02-03 PROCEDURE — 99285 EMERGENCY DEPT VISIT HI MDM: CPT

## 2023-02-03 PROCEDURE — 94761 N-INVAS EAR/PLS OXIMETRY MLT: CPT

## 2023-02-03 PROCEDURE — 71046 X-RAY EXAM CHEST 2 VIEWS: CPT

## 2023-02-03 PROCEDURE — 6370000000 HC RX 637 (ALT 250 FOR IP): Performed by: EMERGENCY MEDICINE

## 2023-02-03 PROCEDURE — 6360000002 HC RX W HCPCS: Performed by: EMERGENCY MEDICINE

## 2023-02-03 PROCEDURE — 2700000000 HC OXYGEN THERAPY PER DAY

## 2023-02-03 PROCEDURE — 80048 BASIC METABOLIC PNL TOTAL CA: CPT

## 2023-02-03 PROCEDURE — 84484 ASSAY OF TROPONIN QUANT: CPT

## 2023-02-03 PROCEDURE — 93005 ELECTROCARDIOGRAM TRACING: CPT | Performed by: EMERGENCY MEDICINE

## 2023-02-03 PROCEDURE — 96365 THER/PROPH/DIAG IV INF INIT: CPT

## 2023-02-03 PROCEDURE — 85025 COMPLETE CBC W/AUTO DIFF WBC: CPT

## 2023-02-03 PROCEDURE — 96367 TX/PROPH/DG ADDL SEQ IV INF: CPT

## 2023-02-03 PROCEDURE — 83880 ASSAY OF NATRIURETIC PEPTIDE: CPT

## 2023-02-03 PROCEDURE — 94640 AIRWAY INHALATION TREATMENT: CPT

## 2023-02-03 PROCEDURE — 2580000003 HC RX 258: Performed by: EMERGENCY MEDICINE

## 2023-02-03 RX ORDER — PREDNISONE 20 MG/1
60 TABLET ORAL ONCE
Status: COMPLETED | OUTPATIENT
Start: 2023-02-03 | End: 2023-02-03

## 2023-02-03 RX ORDER — BENZONATATE 200 MG/1
200 CAPSULE ORAL 3 TIMES DAILY PRN
Qty: 30 CAPSULE | Refills: 0 | Status: SHIPPED | OUTPATIENT
Start: 2023-02-03 | End: 2023-02-13

## 2023-02-03 RX ORDER — DOXYCYCLINE HYCLATE 100 MG
100 TABLET ORAL 2 TIMES DAILY
Qty: 20 TABLET | Refills: 0 | Status: SHIPPED | OUTPATIENT
Start: 2023-02-03 | End: 2023-02-13

## 2023-02-03 RX ORDER — IPRATROPIUM BROMIDE AND ALBUTEROL SULFATE 2.5; .5 MG/3ML; MG/3ML
1 SOLUTION RESPIRATORY (INHALATION) ONCE
Status: COMPLETED | OUTPATIENT
Start: 2023-02-03 | End: 2023-02-03

## 2023-02-03 RX ADMIN — AZITHROMYCIN MONOHYDRATE 500 MG: 500 INJECTION, POWDER, LYOPHILIZED, FOR SOLUTION INTRAVENOUS at 18:11

## 2023-02-03 RX ADMIN — IPRATROPIUM BROMIDE AND ALBUTEROL SULFATE 1 AMPULE: 2.5; .5 SOLUTION RESPIRATORY (INHALATION) at 17:01

## 2023-02-03 RX ADMIN — PREDNISONE 60 MG: 20 TABLET ORAL at 17:14

## 2023-02-03 RX ADMIN — CEFTRIAXONE 1000 MG: 1 INJECTION, POWDER, FOR SOLUTION INTRAMUSCULAR; INTRAVENOUS at 17:23

## 2023-02-03 ASSESSMENT — ENCOUNTER SYMPTOMS
ABDOMINAL PAIN: 0
EYES NEGATIVE: 1
WHEEZING: 1
SHORTNESS OF BREATH: 1
COUGH: 1
GASTROINTESTINAL NEGATIVE: 1
BACK PAIN: 0

## 2023-02-03 ASSESSMENT — PAIN - FUNCTIONAL ASSESSMENT: PAIN_FUNCTIONAL_ASSESSMENT: NONE - DENIES PAIN

## 2023-02-03 NOTE — TELEPHONE ENCOUNTER
Refill request for this med was sent to DR Bean 2/2/23. Will monitor to see if he signs it off.   Once done, will refuse this request.

## 2023-02-03 NOTE — ED PROVIDER NOTES
[address] [facility]        Pt Name: Tiffanie Camacho  MRN: [id number]  [place] 1945  Date of evaluation: 2/3/2023  Provider: David Ortiz MD  PCP: Pedro Dia MD  Note Started: 4:48 PM EST 2/3/23    CHIEF COMPLAINT       Chief Complaint   Patient presents with    Cough     Cough since wednesday       HISTORY OF PRESENT ILLNESS: 1 or more Elements     History from : Patient    Limitations to history : None    Tiffanie Camacho is a 76 y.o. male who presents for cough and shortness of breath which been gradual onset constant worsening for the last 2 days. History of interstitial lung disease and CHF. Patient has chronic respiratory failure with hypoxia on 3 to 4 L nasal cannula baseline. Patient follows with pulmonology. Patient also has known history of cardiomyopathy. Denies any fevers or chills. Patient states he is always short of breath but this is slightly worse than his baseline. Denies any leg swelling. Denies any chest pain. No known sick contacts. Nothing else seems to make his symptoms better or worse. States his primary care doctor called in a prescription for [facility] but he is not yet to pick it up. Symptoms are mild to moderate in nature. Nursing Notes were all reviewed and agreed with or any disagreements were addressed in the HPI. REVIEW OF SYSTEMS :      Review of Systems   Constitutional: Negative. Negative for chills and fever. HENT: Negative. Eyes: Negative. Respiratory:  Positive for cough, shortness of breath and wheezing. Cardiovascular: Negative. Negative for chest pain. Gastrointestinal: Negative. Negative for abdominal pain. Genitourinary: Negative. Musculoskeletal: Negative. Negative for back pain. Skin: Negative. Neurological: Negative. Negative for headaches. Positives and Pertinent negatives as per HPI.      SURGICAL HISTORY     Past Surgical History:   Procedure Laterality Date    ABLATION OF DYSRHYTHMIC FOCUS      APPENDECTOMY      BRONCHOSCOPY N/A 10/11/2022    BRONCHOSCOPY WITH BRONCHIOLAR ALVEOLAR LAVAGE performed by Ike Salgado MD at [address]  10/11/2022    BRONCHOSCOPY DIAGNOSTIC OR CELL [address] ONLY performed by Ike Salgado MD at [address]      pacemaker    COLONOSCOPY  03/19/2019    [facility]    COLONOSCOPY N/A 03/19/2019    COLONOSCOPY WITH BIOPSY performed by Dai Vargas MD at [facility] N/A 03/19/2019    COLONOSCOPY POLYPECTOMY SNARE/COLD BIOPSY performed by Dai Vargas MD at [address]       Previous Medications    ALBUTEROL SULFATE HFA (PROVENTIL;VENTOLIN;PROAIR) 108 (90 BASE) MCG/ACT INHALER    Inhale 2 puffs into the lungs every 6 hours as needed for Wheezing    BENZONATATE (TESSALON) 200 MG CAPSULE    Take 1 capsule by mouth 3 times daily as needed for Cough    DIPHENOXYLATE-ATROPINE (LOMOTIL) 2.5-0.025 MG PER TABLET    TAKE ONE (1) TABLET BY MOUTH FOUR (4) TIMES DAILY AS NEEDED FOR DIARRHEA FOR UP TO 30 DAYS.     ELIQUIS 5 MG TABS TABLET    TAKE ONE (1) TABLET BY MOUTH TWO (2) TIMES DAILY    ESTAZOLAM (PROSOM) 2 MG TABLET    TAKE 1 TO 2 TABLETS BY MOUTH EVERY NIGHT AS NEEDED FOR SLEEP    FAMOTIDINE (PEPCID) 20 MG TABLET    Take 1 tablet by mouth 2 times daily    FLUTICASONE FUROATE-VILANTEROL (BREO ELLIPTA) 200-25 MCG/INH AEPB INHALER    Inhale 1 puff into the lungs daily    FUROSEMIDE (LASIX) 40 MG TABLET    Take 1 tablet by mouth daily    LOSARTAN (COZAAR) 100 MG TABLET    Take 1 tablet by mouth daily    METOPROLOL SUCCINATE (TOPROL XL) 50 MG EXTENDED RELEASE TABLET    TAKE ONE (1) TABLET BY MOUTH DAILY    MULTIPLE VITAMINS-MINERALS (MULTIVITAMIN) LIQD    Take 30 mLs by mouth daily Has 80 mcg of vitamin K -2  Has 50 mg of ginsing root    NONFORMULARY    Apply topically as needed CBD oil    PRAVASTATIN (PRAVACHOL) 40 MG TABLET    Take 1 tablet by mouth daily    QUETIAPINE (SEROQUEL) 25 MG TABLET    TAKE ONE (1) TO TWO (2) TABLETS BY MOUTH NIGHTLY    SPIRONOLACTONE (ALDACTONE) 25 MG TABLET    TAKE ONE (1) TABLET BY MOUTH DAILY    VENLAFAXINE (EFFEXOR XR) 150 MG EXTENDED RELEASE CAPSULE    Take 1 capsule by mouth daily    VITAMIN D (CHOLECALCIFEROL) 125 MCG (5000 UT) CAPS CAPSULE    Take 5,000 Units by mouth every evening       ALLERGIES     Patient has no known allergies. FAMILYHISTORY       Family History   Problem Relation Age of Onset    Cancer Mother     Lung Cancer Mother     COPD Father     Hypertension Sister     Hypertension Brother     Cancer Brother     Hypertension Brother     Stroke Maternal Grandfather     Cancer Other         leukemia        SOCIAL HISTORY       Social History     Tobacco Use    Smoking status: Former     Packs/day: 1.00     Years: 20.00     Pack years: 20.00     Types: Cigarettes     Quit date: 1987     Years since quittin.4     Passive exposure: Past    Smokeless tobacco: Former     Quit date: 1988   Vaping Use    Vaping Use: Never used   Substance Use Topics    Alcohol use: No    Drug use: Never       SCREENINGS        [place] Coma Scale  Eye Opening: Spontaneous  Best Verbal Response: Oriented  Best Motor Response: Obeys commands  [place] Coma Scale Score: 15                CIWA Assessment  BP: (!) 156/82  Heart Rate: 90           PHYSICAL EXAM  1 or more Elements     ED Triage Vitals   BP Temp Temp Source Heart Rate Resp SpO2 Height Weight   23 1629 23 1629 23 1629 23 1629 23 1625 23 1625 23 1629 23 1629   (!) 156/82 98.5 °F (36.9 °C) Tympanic (!) 103 18 (!) 3 % 5' 8\" (1.727 m) 153 lb (69.4 kg)       Physical Exam  Vitals and nursing note reviewed. Constitutional:       General: He is not in acute distress. Appearance: Normal appearance. He is well-developed and normal weight.  He is not ill-appearing, toxic-appearing or diaphoretic. HENT:      Head: Normocephalic and atraumatic. Right Ear: Tympanic membrane and external ear normal.      Left Ear: Tympanic membrane and external ear normal.      Nose: No congestion or rhinorrhea. Mouth/Throat:      Mouth: Mucous membranes are moist.      Pharynx: Oropharynx is clear. No oropharyngeal exudate or posterior oropharyngeal erythema. Eyes:      Extraocular Movements: Extraocular movements intact. Cardiovascular:      Rate and Rhythm: Normal rate and regular rhythm. Pulses: Normal pulses. Pulmonary:      Effort: Pulmonary effort is normal. No respiratory distress. Breath sounds: No stridor. Examination of the right-upper field reveals wheezing. Examination of the left-upper field reveals wheezing. Examination of the right-middle field reveals wheezing and rhonchi. Examination of the left-middle field reveals wheezing and rhonchi. Examination of the right-lower field reveals rhonchi. Examination of the left-lower field reveals rhonchi. Wheezing and rhonchi present. No decreased breath sounds or rales. Chest:      Chest wall: No tenderness. Abdominal:      Palpations: Abdomen is soft. Tenderness: There is no abdominal tenderness. Musculoskeletal:      Cervical back: Normal range of motion and neck supple. No rigidity. Lymphadenopathy:      Cervical: No cervical adenopathy. Skin:     General: Skin is warm and dry. Capillary Refill: Capillary refill takes less than 2 seconds. Neurological:      General: No focal deficit present. Mental Status: He is alert and oriented to person, place, and time.    Psychiatric:         Mood and Affect: Mood normal.         Behavior: Behavior normal.         DIAGNOSTIC RESULTS   LABS:    Labs Reviewed   CBC WITH AUTO DIFFERENTIAL - Abnormal; Notable for the following components:       Result Value    Hemoglobin 9.7 (*)     Hematocrit 31.9 (*)     MCV 68.4 (*)     [facility] 20.9 (*)     MCHC 30.5 (*)     RDW 18.3 (*)     Neutrophils Absolute 8.8 (*)     Lymphocytes Absolute 0.8 (*)     All other components within normal limits   BASIC METABOLIC PANEL W/ REFLEX TO MG FOR LOW K - Abnormal; Notable for the following components:    BUN 27 (*)     All other components within normal limits   BRAIN NATRIURETIC PEPTIDE   TROPONIN       When ordered only abnormal lab results are displayed. All other labs were within normal range or not returned as of this dictation. EKG:     EKG Interpretation    Interpreted by emergency department physician    Rhythm: Atrial sensed ventricularly paced  Rate: normal  Axis: 8  Ectopy: none  Conduction: normal  ST Segments: normal  T Waves: normal  Q Waves: none    Clinical Impression: Atrial sensed ventricularly paced rhythm which is relatively unchanged from previous EKG dated January 19, 2023. Normal [place] interval, normal QRS duration, normal QT QTC. Right axis deviation. Interpreted by myself. RADIOLOGY:   Non-plain film images such as CT, Ultrasound and MRI are read by the radiologist. Plain radiographic images are visualized and preliminarily interpreted by the ED Provider with the below findings:    Concern for right upper lobe consolidation    Interpreted by myself. Interpretation per the Radiologist below, if available at the time of this note:    XR CHEST (2 VW)   Final Result   Multifocal pneumonia in the right upper lobe and right medial lower lobe           No results found. No results found. PROCEDURES   Unless otherwise noted below, none     Procedures    CRITICAL CARE TIME   Total Critical Care time was 24 minutes, excluding separately reportable procedures. There was a high probability of clinically significant/life threatening deterioration in the patient's condition which required my urgent intervention.       This includes multiple reevaluations, vital sign monitoring, pulse oximetry monitoring, telemetry monitoring, clinical response to the IV medications, reviewing the nursing notes, consultation time, dictation/documentation time, and interpretation of the labwork. (This time excludes time spent performing procedures). PAST MEDICAL HISTORY      has a past medical history of Anxiety, CHF (congestive heart failure) ([facility] COPD, mild ([facility] (04/24/2017), COVID-19 (12/2020), Depression, History of blood transfusion, Hyperlipidemia, Hypertension, IBS (irritable bowel syndrome) (10/16/2013), Insomnia, PAF (paroxysmal atrial fibrillation) ([facility] (08/10/2021), Sepsis ([facility] (11/27/2022), Sick sinus syndrome ([facility] (08/10/2021), and Wears glasses. EMERGENCY DEPARTMENT COURSE and DIFFERENTIAL DIAGNOSIS/MDM:   Vitals:    Vitals:    02/03/23 1625 02/03/23 1629 02/03/23 1703 02/03/23 1805   BP:  (!) 156/82     Pulse:  (!) 103 90    Resp: 18 26 18    Temp:  98.5 °F (36.9 °C)     TempSrc:  Tympanic     SpO2: (!) 3% 95% 91% 95%   Weight:  153 lb (69.4 kg)     Height:  5' 8\" (1.727 m)         Is this patient to be included in the SEP-1 Core Measure due to severe sepsis or septic shock?    No   Exclusion criteria - the patient is NOT to be included for SEP-1 Core Measure due to:  2+ SIRS criteria are not met    Chronic Conditions: CHF, interstitial lung disease, cardiomyopathy, hypertension, depression with anxiety, chronic respiratory failure with hypoxia    CONSULTS: (Who and What was discussed)  None    Discussion with Other Profesionals : None    Social Determinants : None    Records Reviewed : Inpatient Notes reviewed inpatient notes from November 26, 2022 and patient was admitted for pneumonia with sepsis and acute respiratory failure and Outpatient Notes it outpatient office visit from January 23, 2023 when patient was seen for his chronic respiratory disease    CC/HPI Summary, DDx, ED Course, and Reassessment:     Differential Diagnosis: ACS, Pneumonia, COPD, Asthma, Noncardiogenic pulmonary edema, Congestive Heart Failure, Pulmonary Embolus, Pneumothorax, Group A strep, Airway Obstruction, Epiglottitis, Retropharyngeal Abscess, Parapharyngeal Abscess, Dehydration, Reflux Pharyngitis, other    70-year-old male with history of interstitial lung disease who presents with persistent cough and shortness of breath slightly tachycardic upon arrival mildly tachypneic saturating between 95 to 98% on home amount of oxygen. Patient has no chest pain. Lower concern for ACS or PE. Patient is on Eliquis. Patient has no fever. Patient has wheezing and rhonchi in lungs bilaterally. Concern for acute on chronic interstitial lung disease with possible exacerbation and/or worsening symptoms. Possible pneumonia. Will obtain chest x-ray to rule out intrathoracic worsening disease and/or pneumonia. Will obtain troponin to look for heart strain. Will obtain BNP to look for acute on chronic CHF exacerbation. Obtain BMP to look for metabolic abnormality and/or renal dysfunction. Will give patient's symptomatic treatment with inhaled duo nebs for wheezing and reactive airway disease as well as steroids for anti-inflammatory and p.o. Will reeval closely and disposition accordingly. ED Course as of 02/03/23 1808   Fri Feb 03, 2023   1709 Xray shows multifocal pneumonia in right upper and right medial lobe. [SC]   [phone] Will give patient IV azithromycin and IV ceftriaxone for pneumonia [SC]   6261 Lab work consistent with previous. No renal dysfunction. Troponin is negative. BNP not significantly elevated for patient. Vital signs remained stable. Patient still receiving antibiotics. We will continue to observe patient prior to making potential discharge versus admission decision. Will use shared decision making. [SC]   1805 Oxygen remains stable. Shared decision-making was used. Patient would prefer to be discharged.   Will discharge patient with doxycycline for pneumonia as well as have patient follow-up with primary care strict return precautions given for any new or worsening symptoms. [SC]      ED Course User Index  [SC] Fay Gold MD       Patient was given the following medications:  Medications   azithromycin (ZITHROMAX) 500 mg in 250 mL addavial (has no administration in time range)   predniSONE (DELTASONE) tablet 60 mg (60 mg Oral Given 2/3/23 1714)   ipratropium-albuterol (DUONEB) nebulizer solution 1 ampule (1 ampule Inhalation Given 2/3/23 1701)   cefTRIAXone (ROCEPHIN) 1,000 mg in sodium chloride 0.9 % 50 mL IVPB (mini-bag) (1,000 mg IntraVENous New Bag 2/3/23 1723)       Disposition Considerations (tests considered but not done, Shared Decision Making, Pt Expectation of Test or Tx.): Considered admission. Patient would prefer to be sent home. Shared decision-making used for discharge. Diagnosis Severity: Right upper and middle lobe pneumonia, mild to moderate      Appropriate for outpatient management      The patient is at low risk for mortality based on demographic, history and clinical factors. Given the best available information and clinical assessment, I estimate the risk of hospitalization to be greater than risk of treatment at home. I have explained to the patient that the risk could rapidly change, given precautions for return and instructions. Explained to patient that the risk for mortality is low based on demographic, history and clinical factors. I discussed with patient the results of evaluation in the ED, diagnosis, care, and prognosis. The plan is to discharge to home. Patient is in agreement with plan and questions have been answered. I also discussed with patient the reasons which may require a return visit and the importance of follow-up care. The patient is well-appearing, nontoxic, and improved at the time of discharge. Patient agrees to call to arrange follow-up care as directed. Patient understands to return immediately for worsening/change in symptoms.      I am the Primary Clinician of Record. FINAL IMPRESSION      1. Pneumonia of right middle lobe due to infectious organism    2. Chronic respiratory failure with hypoxia (HCC)    3.  Hx of interstitial lung disease          DISPOSITION/PLAN     DISPOSITION Decision To Discharge 02/03/2023 06:07:15 PM      PATIENT REFERRED TO:  Ashley Valles MD  [address] [phone]    Schedule an appointment as soon as possible for a visit       [address] [place] [address] Emergency Department  [address] [place] [address]  [phone]    As needed, If symptoms worsen    DISCHARGE MEDICATIONS:  New Prescriptions    DOXYCYCLINE HYCLATE (VIBRA-TABS) 100 MG TABLET    Take 1 tablet by mouth 2 times daily for 10 days       DISCONTINUED MEDICATIONS:  Discontinued Medications    No medications on file              (Please note that portions of this note were completed with a voice recognition program.  Efforts were made to edit the dictations but occasionally words are mis-transcribed.)    Bj Olguin MD (electronically signed)           Bj Olguin MD  02/03/23 [id number]

## 2023-02-04 LAB
EKG ATRIAL RATE: 87 BPM
EKG DIAGNOSIS: NORMAL
EKG P AXIS: 61 DEGREES
EKG P-R INTERVAL: 200 MS
EKG Q-T INTERVAL: 394 MS
EKG QRS DURATION: 120 MS
EKG QTC CALCULATION (BAZETT): 474 MS
EKG R AXIS: -39 DEGREES
EKG T AXIS: 61 DEGREES
EKG VENTRICULAR RATE: 87 BPM

## 2023-02-04 PROCEDURE — 93010 ELECTROCARDIOGRAM REPORT: CPT | Performed by: INTERNAL MEDICINE

## 2023-02-07 ENCOUNTER — TELEPHONE (OUTPATIENT)
Dept: ADMINISTRATIVE | Age: 78
End: 2023-02-07

## 2023-02-07 NOTE — TELEPHONE ENCOUNTER
Submitted PA for estazolam  [facility]  Key: [id number] STATUS: APPROVED THROUGH 02/07/2024. If this requires a response please respond to the pool. [address]). Please advise patient thank you.

## 2023-02-10 ENCOUNTER — PATIENT MESSAGE (OUTPATIENT)
Dept: PRIMARY CARE CLINIC | Age: 78
End: 2023-02-10

## 2023-02-10 NOTE — TELEPHONE ENCOUNTER
From: Luz Terry  To: Dr. Evita Peguero: 2/10/2023 2:24 PM EST  Subject: Vanessa Friedman and Armida Carballo,    Could you please send a letter authorizing me to obtain a handicapped parking  for [place]? I really find walking any distance in a parking lot takes my breath away. You could just attach the letter to an email to < [email or web address]. Thank you for your consideration.     Jacinto Sheffield

## 2023-02-10 NOTE — LETTER
[facility]  [address] [facility] [address]  Phone: [phone]  Fax: [phone]    Erik Araya MD         February 10, 2023     Patient: Nathaniel Mishra   YOB: 1945   Date of Visit:        To Whom It May Concern: It is my medical opinion that Dionte Low requires a disability parking placard for the following reasons:  He cannot walk 200 feet without stopping to rest.  Duration of need: 5 years    If you have any questions or concerns, please don't hesitate to call.     Sincerely,        Erik Araya MD

## 2023-02-27 ENCOUNTER — PATIENT MESSAGE (OUTPATIENT)
Dept: PRIMARY CARE CLINIC | Age: 78
End: 2023-02-27

## 2023-02-27 ENCOUNTER — PATIENT MESSAGE (OUTPATIENT)
Dept: CARDIOLOGY CLINIC | Age: 78
End: 2023-02-27

## 2023-02-27 NOTE — LETTER
[facility]  [address] [facility] [address]  Phone: [phone]  Fax: [phone]    Candido Mulligan MD        February 27, 2023

## 2023-02-27 NOTE — LETTER
[facility]  [address] [facility] [address]  Phone: [phone]  Fax: [phone]    Leatrice Phalen, MD         February 27, 2023     Patient: Luz Terry   YOB: 1945   Date of Visit: 2/27/2023       To Whom It May Concern: It is my medical opinion that Srini Stone requires a disability parking placard for the following reasons:  He cannot walk 200 feet without stopping to rest.  Duration of need: 5 years    If you have any questions or concerns, please don't hesitate to call.     Sincerely,        Leatrice Phalen, MD

## 2023-02-27 NOTE — LETTER
[facility]  [address] [facility] [address]  Phone: [phone]  Fax: [phone]    Lio Noyola MD        February 27, 2023     Patient: Arlene Wesley   YOB: 1945   Date of Visit: 2/27/2023       To Whom It May Concern: It is my medical opinion that Bianca Ambrocio {Work release (duty restriction):80816}. If you have any questions or concerns, please don't hesitate to call.     Sincerely,        Lio Noyola MD

## 2023-02-27 NOTE — TELEPHONE ENCOUNTER
From: Porfirio Perea  To: Dr. Werner Jay: 2/27/2023 2:07 PM EST  Subject: Disability parking    Dr. Brandi Pina and Tati Choi,  I received a message from you that you had sent an email with the request for a handicapped parking placard attached, but I can't find it. Please send it again to < [email or web address] . Thank you.     Bradley Cross

## 2023-02-27 NOTE — TELEPHONE ENCOUNTER
From: Karey Ma  To: Dr. Marilou Reddy: 2023 2:37 PM EST  Subject: CAT scan, my heart, our meeting    Dr. Kevin Salgado,    I wrote this earlier but don't think it was sent. I have been experiencing serious shortness of breath and wracking cough whenever I exert myself--even just getting up from sitting and walking across the room. I am having a CAT scan done this Thursday at Dr. Eder Fraser request--this will be at [facility]. Would you check this out or add anything you think is needed to the procedure? Then we can talk about it at my appointment next . Thanks much.   Angie Otoole

## 2023-02-27 NOTE — TELEPHONE ENCOUNTER
Dr. Rodriguez Staff,     Please review message and advise if you would like to add additional testing.

## 2023-02-28 ENCOUNTER — PATIENT MESSAGE (OUTPATIENT)
Dept: PRIMARY CARE CLINIC | Age: 78
End: 2023-02-28

## 2023-02-28 NOTE — PROGRESS NOTES
Mr. Babar Tenorio had left a message for me to review x-rays and CT that Dr. Daniel Alvarez had ordered. The patient continues to feel shortness of breath with minimal exertion and has a hacking cough. He was diagnosed with pneumonia in early February and has been placed on oxygen. He continues to feel shortness of breath with any degree of exertion with coughing  During my conversation he was coughing    He has no orthopnea or PND  I reviewed his echo results the latest echo showing EF of 50%  I also reviewed his proBNP done in early February which was all in the 300 range that is normal    I really do not think i that his shortness of breath with exertion is related to his heart condition. In the past there was concern about drop in his LVEF to 35% since his pacemaker was placed. However I personally reviewed the echo and felt that the EF was more closer to 45%. A subsequent echo done in August was read by Dr. Aashish Ferreira as showing EF of 50% as well.     Nevertheless I am going to be seeing him in a week  I plan to repeat a proBNP in the renal Chichi Hdez M.D.

## 2023-02-28 NOTE — TELEPHONE ENCOUNTER
From: Chaz Lantigua  To: Dr. Woody Larsen: 2/28/2023 1:09 PM EST  Subject: Radha Ordonez,  I found the letter on My Chart. I was checking my mail instead. Thanks much.     Bryan Lawson

## 2023-03-01 ENCOUNTER — PATIENT MESSAGE (OUTPATIENT)
Dept: PRIMARY CARE CLINIC | Age: 78
End: 2023-03-01

## 2023-03-01 RX ORDER — FLUTICASONE FUROATE AND VILANTEROL 200; 25 UG/1; UG/1
1 POWDER RESPIRATORY (INHALATION) DAILY
Qty: 90 EACH | Refills: 1 | Status: SHIPPED | OUTPATIENT
Start: 2023-03-01

## 2023-03-02 ENCOUNTER — HOSPITAL ENCOUNTER (OUTPATIENT)
Dept: CT IMAGING | Age: 78
Discharge: HOME | End: 2023-03-02
Payer: MEDICARE

## 2023-03-02 DIAGNOSIS — J84.9 ILD (INTERSTITIAL LUNG DISEASE) (HCC): ICD-10-CM

## 2023-03-02 DIAGNOSIS — R06.02 SHORTNESS OF BREATH: Primary | ICD-10-CM

## 2023-03-02 DIAGNOSIS — R06.02 SHORTNESS OF BREATH: ICD-10-CM

## 2023-03-02 LAB
ANION GAP SERPL CALCULATED.3IONS-SCNC: 12 MMOL/L (ref 3–16)
BUN BLDV-MCNC: 32 MG/DL (ref 7–20)
CALCIUM SERPL-MCNC: 9.1 MG/DL (ref 8.3–10.6)
CHLORIDE BLD-SCNC: 104 MMOL/L (ref 99–110)
CO2: 25 MMOL/L (ref 21–32)
CREAT SERPL-MCNC: 1.3 MG/DL (ref 0.8–1.3)
GFR SERPL CREATININE-BSD FRML MDRD: 56 ML/MIN/{1.73_M2}
GLUCOSE BLD-MCNC: 99 MG/DL (ref 70–99)
HCT VFR BLD CALC: 31.5 % (ref 40.5–52.5)
HEMATOLOGY PATH CONSULT: NO
HEMOGLOBIN: 9.7 G/DL (ref 13.5–17.5)
MCH RBC QN AUTO: 20.5 PG (ref 26–34)
MCHC RBC AUTO-ENTMCNC: 30.9 G/DL (ref 31–36)
MCV RBC AUTO: 66.3 FL (ref 80–100)
PDW BLD-RTO: 17.9 % (ref 12.4–15.4)
PLATELET # BLD: 267 K/UL (ref 135–450)
PMV BLD AUTO: 8 FL (ref 5–10.5)
POTASSIUM SERPL-SCNC: 4.4 MMOL/L (ref 3.5–5.1)
PRO-BNP: 125 PG/ML (ref 0–449)
RBC # BLD: 4.74 M/UL (ref 4.2–5.9)
SODIUM BLD-SCNC: 141 MMOL/L (ref 136–145)
WBC # BLD: 9.6 K/UL (ref 4–11)

## 2023-03-02 PROCEDURE — 71250 CT THORAX DX C-: CPT

## 2023-03-03 DIAGNOSIS — F51.04 PSYCHOPHYSIOLOGICAL INSOMNIA: ICD-10-CM

## 2023-03-06 NOTE — TELEPHONE ENCOUNTER
Patient is scheduled for f/u with Dr. Tamara Carr at the [facility] office on tomorrow.

## 2023-03-07 ENCOUNTER — OFFICE VISIT (OUTPATIENT)
Dept: CARDIOLOGY CLINIC | Age: 78
End: 2023-03-07
Payer: MEDICARE

## 2023-03-07 VITALS
HEIGHT: 68 IN | WEIGHT: 152 LBS | HEART RATE: 88 BPM | SYSTOLIC BLOOD PRESSURE: 126 MMHG | DIASTOLIC BLOOD PRESSURE: 58 MMHG | OXYGEN SATURATION: 87 % | BODY MASS INDEX: 23.04 KG/M2

## 2023-03-07 DIAGNOSIS — J44.9 CHRONIC OBSTRUCTIVE PULMONARY DISEASE, UNSPECIFIED COPD TYPE (HCC): ICD-10-CM

## 2023-03-07 DIAGNOSIS — E78.5 HYPERLIPIDEMIA LDL GOAL <100: ICD-10-CM

## 2023-03-07 DIAGNOSIS — J96.11 CHRONIC RESPIRATORY FAILURE WITH HYPOXIA (HCC): ICD-10-CM

## 2023-03-07 DIAGNOSIS — I10 ESSENTIAL HYPERTENSION: ICD-10-CM

## 2023-03-07 DIAGNOSIS — I49.5 SICK SINUS SYNDROME (HCC): ICD-10-CM

## 2023-03-07 DIAGNOSIS — I50.20 SYSTOLIC HEART FAILURE, UNSPECIFIED HF CHRONICITY (HCC): Primary | ICD-10-CM

## 2023-03-07 DIAGNOSIS — J84.9 ILD (INTERSTITIAL LUNG DISEASE) (HCC): ICD-10-CM

## 2023-03-07 PROCEDURE — 3023F SPIROM DOC REV: CPT | Performed by: INTERNAL MEDICINE

## 2023-03-07 PROCEDURE — 3078F DIAST BP <80 MM HG: CPT | Performed by: INTERNAL MEDICINE

## 2023-03-07 PROCEDURE — 1123F ACP DISCUSS/DSCN MKR DOCD: CPT | Performed by: INTERNAL MEDICINE

## 2023-03-07 PROCEDURE — G8484 FLU IMMUNIZE NO ADMIN: HCPCS | Performed by: INTERNAL MEDICINE

## 2023-03-07 PROCEDURE — 1036F TOBACCO NON-USER: CPT | Performed by: INTERNAL MEDICINE

## 2023-03-07 PROCEDURE — 3074F SYST BP LT 130 MM HG: CPT | Performed by: INTERNAL MEDICINE

## 2023-03-07 PROCEDURE — G8427 DOCREV CUR MEDS BY ELIG CLIN: HCPCS | Performed by: INTERNAL MEDICINE

## 2023-03-07 PROCEDURE — 99214 OFFICE O/P EST MOD 30 MIN: CPT | Performed by: INTERNAL MEDICINE

## 2023-03-07 PROCEDURE — 93000 ELECTROCARDIOGRAM COMPLETE: CPT | Performed by: INTERNAL MEDICINE

## 2023-03-07 PROCEDURE — G8420 CALC BMI NORM PARAMETERS: HCPCS | Performed by: INTERNAL MEDICINE

## 2023-03-07 NOTE — TELEPHONE ENCOUNTER
From: Olinda Martinez  To: Dr. Peterson Carrier: 3/1/2023 5:49 PM EST  Subject: Handicapped parking     Kylah Sanon be getting a form from [place] [address] [facility] asking for information and authorization for the parking pass. Please ask Dr. Anna Lopez to fill it out and sign it I'll include a SASE for you to send it back to me--I'll take care of it from there. Making and processing these kinds of forms seems to me the kind of 'bullshit jobs\" described in the book of that name. But I guess we have to find something for these bureaucrats to do. Thanks for your help.     Vianey Garcia How Severe Is Your Skin Lesion?: mild Has Your Skin Lesion Been Treated?: not been treated Is This A New Presentation, Or A Follow-Up?: Skin Lesion

## 2023-03-07 NOTE — PROGRESS NOTES
[facility]  Cardiology Progress Note      Paulina Mcdaniel  1945, 76 y.o.    CC: \" Shortness of breath. \"     Ivonne Casas MD:      HPI:   This is a 76 y.o. 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 Chyna Villagran, atrial fibrillation s/p afib and left atrial flutter ablation on 8/20/21, COVID-19 pneumonia (12/2020), COPD, hyperlipidemia, hypertension, anxiety and depression. Echo 4/2022 with EF of 35%. LHC resulting in nonobstructive disease. Repeat limited echocardiogram 8/2022 showed improved EF of 50%. Hospitalized 11/26-11/30/22 with acute respiratory failure with hypoxia. Discharged home on oxygen. CT Chest with areas of pneumonia. Possible underlying ILD. Managed per Pulmonary. Patient returns for follow up. Accompanied by his wife. Continues with shortness of breath. Wears continuous oxygen and follows with Dr. Duc Medrano. He monitors oxygen saturation. States oxygen level drops when he is not wearing oxygen. Denies any LE edema or chest pains. Reports compliance with medications.      Past Medical History:   Diagnosis Date    Anxiety     CHF (congestive heart failure) (HCC)     COPD, mild ([address] 04/24/2017    COVID-19 12/2020    Depression     History of blood transfusion     Hyperlipidemia     Hypertension     IBS (irritable bowel syndrome) 10/16/2013    Insomnia     PAF (paroxysmal atrial fibrillation) ([address] 08/10/2021    Sepsis ([address] 11/27/2022    Sick sinus syndrome ([address] 08/10/2021    Wears glasses       Past Surgical History:   Procedure Laterality Date    ABLATION OF DYSRHYTHMIC FOCUS      APPENDECTOMY      BRONCHOSCOPY N/A 10/11/2022    BRONCHOSCOPY WITH BRONCHIOLAR ALVEOLAR LAVAGE performed by Maya Harper MD at [address]  10/11/2022    BRONCHOSCOPY DIAGNOSTIC OR CELL [address] ONLY performed by Maya Harper MD at [address]      pacemaker    COLONOSCOPY  03/19/2019    [place]    COLONOSCOPY

## 2023-03-09 ENCOUNTER — OFFICE VISIT (OUTPATIENT)
Dept: PULMONOLOGY | Age: 78
End: 2023-03-09
Payer: MEDICARE

## 2023-03-09 VITALS
WEIGHT: 154.6 LBS | TEMPERATURE: 97.7 F | SYSTOLIC BLOOD PRESSURE: 138 MMHG | OXYGEN SATURATION: 95 % | HEART RATE: 100 BPM | DIASTOLIC BLOOD PRESSURE: 70 MMHG | BODY MASS INDEX: 23.43 KG/M2 | RESPIRATION RATE: 18 BRPM | HEIGHT: 68 IN

## 2023-03-09 DIAGNOSIS — J84.9 ILD (INTERSTITIAL LUNG DISEASE) (HCC): Primary | ICD-10-CM

## 2023-03-09 DIAGNOSIS — R13.10 DYSPHAGIA, UNSPECIFIED TYPE: ICD-10-CM

## 2023-03-09 PROCEDURE — G8420 CALC BMI NORM PARAMETERS: HCPCS | Performed by: INTERNAL MEDICINE

## 2023-03-09 PROCEDURE — G8484 FLU IMMUNIZE NO ADMIN: HCPCS | Performed by: INTERNAL MEDICINE

## 2023-03-09 PROCEDURE — G8427 DOCREV CUR MEDS BY ELIG CLIN: HCPCS | Performed by: INTERNAL MEDICINE

## 2023-03-09 PROCEDURE — 1036F TOBACCO NON-USER: CPT | Performed by: INTERNAL MEDICINE

## 2023-03-09 PROCEDURE — 3074F SYST BP LT 130 MM HG: CPT | Performed by: INTERNAL MEDICINE

## 2023-03-09 PROCEDURE — 3078F DIAST BP <80 MM HG: CPT | Performed by: INTERNAL MEDICINE

## 2023-03-09 PROCEDURE — 1123F ACP DISCUSS/DSCN MKR DOCD: CPT | Performed by: INTERNAL MEDICINE

## 2023-03-09 PROCEDURE — 99214 OFFICE O/P EST MOD 30 MIN: CPT | Performed by: INTERNAL MEDICINE

## 2023-03-09 RX ORDER — AMOXICILLIN AND CLAVULANATE POTASSIUM 875; 125 MG/1; MG/1
1 TABLET, FILM COATED ORAL 2 TIMES DAILY
Qty: 28 TABLET | Refills: 0 | Status: SHIPPED | OUTPATIENT
Start: 2023-03-09 | End: 2023-03-23

## 2023-03-09 ASSESSMENT — ENCOUNTER SYMPTOMS
SHORTNESS OF BREATH: 1
WHEEZING: 0
COUGH: 0

## 2023-03-09 NOTE — PROGRESS NOTES
[address], SLEEP, AND CRITICAL CARE    Juan Menjivar (:  1945) is a 76 y.o. male,New patient, here for evaluation of the following chief complaint(s):  Shortness of Breath         ASSESSMENT/PLAN:  1. ILD (interstitial lung disease) ([address]  Assessment & Plan:   - Focal right upper lobe, appears to be worsening but much too rapidly that I would suspect ILD but cannot rule out  -Concerned with complaints of dysphagia that this could be due to progressive and recurrent aspiration pneumonia  -Antibiotics  -We will get esophagram and MBS, if aspiration work-up is negative may need referral to ILD clinic at [facility]. Orders:  -     FL MODIFIED BARIUM SWALLOW W VIDEO; Future  -     amoxicillin-clavulanate (AUGMENTIN) 875-125 MG per tablet; Take 1 tablet by mouth 2 times daily for 14 days, Disp-28 tablet, R-0Normal  2. Dysphagia, unspecified type  -     FL ESOPHAGRAM; Future    Return in about 3 months (around 2023). Future Appointments   Date Time Provider Pedrito Nicholas   3/20/2023  1:00 PM Jamal Herman, [place] SPEECH [facility]   3/20/2023  1:00 PM [place] 1 [facility]   3/20/2023  2:00 PM [place] RM 1 [facility]   3/27/2023  1:30 PM MD Kasie Chambers RD PC [place] - DYD   2023 10:30 AM SCHEDULE, [facility] REMOTE TRANSMISSION [facility]   2023  1:40 PM Diann Mirza  [facility]   2023 10:30 AM SCHEDULE, [facility] REMOTE TRANSMISSION [facility]   2023 11:30 AM SCHEDULE, [facility] TRANSMISSION [facility]            Subjective   SUBJECTIVE/OBJECTIVE:  -ILD with evidence of progression on CT scan. Patient states he has dysphagia but otherwise his serologic work-up thus far is been unremarkable. -Is wanting to avoid lung biopsy but has undergone bronchoscopy that was unrevealing  On supplemental oxygen he is currently on 2 L. Shortness of Breath  Pertinent negatives include no wheezing.      Review of Systems Constitutional: Negative. Respiratory:  Positive for shortness of breath. Negative for cough and wheezing. Cardiovascular: Negative. Neurological: Negative. Psychiatric/Behavioral: Negative. Objective   Physical Exam     Gen:  No acute distress. Eyes: EOMI. Anicteric sclera. No conjunctival injection. ENT: No discharge. External appearance of ears and nose normal.  Neck: Trachea midline. No mass   Resp:  No crackles. No wheezes. No rhonchi. No dullness on percussion. CV: Regular rate. Regular rhythm. No murmur or rub. No edema. Neuro:  no focal neurologic deficit. Moves all extremities  Psych: Awake and alert, Oriented x 3. Judgement and insight appropriate. Mood stable. PFTs  -Mild COPD, FEV1 75%  -Reduced diffusing capacity  -+ Air trapping    CT chest images reviewed personally by me, interpretation as follows:  -Scattered interstitial lung disease most focal fibrosis in the right upper lobe. Compared to previous CT scan seems to be some progression      An electronic signature was used to authenticate this note.     --Siria Martel MD

## 2023-03-12 ENCOUNTER — PATIENT MESSAGE (OUTPATIENT)
Dept: PULMONOLOGY | Age: 78
End: 2023-03-12

## 2023-03-12 DIAGNOSIS — R05.1 ACUTE COUGH: Primary | ICD-10-CM

## 2023-03-13 ENCOUNTER — TELEPHONE (OUTPATIENT)
Dept: PULMONOLOGY | Age: 78
End: 2023-03-13

## 2023-03-13 NOTE — TELEPHONE ENCOUNTER
Phoned pt and he states he has an appointment and he also has informed that [facility].

## 2023-03-13 NOTE — TELEPHONE ENCOUNTER
From: Jacob Saravia  To: Dr. Trejo Face: 3/12/2023 1:20 PM EDT  Subject: Chase Preciado cough    Dr. Diamond Boys,    I am still experiencing a continuous wracking cough whenever I exert myself--even getting up and walking across the room. I have been using a lot of dextromethorphan and guaifenesin without much success. I don't want anything with codeine or other opiates in it. Can you prescribe something that would help this? Please send it to [facility]. Thank you.     Kassidy Burroughs

## 2023-03-14 RX ORDER — BENZONATATE 100 MG/1
100 CAPSULE ORAL 3 TIMES DAILY PRN
Qty: 90 CAPSULE | Refills: 3 | Status: SHIPPED | OUTPATIENT
Start: 2023-03-14 | End: 2023-03-21

## 2023-03-14 NOTE — ASSESSMENT & PLAN NOTE
- Focal right upper lobe, appears to be worsening but much too rapidly that I would suspect ILD but cannot rule out  -Concerned with complaints of dysphagia that this could be due to progressive and recurrent aspiration pneumonia  -Antibiotics  -We will get esophagram and MBS, if aspiration work-up is negative may need referral to ILD clinic at [facility].

## 2023-03-20 ENCOUNTER — HOSPITAL ENCOUNTER (OUTPATIENT)
Dept: GENERAL RADIOLOGY | Age: 78
Discharge: HOME | End: 2023-03-20
Payer: MEDICARE

## 2023-03-20 ENCOUNTER — HOSPITAL ENCOUNTER (OUTPATIENT)
Dept: SPEECH THERAPY | Age: 78
Setting detail: THERAPIES SERIES
Discharge: HOME | End: 2023-03-20
Payer: MEDICARE

## 2023-03-20 DIAGNOSIS — J84.9 ILD (INTERSTITIAL LUNG DISEASE) (HCC): ICD-10-CM

## 2023-03-20 DIAGNOSIS — R13.10 DYSPHAGIA, UNSPECIFIED TYPE: ICD-10-CM

## 2023-03-20 PROCEDURE — 92611 MOTION FLUOROSCOPY/SWALLOW: CPT

## 2023-03-20 PROCEDURE — 74220 X-RAY XM ESOPHAGUS 1CNTRST: CPT

## 2023-03-20 PROCEDURE — 74230 X-RAY XM SWLNG FUNCJ C+: CPT

## 2023-03-20 NOTE — PROCEDURES
Phase   Premature Spillage to Valleculae: inconsistent  Delayed initiation of swallow with :  Pooling Valleculae: inconsistent  Reduced Epiglottic Distention:   Reduced Pharyngeal Peristalsis:   Pharyngeal Residue - Valleculae:   Sensory:   Pt has sensation of oral residue pooling to pharynx to level of pyriform sinus across all items but also noted with thin liquids      Upper Esophageal Phase  Upper Esophageal Screen- Major Contributing Deficits  Reduced Cricopharyngeal Opening:   Barium lines upper esophagus    Following Evaluation:  Provided education regarding role of SLP, results of assessment, recommendations and general speech pathology plan of care. [x] Pt verbalized understanding and agreement   [] Pt requires ongoing learning   [] No evidence of comprehension     Timed Code Treatment: 0    Total Treatment Time: 25    Signature: Concepcion Garcia,MS,CCC,SLP 3029  Speech and Language Pathologist  3/20/2023 at 1339 pm

## 2023-03-21 DIAGNOSIS — R13.10 DYSPHAGIA, UNSPECIFIED TYPE: Primary | ICD-10-CM

## 2023-03-28 ENCOUNTER — OFFICE VISIT (OUTPATIENT)
Dept: PRIMARY CARE CLINIC | Age: 78
End: 2023-03-28
Payer: MEDICARE

## 2023-03-28 VITALS
HEART RATE: 82 BPM | RESPIRATION RATE: 22 BRPM | DIASTOLIC BLOOD PRESSURE: 83 MMHG | BODY MASS INDEX: 23.42 KG/M2 | SYSTOLIC BLOOD PRESSURE: 143 MMHG | WEIGHT: 154 LBS | OXYGEN SATURATION: 86 %

## 2023-03-28 DIAGNOSIS — F41.8 DEPRESSION WITH ANXIETY: ICD-10-CM

## 2023-03-28 DIAGNOSIS — J84.9 ILD (INTERSTITIAL LUNG DISEASE) (HCC): Primary | ICD-10-CM

## 2023-03-28 DIAGNOSIS — I10 ESSENTIAL HYPERTENSION: ICD-10-CM

## 2023-03-28 DIAGNOSIS — J96.11 CHRONIC RESPIRATORY FAILURE WITH HYPOXIA (HCC): ICD-10-CM

## 2023-03-28 DIAGNOSIS — J44.9 CHRONIC OBSTRUCTIVE PULMONARY DISEASE, UNSPECIFIED COPD TYPE (HCC): ICD-10-CM

## 2023-03-28 DIAGNOSIS — I50.22 CHRONIC SYSTOLIC HEART FAILURE (HCC): ICD-10-CM

## 2023-03-28 PROCEDURE — G8427 DOCREV CUR MEDS BY ELIG CLIN: HCPCS | Performed by: FAMILY MEDICINE

## 2023-03-28 PROCEDURE — G8420 CALC BMI NORM PARAMETERS: HCPCS | Performed by: FAMILY MEDICINE

## 2023-03-28 PROCEDURE — 1123F ACP DISCUSS/DSCN MKR DOCD: CPT | Performed by: FAMILY MEDICINE

## 2023-03-28 PROCEDURE — 99214 OFFICE O/P EST MOD 30 MIN: CPT | Performed by: FAMILY MEDICINE

## 2023-03-28 PROCEDURE — 3077F SYST BP >= 140 MM HG: CPT | Performed by: FAMILY MEDICINE

## 2023-03-28 PROCEDURE — G8484 FLU IMMUNIZE NO ADMIN: HCPCS | Performed by: FAMILY MEDICINE

## 2023-03-28 PROCEDURE — 1036F TOBACCO NON-USER: CPT | Performed by: FAMILY MEDICINE

## 2023-03-28 PROCEDURE — 3079F DIAST BP 80-89 MM HG: CPT | Performed by: FAMILY MEDICINE

## 2023-03-28 PROCEDURE — 3023F SPIROM DOC REV: CPT | Performed by: FAMILY MEDICINE

## 2023-03-28 ASSESSMENT — ENCOUNTER SYMPTOMS
ABDOMINAL PAIN: 0
DIARRHEA: 0
VOICE CHANGE: 0
CONSTIPATION: 0
SHORTNESS OF BREATH: 0
TROUBLE SWALLOWING: 0
BLOOD IN STOOL: 0

## 2023-03-28 NOTE — PROGRESS NOTES
3/28/2023     Marlene Bello (:  1945) is a 76 y.o. male, here for evaluation of the following medical concerns:    Patient presented to the office for follow-up. He has interstitial lung disease diagnosed after he developed COVID infection. He then developed worsening breathing problem and now on continuous home oxygen to keep oxygen saturation above 92%. Has been seeing pulmonologist who noted worsening focal right upper lobe appearance and was concerned about aspiration pneumonia so modified barium swallow was done however it came back negative for aspiration. Patient was told that if work-up is negative for aspiration he may need referral to ILD clinic at [facility]. Patient also have COPD and takes albuterol inhaler as well as Breo. He  has other complication following COVID infection ,has sick sinus syndrome requiring pacemaker placement , developed   congestive heart failure for which he takes Aldactone, Lasix, and losartan and Toprol-XL. His blood pressure is fairly controlled with current regimen. He has anxiety stable on Effexor 150 mg daily. He is  concern about end of life situation and would like a DNR CC arrest.. He signed the document at the office. Review of Systems   Constitutional:  Negative for activity change. HENT:  Negative for trouble swallowing and voice change. Eyes:  Negative for visual disturbance. Respiratory:  Negative for shortness of breath. Cardiovascular:  Negative for chest pain and leg swelling. Gastrointestinal:  Negative for abdominal pain, blood in stool, constipation and diarrhea. Genitourinary:  Negative for difficulty urinating, dysuria, frequency, hematuria and scrotal swelling. Musculoskeletal:  Negative for arthralgias and myalgias. Skin:  Negative for rash. Neurological:  Negative for dizziness. Psychiatric/Behavioral:  Negative for behavioral problems. Prior to Visit Medications    Medication Sig Taking?  Authorizing

## 2023-04-04 ENCOUNTER — PATIENT MESSAGE (OUTPATIENT)
Dept: PRIMARY CARE CLINIC | Age: 78
End: 2023-04-04

## 2023-04-04 DIAGNOSIS — F51.04 PSYCHOPHYSIOLOGICAL INSOMNIA: ICD-10-CM

## 2023-04-05 ENCOUNTER — PATIENT MESSAGE (OUTPATIENT)
Dept: PRIMARY CARE CLINIC | Age: 78
End: 2023-04-05

## 2023-04-05 NOTE — TELEPHONE ENCOUNTER
From: Lucas Melvin  To: Dr. Dixie Mckeon: 4/4/2023 5:38 PM EDT  Subject: Estazolam refill authorization    Dr. Bob Wyatt and Carmen Pineda 23 should have a fax from [place] asking for permission to refill my Estazolam prescription. Thank you for taking care of this.     Henry Raza

## 2023-04-05 NOTE — TELEPHONE ENCOUNTER
From: Dai Michaud  To: Dr. Carleen Willis: 4/5/2023 12:11 AM EDT  Subject: [facility] Asp ILD clinic    Dr Mirta Fagan,    Yanci Fernandez' referral got to the clinic, but when I called to schedule an appointment, I was told that the first opening was July 25 with Dr. Felicia Pisano. I called Dr. Yanci Figueroa' office and asked that he call Dr. Felicia Pisano on the \"doctor-to-doctor\" line to see whether he could take me earlier. So we'll see how that works out. I was wondering since you said he'll probably prescribe a steroid, whether you could send a prescription to [place] for something like prednisone or whatever you think is best. Things seem to be getting a bit worse since I saw you last week. Thanks.     Clary Medina

## 2023-04-26 ENCOUNTER — NURSE ONLY (OUTPATIENT)
Dept: CARDIOLOGY CLINIC | Age: 78
End: 2023-04-26
Payer: MEDICARE

## 2023-04-26 DIAGNOSIS — Z95.0 PACEMAKER: Primary | ICD-10-CM

## 2023-04-26 DIAGNOSIS — I49.5 SICK SINUS SYNDROME (HCC): ICD-10-CM

## 2023-04-26 PROCEDURE — 93294 REM INTERROG EVL PM/LDLS PM: CPT | Performed by: INTERNAL MEDICINE

## 2023-04-26 PROCEDURE — 93296 REM INTERROG EVL PM/IDS: CPT | Performed by: INTERNAL MEDICINE

## 2023-04-28 DIAGNOSIS — F51.04 PSYCHOPHYSIOLOGICAL INSOMNIA: ICD-10-CM

## 2023-05-07 ENCOUNTER — PATIENT MESSAGE (OUTPATIENT)
Dept: PRIMARY CARE CLINIC | Age: 78
End: 2023-05-07

## 2023-05-08 NOTE — TELEPHONE ENCOUNTER
From: Kanu Duffy  To: Dr. Jamel Casarez: 5/7/2023 10:25 PM EDT  Subject: Ignore earlier message    Dr Elif Hopkins and Nika Estrada wrote today to tell me they had already filled my Rx for Estazolam. You apparently approved it when I was last at your office. So ignore what I wrote earlier, OK?     Jose Friday

## 2023-05-08 NOTE — PROGRESS NOTES
Remote transmission received from patient's dual chamber pacemaker monitor at home. Transmission shows normal sensing and pacing function. NSVT noted (Toprol). Ap 3.0%   99.6% (MVP Off)  PVCs 9.6 p/hr  Echo 08.2022 showed EF of 50%. EP physician will review. See interrogation under cardiology tab in the [address] [place] [address] [address] field for more details. Will continue to monitor remotely. (End of 91-day monitoring period 4/26/23).

## 2023-05-24 RX ORDER — PRAVASTATIN SODIUM 40 MG
40 TABLET ORAL DAILY
Qty: 90 TABLET | Refills: 1 | Status: SHIPPED | OUTPATIENT
Start: 2023-05-24

## 2023-05-24 RX ORDER — QUETIAPINE FUMARATE 25 MG/1
TABLET, FILM COATED ORAL
Qty: 180 TABLET | Refills: 1 | Status: SHIPPED | OUTPATIENT
Start: 2023-05-24

## 2023-05-25 DIAGNOSIS — K52.9 CHRONIC DIARRHEA: ICD-10-CM

## 2023-05-26 RX ORDER — DIPHENOXYLATE HYDROCHLORIDE AND ATROPINE SULFATE 2.5; .025 MG/1; MG/1
1 TABLET ORAL 4 TIMES DAILY PRN
Qty: 120 TABLET | Refills: 0 | Status: SHIPPED | OUTPATIENT
Start: 2023-05-26 | End: 2023-06-25

## 2023-05-28 DIAGNOSIS — F51.04 PSYCHOPHYSIOLOGICAL INSOMNIA: ICD-10-CM

## 2023-05-30 ENCOUNTER — OFFICE VISIT (OUTPATIENT)
Dept: PRIMARY CARE CLINIC | Age: 78
End: 2023-05-30

## 2023-05-30 VITALS
OXYGEN SATURATION: 90 % | RESPIRATION RATE: 22 BRPM | HEART RATE: 96 BPM | SYSTOLIC BLOOD PRESSURE: 117 MMHG | DIASTOLIC BLOOD PRESSURE: 60 MMHG

## 2023-05-30 DIAGNOSIS — I49.5 SICK SINUS SYNDROME (HCC): ICD-10-CM

## 2023-05-30 DIAGNOSIS — J96.11 CHRONIC RESPIRATORY FAILURE WITH HYPOXIA (HCC): ICD-10-CM

## 2023-05-30 DIAGNOSIS — I50.22 CHRONIC SYSTOLIC CHF (CONGESTIVE HEART FAILURE) (HCC): ICD-10-CM

## 2023-05-30 DIAGNOSIS — I10 ESSENTIAL HYPERTENSION: ICD-10-CM

## 2023-05-30 DIAGNOSIS — J84.9 ILD (INTERSTITIAL LUNG DISEASE) (HCC): ICD-10-CM

## 2023-05-30 DIAGNOSIS — R93.89 ABNORMAL CT OF THE CHEST: Primary | ICD-10-CM

## 2023-05-30 DIAGNOSIS — E78.5 HYPERLIPIDEMIA LDL GOAL <100: ICD-10-CM

## 2023-05-30 RX ORDER — VENLAFAXINE HYDROCHLORIDE 150 MG/1
150 CAPSULE, EXTENDED RELEASE ORAL DAILY
Qty: 90 CAPSULE | Refills: 1 | Status: SHIPPED | OUTPATIENT
Start: 2023-05-30

## 2023-05-30 ASSESSMENT — ENCOUNTER SYMPTOMS
ABDOMINAL PAIN: 0
TROUBLE SWALLOWING: 0
VOICE CHANGE: 0
DIARRHEA: 0
CONSTIPATION: 0
SHORTNESS OF BREATH: 1
BLOOD IN STOOL: 0
COUGH: 1

## 2023-05-30 NOTE — PROGRESS NOTES
adenocarcinoma or lymphoma. Patient is scheduled for bronchoscopy with biopsy tomorrow at [facility] by Dr Doshi Gama  Patient advised to hold Eliquis 3 days before the procedure    3. Chronic respiratory failure with hypoxia (HCC)  He now use high conct of O2 at 8 L/nc    4. Essential hypertension  Controlled. Continue Aldactone 25 mg daily, losartan 100 mg daily and Toprol-XL 50 mg daily    5. Sick sinus syndrome (HCC)  S/p PPM    6. Chronic systolic CHF (congestive heart failure) ([facility]  Echo 5/9/1385 showed LV systolic function mildly reduced ejection fraction estimated to be 50% continue Lasix 40 mg daily, losartan 100 mg daily, Aldactone 25 mg daily and Toprol-XL 50 mg daily    7. Hyperlipidemia LDL goal <100  Controlled.   Continue pravastatin 40 mg daily      RTC in 6 weeks    An electronic signature was used to authenticate this note.    --Karan Robertson MD on 5/30/2023 at 5:43 PM

## 2023-06-03 ENCOUNTER — PATIENT MESSAGE (OUTPATIENT)
Dept: PRIMARY CARE CLINIC | Age: 78
End: 2023-06-03

## 2023-06-05 NOTE — TELEPHONE ENCOUNTER
From: Tommy Zepeda  To: Dr. Rajesh Miranda: 6/3/2023 12:26 PM EDT  Subject: results of Bronchoscopy/Biopsy    Dr. Sandra Ortega and Landon Sierra should be getting the results from [facility], but the biopsy indicated two spots with cancer in my right lung. This can be treated only with chemotherapy because of the level of oxygen I'm on. [facility] is setting up a referral with an oncologist. Thought you'd want to know.     Marisol Gooden

## 2023-06-06 ENCOUNTER — PATIENT MESSAGE (OUTPATIENT)
Dept: PRIMARY CARE CLINIC | Age: 78
End: 2023-06-06

## 2023-06-07 NOTE — TELEPHONE ENCOUNTER
From: Meghan Mote  To: Dr. Lina Gmoeziter: 6/6/2023 11:17 PM EDT  Subject: stage of cancer     and Parrish Lew,    Thought this might interest you. Message from Ignacio Nageotte, sent June 5 at 2:22 PM   Julito 5, 2:22 PM   Dr. Rios Arvizu and Desean Navarro,      Do the tests show what stage we're at? [address] [place] [address] from Dr. Mike Gutierrez, sent June 5 at 5:06 PM   Dr. Elias Reasons 5, 5:06 PM   Good evening Mr. Camacho,      Based on the biopsy results and the appearance of your PET/CT, I would classify your lung cancer as Stage IIIA.     Malcolm Mistry      Sincerely,

## 2023-06-08 RX ORDER — LOSARTAN POTASSIUM 100 MG/1
100 TABLET ORAL DAILY
Qty: 90 TABLET | Refills: 1 | Status: SHIPPED | OUTPATIENT
Start: 2023-06-08

## 2023-06-19 ENCOUNTER — PATIENT MESSAGE (OUTPATIENT)
Dept: PRIMARY CARE CLINIC | Age: 78
End: 2023-06-19

## 2023-06-19 NOTE — TELEPHONE ENCOUNTER
From: Santosh Gilmore  To: Dr. Agus Carr: 2023 12:01 AM EDT  Subject: My cancer diagnosis    Dr. Lavinia Brito and Graeme Da Silva,    I wanted you to know that My stage AIII lung cancer is usually treated by radiation and surgery. However, because of the oxygen level I need (8 lpm), both options are out. So Dr. Rick Vasquez told me that her team will focus on palliative care. On , I am having an MRI on my brain to see if there is any indication that the cancer may have travelled there--that can be treated with radiation. I'll be in touch and see you in a few week.     March Josy

## 2023-06-20 ENCOUNTER — PATIENT MESSAGE (OUTPATIENT)
Dept: PRIMARY CARE CLINIC | Age: 78
End: 2023-06-20

## 2023-06-21 NOTE — TELEPHONE ENCOUNTER
From: Eduardo Kraft  To: Dr. Jacinto Perezure: 6/20/2023 5:49 PM EDT  Subject: MRI results    I had a brain MRI on Monday and the results show no sign of metastasis to the brain. Elie Espana ... just straw up there. .......     Keaton Calzada

## 2023-06-23 PROBLEM — U09.9 COVID-19 LONG HAULER MANIFESTING CHRONIC COUGH: Status: ACTIVE | Noted: 2022-11-25

## 2023-06-23 PROBLEM — R05.3 COVID-19 LONG HAULER MANIFESTING CHRONIC COUGH: Status: ACTIVE | Noted: 2022-11-25

## 2023-06-23 PROBLEM — J43.9 PULMONARY EMPHYSEMA (HCC): Status: ACTIVE | Noted: 2023-05-17

## 2023-06-28 ENCOUNTER — PATIENT MESSAGE (OUTPATIENT)
Dept: PRIMARY CARE CLINIC | Age: 78
End: 2023-06-28

## 2023-06-28 DIAGNOSIS — F51.04 PSYCHOPHYSIOLOGICAL INSOMNIA: ICD-10-CM

## 2023-07-11 ENCOUNTER — OFFICE VISIT (OUTPATIENT)
Dept: PRIMARY CARE CLINIC | Age: 78
End: 2023-07-11

## 2023-07-11 VITALS — SYSTOLIC BLOOD PRESSURE: 139 MMHG | HEART RATE: 86 BPM | DIASTOLIC BLOOD PRESSURE: 72 MMHG | OXYGEN SATURATION: 98 %

## 2023-07-11 DIAGNOSIS — I10 ESSENTIAL HYPERTENSION: ICD-10-CM

## 2023-07-11 DIAGNOSIS — F41.8 DEPRESSION WITH ANXIETY: ICD-10-CM

## 2023-07-11 DIAGNOSIS — C34.90 MALIGNANT NEOPLASM OF LUNG, UNSPECIFIED LATERALITY, UNSPECIFIED PART OF LUNG (HCC): ICD-10-CM

## 2023-07-11 DIAGNOSIS — J96.11 CHRONIC RESPIRATORY FAILURE WITH HYPOXIA (HCC): ICD-10-CM

## 2023-07-11 DIAGNOSIS — I50.22 CHRONIC SYSTOLIC CHF (CONGESTIVE HEART FAILURE) (HCC): ICD-10-CM

## 2023-07-11 DIAGNOSIS — Z00.00 MEDICARE ANNUAL WELLNESS VISIT, SUBSEQUENT: Primary | ICD-10-CM

## 2023-07-11 DIAGNOSIS — J44.9 CHRONIC OBSTRUCTIVE PULMONARY DISEASE, UNSPECIFIED COPD TYPE (HCC): ICD-10-CM

## 2023-07-11 RX ORDER — GUAIFENESIN AND CODEINE PHOSPHATE 100; 10 MG/5ML; MG/5ML
5 SOLUTION ORAL 3 TIMES DAILY PRN
COMMUNITY

## 2023-07-11 ASSESSMENT — PATIENT HEALTH QUESTIONNAIRE - PHQ9
9. THOUGHTS THAT YOU WOULD BE BETTER OFF DEAD, OR OF HURTING YOURSELF: 0
6. FEELING BAD ABOUT YOURSELF - OR THAT YOU ARE A FAILURE OR HAVE LET YOURSELF OR YOUR FAMILY DOWN: 0
10. IF YOU CHECKED OFF ANY PROBLEMS, HOW DIFFICULT HAVE THESE PROBLEMS MADE IT FOR YOU TO DO YOUR WORK, TAKE CARE OF THINGS AT HOME, OR GET ALONG WITH OTHER PEOPLE: 1
2. FEELING DOWN, DEPRESSED OR HOPELESS: 0
SUM OF ALL RESPONSES TO PHQ QUESTIONS 1-9: 4
3. TROUBLE FALLING OR STAYING ASLEEP: 0
5. POOR APPETITE OR OVEREATING: 1
SUM OF ALL RESPONSES TO PHQ QUESTIONS 1-9: 4
8. MOVING OR SPEAKING SO SLOWLY THAT OTHER PEOPLE COULD HAVE NOTICED. OR THE OPPOSITE, BEING SO FIGETY OR RESTLESS THAT YOU HAVE BEEN MOVING AROUND A LOT MORE THAN USUAL: 0
7. TROUBLE CONCENTRATING ON THINGS, SUCH AS READING THE NEWSPAPER OR WATCHING TELEVISION: 0
SUM OF ALL RESPONSES TO PHQ QUESTIONS 1-9: 4
4. FEELING TIRED OR HAVING LITTLE ENERGY: 3
SUM OF ALL RESPONSES TO PHQ QUESTIONS 1-9: 4
1. LITTLE INTEREST OR PLEASURE IN DOING THINGS: 0
SUM OF ALL RESPONSES TO PHQ9 QUESTIONS 1 & 2: 0

## 2023-07-11 ASSESSMENT — LIFESTYLE VARIABLES
HOW MANY STANDARD DRINKS CONTAINING ALCOHOL DO YOU HAVE ON A TYPICAL DAY: PATIENT DOES NOT DRINK
HOW OFTEN DO YOU HAVE A DRINK CONTAINING ALCOHOL: NEVER

## 2023-07-11 NOTE — PROGRESS NOTES
Medicare Annual Wellness Visit    Williams Goldman is here for Medicare AWV    Assessment & Plan   Medicare annual wellness visit, subsequent  -Patient has no dementia. He lives with his wife, patient able to do basic activities of daily living including driving. He was recently diagnosed with lung cancer but his able to deal with his condition fairly well. Malignant neoplasm of lung, unspecified laterality, unspecified part of lung ([address])  -Has a recent PET scan which showed some activity in the colon and will need to have colonoscopy to rule out colon cancer before he can be started on lung cancer treatment. Chronic obstructive pulmonary disease, unspecified COPD type ([address])  -He is stable on albuterol inhaler and Breo. Chronic respiratory failure with hypoxia (HCC)  -He is on a high concentration of oxygen 7- 8 L by nasal cannula    Depression with anxiety  -He is adjusting fairly well with his condition. He  takes Effexor 150 mg daily and Seroquel 25 mg at night. Essential hypertension  -Controlled. Continue Aldactone 25 mg daily and Toprol-XL 50 mg daily    Chronic systolic CHF (congestive heart failure) ([address])  -Stable. Continue losartan, Toprol, Aldactone and Lasix    Recommendations for Preventive Services Due: see orders and patient instructions/AVS.  Recommended screening schedule for the next 5-10 years is provided to the patient in written form: see Patient Instructions/AVS.     Return in 1 year (on 7/11/2024). Subjective   Patient is 78years old male patient with the COPD on Breo and albuterol inhaler, has chronic respiratory failure with hypoxia on continuous home oxygen. He was initially diagnosed to have pulmonary fibrosis following COVID-19 pneumonia but further work-up at [facility] revealed that he has lung cancer.   Has PET scan and other test for staging purposes and was found to have activity in the colon and was referred to gastroenterologist for possible colonoscopy to rule out

## 2023-07-12 ENCOUNTER — TELEPHONE (OUTPATIENT)
Dept: CARDIOLOGY CLINIC | Age: 78
End: 2023-07-12

## 2023-07-12 NOTE — TELEPHONE ENCOUNTER
Dr. Kiesha Max,     Patient seen in the office 3/27/23. Has a hx of AVB with Medtronic dual chamber pacemaker, Afib/flutter with ablation in 2021, COPD, HLD and HTN. EF 35% on ECHO 4/2022. EF improved to 50% on Echo 8/2022. Please advise if patient may proceed with colonoscopy on 7/17/23 and hold Eliquis.

## 2023-07-12 NOTE — TELEPHONE ENCOUNTER
CARDIAC CLEARANCE     What type of procedure are you having? COLONOSCOPY    Which physician is performing your procedure? DR. Gladys Teague    When is your procedure scheduled for?7/17/23    Where are you having this procedure? [address]    Are you taking Blood Thinners? ELIQUIS 5MG   If so what? (Name/dose/frequesncy)     Does the surgeon want you to stop your blood thinner? If so for how long? HOLD FOR 2 DAYS PRIOR TO PROCEDURE    Phone Number and Contact Name for Physicians CKEKFI:[phone]    Fax number to send [phone]

## 2023-07-14 NOTE — TELEPHONE ENCOUNTER
MD Margarita Tanner RN 18 hours ago (4:24 PM)       Risk of perioperative cardiac event is low with a colonoscopy.   Therefore may proceed

## 2023-07-24 DIAGNOSIS — F51.04 PSYCHOPHYSIOLOGICAL INSOMNIA: ICD-10-CM

## 2023-07-25 ENCOUNTER — PATIENT MESSAGE (OUTPATIENT)
Dept: PRIMARY CARE CLINIC | Age: 78
End: 2023-07-25

## 2023-07-26 NOTE — TELEPHONE ENCOUNTER
From: Zoila Garcia  To: Dr. Pizarro Seeds: 7/25/2023 6:40 PM EDT  Subject: estazolam    Dr Leny Tai and Cici Haynes indicates that they haven't received back the fax they sent about your approval to refill this. They are scheduled to refill it on July 29 once they have the approval.    Could you check on this, please?   Thanks  Chichi Ash

## 2023-08-06 ENCOUNTER — PATIENT MESSAGE (OUTPATIENT)
Dept: PRIMARY CARE CLINIC | Age: 78
End: 2023-08-06

## 2023-08-06 PROCEDURE — 93294 REM INTERROG EVL PM/LDLS PM: CPT | Performed by: INTERNAL MEDICINE

## 2023-08-06 PROCEDURE — 93296 REM INTERROG EVL PM/IDS: CPT | Performed by: INTERNAL MEDICINE

## 2023-08-07 RX ORDER — APIXABAN 5 MG/1
TABLET, FILM COATED ORAL
Qty: 180 TABLET | Refills: 2 | Status: SHIPPED | OUTPATIENT
Start: 2023-08-07

## 2023-08-07 NOTE — TELEPHONE ENCOUNTER
From: Zoila Garcia  To: Dr. Pizarro Seeds: 8/6/2023 5:38 PM EDT  Subject: seroquel/quetiapine    =Dr. Medina Clock,  You have a fax from [facility] to approve refilling the Seroquel/quetiapine. You told me to use this instead of Estazolam if I woke up during the night, which I have been doing. So the Rx refill date may be a bit early because I'm following your advice. So please approve the request from [facility].   Thank you  Chichi Ash

## 2023-08-07 NOTE — TELEPHONE ENCOUNTER
Last OV: 03/07/23  Next OV: NONE  Last refill: 6/7/2023  Most recent Labs: CBC,BMP 03/02/23  Last EKG (if needed): 03/07/23

## 2023-08-08 RX ORDER — FUROSEMIDE 40 MG/1
40 TABLET ORAL DAILY
Qty: 90 TABLET | Refills: 1 | Status: SHIPPED | OUTPATIENT
Start: 2023-08-08

## 2023-08-25 ENCOUNTER — PATIENT MESSAGE (OUTPATIENT)
Dept: PRIMARY CARE CLINIC | Age: 78
End: 2023-08-25

## 2023-08-25 DIAGNOSIS — F51.04 PSYCHOPHYSIOLOGICAL INSOMNIA: ICD-10-CM

## 2023-08-26 NOTE — TELEPHONE ENCOUNTER
From: Landon Hardin  To: Dr. Suraj Sullivan: 8/25/2023 3:45 PM EDT  Subject: Our Sept 18 appointment    Dr. Baca Nurse and Gabrielle Garcia,  Our next appointment is scheduled for Sept 18. My O2 home concentrator is set at 9.5 while the portable goes only to 6 as a pulse. My pulmonologist and oncologist both think I'd be putting myself at great risk trusting the lower O2 level, so I can't make the visit. However, I thought you have the possibility or an online visit, something like a Zoom visit. Can we do that? You'll need to send me instructions] about getting on your system. Thanks much. Kyle Casiano .. Aditi Booth

## 2023-09-12 ENCOUNTER — PATIENT MESSAGE (OUTPATIENT)
Dept: PRIMARY CARE CLINIC | Age: 78
End: 2023-09-12

## 2023-09-12 NOTE — TELEPHONE ENCOUNTER
From: Tello Mcdowell  To: Dr. Sanchez Cost: 9/12/2023 12:01 PM EDT  Subject: Stopping chemo    ,  Yesterday, the oncologist and I agreed that the chemotherapy was doing no real good in relieving the lung cancer symptoms, so we decided to stop it. I am going into hospice. The hospice I'll be using is [facility], which has been highly recommended by those who used their service. The good news for me is that you'll remain my primary care physician. I presume you know the ins and outs of hospice. I am pleased that we can continue our 25+ year relationship. I am at 10 lpm of oxygen, there is no portable machine that supports that. (The [place] portable goes only to 6 lpm as a pulse.) I am looking forward to our \"virtual\" visit. Thanks for everything.   Taylor Rondon

## 2023-09-13 ENCOUNTER — TELEPHONE (OUTPATIENT)
Dept: PRIMARY CARE CLINIC | Age: 78
End: 2023-09-13

## 2023-09-13 ENCOUNTER — PATIENT MESSAGE (OUTPATIENT)
Dept: PRIMARY CARE CLINIC | Age: 78
End: 2023-09-13

## 2023-09-13 NOTE — TELEPHONE ENCOUNTER
From: Tessy Espino  To: Dr. Aurora Ulloa: 9/13/2023 2:30 PM EDT  Subject: Sid Mccauley can't get through    Dr Zuleyma London and Bianca Sloan,  I am going to attempt to call you, but [facility] has been trying to contact you for the past 45 minutes--no one is answering the phone. I am discharged now from them, but hospice can't do anything until you send them my records. Please call me at [phone] which might simplify this, but here are their telephone and fax numbers:  P: [phone]  F: [phone]     I am really stuck now since I've been discharged by Sid Mccauley but cannot move forward with hospice until they have the needed records. So please take care of this ASAP. Thanks.   [facility]

## 2023-09-13 NOTE — TELEPHONE ENCOUNTER
D/C from home care requesting Hospice. The hospice he will be using is [facility].  Can you please send a referral?

## 2023-09-13 NOTE — TELEPHONE ENCOUNTER
Printed last AWV, multiple radiology repots, cardiology results, Patient Summary and Outpt Med list.        Faxed. Unable to print labs with flowsheet issues.

## 2023-09-13 NOTE — TELEPHONE ENCOUNTER
----- Message from Juliette Hurt sent at 9/13/2023 11:43 AM EDT -----  Subject: Message to Provider    QUESTIONS  Information for Provider? Hudson Cooney sent over a   request yesterday for medical records an order to evaluate and possibly   admit. That was sent over by [employer]. They said he is declining and   they needs orders so they can send someone out. Please call to advise   [phone]. Fax to? [phone].  ---------------------------------------------------------------------------  --------------  Chanelle REDDY  [phone]; OK to leave message on voicemail  ---------------------------------------------------------------------------  --------------  SCRIPT ANSWERS  Relationship to Patient? Covered Entity  Covered Entity Type? Other  Other Covered Entity Type? Hospice  Representative Name?  Hudson Cooney

## 2023-09-14 ENCOUNTER — TELEPHONE (OUTPATIENT)
Dept: PRIMARY CARE CLINIC | Age: 78
End: 2023-09-14

## 2023-09-14 DIAGNOSIS — C34.90 MALIGNANT NEOPLASM OF LUNG, UNSPECIFIED LATERALITY, UNSPECIFIED PART OF LUNG (HCC): Primary | ICD-10-CM

## 2023-09-14 NOTE — TELEPHONE ENCOUNTER
----- Message from Violeta Da Silva sent at 9/14/2023 10:47 AM EDT -----  Subject: Message to Provider    QUESTIONS  Information for Provider? Delgado Elliott from [facility] is calling for   the practice. She said she did receive everything but the Order for   Hospice. ---------------------------------------------------------------------------  --------------  Chanelle Reyes INFO  [phone]; OK to leave message on voicemail  ---------------------------------------------------------------------------  --------------  SCRIPT ANSWERS  Relationship to Patient? Covered Entity  Covered Entity Type? Other  Other Covered Entity Type? Hospice  Representative Name?  Delgado Elliott

## 2023-09-18 ENCOUNTER — TELEPHONE (OUTPATIENT)
Dept: PRIMARY CARE CLINIC | Age: 78
End: 2023-09-18

## 2023-09-18 NOTE — TELEPHONE ENCOUNTER
Diane with [facility] (formerly [facility] called in stating that they admitted PT into their services on Friday but PT would like to have Dr. Bryce Griffith continue to follow his care. They were calling to see if that would be okay with Dr. Bryce Griffith or if the hospice director should take over his care.      Please call back to adv: [phone]

## 2023-09-19 ENCOUNTER — TELEMEDICINE (OUTPATIENT)
Dept: PRIMARY CARE CLINIC | Age: 78
End: 2023-09-19

## 2023-09-19 DIAGNOSIS — I50.22 CHRONIC SYSTOLIC CHF (CONGESTIVE HEART FAILURE) (HCC): ICD-10-CM

## 2023-09-19 DIAGNOSIS — Z51.5 HOSPICE CARE PATIENT: ICD-10-CM

## 2023-09-19 DIAGNOSIS — C34.90 MALIGNANT NEOPLASM OF LUNG, UNSPECIFIED LATERALITY, UNSPECIFIED PART OF LUNG (HCC): ICD-10-CM

## 2023-09-19 DIAGNOSIS — F41.8 DEPRESSION WITH ANXIETY: ICD-10-CM

## 2023-09-19 DIAGNOSIS — I10 ESSENTIAL HYPERTENSION: ICD-10-CM

## 2023-09-19 DIAGNOSIS — J96.11 CHRONIC RESPIRATORY FAILURE WITH HYPOXIA (HCC): ICD-10-CM

## 2023-09-19 ASSESSMENT — ENCOUNTER SYMPTOMS
SHORTNESS OF BREATH: 1
VOICE CHANGE: 0
TROUBLE SWALLOWING: 0
ABDOMINAL PAIN: 0
DIARRHEA: 0
CONSTIPATION: 0
WHEEZING: 0
BLOOD IN STOOL: 0

## 2023-09-19 NOTE — PROGRESS NOTES
History of blood transfusion     Hyperlipidemia     Hypertension     IBS (irritable bowel syndrome) 10/16/2013    Insomnia     PAF (paroxysmal atrial fibrillation) ([address]) 08/10/2021    Sepsis ([address]) 11/27/2022    Sick sinus syndrome ([address]) 08/10/2021    Wears glasses    ,   Past Surgical History:   Procedure Laterality Date    ABLATION OF DYSRHYTHMIC FOCUS      APPENDECTOMY      BRONCHOSCOPY N/A 10/11/2022    BRONCHOSCOPY WITH BRONCHIOLAR ALVEOLAR LAVAGE performed by Mario Miller MD at [facility]  10/11/2022    BRONCHOSCOPY DIAGNOSTIC OR CELL [address] ONLY performed by Mario Miller MD at [address] [place], [place] [id number]      pacemaker    COLONOSCOPY  03/19/2019    [facility]    COLONOSCOPY N/A 03/19/2019    COLONOSCOPY WITH BIOPSY performed by Tip Howard MD at [address] [place] [address] N/A 03/19/2019    COLONOSCOPY POLYPECTOMY SNARE/COLD BIOPSY performed by Tip Howard MD at [facility]:  [ INSTRUCTIONS:  \"[x]\" Indicates a positive item  \"[]\" Indicates a negative item  -- DELETE ALL ITEMS NOT EXAMINED]  Vital Signs: (As obtained by patient/caregiver or practitioner observation)    Blood pressure-  Heart rate-    Respiratory rate-    Temperature-  Pulse oximetry-     Constitutional: [x] Appears well-developed and well-nourished [] No apparent distress      [] Abnormal-   Mental status  [x] Alert and awake  [] Oriented to person/place/time []Able to follow commands      Eyes:  EOM    [x]  Normal  [] Abnormal-  Sclera  []  Normal  [] Abnormal -         Discharge []  None visible  [] Abnormal -    HENT:   [x] Normocephalic, atraumatic.   [] Abnormal   [] Mouth/Throat: Mucous membranes are moist.     External Ears [x] Normal  [] Abnormal-     Neck: [x] No visualized mass     Pulmonary/Chest: [x] Respiratory effort normal.  [] No visualized signs of difficulty breathing or respiratory distress        []

## 2023-09-20 RX ORDER — METOPROLOL SUCCINATE 50 MG/1
TABLET, EXTENDED RELEASE ORAL
Qty: 90 TABLET | Refills: 3 | Status: SHIPPED | OUTPATIENT
Start: 2023-09-20

## 2023-09-20 NOTE — TELEPHONE ENCOUNTER
Last OV: 3/7/23  Next OV: requested to return in six months. Not scheduled. Last refill  8/16/22  Most recent Labs: BMP 3/2/23  Last EKG (if needed):    Called patient to schedule OV and patient said he is now in Hospice for a couple of weeks. Patient requested that Dr. Svitlana Smith please fill for him. Thank you.

## 2023-09-21 ENCOUNTER — TELEPHONE (OUTPATIENT)
Dept: PRIMARY CARE CLINIC | Age: 78
End: 2023-09-21

## 2023-09-21 NOTE — TELEPHONE ENCOUNTER
----- Message from VINCENZO sent at 9/21/2023 10:13 AM EDT -----  Subject: Message to Provider    QUESTIONS  Information for Provider? Marques Mariee at [facility] she faxed   a request to practice to see if PCP wants to stay as patient's attending   physician. She is requesting a call back with this information.  ---------------------------------------------------------------------------  --------------  CALL BACK INFO  [phone]; OK to leave message on voicemail  ---------------------------------------------------------------------------  --------------  SCRIPT ANSWERS  Relationship to Patient? Covered Entity  Covered Entity Type? Home Health Care? Representative Name?  Marques Mariee at [facility]

## 2023-10-19 ENCOUNTER — PATIENT MESSAGE (OUTPATIENT)
Dept: PRIMARY CARE CLINIC | Age: 78
End: 2023-10-19

## 2023-10-20 NOTE — TELEPHONE ENCOUNTER
From: Lynn Whiting  To: Dr. Omar Mireles: 10/19/2023 7:43 PM EDT  Subject: virtual visits    Dr. John Liu and Troy,  Since you're not able to contact any outside [facility] on your virtual visit set-up, you might try downloading Zoom. I use it a lot for meetings. It's free for the first 40 minutes of connection. See whether that helps. OK?     Roddy Yo

## 2023-10-29 ENCOUNTER — PATIENT MESSAGE (OUTPATIENT)
Dept: PRIMARY CARE CLINIC | Age: 78
End: 2023-10-29

## 2023-10-30 NOTE — TELEPHONE ENCOUNTER
From: Andrea Lion  To: Dr. Zoraida Nissen: 10/29/2023 12:56 AM EDT  Subject: Virtual visit    Dr. Aletha Banks and Troy,    I really don't want to change doctors. We've been together for 25+ years, and you know my history. If you like we can use the audio like we did last time. I'll still pay the fee. OR We could do it over the phone. I like and trust you and am not interested in having a new doctor at this critical point in my life. Let me know what you think, please.   Becky Arvizu

## 2023-11-03 ENCOUNTER — TELEPHONE (OUTPATIENT)
Dept: PRIMARY CARE CLINIC | Age: 78
End: 2023-11-03

## 2023-11-03 NOTE — TELEPHONE ENCOUNTER
----- Message from Erika Gallegos sent at 11/2/2023  3:49 PM EDT -----  Subject: Appointment Request    Reason for Call: Established Patient Appointment needed: Routine Existing   Condition Follow Up    QUESTIONS    Reason for appointment request? Available appointments did not meet   patient need     Additional Information for Provider? Patient would like a audio   appointment since he lives in [place]  ---------------------------------------------------------------------------  --------------  CALL BACK INFO  [phone]; OK to leave message on voicemail,Do not leave any message,   patient will call back for answer  ---------------------------------------------------------------------------  --------------  SCRIPT ANSWERS

## 2023-12-29 PROCEDURE — 93296 REM INTERROG EVL PM/IDS: CPT | Performed by: INTERNAL MEDICINE

## 2023-12-29 PROCEDURE — 93294 REM INTERROG EVL PM/LDLS PM: CPT | Performed by: INTERNAL MEDICINE

## 2024-01-17 NOTE — PROGRESS NOTES
2021     Zakia Mccabe (:  1945) is a 64 y.o. male, here for evaluation of the following medical concerns:    HPI  Patient presented to the office for follow-up. He has hypertension and blood pressure is currently uncontrolled. Apparently patient has been adjusting her own medicine. He stopped hydralazine and hydrochlorothiazide due to urinary frequency. He also reduce amlodipine from 5 to 2.5 mg daily. He still take losartan 100 mg daily. He is pacemaker is reported to be doing well per interrogation. He has A. fib and decided to switch to Eliquis  to Coumadin due to the cost of Eliquis $ 150 per month. He has anxiety with depression is stable on Effexor and Seroquel. He has insomnia and takes ProSom 2 mg 1 to 2 tablets at night. He denies chest pain or shortness of breath. Denies palpitation denies bowel disease disturbance    Review of Systems   Constitutional: Negative for activity change. HENT: Negative for trouble swallowing and voice change. Eyes: Negative for visual disturbance. Respiratory: Negative for shortness of breath. Cardiovascular: Negative for chest pain and leg swelling. Gastrointestinal: Negative for abdominal pain, blood in stool, constipation and diarrhea. Genitourinary: Negative for difficulty urinating, dysuria, frequency, hematuria and scrotal swelling. Musculoskeletal: Negative for arthralgias and myalgias. Skin: Negative for rash. Neurological: Negative for dizziness. Psychiatric/Behavioral: Negative for behavioral problems. Prior to Visit Medications    Medication Sig Taking? Authorizing Provider   amLODIPine (NORVASC) 5 MG tablet Take 1 tablet by mouth daily Yes Colleen Younger MD   hydrALAZINE (APRESOLINE) 50 MG tablet Take 1 tablet by mouth 2 times daily Yes Colleen Younger MD   warfarin (COUMADIN) 5 MG tablet Take 1 tablet by mouth daily Or as directed by the [address] [place] [address].   Patient taking differently: Assessment/Plan: Patient will be referred to general surgery for right inguinal mass/hernia.  Patient left CAT scan to confirm.  Patient will refer to urology for vasectomy evaluation.  Patient will go for laboratory studies.       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 male.      Patient is here with enlarged lymph node right inguinal region for the past 3 weeks.  No other lymph nodes being enlarged.  No significant pain noted.  No significant change urination or defecation.  No fevers or night sweats.  No significant abrasions or cuts on right lower extremity.  No abdominal pain.  Patient would like to have laboratory studies done.  Patient would like to consider seeing urology for possible vasectomy          The following portions of the patient's history were reviewed and updated as appropriate: allergies, current medications, past family history, past medical history, past social history, past surgical history and problem list.      Review of Systems   Constitutional: Negative.    HENT: Negative.     Eyes: Negative.    Respiratory: Negative.     Cardiovascular: Negative.    Gastrointestinal: Negative.    Endocrine: Negative.    Genitourinary:  "Negative.    Musculoskeletal: Negative.    Skin: Negative.    Allergic/Immunologic: Negative.    Neurological: Negative.    Hematological: Negative.    Psychiatric/Behavioral: Negative.             Objective:               /70   Pulse 64   Temp 98.7 °F (37.1 °C)   Resp 16   Ht 5' 11\" (1.803 m)   Wt 101 kg (222 lb)   SpO2 98%   BMI 30.96 kg/m²          Physical Exam  Vitals and nursing note reviewed.   Constitutional:       General: He is not in acute distress.     Appearance: Normal appearance. He is not ill-appearing, toxic-appearing or diaphoretic.   HENT:      Head: Normocephalic and atraumatic.      Right Ear: Tympanic membrane, ear canal and external ear normal. There is no impacted cerumen.      Left Ear: Tympanic membrane, ear canal and external ear normal. There is no impacted cerumen.      Nose: Nose normal. No congestion or rhinorrhea.      Mouth/Throat:      Mouth: Mucous membranes are moist.      Pharynx: No oropharyngeal exudate or posterior oropharyngeal erythema.   Eyes:      General: No scleral icterus.        Right eye: No discharge.         Left eye: No discharge.      Extraocular Movements: Extraocular movements intact.      Conjunctiva/sclera: Conjunctivae normal.      Pupils: Pupils are equal, round, and reactive to light.   Neck:      Vascular: No carotid bruit.   Cardiovascular:      Rate and Rhythm: Normal rate and regular rhythm.      Pulses: Normal pulses.      Heart sounds: Normal heart sounds. No murmur heard.     No friction rub. No gallop.   Pulmonary:      Effort: Pulmonary effort is normal. No respiratory distress.      Breath sounds: Normal breath sounds. No stridor. No wheezing, rhonchi or rales.   Chest:      Chest wall: No tenderness.   Abdominal:      General: Abdomen is flat. Bowel sounds are normal. There is no distension.      Palpations: Abdomen is soft.      Tenderness: There is no abdominal tenderness. There is no guarding or rebound.      Hernia: A hernia is " Take 2.5 mg by mouth daily Except 5 mg on Mondays and Thursdays or as directed by the [address] [place] [address]. # [id number]  Goal = 2 - 3 Yes Rajinder Bautista MD   losartan (COZAAR) 100 MG tablet Take 1 tablet by mouth daily Yes Ruth Mckenzie MD   metoprolol succinate (TOPROL XL) 25 MG extended release tablet Take 0.5 tablets by mouth daily Yes KLAUS Kimball - CNP   QUEtiapine (SEROQUEL) 25 MG tablet Take 1 tablet by mouth nightly  Ruth Mckenzie MD   estazolam (PROSOM) 2 MG tablet TAKE 1 TO 2 TABLETS BY MOUTH EVERY NIGHT AS NEEDED FOR SLEEP  Ruth Mckenzie MD   pravastatin (PRAVACHOL) 40 MG tablet Take 1 tablet by mouth daily  Ruth Mckenzie MD   Acetylcysteine (NAC) 600 MG CAPS Take 2 capsules by mouth daily  Historical Provider, MD   Omega-3 Fatty Acids (FISH OIL) 1200 MG CPDR Take 2 capsules by mouth daily  Historical Provider, MD   Echinacea 400 MG CAPS Take 2 capsules by mouth daily Takes once cap daily during summer.   Historical Provider, MD   melatonin 10 MG CAPS capsule Take 30 mg by mouth nightly  Historical Provider, MD   5-Hydroxytryptophan (5-HTP) 100 MG CAPS Take 3 capsules by mouth nightly  Historical Provider, MD   GAMMA AMINOBUTYRIC ACID PO Take 1,500 mg by mouth nightly  Historical Provider, MD   L-Tryptophan 500 MG TABS Take 3,000 mg by mouth nightly  Historical Provider, MD   L-Theanine 200 MG CAPS Take 600 mg by mouth nightly  Historical Provider, MD   vitamin D (CHOLECALCIFEROL) 125 MCG (5000 UT) CAPS capsule Take 5,000 Units by mouth daily  Historical Provider, MD   GUAIFENESIN PO Take 600 mg by mouth daily  Historical Provider, MD   Ginkgo Biloba 120 MG CAPS Take 240 mg by mouth daily  Historical Provider, MD   Multiple Vitamins-Minerals (MULTIVITAMIN) LIQD Take 30 mLs by mouth daily Has 80 mcg of vitamin K -2  Has 50 mg of ginsing root  Historical Provider, MD   Fluticasone furoate-vilanterol (BREO ELLIPTA) 200-25 MCG/INH AEPB inhaler Inhale 1 puff into the lungs daily Gunnar Santiago MD   venlafaxine (EFFEXOR XR) 150 MG extended release capsule Take 1 capsule by mouth daily  Gunnar Santiago MD   flecainide (TAMBOCOR) 50 MG tablet Take 1 tablet by mouth 2 times daily  KLAUS Kimball CNP   diphenoxylate-atropine (LOMOTIL) 2.5-0.025 MG per tablet Take 1 tablet by mouth 4 times daily as needed for Diarrhea for up to 60 days. Gunnar Santiago MD   albuterol sulfate HFA (VENTOLIN HFA) 108 (90 Base) MCG/ACT inhaler Inhale 2 puffs into the lungs every 6 hours as needed for Wheezing or Shortness of Breath  Gunnar Santiago MD   famotidine (PEPCID) 20 MG tablet Take 1 tablet by mouth 2 times daily  KLAUS Del Castillo CNP        Social History     Tobacco Use    Smoking status: Former Smoker     Packs/day: 1.00     Years: 20.00     Pack years: 20.00     Types: Cigarettes     Quit date: 1988     Years since quittin.2    Smokeless tobacco: Never Used   Substance Use Topics    Alcohol use: No        Vitals:    21 1334 21 1353   BP: (!) 160/86 (!) 151/82   Pulse: 82    Resp: 18    SpO2: 97%    Weight: 166 lb 6.4 oz (75.5 kg)      Estimated body mass index is 26.06 kg/m² as calculated from the following:    Height as of 21: 5' 7\" (1.702 m). Weight as of this encounter: 166 lb 6.4 oz (75.5 kg). Physical Exam  Constitutional:       General: He is not in acute distress. Appearance: He is well-developed. HENT:      Head: Normocephalic. Eyes:      Conjunctiva/sclera: Conjunctivae normal.   Neck:      Thyroid: No thyromegaly. Cardiovascular:      Rate and Rhythm: Normal rate and regular rhythm. Heart sounds: Normal heart sounds. No murmur heard. Pulmonary:      Effort: No respiratory distress. Breath sounds: Normal breath sounds. No wheezing or rales. Abdominal:      General: There is no distension. Palpations: Abdomen is soft. Musculoskeletal:         General: Normal range of motion.       Cervical back: Neck present.      Comments: Right inguinal mass/hernia   Musculoskeletal:         General: No swelling, tenderness, deformity or signs of injury. Normal range of motion.      Cervical back: Normal range of motion and neck supple. No rigidity. No muscular tenderness.      Right lower leg: No edema.      Left lower leg: No edema.   Lymphadenopathy:      Cervical: No cervical adenopathy.   Skin:     General: Skin is warm and dry.      Capillary Refill: Capillary refill takes less than 2 seconds.      Coloration: Skin is not jaundiced.      Findings: No bruising, erythema, lesion or rash.   Neurological:      General: No focal deficit present.      Mental Status: He is alert and oriented to person, place, and time.      Cranial Nerves: No cranial nerve deficit.      Sensory: No sensory deficit.      Motor: No weakness.      Coordination: Coordination normal.      Gait: Gait normal.   Psychiatric:         Mood and Affect: Mood normal.         Behavior: Behavior normal.         Thought Content: Thought content normal.         Judgment: Judgment normal.          supple. Skin:     General: Skin is warm. Findings: No rash. Neurological:      Mental Status: He is alert and oriented to person, place, and time. Psychiatric:         Behavior: Behavior normal.         ASSESSMENT/PLAN:  1. Essential hypertension  Uncontrolled due to poor compliance. Patient has been adjusting his own medicine. He stopped taking hydralazine and hydrochlorothiazide due to urinary frequency. He also reduce the dose of amlodipine from 5 to 2.5 mg. Patient advised to restart amlodipine at 5 mg. Start hydralazine from 25 mg to 50 mg twice daily. ( not TID for better compliance) . Continue losartan 100 mg daily. May DC hydrochlorothiazide due to urinary frequency    2. Hyperlipidemia LDL goal <100  Continue Pravachol 40 mg daily. 3. PAF (paroxysmal atrial fibrillation) ([address]  Continue Warfarin daily- dose adjusted at  [facility]. He has upcoming appointment with the [facility] and was told that he may need catheter ablation. . Might consider restarting atenolol for V. rate and BP control    4. Sick sinus syndrome (HCC)  S/P pacemaker placement. Reported to be doing fairly well  per recent interrogation. 5. Depression with anxiety  Stable. Continue Effexor 150 mg daily and Seroquel 25 mg at night.     6. Primary insomnia  He takes ProSom 2 mg tablet 1 to 2 tablets at night PRN for sleep      RTC in  3 mos    An electronic signature was used to authenticate this note.    --Bonnie Crowley MD on 11/24/2021 at 2:41 PM

## (undated) DEVICE — FORCEPS BX 240CM 2.4MM L NDL RAD JAW 4 M00513334

## (undated) DEVICE — SINGLE USE SUCTION VALVE MAJ-209: Brand: SINGLE USE SUCTION VALVE (STERILE)

## (undated) DEVICE — 60 ML SYRINGE REGULAR TIP: Brand: MONOJECT

## (undated) DEVICE — SINGLE USE BIOPSY VALVE MAJ-210: Brand: SINGLE USE BIOPSY VALVE (STERILE)

## (undated) DEVICE — SNARE ENDOSCP L240CM LOOP W13MM DIA2.4MM SHT THROW SM OVL

## (undated) DEVICE — SYRINGE MED 10ML POLYPR LUERSLIP TIP FLAT TOP W/O SFTY DISP

## (undated) DEVICE — MASK, AEROSOL, ELONGATED, ADULT: Brand: MEDLINE